# Patient Record
Sex: FEMALE | Race: BLACK OR AFRICAN AMERICAN | NOT HISPANIC OR LATINO | Employment: OTHER | URBAN - METROPOLITAN AREA
[De-identification: names, ages, dates, MRNs, and addresses within clinical notes are randomized per-mention and may not be internally consistent; named-entity substitution may affect disease eponyms.]

---

## 2017-01-11 ENCOUNTER — LAB VISIT (OUTPATIENT)
Dept: LAB | Facility: HOSPITAL | Age: 72
End: 2017-01-11
Attending: NUCLEAR MEDICINE
Payer: MEDICARE

## 2017-01-11 DIAGNOSIS — D35.00 PHEOCHROMOCYTOMA: Primary | ICD-10-CM

## 2017-01-11 LAB
ALBUMIN SERPL BCP-MCNC: 3.5 G/DL
ALP SERPL-CCNC: 96 U/L
ALT SERPL W/O P-5'-P-CCNC: 8 U/L
ANION GAP SERPL CALC-SCNC: 9 MMOL/L
AST SERPL-CCNC: 12 U/L
BASOPHILS # BLD AUTO: 0.01 K/UL
BASOPHILS NFR BLD: 0.3 %
BILIRUB SERPL-MCNC: 0.4 MG/DL
BUN SERPL-MCNC: 11 MG/DL
CALCIUM SERPL-MCNC: 9.3 MG/DL
CHLORIDE SERPL-SCNC: 108 MMOL/L
CO2 SERPL-SCNC: 25 MMOL/L
CREAT SERPL-MCNC: 0.9 MG/DL
DIFFERENTIAL METHOD: ABNORMAL
EOSINOPHIL # BLD AUTO: 0.2 K/UL
EOSINOPHIL NFR BLD: 4.6 %
ERYTHROCYTE [DISTWIDTH] IN BLOOD BY AUTOMATED COUNT: 14.7 %
EST. GFR  (AFRICAN AMERICAN): >60 ML/MIN/1.73 M^2
EST. GFR  (NON AFRICAN AMERICAN): >60 ML/MIN/1.73 M^2
GLUCOSE SERPL-MCNC: 89 MG/DL
HCT VFR BLD AUTO: 35.7 %
HGB BLD-MCNC: 11.5 G/DL
LYMPHOCYTES # BLD AUTO: 1 K/UL
LYMPHOCYTES NFR BLD: 27.4 %
MCH RBC QN AUTO: 29 PG
MCHC RBC AUTO-ENTMCNC: 32.2 %
MCV RBC AUTO: 90 FL
MONOCYTES # BLD AUTO: 0.5 K/UL
MONOCYTES NFR BLD: 12.4 %
NEUTROPHILS # BLD AUTO: 2.1 K/UL
NEUTROPHILS NFR BLD: 55.3 %
PLATELET # BLD AUTO: 238 K/UL
PMV BLD AUTO: 8.6 FL
POTASSIUM SERPL-SCNC: 4.2 MMOL/L
PROT SERPL-MCNC: 6.8 G/DL
RBC # BLD AUTO: 3.96 M/UL
SODIUM SERPL-SCNC: 142 MMOL/L
WBC # BLD AUTO: 3.72 K/UL

## 2017-01-11 PROCEDURE — 85025 COMPLETE CBC W/AUTO DIFF WBC: CPT

## 2017-01-11 PROCEDURE — 80053 COMPREHEN METABOLIC PANEL: CPT

## 2017-01-11 PROCEDURE — 36415 COLL VENOUS BLD VENIPUNCTURE: CPT

## 2017-01-17 DIAGNOSIS — C74.90 MALIGNANT PHEOCHROMOCYTOMA, UNSPECIFIED LATERALITY: Primary | ICD-10-CM

## 2017-02-01 ENCOUNTER — LAB VISIT (OUTPATIENT)
Dept: LAB | Facility: HOSPITAL | Age: 72
End: 2017-02-01
Attending: NUCLEAR MEDICINE
Payer: MEDICARE

## 2017-02-01 DIAGNOSIS — D35.00 PHEOCHROMOCYTOMA: Primary | ICD-10-CM

## 2017-02-01 LAB
ALBUMIN SERPL BCP-MCNC: 3.7 G/DL
ALP SERPL-CCNC: 104 U/L
ALT SERPL W/O P-5'-P-CCNC: 6 U/L
ANION GAP SERPL CALC-SCNC: 9 MMOL/L
AST SERPL-CCNC: 12 U/L
BASOPHILS # BLD AUTO: 0.01 K/UL
BASOPHILS NFR BLD: 0.2 %
BILIRUB SERPL-MCNC: 0.5 MG/DL
BUN SERPL-MCNC: 14 MG/DL
CALCIUM SERPL-MCNC: 9.7 MG/DL
CHLORIDE SERPL-SCNC: 106 MMOL/L
CO2 SERPL-SCNC: 25 MMOL/L
CREAT SERPL-MCNC: 0.9 MG/DL
DIFFERENTIAL METHOD: ABNORMAL
EOSINOPHIL # BLD AUTO: 0.2 K/UL
EOSINOPHIL NFR BLD: 4 %
ERYTHROCYTE [DISTWIDTH] IN BLOOD BY AUTOMATED COUNT: 14.6 %
EST. GFR  (AFRICAN AMERICAN): >60 ML/MIN/1.73 M^2
EST. GFR  (NON AFRICAN AMERICAN): >60 ML/MIN/1.73 M^2
GLUCOSE SERPL-MCNC: 91 MG/DL
HCT VFR BLD AUTO: 38.4 %
HGB BLD-MCNC: 12.4 G/DL
LYMPHOCYTES # BLD AUTO: 1.5 K/UL
LYMPHOCYTES NFR BLD: 27.2 %
MCH RBC QN AUTO: 29.3 PG
MCHC RBC AUTO-ENTMCNC: 32.3 %
MCV RBC AUTO: 91 FL
MONOCYTES # BLD AUTO: 0.3 K/UL
MONOCYTES NFR BLD: 5.7 %
NEUTROPHILS # BLD AUTO: 3.4 K/UL
NEUTROPHILS NFR BLD: 62.7 %
PLATELET # BLD AUTO: 238 K/UL
PMV BLD AUTO: 8.7 FL
POTASSIUM SERPL-SCNC: 4.3 MMOL/L
PROT SERPL-MCNC: 7 G/DL
RBC # BLD AUTO: 4.23 M/UL
SODIUM SERPL-SCNC: 140 MMOL/L
WBC # BLD AUTO: 5.48 K/UL

## 2017-02-01 PROCEDURE — 36415 COLL VENOUS BLD VENIPUNCTURE: CPT

## 2017-02-01 PROCEDURE — 86316 IMMUNOASSAY TUMOR OTHER: CPT

## 2017-02-01 PROCEDURE — 85025 COMPLETE CBC W/AUTO DIFF WBC: CPT

## 2017-02-01 PROCEDURE — 82542 COL CHROMOTOGRAPHY QUAL/QUAN: CPT

## 2017-02-01 PROCEDURE — 80053 COMPREHEN METABOLIC PANEL: CPT

## 2017-02-01 PROCEDURE — 83519 RIA NONANTIBODY: CPT

## 2017-02-01 PROCEDURE — 83519 RIA NONANTIBODY: CPT | Mod: 59

## 2017-02-06 ENCOUNTER — TELEPHONE (OUTPATIENT)
Dept: ENDOCRINOLOGY | Facility: CLINIC | Age: 72
End: 2017-02-06

## 2017-02-06 DIAGNOSIS — C74.91: Primary | ICD-10-CM

## 2017-02-06 LAB — CGA SERPL-MCNC: 21 NG/ML

## 2017-02-06 NOTE — TELEPHONE ENCOUNTER
----- Message from Neetu Diez sent at 2/6/2017  8:31 AM CST -----  Contact: Patient  Patient is requesting to complete labs at [facility] [place] location piror to 6/27/2017 follow up appointment.    Please call patient at [phone].

## 2017-02-10 LAB — PANCREASTATIN SERPL-MCNC: 98 PG/ML (ref 0–88)

## 2017-02-15 ENCOUNTER — LAB VISIT (OUTPATIENT)
Dept: LAB | Facility: HOSPITAL | Age: 72
End: 2017-02-15
Attending: NUCLEAR MEDICINE
Payer: MEDICARE

## 2017-02-15 DIAGNOSIS — C74.90 MALIGNANT PHEOCHROMOCYTOMA, UNSPECIFIED LATERALITY: ICD-10-CM

## 2017-02-15 LAB
ALBUMIN SERPL BCP-MCNC: 3.8 G/DL
ALP SERPL-CCNC: 107 U/L
ALT SERPL W/O P-5'-P-CCNC: 7 U/L
ANION GAP SERPL CALC-SCNC: 12 MMOL/L
AST SERPL-CCNC: 13 U/L
BASOPHILS # BLD AUTO: 0.02 K/UL
BASOPHILS NFR BLD: 0.4 %
BILIRUB SERPL-MCNC: 0.5 MG/DL
BUN SERPL-MCNC: 14 MG/DL
CALCIUM SERPL-MCNC: 9.8 MG/DL
CHLORIDE SERPL-SCNC: 105 MMOL/L
CO2 SERPL-SCNC: 23 MMOL/L
CREAT SERPL-MCNC: 0.8 MG/DL
DIFFERENTIAL METHOD: ABNORMAL
EOSINOPHIL # BLD AUTO: 0.2 K/UL
EOSINOPHIL NFR BLD: 3 %
ERYTHROCYTE [DISTWIDTH] IN BLOOD BY AUTOMATED COUNT: 14.6 %
EST. GFR  (AFRICAN AMERICAN): >60 ML/MIN/1.73 M^2
EST. GFR  (NON AFRICAN AMERICAN): >60 ML/MIN/1.73 M^2
GLUCOSE SERPL-MCNC: 82 MG/DL
HCT VFR BLD AUTO: 38.7 %
HGB BLD-MCNC: 12.6 G/DL
LYMPHOCYTES # BLD AUTO: 1.9 K/UL
LYMPHOCYTES NFR BLD: 33.4 %
MCH RBC QN AUTO: 29.4 PG
MCHC RBC AUTO-ENTMCNC: 32.6 %
MCV RBC AUTO: 90 FL
MONOCYTES # BLD AUTO: 0.6 K/UL
MONOCYTES NFR BLD: 10.4 %
NEUTROPHILS # BLD AUTO: 3 K/UL
NEUTROPHILS NFR BLD: 52.8 %
PLATELET # BLD AUTO: 250 K/UL
PMV BLD AUTO: 8.9 FL
POTASSIUM SERPL-SCNC: 3.8 MMOL/L
PROT SERPL-MCNC: 7.3 G/DL
RBC # BLD AUTO: 4.29 M/UL
SODIUM SERPL-SCNC: 140 MMOL/L
WBC # BLD AUTO: 5.66 K/UL

## 2017-02-15 PROCEDURE — 36415 COLL VENOUS BLD VENIPUNCTURE: CPT

## 2017-02-15 PROCEDURE — 85025 COMPLETE CBC W/AUTO DIFF WBC: CPT

## 2017-02-15 PROCEDURE — 80053 COMPREHEN METABOLIC PANEL: CPT

## 2017-02-16 LAB — NEUROKININ A SERPL-MCNC: 18 PG/ML

## 2017-02-22 ENCOUNTER — HOSPITAL ENCOUNTER (OUTPATIENT)
Dept: RADIOLOGY | Facility: HOSPITAL | Age: 72
Discharge: HOME | End: 2017-02-22
Attending: NUCLEAR MEDICINE
Payer: MEDICARE

## 2017-02-22 DIAGNOSIS — C74.90 PHEOCHROMOCYTOMA, MALIGNANT, UNSPECIFIED LATERALITY: ICD-10-CM

## 2017-02-22 PROCEDURE — 74178 CT ABD&PLV WO CNTR FLWD CNTR: CPT | Mod: 26,,, | Performed by: RADIOLOGY

## 2017-02-22 PROCEDURE — 74178 CT ABD&PLV WO CNTR FLWD CNTR: CPT | Mod: TC

## 2017-02-22 PROCEDURE — 25500020 PHARM REV CODE 255: Performed by: NUCLEAR MEDICINE

## 2017-02-22 RX ADMIN — IOHEXOL 75 ML: 350 INJECTION, SOLUTION INTRAVENOUS at 10:02

## 2017-02-22 RX ADMIN — IOHEXOL 30 ML: 350 INJECTION, SOLUTION INTRAVENOUS at 08:02

## 2017-02-23 ENCOUNTER — HOSPITAL ENCOUNTER (OUTPATIENT)
Dept: RADIOLOGY | Facility: HOSPITAL | Age: 72
Discharge: HOME | End: 2017-02-23
Attending: NUCLEAR MEDICINE
Payer: MEDICARE

## 2017-02-23 DIAGNOSIS — C74.90 PHEOCHROMOCYTOMA, MALIGNANT, UNSPECIFIED LATERALITY: ICD-10-CM

## 2017-02-23 DIAGNOSIS — D35.00 PHEOCHROMOCYTOMA, UNSPECIFIED LATERALITY: Primary | ICD-10-CM

## 2017-02-23 PROCEDURE — 78803 RP LOCLZJ TUM SPECT 1 AREA: CPT | Mod: TC

## 2017-02-23 PROCEDURE — 78804 RP LOCLZJ TUM WHBDY 2+D IMG: CPT | Mod: 26,,, | Performed by: RADIOLOGY

## 2017-02-23 PROCEDURE — 78803 RP LOCLZJ TUM SPECT 1 AREA: CPT | Mod: 26,,, | Performed by: RADIOLOGY

## 2017-02-24 ENCOUNTER — HOSPITAL ENCOUNTER (OUTPATIENT)
Dept: RADIOLOGY | Facility: HOSPITAL | Age: 72
Discharge: HOME | End: 2017-02-24
Attending: NUCLEAR MEDICINE
Payer: MEDICARE

## 2017-02-24 DIAGNOSIS — D35.00 PHEOCHROMOCYTOMA, UNSPECIFIED LATERALITY: ICD-10-CM

## 2017-02-24 PROCEDURE — 78804 RP LOCLZJ TUM WHBDY 2+D IMG: CPT | Mod: TC

## 2017-02-24 PROCEDURE — 78803 RP LOCLZJ TUM SPECT 1 AREA: CPT | Mod: 26,,, | Performed by: RADIOLOGY

## 2017-02-24 PROCEDURE — 78803 RP LOCLZJ TUM SPECT 1 AREA: CPT | Mod: TC

## 2017-02-24 PROCEDURE — A4641 RADIOPHARM DX AGENT NOC: HCPCS

## 2017-03-01 LAB — 5-HIAA, PLASMA (NEUROEND): 11 NG/ML

## 2017-03-06 RX ORDER — BLOOD-GLUCOSE METER
EACH MISCELLANEOUS
Qty: 1 EACH | Refills: 0 | Status: SHIPPED | OUTPATIENT
Start: 2017-03-06

## 2017-05-09 RX ORDER — HYDROCORTISONE 20 MG/1
TABLET ORAL
Qty: 135 TABLET | Refills: 2 | Status: SHIPPED | OUTPATIENT
Start: 2017-05-09 | End: 2017-12-08 | Stop reason: SDUPTHER

## 2017-06-20 ENCOUNTER — OFFICE VISIT (OUTPATIENT)
Dept: OPTOMETRY | Facility: CLINIC | Age: 72
End: 2017-06-20
Payer: MEDICARE

## 2017-06-20 ENCOUNTER — LAB VISIT (OUTPATIENT)
Dept: LAB | Facility: HOSPITAL | Age: 72
End: 2017-06-20
Attending: INTERNAL MEDICINE
Payer: MEDICARE

## 2017-06-20 DIAGNOSIS — H52.4 HYPEROPIA WITH PRESBYOPIA, BILATERAL: ICD-10-CM

## 2017-06-20 DIAGNOSIS — H52.03 HYPEROPIA WITH PRESBYOPIA, BILATERAL: ICD-10-CM

## 2017-06-20 DIAGNOSIS — H25.13 NUCLEAR SCLEROSIS, BILATERAL: ICD-10-CM

## 2017-06-20 DIAGNOSIS — E11.9 TYPE 2 DIABETES MELLITUS WITHOUT OPHTHALMIC MANIFESTATIONS: Primary | ICD-10-CM

## 2017-06-20 DIAGNOSIS — C74.91: ICD-10-CM

## 2017-06-20 PROCEDURE — 83835 ASSAY OF METANEPHRINES: CPT

## 2017-06-20 PROCEDURE — 36415 COLL VENOUS BLD VENIPUNCTURE: CPT

## 2017-06-20 PROCEDURE — 92015 DETERMINE REFRACTIVE STATE: CPT | Mod: S$GLB,,, | Performed by: OPTOMETRIST

## 2017-06-20 PROCEDURE — 99999 PR PBB SHADOW E&M-EST. PATIENT-LVL II: CPT | Mod: PBBFAC,,, | Performed by: OPTOMETRIST

## 2017-06-20 PROCEDURE — 92004 COMPRE OPH EXAM NEW PT 1/>: CPT | Mod: S$GLB,,, | Performed by: OPTOMETRIST

## 2017-06-20 NOTE — PROGRESS NOTES
"HPI     Today is patient's first eye exam. Using +2.50 OTC readers "all the time"    When reading and watching tv. Doesn't wear them when driving.   Occasionally sees floater OD.  Patient denies diplopia, headaches, flashes, and pain.    Hemoglobin A1C       Date                     Value               Ref Range             Status                12/20/2016               5.4                 4.5 - 6.2 %           Final                  Last edited by Johanna Jensen on 6/20/2017  8:29 AM. (History)        ROS     Negative for: Constitutional, Gastrointestinal, Neurological, Skin,   Genitourinary, Musculoskeletal, HENT, Endocrine, Cardiovascular, Eyes,   Respiratory, Psychiatric, Allergic/Imm, Heme/Lymph    Last edited by Mei Hunter, OD on 6/20/2017  9:27 AM. (History)        Assessment /Plan     For exam results, see Encounter Report.    Type 2 diabetes mellitus without ophthalmic manifestations    Nuclear sclerosis, bilateral    Hyperopia with presbyopia, bilateral            1. No retinopathy--monitor yearly.  Educated pt eye health correlates with blood sugar control.    2. Educated on cataracts and affects on vision.  Monitor.  3. Bifocal rx given        RTC 1 year for dm exam.                   "

## 2017-06-24 LAB
METANEPH FREE SERPL-MCNC: <25 PG/ML
METANEPHS SERPL-MCNC: 1350 PG/ML
NORMETANEPH FREE SERPL-MCNC: 1350 PG/ML

## 2017-06-27 ENCOUNTER — OFFICE VISIT (OUTPATIENT)
Dept: ENDOCRINOLOGY | Facility: CLINIC | Age: 72
End: 2017-06-27
Payer: MEDICARE

## 2017-06-27 VITALS
BODY MASS INDEX: 33.38 KG/M2 | WEIGHT: 200.38 LBS | DIASTOLIC BLOOD PRESSURE: 80 MMHG | HEIGHT: 65 IN | HEART RATE: 62 BPM | SYSTOLIC BLOOD PRESSURE: 132 MMHG

## 2017-06-27 DIAGNOSIS — E11.9 CONTROLLED TYPE 2 DIABETES MELLITUS WITHOUT COMPLICATION, WITHOUT LONG-TERM CURRENT USE OF INSULIN: ICD-10-CM

## 2017-06-27 DIAGNOSIS — C74.91: Primary | ICD-10-CM

## 2017-06-27 DIAGNOSIS — E27.40 HYPOADRENALISM: ICD-10-CM

## 2017-06-27 PROCEDURE — 1125F AMNT PAIN NOTED PAIN PRSNT: CPT | Mod: S$GLB,,, | Performed by: INTERNAL MEDICINE

## 2017-06-27 PROCEDURE — 1159F MED LIST DOCD IN RCRD: CPT | Mod: S$GLB,,, | Performed by: INTERNAL MEDICINE

## 2017-06-27 PROCEDURE — 99214 OFFICE O/P EST MOD 30 MIN: CPT | Mod: S$GLB,,, | Performed by: INTERNAL MEDICINE

## 2017-06-27 PROCEDURE — 4010F ACE/ARB THERAPY RXD/TAKEN: CPT | Mod: S$GLB,,, | Performed by: INTERNAL MEDICINE

## 2017-06-27 PROCEDURE — 3044F HG A1C LEVEL LT 7.0%: CPT | Mod: S$GLB,,, | Performed by: INTERNAL MEDICINE

## 2017-06-27 PROCEDURE — 99999 PR PBB SHADOW E&M-EST. PATIENT-LVL III: CPT | Mod: PBBFAC,,, | Performed by: INTERNAL MEDICINE

## 2017-06-27 NOTE — PROGRESS NOTES
CC: f/u for pheochromocytoma    HPI: 66 year old female s/p bilat adrenalectomy  and found to have pheochromocytoma.  Current meds: hydrocortisone 20mg qam, 10qpm, fludrocortisone 0.15mg daily.   Patient has lower back pain for 2 months and still has dyspnea on exertion.  Blood pressure is well controlled.  No palpitations, anxiety, pallor. Has low back pain and left knee pain.  Low back pain x 2 weeks without known injury.  She ran out of hydrocortisone 2 weks ago.  Pt was treated with I-131 labeled MIBG at [place] on 3/16/2016 and on 2016, .. She received 200 mCi of I 131 x 3.. Thyroid was blocked with SSKI.  BP good at home. She is taking losartin 50 mg/day and Nifedical 60 mg/day.    PAST MEDICAL HISTORY: Surgery, appendectomy, bilateral adrenalectomy.   Medical illnesses, longstanding hypertension.     FAMILY HISTORY: Father  at 79, heart disease. Mother  at 88,   heart disease.     SOCIAL HISTORY: Does not drink or smoke.    ROS:   Constitutional: No recent significant weight change  Eyes: No recent visual changes  ENT: No dysphagia  Cardiovascular: intermittent chest pain+  Respiratory: IGNACIO+  Gastrointestinal: Denies recent bowel disturbances  GenitoUrinary - No dysuria  Skin: No new skin rash  Neurologic: No focal neurologic complaints. Has low back pain X several months radiating both legs..  All other systems reviewed and are negative.    PE: GENERAL: Well developed, well nourished.  BMI 33.35 Weight 200.4 pounds /80  PSYCH: AAOx3, appropriate mood and affect, pleasant expression  EYES: Conjunctiva, corneas clear  NECK: Supple, trachea midline, no thyromegaly or nodules  CHEST: Resp even and unlabored, CTA bilateral.  CARDIAC: RRR, S1, S2 heard, no murmurs   ABDOMEN: Soft, non-tender, non-distended; +BS  VASCULAR: DP pulses +2/4 bilaterally, no edema  NEURO: Gait steady, CN II-VII grossly intact, 5/5 strength major flexors/extensors.  SKIN: no acanthosis  mirs.    Results for orders placed or performed in visit on 06/20/17   METANEPHRINES, PLASMA FREE   Result Value Ref Range    Metanephrine, Free <25 < OR = 57 pg/mL    Metanephrine, Total, Plasma 1350 (H) < OR = 205 pg/mL    Normetanephrine, Free 1350 (H) < OR = 148 pg/mL     IMPRESSION: Bilateral adrenalectomy  Metastatic pheochromocytoma  Hypertension  Low back pain.  Type 2 diabetes. Controlled  Normetanephrines 1350 very high.    RECOMMENDATIONS:Will let Dr Cruz know.

## 2017-06-28 ENCOUNTER — TELEPHONE (OUTPATIENT)
Dept: NEUROLOGY | Facility: HOSPITAL | Age: 72
End: 2017-06-28

## 2017-06-28 NOTE — TELEPHONE ENCOUNTER
----- Message from Lani Fields sent at 6/28/2017 10:48 AM CDT -----  Contact: Patient  CAMP- Patients alternate number  [phone]

## 2017-06-28 NOTE — TELEPHONE ENCOUNTER
----- Message from Lani Fields sent at 6/28/2017 10:45 AM CDT -----  Contact: Patient  CAMP- Patient regarding high level blood cmp and metanephrine. Dr Burton recommended that patient call Dr. Martines. Patients contact number [phone]

## 2017-06-28 NOTE — TELEPHONE ENCOUNTER
Phoned Dr Martines with the recent lab results.  He is trying to get in touch with Dr Burton to discuss the next steps in patient's plan of care.  Returned call to patient and informed her of this and stated that either myself or someone from Dr Burton office will be back in touch with her to discuss what is next in her treatment.  She verbalized understansding.

## 2017-07-06 ENCOUNTER — TELEPHONE (OUTPATIENT)
Dept: ENDOCRINOLOGY | Facility: CLINIC | Age: 72
End: 2017-07-06

## 2017-07-06 DIAGNOSIS — C74.91: Primary | ICD-10-CM

## 2017-07-06 NOTE — TELEPHONE ENCOUNTER
----- Message from Deborah Cox sent at 7/6/2017 11:57 AM CDT -----  Contact: Claudia /  [phone]  Claudia has some questions to ask Dr. Burton. Claudia can be reached at [phone].

## 2017-07-17 ENCOUNTER — TELEPHONE (OUTPATIENT)
Dept: ENDOCRINOLOGY | Facility: CLINIC | Age: 72
End: 2017-07-17

## 2017-07-17 NOTE — TELEPHONE ENCOUNTER
----- Message from Ursula Kahn sent at 7/17/2017 11:10 AM CDT -----  Contact: Pt's Sister Claudia Washburn [phone]  Pt's Sister Claudia Washburn called to speak to the nurse regarding the pt's care and appt for testing and would like a call back.    Ms Washburn can be reached at [phone].    Thanks

## 2017-07-24 ENCOUNTER — TELEPHONE (OUTPATIENT)
Dept: RADIOLOGY | Facility: HOSPITAL | Age: 72
End: 2017-07-24

## 2017-07-25 ENCOUNTER — HOSPITAL ENCOUNTER (OUTPATIENT)
Dept: RADIOLOGY | Facility: HOSPITAL | Age: 72
Discharge: HOME | End: 2017-07-25
Attending: INTERNAL MEDICINE
Payer: MEDICARE

## 2017-07-25 DIAGNOSIS — C74.91: ICD-10-CM

## 2017-07-25 PROCEDURE — 78804 RP LOCLZJ TUM WHBDY 2+D IMG: CPT | Mod: 26,,, | Performed by: NUCLEAR MEDICINE

## 2017-07-25 RX ORDER — METFORMIN HYDROCHLORIDE 500 MG/1
TABLET, EXTENDED RELEASE ORAL
Qty: 180 TABLET | Refills: 2 | Status: SHIPPED | OUTPATIENT
Start: 2017-07-25 | End: 2018-02-23 | Stop reason: SDUPTHER

## 2017-07-26 ENCOUNTER — HOSPITAL ENCOUNTER (OUTPATIENT)
Dept: RADIOLOGY | Facility: HOSPITAL | Age: 72
Discharge: HOME | End: 2017-07-26
Attending: INTERNAL MEDICINE
Payer: MEDICARE

## 2017-07-26 PROCEDURE — 78804 RP LOCLZJ TUM WHBDY 2+D IMG: CPT | Mod: TC

## 2017-07-26 PROCEDURE — A4641 RADIOPHARM DX AGENT NOC: HCPCS

## 2017-07-31 ENCOUNTER — TELEPHONE (OUTPATIENT)
Dept: ENDOCRINOLOGY | Facility: CLINIC | Age: 72
End: 2017-07-31

## 2017-07-31 DIAGNOSIS — C74.91: Primary | ICD-10-CM

## 2017-07-31 NOTE — TELEPHONE ENCOUNTER
Please call patient about results. And let me know if we have to see her again. We saw her in June. You didn't tell me to see her again so soon.

## 2017-07-31 NOTE — TELEPHONE ENCOUNTER
----- Message from Laxmi Emerson sent at 7/31/2017  8:39 AM CDT -----  Contact: pt's sister  Claudia Washburn   Sister    Ph [phone] --    Pt is still sick   And can hardly work    Dr Daniel Martines spoke to Dr Burton about pt   -     Tumor    1)   Needs test results    2) pt was suppose be seen as soon as possible     Please call sister     Thanks

## 2017-08-17 ENCOUNTER — OFFICE VISIT (OUTPATIENT)
Dept: SURGERY | Facility: CLINIC | Age: 72
End: 2017-08-17
Payer: MEDICARE

## 2017-08-17 VITALS
WEIGHT: 201.94 LBS | HEIGHT: 65 IN | DIASTOLIC BLOOD PRESSURE: 77 MMHG | TEMPERATURE: 98 F | SYSTOLIC BLOOD PRESSURE: 125 MMHG | BODY MASS INDEX: 33.65 KG/M2 | HEART RATE: 66 BPM

## 2017-08-17 DIAGNOSIS — C74.91: ICD-10-CM

## 2017-08-17 DIAGNOSIS — D35.00 PHEOCHROMOCYTOMA, UNSPECIFIED LATERALITY: Primary | ICD-10-CM

## 2017-08-17 PROCEDURE — 1126F AMNT PAIN NOTED NONE PRSNT: CPT | Mod: S$GLB,,, | Performed by: SURGERY

## 2017-08-17 PROCEDURE — 1159F MED LIST DOCD IN RCRD: CPT | Mod: S$GLB,,, | Performed by: SURGERY

## 2017-08-17 PROCEDURE — 3074F SYST BP LT 130 MM HG: CPT | Mod: S$GLB,,, | Performed by: SURGERY

## 2017-08-17 PROCEDURE — 99999 PR PBB SHADOW E&M-EST. PATIENT-LVL III: CPT | Mod: PBBFAC,,, | Performed by: SURGERY

## 2017-08-17 PROCEDURE — 3078F DIAST BP <80 MM HG: CPT | Mod: S$GLB,,, | Performed by: SURGERY

## 2017-08-17 PROCEDURE — 99213 OFFICE O/P EST LOW 20 MIN: CPT | Mod: S$GLB,,, | Performed by: SURGERY

## 2017-08-17 PROCEDURE — 3008F BODY MASS INDEX DOCD: CPT | Mod: S$GLB,,, | Performed by: SURGERY

## 2017-08-17 NOTE — Clinical Note
F/u with me 1 week after CT scans to discuss scheduling surgery. Cayla, please present at the next endocrine conference with diagnosis of malignant pheochromocytoma.

## 2017-08-17 NOTE — LETTER
[facility] - General Surgery  [address]  [facility] [address]  Phone: [phone] August 19, 2017      Daniel Martines Jr., MD  [address]  [address]  [facility] [address]    Patient: Hailey Sawant   MR Number: [id number]   YOB: 1945   Date of Visit: 8/17/2017     Dear Dr. Martines:    Thank you for referring Hailey Sawant to me for evaluation.     The patient is well known to me from [facility] [place] from between [facility].  I had performed bilateral adrenalectomies on her in the past and she developed malignant metastatic pheochromocytoma to the humerus treated with radioactive labeled MIBG tracer for ablation.     Recently her metanephrines and catecholamines have increased.  She had a nuclear medicine MIBG study which revealed disease in the right adrenal bed near the superior pole of the right kidney and right para-aortic region. There was no uptake in the humerus.  With her still requiring 3 medications for hypertension control and control of the distant disease she is being referred to consider palliative resection of the right adrenal bed and right para-aortic lymph node since the metastatic disease appears to be well controlled with previous radioactive MIBG ablation.    The patient would consider repeat surgery which would need to be an open approach.  I would like to repeat a CT scan of the chest abdomen and pelvis with oral and IV contrast to assess the extent of the disease before entertaining the surgery.    I would like to get the CT scan of the chest abdomen and pelvis and then re-present her at our multidisciplinary endocrine conference. I would like to get a baseline 24-hour urine for catecholamines and metanephrines since the recent increase have been serum values and we do not have a more confirmatory of urinary valuation in the record.  She denies any weight loss at the present time.     She has had about a one-month history of low back pain and  left abdominal cramping but denies any other symptoms. She will follow-up with me 1 week after the CT scans to discuss surgical scheduling for palliative resection of the residual/persistent disease at the right adrenal bed at the superior aspect of the right kidney as well as the para-aortic right-sided lymphadenopathy seen on MIBG scanning to hopefully help better control her hypertension which is currently being controlled with 3 medications.  The patient is also on chronic steroid replacement due to the history of bilateral adrenalectomies.    Time spent with the patient today and her sister was 30 minutes with greater than 50% of time in counseling.  She would need a full bowel prep for the surgery as well as type and cross match for 2 units of blood.    If you have questions, please do not hesitate to call me. I look forward to following Hailey Sawant along with you.    Sincerely,      Sandoval Castillo MD  Medical Director, [facility]  Section of General and Oncologic Surgery  [facility]    RLC/hcr

## 2017-08-19 NOTE — PROGRESS NOTES
Progress Notes        CC: f/u for pheochromocytoma    HPI: 66 year old female s/p bilat adrenalectomy  and found to have pheochromocytoma.  Current meds: hydrocortisone 20mg qam, 10qpm, fludrocortisone 0.15mg daily.   Patient has lower back pain for 2 months and still has dyspnea on exertion.  Blood pressure is well controlled.  No palpitations, anxiety, pallor. Has low back pain and left knee pain.  Low back pain x 2 weeks without known injury.  She ran out of hydrocortisone 2 weks ago.  Pt was treated with I-131 labeled MIBG at [place] on 3/16/2016 and on 2016, 113.. She received 200 mCi of I 131 x 3.. Thyroid was blocked with SSKI.  BP good at home. She is taking losartin 50 mg/day and Nifedical 60 mg/day.    PAST MEDICAL HISTORY: Surgery, appendectomy, bilateral adrenalectomy.   Medical illnesses, longstanding hypertension.     FAMILY HISTORY: Father  at 79, heart disease. Mother  at 88,   heart disease.     SOCIAL HISTORY: Does not drink or smoke.    ROS:   Constitutional: No recent significant weight change  Eyes: No recent visual changes  ENT: No dysphagia  Cardiovascular: intermittent chest pain+  Respiratory: IGNACIO+  Gastrointestinal: Denies recent bowel disturbances  GenitoUrinary - No dysuria  Skin: No new skin rash  Neurologic: No focal neurologic complaints. Has low back pain X several months radiating both legs..  All other systems reviewed and are negative.    PE: GENERAL: Well developed, well nourished.  BMI 33.35 Weight 200.4 pounds /80  PSYCH: AAOx3, appropriate mood and affect, pleasant expression  EYES: Conjunctiva, corneas clear  NECK: Supple, trachea midline, no thyromegaly or nodules  CHEST: Resp even and unlabored, CTA bilateral.  CARDIAC: RRR, S1, S2 heard, no murmurs   ABDOMEN: Soft, non-tender, non-distended; +BS  VASCULAR: DP pulses +2/4 bilaterally, no edema  NEURO: Gait steady, CN II-VII grossly intact, 5/5 strength major flexors/extensors.  SKIN: no  acanthosis nigracans.         Results for orders placed or performed in visit on 06/20/17   METANEPHRINES, PLASMA FREE   Result Value Ref Range     Metanephrine, Free <25 < OR = 57 pg/mL     Metanephrine, Total, Plasma 1350 (H) < OR = 205 pg/mL     Normetanephrine, Free 1350 (H) < OR = 148 pg/mL      IMPRESSION: Bilateral adrenalectomy  Metastatic pheochromocytoma  Hypertension  Low back pain.  Type 2 diabetes. Controlled  Normetanephrines 1350 very high.        The patient is well known to me from [facility], [place] from between [facility].  I had performed bilateral adrenalectomies on her in the past and she developed malignant metastatic pheochromocytoma to the humerus treated with radioactive labeled MIBG tracer for ablation.    Recently her metanephrines and catecholamines have increased.  She had a nuclear medicine MIBG study which revealed disease in the right adrenal bed near the superior pole of the right kidney and right para-aortic region.  There was no uptake in the humerus.  With her still requiring 3 medications for hypertension control and control of the distant disease she is being referred to consider palliative resection of the right adrenal bed and right para-aortic lymph node since the metastatic disease appears to be well controlled with previous radioactive MIBG ablation.  The patient would consider repeat surgery which would need to be an open approach.  I would like to repeat a CT scan of the chest abdomen and pelvis with oral and IV contrast to assess the extent of the disease before entertaining the surgery.  I would like to get the CT scan of the chest abdomen and pelvis and then re-present her at our multidisciplinary endocrine conference.  I would like to get a baseline 24-hour urine for catecholamines and metanephrines since the recent increase have been serum values and we do not have a more conference of urinary valuation the record  She denies any weight loss at the present  time.  She's had about a one-month history of low back pain and left abdominal cramping but denies any other symptoms.  She will follow-up with me progress in 1 week after the CT scans to discuss surgical scheduling for palliative resection of the residual/persistent disease at the right adrenal bed at the superior aspect of the right kidney as well as the para-aortic right-sided lymphadenopathy seen on MIBG scanning to hopefully help better control her hypertension which is currently being controlled with 3 medications.  The patient is also on chronic steroid replacement due to the history of bilateral adrenalectomies.  Time spent with the patient today and her sister was 30 minutes with greater than 50% of time in counseling.  She would need a full bowel prep for the surgery as well as type and cross match for 2 units of blood.

## 2017-08-21 ENCOUNTER — LAB VISIT (OUTPATIENT)
Dept: LAB | Facility: HOSPITAL | Age: 72
End: 2017-08-21
Attending: SURGERY
Payer: MEDICARE

## 2017-08-21 DIAGNOSIS — D35.00 PHEOCHROMOCYTOMA, UNSPECIFIED LATERALITY: ICD-10-CM

## 2017-08-21 PROCEDURE — 82384 ASSAY THREE CATECHOLAMINES: CPT

## 2017-08-21 PROCEDURE — 83835 ASSAY OF METANEPHRINES: CPT

## 2017-08-24 ENCOUNTER — TELEPHONE (OUTPATIENT)
Dept: RADIOLOGY | Facility: HOSPITAL | Age: 72
End: 2017-08-24

## 2017-08-26 ENCOUNTER — HOSPITAL ENCOUNTER (OUTPATIENT)
Dept: RADIOLOGY | Facility: HOSPITAL | Age: 72
Discharge: HOME | End: 2017-08-26
Attending: SURGERY
Payer: MEDICARE

## 2017-08-26 DIAGNOSIS — D35.00 PHEOCHROMOCYTOMA, UNSPECIFIED LATERALITY: ICD-10-CM

## 2017-08-26 LAB
CATECHOLS UR-IMP: ABNORMAL
COLLECT DURATION TIME SPEC: 24 HR
COLLECT DURATION TIME SPEC: 24 HR
CREAT 24H UR-MRATE: 1314 MG/D (ref 500–1400)
CREAT 24H UR-MRATE: 1314 MG/D (ref 500–1400)
CREAT UR-MCNC: 37 MG/DL
CREAT UR-MCNC: 37 MG/DL
DOPAMINE 24H UR-MRATE: 288 UG/D (ref 56–272)
DOPAMINE UR-MCNC: 81 UG/L
DOPAMINE/CREAT UR: 219 UG/G CRT (ref 0–250)
EPINEPH 24H UR-MRATE: <4 UG/D (ref 1–5)
EPINEPH UR-MCNC: <1 UG/L
EPINEPH/CREAT UR: <3 UG/G CRT (ref 0–20)
METANEPH 24H UR-MCNC: 12 UG/L
METANEPH 24H UR-MRATE: 43 UG/D (ref 39–143)
METANEPH+NORMETANEPH UR-IMP: ABNORMAL
METANEPH/CREAT 24H UR: 32 UG/G CRT (ref 0–300)
NOREPINEPH 24H UR-MRATE: 99 UG/D (ref 11–60)
NOREPINEPH UR-MCNC: 28 UG/L
NOREPINEPH/CREAT UR: 76 UG/G CRT (ref 0–45)
NORMETANEPHRINE 24H UR-MCNC: 881 UG/L
NORMETANEPHRINE 24H UR-MRATE: 3128 UG/D (ref 109–393)
NORMETANEPHRINE/CREAT 24H UR: 2381 UG/G CRT (ref 0–400)
SPECIMEN VOL ?TM UR: 3550 ML
SPECIMEN VOL ?TM UR: 3550 ML

## 2017-08-26 PROCEDURE — 71260 CT THORAX DX C+: CPT | Mod: TC

## 2017-08-26 PROCEDURE — 25500020 PHARM REV CODE 255: Performed by: SURGERY

## 2017-08-26 PROCEDURE — 74177 CT ABD & PELVIS W/CONTRAST: CPT | Mod: 26,,, | Performed by: RADIOLOGY

## 2017-08-26 PROCEDURE — 71260 CT THORAX DX C+: CPT | Mod: 26,,, | Performed by: RADIOLOGY

## 2017-08-26 PROCEDURE — 74177 CT ABD & PELVIS W/CONTRAST: CPT | Mod: TC

## 2017-08-26 RX ADMIN — IOHEXOL 15 ML: 350 INJECTION, SOLUTION INTRAVENOUS at 08:08

## 2017-08-26 RX ADMIN — IOHEXOL 15 ML: 350 INJECTION, SOLUTION INTRAVENOUS at 09:08

## 2017-08-26 RX ADMIN — IOHEXOL 100 ML: 350 INJECTION, SOLUTION INTRAVENOUS at 09:08

## 2017-08-28 LAB
CREAT SERPL-MCNC: 0.8 MG/DL (ref 0.5–1.4)
SAMPLE: NORMAL

## 2017-08-31 ENCOUNTER — OFFICE VISIT (OUTPATIENT)
Dept: SURGERY | Facility: CLINIC | Age: 72
End: 2017-08-31
Payer: MEDICARE

## 2017-08-31 VITALS
TEMPERATURE: 98 F | HEART RATE: 61 BPM | DIASTOLIC BLOOD PRESSURE: 66 MMHG | SYSTOLIC BLOOD PRESSURE: 126 MMHG | WEIGHT: 204.25 LBS | HEIGHT: 65 IN | BODY MASS INDEX: 34.03 KG/M2

## 2017-08-31 DIAGNOSIS — C74.91: ICD-10-CM

## 2017-08-31 DIAGNOSIS — Z01.818 PREOP TESTING: Primary | ICD-10-CM

## 2017-08-31 PROCEDURE — 3074F SYST BP LT 130 MM HG: CPT | Mod: S$GLB,,, | Performed by: SURGERY

## 2017-08-31 PROCEDURE — 3008F BODY MASS INDEX DOCD: CPT | Mod: S$GLB,,, | Performed by: SURGERY

## 2017-08-31 PROCEDURE — 99999 PR PBB SHADOW E&M-EST. PATIENT-LVL III: CPT | Mod: PBBFAC,,, | Performed by: SURGERY

## 2017-08-31 PROCEDURE — 3078F DIAST BP <80 MM HG: CPT | Mod: S$GLB,,, | Performed by: SURGERY

## 2017-08-31 PROCEDURE — 99214 OFFICE O/P EST MOD 30 MIN: CPT | Mod: S$GLB,,, | Performed by: SURGERY

## 2017-08-31 PROCEDURE — 1159F MED LIST DOCD IN RCRD: CPT | Mod: S$GLB,,, | Performed by: SURGERY

## 2017-08-31 PROCEDURE — 1125F AMNT PAIN NOTED PAIN PRSNT: CPT | Mod: S$GLB,,, | Performed by: SURGERY

## 2017-08-31 RX ORDER — DOXAZOSIN 2 MG/1
2 TABLET ORAL DAILY
Qty: 30 TABLET | Refills: 0 | Status: SHIPPED | OUTPATIENT
Start: 2017-08-31 | End: 2017-11-09

## 2017-08-31 NOTE — PROGRESS NOTES
Progress Notes        CC: f/u for pheochromocytoma    HPI: 66 year old female s/p bilat adrenalectomy  and found to have pheochromocytoma.  Current meds: hydrocortisone 20mg qam, 10qpm, fludrocortisone 0.15mg daily.   Patient has lower back pain for 2 months and still has dyspnea on exertion.  Blood pressure is well controlled.  No palpitations, anxiety, pallor. Has low back pain and left knee pain.  Low back pain x 2 weeks without known injury.  She ran out of hydrocortisone 2 weks ago.  Pt was treated with I-131 labeled MIBG at [place] on 3/16/2016 and on 2016, 113.. She received 200 mCi of I 131 x 3.. Thyroid was blocked with SSKI.  BP good at home. She is taking losartin 50 mg/day and Nifedical 60 mg/day.    PAST MEDICAL HISTORY: Surgery, appendectomy, bilateral adrenalectomy.   Medical illnesses, longstanding hypertension.     FAMILY HISTORY: Father  at 79, heart disease. Mother  at 88,   heart disease.     SOCIAL HISTORY: Does not drink or smoke.    ROS:   Constitutional: No recent significant weight change  Eyes: No recent visual changes  ENT: No dysphagia  Cardiovascular: intermittent chest pain+  Respiratory: IGNACIO+  Gastrointestinal: Denies recent bowel disturbances  GenitoUrinary - No dysuria  Skin: No new skin rash  Neurologic: No focal neurologic complaints. Has low back pain X several months radiating both legs..  All other systems reviewed and are negative.    PE: GENERAL: Well developed, well nourished.  BMI 33.35 Weight 200.4 pounds /80  PSYCH: AAOx3, appropriate mood and affect, pleasant expression  EYES: Conjunctiva, corneas clear  NECK: Supple, trachea midline, no thyromegaly or nodules  CHEST: Resp even and unlabored, CTA bilateral.  CARDIAC: RRR, S1, S2 heard, no murmurs   ABDOMEN: Soft, non-tender, non-distended; +BS  VASCULAR: DP pulses +2/4 bilaterally, no edema  NEURO: Gait steady, CN II-VII grossly intact, 5/5 strength major flexors/extensors.  SKIN: no  acanthosis nigracans.         Results for orders placed or performed in visit on 06/20/17   METANEPHRINES, PLASMA FREE   Result Value Ref Range     Metanephrine, Free <25 < OR = 57 pg/mL     Metanephrine, Total, Plasma 1350 (H) < OR = 205 pg/mL     Normetanephrine, Free 1350 (H) < OR = 148 pg/mL      IMPRESSION: Bilateral adrenalectomy  Metastatic pheochromocytoma  Hypertension  Low back pain.  Type 2 diabetes. Controlled  Normetanephrines 1350 very high.        The patient is well known to me from [facility] from [facility].  I had performed bilateral adrenalectomies on her in the past and she developed malignant metastatic pheochromocytoma to the humerus treated with radioactive labeled MIBG tracer for ablation.    Recently her metanephrines and catecholamines have increased.  She had a nuclear medicine MIBG study which revealed disease in the right adrenal bed near the superior pole of the right kidney and right para-aortic region.  There was no uptake in the humerus.  With her still requiring 3 medications for hypertension control and control of the distant disease she is being referred to consider palliative resection of the right adrenal bed and right para-aortic lymph node since the metastatic disease appears to be well controlled with previous radioactive MIBG ablation.  The patient would consider repeat surgery which would need to be an open approach.  I would like to repeat a CT scan of the chest abdomen and pelvis with oral and IV contrast to assess the extent of the disease before entertaining the surgery.  I would like to get the CT scan of the chest abdomen and pelvis and then re-present her at our multidisciplinary endocrine conference.  I would like to get a baseline 24-hour urine for catecholamines and metanephrines since the recent increase have been serum values and we do not have a more conference of urinary valuation the record  She denies any weight loss at the present  time.  She's had about a one-month history of low back pain and left abdominal cramping but denies any other symptoms.      Open palliative resection of the residual/persistent disease at the right adrenal bed at the superior aspect of the right kidney as well as the para-aortic right-sided lymphadenopathy seen on MIBG scanning to hopefully help better control her hypertension which is currently being controlled with 3 medications.  The patient is also on chronic steroid replacement due to the history of bilateral adrenalectomies and should receive preop steroids, hydrocortisone 100mg.    Start alpha blockade with cardura 2mg daily.    Time spent with the patient today and her sister was 30 minutes with greater than 50% of time in counseling.  She would need a full bowel prep for the surgery as well as type and cross match for 2 units of blood.    I have personally taken the history and examined this patient and agree with the resident's note as stated above.  Consented and scheduled for an open right adrenalectomy and adjacent LN excision on Monday 10-2-17  Will give Doxazosin 2 mg daily for alpha blockade pre-op.  Bowel prep with clear liquids and Mg citrate the day prior to surgery.  Pt seen again with her 2 family members.  Will need stress dose IV steroids on call to OR.  Will notify Dr. Joshua Burton of expected admission and surgery on 10-2-17.

## 2017-08-31 NOTE — LETTER
[facility] - General Surgery  [address]  [facility] [address]  Phone: [phone] September 2, 2017      Daniel Martines Jr., MD  [address]  [address]  [place] [place] [address]    Patient: Hailey Sawant   MR Number: [id number]   YOB: 1945   Date of Visit: 8/31/2017     Dear Dr. Martines:    Thank you for referring Hailey Sawant to me for evaluation.    Consented and scheduled for an open right adrenalectomy and adjacent LN excision on Monday 10-2-17.  Will give Doxazosin 2 mg daily for alpha blockade pre-op.  Bowel prep with clear liquids and Mg citrate the day prior to surgery.    Patient seen again with her 2 family members.  Will need stress dose IV steroids on call to OR.  Will notify Dr. Joshua Burton of expected admission and surgery on 10-2-17.    If you have questions, please do not hesitate to call me. I look forward to following Hailey Sawant along with you.    Sincerely,      Sandoval Castillo MD  Medical Director, [facility] [place] [facility]  Section of General and Oncologic Surgery  [facility]    RLC/hcr

## 2017-09-01 DIAGNOSIS — C74.91: Primary | ICD-10-CM

## 2017-09-12 RX ORDER — CALCIUM CITRATE/VITAMIN D3 200MG-6.25
TABLET ORAL
Qty: 100 STRIP | Refills: 3 | Status: SHIPPED | OUTPATIENT
Start: 2017-09-12 | End: 2018-09-12 | Stop reason: SDUPTHER

## 2017-09-12 RX ORDER — GLUCOSAM/CHON-MSM1/C/MANG/BOSW 500-416.6
TABLET ORAL
Qty: 200 EACH | Refills: 3 | Status: SHIPPED | OUTPATIENT
Start: 2017-09-12 | End: 2018-09-12 | Stop reason: SDUPTHER

## 2017-09-18 ENCOUNTER — HOSPITAL ENCOUNTER (OUTPATIENT)
Dept: PREADMISSION TESTING | Facility: HOSPITAL | Age: 72
Discharge: HOME | End: 2017-09-18
Attending: ANESTHESIOLOGY
Payer: MEDICARE

## 2017-09-18 ENCOUNTER — ANESTHESIA EVENT (OUTPATIENT)
Dept: SURGERY | Facility: HOSPITAL | Age: 72
DRG: 614 | End: 2017-09-18
Payer: MEDICARE

## 2017-09-18 ENCOUNTER — HOSPITAL ENCOUNTER (OUTPATIENT)
Dept: CARDIOLOGY | Facility: CLINIC | Age: 72
Discharge: HOME | End: 2017-09-18
Payer: MEDICARE

## 2017-09-18 VITALS
BODY MASS INDEX: 34.32 KG/M2 | HEART RATE: 82 BPM | HEIGHT: 65 IN | OXYGEN SATURATION: 97 % | TEMPERATURE: 98 F | DIASTOLIC BLOOD PRESSURE: 60 MMHG | WEIGHT: 206 LBS | SYSTOLIC BLOOD PRESSURE: 116 MMHG | RESPIRATION RATE: 16 BRPM

## 2017-09-18 DIAGNOSIS — Z01.818 PREOP TESTING: ICD-10-CM

## 2017-09-18 PROCEDURE — 93000 ELECTROCARDIOGRAM COMPLETE: CPT | Mod: S$GLB,,, | Performed by: INTERNAL MEDICINE

## 2017-09-18 NOTE — DISCHARGE INSTRUCTIONS
Your surgery has been scheduled for:__________________________________________    You should report to:  ____[facility], located on the [place] side of the first floor of the           [facility] ([phone])  ____[facility], located on the [facility] side of the            Second floor of the [facility] ([phone])  ____3rd Floor [facility] located on the [facility] side of the [facility] [phone]  Please Note   - Tell your doctor if you take Aspirin, products containing Aspirin, herbal medications  or blood thinners, such as Coumadin, Ticlid, or Plavix.  (Consult your provider regarding holding or stopping before surgery).  - Arrange for someone to drive you home following surgery.  You will not be allowed to leave the surgical facility alone or drive yourself home following sedation and anesthesia.  Before Surgery  - Stop taking all herbal medications 14days prior to surgery  - No Motrin/Advil (Ibuprofen) 7 days before surgery  - No Aleve (Naproxen) 7 days before surgery  - Stop Taking Asprin, products containing Asprin _____days before surgery  - Stop taking blood thinners_______days before surgery  - Refrain from drinking alcoholic beverages for 24hours before and after surgery  - Stop or limit smoking _________days before surgery  Night before Surgery  - DO NOT EAT OR DRINK ANYTHING AFTER MIDNIGHT, INCLUDING GUM, HARD CANDY, MINTS, OR CHEWING TOBACCO.  - Take a shower or bath (shower is recommended).  Bathe with Hibiclens soap or an antibacterial soap from the neck down.  If not supplied by your surgeon, hibiclens soap will need to be purchased over the counter in pharmacy.  Rinse soap off thoroughly.  - Shampoo your hair with your regular shampoo  The Day of Surgery  - Take another bath or shower with hibiclens or any antibacterial soap, to reduce the chance of infection.  - Take heart and blood  pressure medications with a small sip of water, as advised by the perioperative team.  - Do not take fluid pills  - You may brush your teeth and rinse your mouth, but do not swall any additional water.   - Do not apply perfumes, powder, body lotions or deodorant on the day of surgery.  - Nail polish should be removed.  - Do not wear makeup or moisturizer  - Wear comfortable clothes, such as a button front shirt and loose fitting pants.  - Leave all jewelry, including body piercings, and valuables at home.    - Bring any devices you will neeed after surgery such as crutches or canes.  - If you have sleep apnea, please bring your CPAP machine  In the event that your physical condition changes including the onset of a cold or respiratory illness, or if you have to delay or cancel your surgery, please notify your surgeon.  Anesthesia: General Anesthesia  Youre due to have surgery. During surgery, youll be given medication called anesthesia. (It is also called anesthetic.) This will keep you comfortable and pain-free. Your anesthesia provider will use general anesthesia. This sheet tells you more about it.  What is general anesthesia?     You are watched continuously during your procedure by the anesthesia provider   General anesthesia puts you into a state like deep sleep. It goes into the bloodstream (IV anesthetics), into the lungs (gas anesthetics), or both. You feel nothing during the procedure. You will not remember it. During the procedure, the anesthesia provider monitors you continuously. He or she checks your heart rate and rhythm, blood pressure, breathing, and blood oxygen.  · IV Anesthetics. IV anesthetics are given through an IV line in your arm. Theyre often given first. This is so you are asleep before a gas anesthetic is started. Some kinds of IV anesthetics relieve pain. Others relax you. Your doctor will decide which kind is best in your case.  · Gas Anesthetics. Gas anesthetics are breathed into  the lungs. They are often used to keep you asleep. They can be given through a facemask or a tube placed in your larynx or trachea (breathing tube).  ? If you have a facemask, your anesthesia provider will most likely place it over your nose and mouth while youre still awake. Youll breathe oxygen through the mask as your IV anesthetic is started. Gas anesthetic may be added through the mask.  ? If you have a tube in the larynx or trachea, it will be inserted into your throat after youre asleep.  Anesthesia tools and medications  You will likely have:  · IV anesthetics. These are put into an IV line into your bloodstream.  · Gas anesthetics. You breathe these anesthetics into your lungs, where they pass into your bloodstream.  · Pulse oximeter. This is a small clip that is attached to the end of your finger. This measures your blood oxygen level.  · Electrocardiography leads (electrodes). These are small sticky pads that are placed on your chest. They record your heart rate and rhythm.  · Blood pressure cuff. This reads your blood pressure.  Risks and possible complications  General anesthesia has some risks. These include:  · Breathing problems  · Nausea and vomiting  · Sore throat or hoarseness (usually temporary)  · Allergic reaction to the anesthetic  · Irregular heartbeat (rare)  · Cardiac arrest (rare)   Anesthesia safety  · Follow all instructions you are given for how long not to eat or drink before your procedure.  · Be sure your doctor knows what medications and drugs you take. This includes over-the-counter medications, herbs, supplements, alcohol or other drugs. You will be asked when those were last taken.  · Have an adult family member or friend drive you home after the procedure.  · For the first 24 hours after your surgery:  ? Do not drive or use heavy equipment.  ? Have a trusted family member or spouse make important decisions or sign documents.  ? Avoid alcohol.  ? Have a responsible adult stay  with you. He or she can watch for problems and help keep you safe.  Date Last Reviewed: 10/16/2014  © 9767-1671 [employer]. [address] [place] [address], [place] [address]. All rights reserved. This information is not intended as a substitute for professional medical care. Always follow your healthcare professional's instructions.

## 2017-09-18 NOTE — ANESTHESIA PREPROCEDURE EVALUATION
09/18/2017  Hailey Sawant is a 72 y.o., female.    Anesthesia Evaluation      I have reviewed the Medications.   Steroids Taken In Past Year: Hydrocortisone    Review of Systems  Anesthesia Hx:  No problems with previous Anesthesia History of prior surgery of interest to airway management or planning: Previous anesthesia: General 8 years : adrenalectomy with general anesthesia.  Procedure performed at an [facility]. Denies Family Hx of Anesthesia complications.   Denies Personal Hx of Anesthesia complications.   Social:  Denies Tobacco Use. Denies Alcohol Use.   Hematology/Oncology:         -- Denies Anemia: Hematology Comments: hgb 11.0   EENT/Dental:   Denies Throat Disease. Denies Jaw Problems   Cardiovascular:   Hypertension, well controlled IGNACIO  Functional Capacity good / => 4 METS, walking/stationary bike: + OCCASIONAL SOB/denies CP  Denies Coronary Artery Disease.  Denies Congestive Heart Failure (CHF)  Denies Deep Venous Thrombosis (DVT)  Hypertension , Recent typical clinic B/P of 116/60 @ POC visit    Pulmonary:  Denies Asthma.  Denies Chronic Obstructive Pulmonary Disease (COPD).  Possible Obstructive Sleep Apnea , (STOP/BANG) Symptoms S - Snoring (loud), P - Pressure being treated for high BP and A - Age > 50    Renal/:   crt 1.0 Denies Kidney Function/Disease    Hepatic/GI:  Hepatic/GI Normal  Denies GERD.  Denies Esophageal / Stomach Disorders  Denies Liver Disease    Musculoskeletal:  Joint Disease:  Arthritis  Bone Disorders: Osteomalacia   Neurological:  Neurology Normal  Neuro Symptoms of numbness Bilateral handDenies Seizure Disorder  Denies CVA - Cerebrovasular Accident  Denies TIA - Transient Ischemic Attack    Endocrine:  Diabetes, Type 2 Diabetes for 7 years , controlled by oral hypoglycemics. Typical AM glucose range:  , most recent HgA1c value was 5.5 on 6/27/17.   Thyroid Disease s/p MULLINS Rx  Pheochromocytoma (S/P total adrenalectomy), malignant       Physical Exam  General:  Well nourished    Airway/Jaw/Neck:  Airway Findings: Mouth Opening: Normal General Airway Assessment: Adult  Mallampati: II  Improves to II with phonation.  Jaw/Neck Findings:  Neck ROM: Normal ROM      Dental:  Dental Findings: In tact   Chest/Lungs:  Chest/Lungs Findings: Clear to auscultation, Normal Respiratory Rate     Heart/Vascular:  Heart Findings: Rate: Normal  Vascular Findings:  Edema  Edema Locations: BLE  Edema: +1 or +2        Mental Status:  Mental Status Findings:  Cooperative, Alert and Oriented         Anesthesia Plan  Type of Anesthesia, risks & benefits discussed:  Anesthesia Type:  general  Patient's Preference:   Intra-op Monitoring Plan: standard ASA monitors  Intra-op Monitoring Plan Comments:   Post Op Pain Control Plan: multimodal analgesia  Post Op Pain Control Plan Comments:   Induction:   IV  Beta Blocker:  Patient is on a Beta-Blocker and has received one dose within the past 24 hours (No further documentation required).       Informed Consent: Patient understands risks and agrees with Anesthesia plan.  Questions answered. Anesthesia consent signed with patient.  ASA Score: 2     Day of Surgery Review of History & Physical:    H&P update referred to the surgeon.         Ready For Surgery From Anesthesia Perspective.     No clearance needed per Dr. Leopold    Per Dr. Castillo 's note 9/2/17: The patient is also on chronic steroid replacement due to the history of bilateral adrenalectomies and should receive preop steroids, hydrocortisone 100mg.     Start alpha blockade with cardura 2mg daily.      She would need a full bowel prep for the surgery as well as type and cross match for 2 units of blood.    EKG OK per Dr. Leopold Sharlana M Wilson, RN

## 2017-09-29 ENCOUNTER — TELEPHONE (OUTPATIENT)
Dept: SURGERY | Facility: CLINIC | Age: 72
End: 2017-09-29

## 2017-10-02 ENCOUNTER — ANESTHESIA (OUTPATIENT)
Dept: SURGERY | Facility: HOSPITAL | Age: 72
DRG: 614 | End: 2017-10-02
Payer: MEDICARE

## 2017-10-02 ENCOUNTER — SURGERY (OUTPATIENT)
Age: 72
End: 2017-10-02

## 2017-10-02 ENCOUNTER — HOSPITAL ENCOUNTER (INPATIENT)
Facility: HOSPITAL | Age: 72
LOS: 4 days | Discharge: HOME HEALTH | DRG: 614 | End: 2017-10-06
Attending: SURGERY | Admitting: SURGERY
Payer: MEDICARE

## 2017-10-02 DIAGNOSIS — I48.91 A-FIB: ICD-10-CM

## 2017-10-02 DIAGNOSIS — E27.40 HYPOADRENALISM: Primary | ICD-10-CM

## 2017-10-02 DIAGNOSIS — D35.00 PHEOCHROMOCYTOMA: ICD-10-CM

## 2017-10-02 DIAGNOSIS — C74.90: ICD-10-CM

## 2017-10-02 LAB
ABO + RH BLD: NORMAL
ALBUMIN SERPL BCP-MCNC: 2.8 G/DL
ALP SERPL-CCNC: 89 U/L
ALT SERPL W/O P-5'-P-CCNC: 49 U/L
ANION GAP SERPL CALC-SCNC: 9 MMOL/L
ANION GAP SERPL CALC-SCNC: 9 MMOL/L
APTT BLDCRRT: 22.2 SEC
AST SERPL-CCNC: 114 U/L
BASOPHILS # BLD AUTO: 0.01 K/UL
BASOPHILS NFR BLD: 0.1 %
BILIRUB SERPL-MCNC: 0.4 MG/DL
BLD GP AB SCN CELLS X3 SERPL QL: NORMAL
BUN SERPL-MCNC: 11 MG/DL
BUN SERPL-MCNC: 11 MG/DL
CALCIUM SERPL-MCNC: 7.9 MG/DL
CALCIUM SERPL-MCNC: 7.9 MG/DL
CHLORIDE SERPL-SCNC: 109 MMOL/L
CHLORIDE SERPL-SCNC: 109 MMOL/L
CO2 SERPL-SCNC: 22 MMOL/L
CO2 SERPL-SCNC: 22 MMOL/L
CREAT SERPL-MCNC: 0.8 MG/DL
CREAT SERPL-MCNC: 0.8 MG/DL
DIFFERENTIAL METHOD: ABNORMAL
EOSINOPHIL # BLD AUTO: 0 K/UL
EOSINOPHIL NFR BLD: 0.1 %
ERYTHROCYTE [DISTWIDTH] IN BLOOD BY AUTOMATED COUNT: 14.5 %
EST. GFR  (AFRICAN AMERICAN): >60 ML/MIN/1.73 M^2
EST. GFR  (AFRICAN AMERICAN): >60 ML/MIN/1.73 M^2
EST. GFR  (NON AFRICAN AMERICAN): >60 ML/MIN/1.73 M^2
EST. GFR  (NON AFRICAN AMERICAN): >60 ML/MIN/1.73 M^2
GLUCOSE SERPL-MCNC: 145 MG/DL
GLUCOSE SERPL-MCNC: 145 MG/DL
HCT VFR BLD AUTO: 29 %
HGB BLD-MCNC: 9.8 G/DL
INR PPP: 1.3
LACTATE SERPL-SCNC: 1 MMOL/L
LYMPHOCYTES # BLD AUTO: 0.8 K/UL
LYMPHOCYTES NFR BLD: 6.3 %
MAGNESIUM SERPL-MCNC: 2 MG/DL
MCH RBC QN AUTO: 29.9 PG
MCHC RBC AUTO-ENTMCNC: 33.8 G/DL
MCV RBC AUTO: 88 FL
MONOCYTES # BLD AUTO: 0.8 K/UL
MONOCYTES NFR BLD: 6 %
NEUTROPHILS # BLD AUTO: 11.3 K/UL
NEUTROPHILS NFR BLD: 86.9 %
PHOSPHATE SERPL-MCNC: 4.3 MG/DL
PLATELET # BLD AUTO: 191 K/UL
PMV BLD AUTO: 8.6 FL
POCT GLUCOSE: 105 MG/DL (ref 70–110)
POCT GLUCOSE: 110 MG/DL (ref 70–110)
POTASSIUM SERPL-SCNC: 4.1 MMOL/L
POTASSIUM SERPL-SCNC: 4.1 MMOL/L
PROT SERPL-MCNC: 5.5 G/DL
PROTHROMBIN TIME: 13.2 SEC
RBC # BLD AUTO: 3.28 M/UL
SODIUM SERPL-SCNC: 140 MMOL/L
SODIUM SERPL-SCNC: 140 MMOL/L
WBC # BLD AUTO: 12.96 K/UL

## 2017-10-02 PROCEDURE — 86920 COMPATIBILITY TEST SPIN: CPT

## 2017-10-02 PROCEDURE — 27201423 OPTIME MED/SURG SUP & DEVICES STERILE SUPPLY: Performed by: SURGERY

## 2017-10-02 PROCEDURE — 86900 BLOOD TYPING SEROLOGIC ABO: CPT

## 2017-10-02 PROCEDURE — 38747 REMOVE ABDOMINAL LYMPH NODES: CPT | Mod: 59,,, | Performed by: SURGERY

## 2017-10-02 PROCEDURE — 25000003 PHARM REV CODE 250: Performed by: STUDENT IN AN ORGANIZED HEALTH CARE EDUCATION/TRAINING PROGRAM

## 2017-10-02 PROCEDURE — 27000221 HC OXYGEN, UP TO 24 HOURS

## 2017-10-02 PROCEDURE — 07BD0ZZ EXCISION OF AORTIC LYMPHATIC, OPEN APPROACH: ICD-10-PCS | Performed by: SURGERY

## 2017-10-02 PROCEDURE — 36620 INSERTION CATHETER ARTERY: CPT | Mod: 59,,, | Performed by: ANESTHESIOLOGY

## 2017-10-02 PROCEDURE — 83036 HEMOGLOBIN GLYCOSYLATED A1C: CPT

## 2017-10-02 PROCEDURE — 63600175 PHARM REV CODE 636 W HCPCS: Performed by: NURSE ANESTHETIST, CERTIFIED REGISTERED

## 2017-10-02 PROCEDURE — 84100 ASSAY OF PHOSPHORUS: CPT

## 2017-10-02 PROCEDURE — 27200677 HC TRANSDUCER MONITOR KIT SINGLE: Performed by: NURSE ANESTHETIST, CERTIFIED REGISTERED

## 2017-10-02 PROCEDURE — 25000003 PHARM REV CODE 250: Performed by: NURSE ANESTHETIST, CERTIFIED REGISTERED

## 2017-10-02 PROCEDURE — 27201037 HC PRESSURE MONITORING SET UP

## 2017-10-02 PROCEDURE — 60540 EXPLORE ADRENAL GLAND: CPT | Mod: 51,RT,, | Performed by: SURGERY

## 2017-10-02 PROCEDURE — C1751 CATH, INF, PER/CENT/MIDLINE: HCPCS | Performed by: NURSE ANESTHETIST, CERTIFIED REGISTERED

## 2017-10-02 PROCEDURE — 63600175 PHARM REV CODE 636 W HCPCS: Performed by: SURGERY

## 2017-10-02 PROCEDURE — 0GT30ZZ RESECTION OF RIGHT ADRENAL GLAND, OPEN APPROACH: ICD-10-PCS | Performed by: SURGERY

## 2017-10-02 PROCEDURE — 25000242 PHARM REV CODE 250 ALT 637 W/ HCPCS: Performed by: NURSE ANESTHETIST, CERTIFIED REGISTERED

## 2017-10-02 PROCEDURE — 88305 TISSUE EXAM BY PATHOLOGIST: CPT | Performed by: PATHOLOGY

## 2017-10-02 PROCEDURE — C9290 INJ, BUPIVACAINE LIPOSOME: HCPCS | Performed by: SURGERY

## 2017-10-02 PROCEDURE — 85610 PROTHROMBIN TIME: CPT

## 2017-10-02 PROCEDURE — 20000000 HC ICU ROOM

## 2017-10-02 PROCEDURE — 94640 AIRWAY INHALATION TREATMENT: CPT

## 2017-10-02 PROCEDURE — 80053 COMPREHEN METABOLIC PANEL: CPT

## 2017-10-02 PROCEDURE — 88305 TISSUE EXAM BY PATHOLOGIST: CPT | Mod: 26,,, | Performed by: PATHOLOGY

## 2017-10-02 PROCEDURE — 47120 PARTIAL REMOVAL OF LIVER: CPT | Mod: ,,, | Performed by: SURGERY

## 2017-10-02 PROCEDURE — 0TB00ZZ EXCISION OF RIGHT KIDNEY, OPEN APPROACH: ICD-10-PCS | Performed by: UROLOGY

## 2017-10-02 PROCEDURE — 25000242 PHARM REV CODE 250 ALT 637 W/ HCPCS: Performed by: ANESTHESIOLOGY

## 2017-10-02 PROCEDURE — 88307 TISSUE EXAM BY PATHOLOGIST: CPT | Performed by: PATHOLOGY

## 2017-10-02 PROCEDURE — 82962 GLUCOSE BLOOD TEST: CPT | Performed by: SURGERY

## 2017-10-02 PROCEDURE — 36556 INSERT NON-TUNNEL CV CATH: CPT | Performed by: ANESTHESIOLOGY

## 2017-10-02 PROCEDURE — 36000708 HC OR TIME LEV III 1ST 15 MIN: Performed by: SURGERY

## 2017-10-02 PROCEDURE — 36000709 HC OR TIME LEV III EA ADD 15 MIN: Performed by: SURGERY

## 2017-10-02 PROCEDURE — 63600175 PHARM REV CODE 636 W HCPCS

## 2017-10-02 PROCEDURE — 25000003 PHARM REV CODE 250: Performed by: SURGERY

## 2017-10-02 PROCEDURE — 36556 INSERT NON-TUNNEL CV CATH: CPT | Mod: 59,,, | Performed by: ANESTHESIOLOGY

## 2017-10-02 PROCEDURE — 88342 IMHCHEM/IMCYTCHM 1ST ANTB: CPT | Mod: 26,,, | Performed by: PATHOLOGY

## 2017-10-02 PROCEDURE — D9220A PRA ANESTHESIA: Mod: ANES,,, | Performed by: ANESTHESIOLOGY

## 2017-10-02 PROCEDURE — 83605 ASSAY OF LACTIC ACID: CPT

## 2017-10-02 PROCEDURE — 86901 BLOOD TYPING SEROLOGIC RH(D): CPT

## 2017-10-02 PROCEDURE — 85025 COMPLETE CBC W/AUTO DIFF WBC: CPT

## 2017-10-02 PROCEDURE — 27100025 HC TUBING, SET FLUID WARMER: Performed by: NURSE ANESTHETIST, CERTIFIED REGISTERED

## 2017-10-02 PROCEDURE — 83735 ASSAY OF MAGNESIUM: CPT

## 2017-10-02 PROCEDURE — 0FB00ZZ EXCISION OF LIVER, OPEN APPROACH: ICD-10-PCS | Performed by: SURGERY

## 2017-10-02 PROCEDURE — 63600175 PHARM REV CODE 636 W HCPCS: Performed by: STUDENT IN AN ORGANIZED HEALTH CARE EDUCATION/TRAINING PROGRAM

## 2017-10-02 PROCEDURE — 37000008 HC ANESTHESIA 1ST 15 MINUTES: Performed by: SURGERY

## 2017-10-02 PROCEDURE — 88307 TISSUE EXAM BY PATHOLOGIST: CPT | Mod: 26,,, | Performed by: PATHOLOGY

## 2017-10-02 PROCEDURE — 36620 INSERTION CATHETER ARTERY: CPT | Performed by: ANESTHESIOLOGY

## 2017-10-02 PROCEDURE — 85730 THROMBOPLASTIN TIME PARTIAL: CPT

## 2017-10-02 PROCEDURE — 37000009 HC ANESTHESIA EA ADD 15 MINS: Performed by: SURGERY

## 2017-10-02 PROCEDURE — 50240 NEPHRECTOMY PARTIAL: CPT | Mod: 52,RT,, | Performed by: UROLOGY

## 2017-10-02 PROCEDURE — D9220A PRA ANESTHESIA: Mod: CRNA,,, | Performed by: NURSE ANESTHETIST, CERTIFIED REGISTERED

## 2017-10-02 RX ORDER — FAMOTIDINE 20 MG/50ML
20 INJECTION, SOLUTION INTRAVENOUS 2 TIMES DAILY
Status: DISCONTINUED | OUTPATIENT
Start: 2017-10-03 | End: 2017-10-03

## 2017-10-02 RX ORDER — FENTANYL CITRATE 50 UG/ML
INJECTION, SOLUTION INTRAMUSCULAR; INTRAVENOUS
Status: COMPLETED
Start: 2017-10-02 | End: 2017-10-02

## 2017-10-02 RX ORDER — ROCURONIUM BROMIDE 10 MG/ML
INJECTION, SOLUTION INTRAVENOUS
Status: DISCONTINUED | OUTPATIENT
Start: 2017-10-02 | End: 2017-10-02

## 2017-10-02 RX ORDER — FENTANYL CITRATE 50 UG/ML
25 INJECTION, SOLUTION INTRAMUSCULAR; INTRAVENOUS EVERY 5 MIN PRN
Status: CANCELLED | OUTPATIENT
Start: 2017-10-02

## 2017-10-02 RX ORDER — IBUPROFEN 200 MG
16 TABLET ORAL
Status: DISCONTINUED | OUTPATIENT
Start: 2017-10-02 | End: 2017-10-06 | Stop reason: HOSPADM

## 2017-10-02 RX ORDER — HYDROMORPHONE HYDROCHLORIDE 1 MG/ML
1 INJECTION, SOLUTION INTRAMUSCULAR; INTRAVENOUS; SUBCUTANEOUS
Status: DISCONTINUED | OUTPATIENT
Start: 2017-10-02 | End: 2017-10-06 | Stop reason: HOSPADM

## 2017-10-02 RX ORDER — GLYCOPYRROLATE 0.2 MG/ML
INJECTION INTRAMUSCULAR; INTRAVENOUS
Status: DISCONTINUED | OUTPATIENT
Start: 2017-10-02 | End: 2017-10-02

## 2017-10-02 RX ORDER — KETAMINE HYDROCHLORIDE 100 MG/ML
INJECTION, SOLUTION INTRAMUSCULAR; INTRAVENOUS
Status: DISCONTINUED | OUTPATIENT
Start: 2017-10-02 | End: 2017-10-02

## 2017-10-02 RX ORDER — PANTOPRAZOLE SODIUM 40 MG/10ML
40 INJECTION, POWDER, LYOPHILIZED, FOR SOLUTION INTRAVENOUS
Status: DISCONTINUED | OUTPATIENT
Start: 2017-10-03 | End: 2017-10-02 | Stop reason: RX

## 2017-10-02 RX ORDER — HYDRALAZINE HYDROCHLORIDE 20 MG/ML
INJECTION INTRAMUSCULAR; INTRAVENOUS
Status: DISCONTINUED | OUTPATIENT
Start: 2017-10-02 | End: 2017-10-02

## 2017-10-02 RX ORDER — ONDANSETRON 2 MG/ML
4 INJECTION INTRAMUSCULAR; INTRAVENOUS EVERY 8 HOURS PRN
Status: DISCONTINUED | OUTPATIENT
Start: 2017-10-02 | End: 2017-10-06 | Stop reason: HOSPADM

## 2017-10-02 RX ORDER — NICARDIPINE HYDROCHLORIDE 0.2 MG/ML
5 INJECTION INTRAVENOUS CONTINUOUS
Status: DISCONTINUED | OUTPATIENT
Start: 2017-10-02 | End: 2017-10-03

## 2017-10-02 RX ORDER — ACETAMINOPHEN 10 MG/ML
INJECTION, SOLUTION INTRAVENOUS
Status: DISCONTINUED | OUTPATIENT
Start: 2017-10-02 | End: 2017-10-02

## 2017-10-02 RX ORDER — OXYCODONE HYDROCHLORIDE 5 MG/1
5 TABLET ORAL EVERY 4 HOURS PRN
Status: DISCONTINUED | OUTPATIENT
Start: 2017-10-02 | End: 2017-10-06 | Stop reason: HOSPADM

## 2017-10-02 RX ORDER — ONDANSETRON 2 MG/ML
4 INJECTION INTRAMUSCULAR; INTRAVENOUS EVERY 12 HOURS PRN
Status: DISCONTINUED | OUTPATIENT
Start: 2017-10-02 | End: 2017-10-02 | Stop reason: HOSPADM

## 2017-10-02 RX ORDER — ALBUTEROL SULFATE 90 UG/1
AEROSOL, METERED RESPIRATORY (INHALATION)
Status: DISCONTINUED | OUTPATIENT
Start: 2017-10-02 | End: 2017-10-02

## 2017-10-02 RX ORDER — PROPOFOL 10 MG/ML
VIAL (ML) INTRAVENOUS
Status: DISCONTINUED | OUTPATIENT
Start: 2017-10-02 | End: 2017-10-02

## 2017-10-02 RX ORDER — SODIUM CHLORIDE 9 MG/ML
INJECTION, SOLUTION INTRAVENOUS CONTINUOUS
Status: DISCONTINUED | OUTPATIENT
Start: 2017-10-02 | End: 2017-10-02

## 2017-10-02 RX ORDER — AMOXICILLIN 250 MG
1 CAPSULE ORAL 2 TIMES DAILY
Status: DISCONTINUED | OUTPATIENT
Start: 2017-10-03 | End: 2017-10-06 | Stop reason: HOSPADM

## 2017-10-02 RX ORDER — MIDAZOLAM HYDROCHLORIDE 1 MG/ML
INJECTION, SOLUTION INTRAMUSCULAR; INTRAVENOUS
Status: DISCONTINUED | OUTPATIENT
Start: 2017-10-02 | End: 2017-10-02

## 2017-10-02 RX ORDER — ALBUTEROL SULFATE 0.83 MG/ML
2.5 SOLUTION RESPIRATORY (INHALATION) ONCE
Status: COMPLETED | OUTPATIENT
Start: 2017-10-02 | End: 2017-10-02

## 2017-10-02 RX ORDER — POLYETHYLENE GLYCOL 3350 17 G/17G
17 POWDER, FOR SOLUTION ORAL DAILY
Status: DISCONTINUED | OUTPATIENT
Start: 2017-10-03 | End: 2017-10-06 | Stop reason: HOSPADM

## 2017-10-02 RX ORDER — OXYCODONE HYDROCHLORIDE 5 MG/1
10 TABLET ORAL EVERY 4 HOURS PRN
Status: DISCONTINUED | OUTPATIENT
Start: 2017-10-02 | End: 2017-10-06 | Stop reason: HOSPADM

## 2017-10-02 RX ORDER — IBUPROFEN 200 MG
24 TABLET ORAL
Status: DISCONTINUED | OUTPATIENT
Start: 2017-10-02 | End: 2017-10-06 | Stop reason: HOSPADM

## 2017-10-02 RX ORDER — SODIUM CHLORIDE 0.9 % (FLUSH) 0.9 %
3 SYRINGE (ML) INJECTION
Status: DISCONTINUED | OUTPATIENT
Start: 2017-10-02 | End: 2017-10-02

## 2017-10-02 RX ORDER — FENTANYL CITRATE 50 UG/ML
INJECTION, SOLUTION INTRAMUSCULAR; INTRAVENOUS
Status: DISCONTINUED | OUTPATIENT
Start: 2017-10-02 | End: 2017-10-02

## 2017-10-02 RX ORDER — SODIUM CHLORIDE 9 MG/ML
INJECTION, SOLUTION INTRAVENOUS CONTINUOUS
Status: DISCONTINUED | OUTPATIENT
Start: 2017-10-02 | End: 2017-10-03

## 2017-10-02 RX ORDER — ONDANSETRON 2 MG/ML
INJECTION INTRAMUSCULAR; INTRAVENOUS
Status: DISCONTINUED | OUTPATIENT
Start: 2017-10-02 | End: 2017-10-02

## 2017-10-02 RX ORDER — IPRATROPIUM BROMIDE AND ALBUTEROL SULFATE 2.5; .5 MG/3ML; MG/3ML
3 SOLUTION RESPIRATORY (INHALATION) EVERY 4 HOURS PRN
Status: DISCONTINUED | OUTPATIENT
Start: 2017-10-02 | End: 2017-10-05

## 2017-10-02 RX ORDER — PROMETHAZINE HYDROCHLORIDE 25 MG/1
25 SUPPOSITORY RECTAL EVERY 6 HOURS PRN
Status: DISCONTINUED | OUTPATIENT
Start: 2017-10-02 | End: 2017-10-06 | Stop reason: HOSPADM

## 2017-10-02 RX ORDER — LIDOCAINE HCL/PF 100 MG/5ML
SYRINGE (ML) INTRAVENOUS
Status: DISCONTINUED | OUTPATIENT
Start: 2017-10-02 | End: 2017-10-02

## 2017-10-02 RX ORDER — DIPHENHYDRAMINE HYDROCHLORIDE 50 MG/ML
12.5 INJECTION INTRAMUSCULAR; INTRAVENOUS EVERY 4 HOURS PRN
Status: DISCONTINUED | OUTPATIENT
Start: 2017-10-02 | End: 2017-10-03

## 2017-10-02 RX ORDER — NEOSTIGMINE METHYLSULFATE 1 MG/ML
INJECTION, SOLUTION INTRAVENOUS
Status: DISCONTINUED | OUTPATIENT
Start: 2017-10-02 | End: 2017-10-02

## 2017-10-02 RX ORDER — NICARDIPINE HYDROCHLORIDE 2.5 MG/ML
INJECTION INTRAVENOUS
Status: DISCONTINUED | OUTPATIENT
Start: 2017-10-02 | End: 2017-10-02

## 2017-10-02 RX ORDER — INSULIN ASPART 100 [IU]/ML
1-10 INJECTION, SOLUTION INTRAVENOUS; SUBCUTANEOUS
Status: DISCONTINUED | OUTPATIENT
Start: 2017-10-02 | End: 2017-10-06 | Stop reason: HOSPADM

## 2017-10-02 RX ORDER — NICARDIPINE HYDROCHLORIDE 0.2 MG/ML
INJECTION INTRAVENOUS CONTINUOUS PRN
Status: DISCONTINUED | OUTPATIENT
Start: 2017-10-02 | End: 2017-10-02

## 2017-10-02 RX ORDER — GLUCAGON 1 MG
1 KIT INJECTION
Status: DISCONTINUED | OUTPATIENT
Start: 2017-10-02 | End: 2017-10-06 | Stop reason: HOSPADM

## 2017-10-02 RX ADMIN — FENTANYL CITRATE 25 MCG: 50 INJECTION, SOLUTION INTRAMUSCULAR; INTRAVENOUS at 06:10

## 2017-10-02 RX ADMIN — ALBUTEROL SULFATE 2 PUFF: 90 AEROSOL, METERED RESPIRATORY (INHALATION) at 05:10

## 2017-10-02 RX ADMIN — GLYCOPYRROLATE 0.6 MG: 0.2 INJECTION, SOLUTION INTRAMUSCULAR; INTRAVENOUS at 05:10

## 2017-10-02 RX ADMIN — BUPIVACAINE 20 ML: 13.3 INJECTION, SUSPENSION, LIPOSOMAL INFILTRATION at 05:10

## 2017-10-02 RX ADMIN — HYDROCORTISONE SODIUM SUCCINATE 100 MG: 100 INJECTION, POWDER, FOR SOLUTION INTRAMUSCULAR; INTRAVENOUS at 09:10

## 2017-10-02 RX ADMIN — SODIUM CHLORIDE: 0.9 INJECTION, SOLUTION INTRAVENOUS at 11:10

## 2017-10-02 RX ADMIN — KETAMINE HYDROCHLORIDE 25 MG: 100 INJECTION, SOLUTION, CONCENTRATE INTRAMUSCULAR; INTRAVENOUS at 04:10

## 2017-10-02 RX ADMIN — HYDROCORTISONE SODIUM SUCCINATE 100 MG: 100 INJECTION, POWDER, FOR SOLUTION INTRAMUSCULAR; INTRAVENOUS at 02:10

## 2017-10-02 RX ADMIN — ACETAMINOPHEN 1000 MG: 10 INJECTION, SOLUTION INTRAVENOUS at 06:10

## 2017-10-02 RX ADMIN — MIDAZOLAM HYDROCHLORIDE 1 MG: 1 INJECTION, SOLUTION INTRAMUSCULAR; INTRAVENOUS at 02:10

## 2017-10-02 RX ADMIN — FENTANYL CITRATE 50 MCG: 50 INJECTION, SOLUTION INTRAMUSCULAR; INTRAVENOUS at 05:10

## 2017-10-02 RX ADMIN — ALBUTEROL SULFATE 2.5 MG: 2.5 SOLUTION RESPIRATORY (INHALATION) at 12:10

## 2017-10-02 RX ADMIN — PROPOFOL 150 MG: 10 INJECTION, EMULSION INTRAVENOUS at 02:10

## 2017-10-02 RX ADMIN — HYDRALAZINE HYDROCHLORIDE 5 MG: 20 INJECTION INTRAMUSCULAR; INTRAVENOUS at 05:10

## 2017-10-02 RX ADMIN — PROPOFOL 30 MG: 10 INJECTION, EMULSION INTRAVENOUS at 04:10

## 2017-10-02 RX ADMIN — ONDANSETRON 4 MG: 2 INJECTION INTRAMUSCULAR; INTRAVENOUS at 04:10

## 2017-10-02 RX ADMIN — NEOSTIGMINE METHYLSULFATE 5 MG: 1 INJECTION INTRAVENOUS at 05:10

## 2017-10-02 RX ADMIN — LIDOCAINE HYDROCHLORIDE 80 MG: 20 INJECTION, SOLUTION INTRAVENOUS at 02:10

## 2017-10-02 RX ADMIN — SODIUM CHLORIDE, SODIUM GLUCONATE, SODIUM ACETATE, POTASSIUM CHLORIDE, MAGNESIUM CHLORIDE, SODIUM PHOSPHATE, DIBASIC, AND POTASSIUM PHOSPHATE: .53; .5; .37; .037; .03; .012; .00082 INJECTION, SOLUTION INTRAVENOUS at 05:10

## 2017-10-02 RX ADMIN — ROCURONIUM BROMIDE 10 MG: 10 INJECTION, SOLUTION INTRAVENOUS at 04:10

## 2017-10-02 RX ADMIN — SODIUM CHLORIDE, SODIUM GLUCONATE, SODIUM ACETATE, POTASSIUM CHLORIDE, MAGNESIUM CHLORIDE, SODIUM PHOSPHATE, DIBASIC, AND POTASSIUM PHOSPHATE: .53; .5; .37; .037; .03; .012; .00082 INJECTION, SOLUTION INTRAVENOUS at 02:10

## 2017-10-02 RX ADMIN — HYDROCORTISONE SODIUM SUCCINATE 100 MG: 100 INJECTION, POWDER, FOR SOLUTION INTRAMUSCULAR; INTRAVENOUS at 12:10

## 2017-10-02 RX ADMIN — HYDROMORPHONE HYDROCHLORIDE 1 MG: 1 INJECTION, SOLUTION INTRAMUSCULAR; INTRAVENOUS; SUBCUTANEOUS at 08:10

## 2017-10-02 RX ADMIN — ROCURONIUM BROMIDE 40 MG: 10 INJECTION, SOLUTION INTRAVENOUS at 02:10

## 2017-10-02 RX ADMIN — FENTANYL CITRATE 200 MCG: 50 INJECTION, SOLUTION INTRAMUSCULAR; INTRAVENOUS at 02:10

## 2017-10-02 RX ADMIN — NICARDIPINE HYDROCHLORIDE 100 MCG: 2.5 INJECTION INTRAVENOUS at 04:10

## 2017-10-02 RX ADMIN — Medication 2 G: at 03:10

## 2017-10-02 RX ADMIN — NICARDIPINE HYDROCHLORIDE 2.5 MG/HR: 0.2 INJECTION, SOLUTION INTRAVENOUS at 04:10

## 2017-10-02 RX ADMIN — OXYCODONE HYDROCHLORIDE 5 MG: 5 TABLET ORAL at 11:10

## 2017-10-02 RX ADMIN — FENTANYL CITRATE: 50 INJECTION, SOLUTION INTRAMUSCULAR; INTRAVENOUS at 06:10

## 2017-10-02 RX ADMIN — FENTANYL CITRATE 100 MCG: 50 INJECTION, SOLUTION INTRAMUSCULAR; INTRAVENOUS at 03:10

## 2017-10-02 RX ADMIN — ROCURONIUM BROMIDE 10 MG: 10 INJECTION, SOLUTION INTRAVENOUS at 02:10

## 2017-10-02 RX ADMIN — PROPOFOL 50 MG: 10 INJECTION, EMULSION INTRAVENOUS at 02:10

## 2017-10-02 RX ADMIN — ROCURONIUM BROMIDE 20 MG: 10 INJECTION, SOLUTION INTRAVENOUS at 03:10

## 2017-10-02 RX ADMIN — SODIUM CHLORIDE: 0.9 INJECTION, SOLUTION INTRAVENOUS at 07:10

## 2017-10-02 NOTE — ANESTHESIA PROCEDURE NOTES
Arterial    Diagnosis: Pheochromocytoma    Patient location during procedure: pre-op  Procedure start time: 10/2/2017 2:05 PM  Timeout: 10/2/2017 2:01 PM  Procedure end time: 10/2/2017 2:15 PM  Staffing  Anesthesiologist: TRAN FELDER  Performed: anesthesiologist   Anesthesiologist was present at the time of the procedure.  Preanesthetic Checklist  Completed: patient identified, site marked, surgical consent, pre-op evaluation, timeout performed, IV checked, risks and benefits discussed, monitors and equipment checked and anesthesia consent givenArterial  Skin Prep: chlorhexidine gluconate  Location: radial  Catheter Size: 20 G  Catheter placement by Ultrasound guidance. Heme positive aspiration all ports.  Vessel Caliber: medium, patent, compressibility normal  Vascular Doppler:  not done  Needle advanced into vessel with real time Ultrasound guidance.  Guidewire confirmed in vessel.  Sterile sheath used.Insertion Attempts: 1  Assessment  Dressing: secured with tape and tegaderm  Patient: Tolerated well

## 2017-10-02 NOTE — H&P
HPI: 66 year old female s/p bilat adrenalectomy  and found to have pheochromocytoma.  Current meds: hydrocortisone 20mg qam, 10qpm, fludrocortisone 0.15mg daily.   Patient has lower back pain for 2 months and still has dyspnea on exertion.  Blood pressure is well controlled.  No palpitations, anxiety, pallor. Has low back pain and left knee pain.  Low back pain x 2 weeks without known injury.  She ran out of hydrocortisone 2 weks ago.  Pt was treated with I-131 labeled MIBG at [place] on 3/16/2016 and on 2016, .. She received 200 mCi of I 131 x 3.. Thyroid was blocked with SSKI.  BP good at home. She is taking losartin 50 mg/day and Nifedical 60 mg/day.    PAST MEDICAL HISTORY: Surgery, appendectomy, bilateral adrenalectomy.   Medical illnesses, longstanding hypertension.     FAMILY HISTORY: Father  at 79, heart disease. Mother  at 88,   heart disease.     SOCIAL HISTORY: Does not drink or smoke.    ROS:   Constitutional: No recent significant weight change  Eyes: No recent visual changes  ENT: No dysphagia  Cardiovascular: intermittent chest pain+  Respiratory: IGNACIO+  Gastrointestinal: Denies recent bowel disturbances  GenitoUrinary - No dysuria  Skin: No new skin rash  Neurologic: No focal neurologic complaints. Has low back pain X several months radiating both legs..  All other systems reviewed and are negative.    PE: GENERAL: Well developed, well nourished.  BMI 33.35 Weight 200.4 pounds /80  PSYCH: AAOx3, appropriate mood and affect, pleasant expression  EYES: Conjunctiva, corneas clear  NECK: Supple, trachea midline, no thyromegaly or nodules  CHEST: Resp even and unlabored, CTA bilateral.  CARDIAC: RRR, S1, S2 heard, no murmurs   ABDOMEN: Soft, non-tender, non-distended; +BS  VASCULAR: DP pulses +2/4 bilaterally, no edema  NEURO: Gait steady, CN II-VII grossly intact, 5/5 strength major flexors/extensors.  SKIN: no acanthosis nigracans.             Results for orders placed or  performed in visit on 06/20/17   METANEPHRINES, PLASMA FREE   Result Value Ref Range     Metanephrine, Free <25 < OR = 57 pg/mL     Metanephrine, Total, Plasma 1350 (H) < OR = 205 pg/mL     Normetanephrine, Free 1350 (H) < OR = 148 pg/mL      IMPRESSION: Bilateral adrenalectomy  Metastatic pheochromocytoma  Hypertension  Low back pain.  Type 2 diabetes. Controlled  Normetanephrines 1350 very high.         The patient is well known to me from [facility].  I had performed bilateral adrenalectomies on her in the past and she developed malignant metastatic pheochromocytoma to the humerus treated with radioactive labeled MIBG tracer for ablation.     Recently her metanephrines and catecholamines have increased.  She had a nuclear medicine MIBG study which revealed disease in the right adrenal bed near the superior pole of the right kidney and right para-aortic region.  There was no uptake in the humerus.  With her still requiring 3 medications for hypertension control and control of the distant disease she is being referred to consider palliative resection of the right adrenal bed and right para-aortic lymph node since the metastatic disease appears to be well controlled with previous radioactive MIBG ablation.  The patient would consider repeat surgery which would need to be an open approach.  I would like to repeat a CT scan of the chest abdomen and pelvis with oral and IV contrast to assess the extent of the disease before entertaining the surgery.  I would like to get the CT scan of the chest abdomen and pelvis and then re-present her at our multidisciplinary endocrine conference.  I would like to get a baseline 24-hour urine for catecholamines and metanephrines since the recent increase have been serum values and we do not have a more conference of urinary valuation the record  She denies any weight loss at the present time.  She's had about a one-month history of low back  pain and left abdominal cramping but denies any other symptoms.        Plan:    To OR for open adrenalectomy    Shaan Novak MD PGY III  [phone]

## 2017-10-02 NOTE — ANESTHESIA PROCEDURE NOTES
Central Line    Diagnosis: Pheo  Procedure start time: 10/2/2017 2:52 PM  Timeout: 10/2/2017 2:52 PM  Procedure end time: 10/2/2017 3:05 PM  Staffing  Anesthesiologist: TRAN FELDER  Performed: anesthesiologist   Anesthesiologist was present at the time of the procedure.  Preanesthetic Checklist  Completed: patient identified, site marked, surgical consent, pre-op evaluation, timeout performed, IV checked, risks and benefits discussed, monitors and equipment checked and anesthesia consent given  Indication  Indication: hemodynamic monitoring, vascular access, med administration     Anesthesia   general anesthesia    Central Line  Skin Prep: skin prepped with Betadine, skin prep agent completely dried prior to procedure  maximum sterile barriers used during central venous catheter insertion  hand hygiene performed prior to central venous catheter insertion  Location: right internal jugular,   Catheter type: triple lumen  Catheter Size: 7 Fr  Ultrasound: vascular probe with ultrasound  Vessel Caliber: medium, patent, compressibility normal  Needle advanced into vessel with real time Ultrasound guidance.  Guidewire confirmed in vessel.  Sterile sheath used.   Manometry: Venous cannualation confirmed by visual estimation of blood vessel pressure using manometry.  Insertion Attempts: 1   Securement:line sutured, chlorhexidine patch, sterile dressing applied and blood return through all ports     Post-Procedure  X-Ray: successful placement  Adverse Events:none

## 2017-10-02 NOTE — NURSING
Pt presents to SICU 6086 via bed from Anesthesia Service with portable telemetry and pulse oxymetry.  Upon arrival, SICU monitoring applied.  Pt presents with restlessness, moaning loudly and complaining of severe stomach pain. Anesthesia service administers Fentanyl IV, which provides moderate pain relief. Abdomen soft, contour symmetrical.  Small amount of sanguinous drainage noted to RUTH ANN.  SICU service notified of pt's arrival, and orders received.  Ace Guardado RN (night shift) presents to bedside, report given and care assumed.

## 2017-10-02 NOTE — H&P
History & Physical  Surgical Intensive Care    SUBJECTIVE:     Chief Complaint/Reason for Admission: Recurrent pheochromocytoma s/p adrenalectomy    History of Present Illness:  Patient is a 72 y.o. 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 Since her surgery, the patient has had increasing blood pressure requirements, now on three anti-hypertensive medications at home. Repeat labs in August 2017 showed increased metanephrines and catecholamines. Repeat nuclear medicine MIBG study showed no uptake in the humerus but did show disease in the right adrenal bed close to the superior pole of the right kidney as well as the right para-aortic region. The patient was taken to the OR today for repeat adrenalectomy with para-aortic lymph node dissection. She arrives to the SICU extubated maintaining O2 sats>95% on non-rebreather mask, HDS off pressor support.     PTA Medications   Medication Sig    doxazosin (CARDURA) 2 MG tablet Take 1 tablet (2 mg total) by mouth once daily.    hydrocortisone (CORTEF) 20 MG Tab TAKE 1 TABLET EVERY MORNING  AND TAKE 1/2 TABLET EVERY EVENING  AT  5PM    losartan (COZAAR) 50 MG tablet Take 50 mg by mouth once daily.    metformin (GLUCOPHAGE-XR) 500 MG 24 hr tablet TAKE 1 TABLET TWICE DAILY WITH MEALS    nifedipine (ADALAT CC) 90 MG TbSR Take 30 mg by mouth once daily.    aspirin 81 MG Chew Take 81 mg by mouth once daily.    cholecalciferol, vitamin D3, (VITAMIN D3) 5,000 unit Tab Take 5,000 Units by mouth once daily.    furosemide (LASIX) 40 MG tablet Take 20 mg by mouth. Monday and Friday    TRUE METRIX AIR GLUCOSE METER kit CHECK BLOOD SUGAR ONE TIME DAILY    TRUE METRIX GLUCOSE TEST STRIP Strp CHECK BLOOD SUGAR ONE TIME  DAILY    TRUEPLUS LANCETS 28 gauge Misc TEST ONE TIME DAILY       Review of patient's allergies indicates:  No Known Allergies    Past Medical History:   Diagnosis Date    Diabetes mellitus     Hypertension     Malignant pheochromocytoma     Pheochromocytoma     Pheochromocytoma, malignant 01/10/2016    Pheochromocytoma, malignant     Thyroid disease      Past Surgical History:   Procedure Laterality Date    ADRENALECTOMY      APPENDECTOMY      MIBG Therapy  3/2016, 7/2016, 12/2016    ELIJAH Martines     Family History   Problem Relation Age of Onset    Heart disease Mother     Diabetes Mother     Heart disease Father     Melanoma Neg Hx      Social History   Substance Use Topics    Smoking status: Never Smoker    Smokeless tobacco: Never Used    Alcohol use No        Review of Systems:  Review of Systems   Unable to perform ROS: Patient unresponsive     OBJECTIVE:     Vital Signs (Most Recent)  Temp: 98.5 °F (36.9 °C) (10/02/17 1216)  Pulse: 63 (10/02/17 1216)  Resp: 18 (10/02/17 1216)  BP: (!) 136/55 (10/02/17 1219)  SpO2: 100 % (10/02/17 1216)  Ventilator Data (Last 24H):          Hemodynamic Parameters (Last 24H):       Physical Exam   Constitutional: She appears well-developed and well-nourished.   HENT:   Head: Normocephalic and atraumatic.   Cardiovascular: Normal rate, regular rhythm and normal heart sounds.  Exam reveals no friction rub.    No murmur heard.  Pulmonary/Chest: Effort normal and breath sounds normal. No respiratory distress. She has no wheezes. She has no rales.   Abdominal: Soft. She exhibits no distension. There is no tenderness.   Right incision covered with gauze, not saturated. RUTH ANN drain in place with serosanguinous output.     Lines/Drains:       Peripheral IV - Single Lumen 10/02/17 1145 Left Hand (Active)   Site Assessment Clean;Dry;Intact 10/2/2017 12:19 PM   Line Status Blood return noted;Infusing 10/2/2017 12:19 PM   Dressing Status Clean;Dry;Intact 10/2/2017  12:19 PM   Dressing Intervention New dressing 10/2/2017 12:19 PM   Number of days: 0     Laboratory  CBC:   Recent Labs  Lab 10/02/17  1825   WBC 12.96*   RBC 3.28*   HGB 9.8*   HCT 29.0*      MCV 88   MCH 29.9   MCHC 33.8     CMP:   Recent Labs  Lab 10/02/17  1825   *  145*   CALCIUM 7.9*  7.9*   ALBUMIN 2.8*   PROT 5.5*     140   K 4.1  4.1   CO2 22*  22*     109   BUN 11  11   CREATININE 0.8  0.8   ALKPHOS 89   ALT 49*   *   BILITOT 0.4     Coagulation:   Recent Labs  Lab 10/02/17  1825   LABPROT 13.2*   INR 1.3*   APTT 22.2       Chest X-Ray: No results found in the last 24 hours.      ASSESSMENT/PLAN:   72 y.o. 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 Now s/p right redo-adrenalectomy with para-aortic lymph node dissection on 10/2/2017.    Plan:    Neuro:   - oxy Ir, dilaudid IV for breakthrough pain, IV tylenol  - off sedation    Pulmonary:   -extubated currently on non rebreather, wean as tolerated    Cardiac:  - SBP goal<170, cardene gtt as needed  -HDS not requiring pressors  -Home meds include losartan, nifedipine, doxazosin    Renal:   -Carcamo in place  - monitor UOP   -Bun/Cr stable, continue to trend    Fluids/Electrolytes/Nutrition/GI:   -Nutritional status: clear liquid diet, advance as tolerated  -replace lytes PRN  -maintenance fluids Ns@125ml/hr  - stress dose steroids 100mg q8  - polyethylene glycol packet daily  - GI prophylaxis: famotidine 20mg IV BID    Hematology/Oncology:  -H/H 9.8/29, trend   -INR/Plts 1.3/191  - SCDs in place, anticoagulation held for now per primary team    Infectious Disease:   -Afebrile  -WBC elevated post-op, continue to monitor  - lactate 1.0, trend seral lactates    Endocrine:  -Glucose goal of 120-150  -SSI    Dispo:  -Continue care in the ICU  setting    Prema Colon, PGY-1  General Surgery  [phone]  10/02/2017

## 2017-10-02 NOTE — PROGRESS NOTES
5735-Dr. Nava asked if hydrocortisone should be given and she stated to give the medication now in preop.

## 2017-10-02 NOTE — TRANSFER OF CARE
"Anesthesia Transfer of Care Note    Patient: Hailey Sawant    Procedure(s) Performed: Procedure(s) (LRB):  ADRENALECTOMY open exploratory (Right)    Patient location: PACU    Anesthesia Type: general    Transport from OR: Transported from OR on 6-10 L/min O2 by face mask with adequate spontaneous ventilation    Post pain: adequate analgesia    Post assessment: no apparent anesthetic complications and tolerated procedure well    Post vital signs: stable    Level of consciousness: awake    Nausea/Vomiting: no nausea/vomiting    Complications: none    Transfer of care protocol was followed      Last vitals:   Visit Vitals  BP (!) 136/55   Pulse 63   Temp 36.9 °C (98.5 °F) (Oral)   Resp 18   Ht 5' 5" (1.651 m)   Wt 92.5 kg (204 lb)   SpO2 100%   BMI 33.95 kg/m²     "

## 2017-10-03 LAB
ALBUMIN SERPL BCP-MCNC: 2.9 G/DL
ALP SERPL-CCNC: 93 U/L
ALT SERPL W/O P-5'-P-CCNC: 44 U/L
ANION GAP SERPL CALC-SCNC: 9 MMOL/L
AST SERPL-CCNC: 143 U/L
BASOPHILS # BLD AUTO: 0.01 K/UL
BASOPHILS NFR BLD: 0.1 %
BILIRUB SERPL-MCNC: 0.7 MG/DL
BUN SERPL-MCNC: 11 MG/DL
CALCIUM SERPL-MCNC: 7.9 MG/DL
CHLORIDE SERPL-SCNC: 110 MMOL/L
CO2 SERPL-SCNC: 20 MMOL/L
CREAT SERPL-MCNC: 0.8 MG/DL
DIFFERENTIAL METHOD: ABNORMAL
EOSINOPHIL # BLD AUTO: 0 K/UL
EOSINOPHIL NFR BLD: 0 %
ERYTHROCYTE [DISTWIDTH] IN BLOOD BY AUTOMATED COUNT: 14.9 %
EST. GFR  (AFRICAN AMERICAN): >60 ML/MIN/1.73 M^2
EST. GFR  (NON AFRICAN AMERICAN): >60 ML/MIN/1.73 M^2
ESTIMATED AVG GLUCOSE: 108 MG/DL
GLUCOSE SERPL-MCNC: 105 MG/DL (ref 70–110)
GLUCOSE SERPL-MCNC: 122 MG/DL
HBA1C MFR BLD HPLC: 5.4 %
HCO3 UR-SCNC: 21.6 MMOL/L (ref 24–28)
HCT VFR BLD AUTO: 31.2 %
HCT VFR BLD CALC: 29 %PCV (ref 36–54)
HGB BLD-MCNC: 10.2 G/DL
LACTATE SERPL-SCNC: 0.8 MMOL/L
LACTATE SERPL-SCNC: 0.9 MMOL/L
LACTATE SERPL-SCNC: 0.9 MMOL/L
LYMPHOCYTES # BLD AUTO: 0.8 K/UL
LYMPHOCYTES NFR BLD: 7.1 %
MAGNESIUM SERPL-MCNC: 2.2 MG/DL
MCH RBC QN AUTO: 29.1 PG
MCHC RBC AUTO-ENTMCNC: 32.7 G/DL
MCV RBC AUTO: 89 FL
MONOCYTES # BLD AUTO: 0.7 K/UL
MONOCYTES NFR BLD: 5.8 %
NEUTROPHILS # BLD AUTO: 10.1 K/UL
NEUTROPHILS NFR BLD: 86.7 %
PCO2 BLDA: 34 MMHG (ref 35–45)
PH SMN: 7.41 [PH] (ref 7.35–7.45)
PLATELET # BLD AUTO: 198 K/UL
PMV BLD AUTO: 9.1 FL
PO2 BLDA: 98 MMHG (ref 80–100)
POC BE: -3 MMOL/L
POC IONIZED CALCIUM: 1.12 MMOL/L (ref 1.06–1.42)
POC SATURATED O2: 98 % (ref 95–100)
POC TCO2: 23 MMOL/L (ref 23–27)
POCT GLUCOSE: 132 MG/DL (ref 70–110)
POCT GLUCOSE: 139 MG/DL (ref 70–110)
POCT GLUCOSE: 148 MG/DL (ref 70–110)
POCT GLUCOSE: 186 MG/DL (ref 70–110)
POTASSIUM BLD-SCNC: 4 MMOL/L (ref 3.5–5.1)
POTASSIUM SERPL-SCNC: 4.5 MMOL/L
PROT SERPL-MCNC: 5.8 G/DL
RBC # BLD AUTO: 3.5 M/UL
SAMPLE: ABNORMAL
SODIUM BLD-SCNC: 142 MMOL/L (ref 136–145)
SODIUM SERPL-SCNC: 139 MMOL/L
WBC # BLD AUTO: 11.61 K/UL

## 2017-10-03 PROCEDURE — 99900035 HC TECH TIME PER 15 MIN (STAT)

## 2017-10-03 PROCEDURE — 94664 DEMO&/EVAL PT USE INHALER: CPT

## 2017-10-03 PROCEDURE — 83605 ASSAY OF LACTIC ACID: CPT

## 2017-10-03 PROCEDURE — 63600175 PHARM REV CODE 636 W HCPCS: Performed by: INTERNAL MEDICINE

## 2017-10-03 PROCEDURE — 25000003 PHARM REV CODE 250: Performed by: SURGERY

## 2017-10-03 PROCEDURE — S0028 INJECTION, FAMOTIDINE, 20 MG: HCPCS | Performed by: SURGERY

## 2017-10-03 PROCEDURE — 99222 1ST HOSP IP/OBS MODERATE 55: CPT | Mod: ,,, | Performed by: INTERNAL MEDICINE

## 2017-10-03 PROCEDURE — 25000003 PHARM REV CODE 250: Performed by: STUDENT IN AN ORGANIZED HEALTH CARE EDUCATION/TRAINING PROGRAM

## 2017-10-03 PROCEDURE — 27000221 HC OXYGEN, UP TO 24 HOURS

## 2017-10-03 PROCEDURE — 94799 UNLISTED PULMONARY SVC/PX: CPT

## 2017-10-03 PROCEDURE — 80053 COMPREHEN METABOLIC PANEL: CPT

## 2017-10-03 PROCEDURE — 20000000 HC ICU ROOM

## 2017-10-03 PROCEDURE — 63600175 PHARM REV CODE 636 W HCPCS: Performed by: SURGERY

## 2017-10-03 PROCEDURE — 83735 ASSAY OF MAGNESIUM: CPT

## 2017-10-03 PROCEDURE — 27000173 HC ACAPELLA DEVICE DH OR DM

## 2017-10-03 PROCEDURE — 85025 COMPLETE CBC W/AUTO DIFF WBC: CPT

## 2017-10-03 PROCEDURE — 94667 MNPJ CHEST WALL 1ST: CPT

## 2017-10-03 RX ORDER — FAMOTIDINE 20 MG/1
20 TABLET, FILM COATED ORAL 2 TIMES DAILY
Status: DISCONTINUED | OUTPATIENT
Start: 2017-10-03 | End: 2017-10-06 | Stop reason: HOSPADM

## 2017-10-03 RX ORDER — HYDROCORTISONE 10 MG/1
10 TABLET ORAL
Status: DISCONTINUED | OUTPATIENT
Start: 2017-10-04 | End: 2017-10-06 | Stop reason: HOSPADM

## 2017-10-03 RX ORDER — FLUDROCORTISONE ACETATE 0.1 MG/1
100 TABLET ORAL DAILY
Status: DISCONTINUED | OUTPATIENT
Start: 2017-10-04 | End: 2017-10-06 | Stop reason: HOSPADM

## 2017-10-03 RX ORDER — ENOXAPARIN SODIUM 100 MG/ML
40 INJECTION SUBCUTANEOUS EVERY 24 HOURS
Status: DISCONTINUED | OUTPATIENT
Start: 2017-10-03 | End: 2017-10-06 | Stop reason: HOSPADM

## 2017-10-03 RX ORDER — HYDROCORTISONE 10 MG/1
10 TABLET ORAL
Status: DISCONTINUED | OUTPATIENT
Start: 2017-10-03 | End: 2017-10-03

## 2017-10-03 RX ORDER — HYDROCORTISONE 20 MG/1
20 TABLET ORAL DAILY
Status: DISCONTINUED | OUTPATIENT
Start: 2017-10-04 | End: 2017-10-06 | Stop reason: HOSPADM

## 2017-10-03 RX ADMIN — OXYCODONE HYDROCHLORIDE 5 MG: 5 TABLET ORAL at 08:10

## 2017-10-03 RX ADMIN — HYDROMORPHONE HYDROCHLORIDE 1 MG: 1 INJECTION, SOLUTION INTRAMUSCULAR; INTRAVENOUS; SUBCUTANEOUS at 11:10

## 2017-10-03 RX ADMIN — FAMOTIDINE 20 MG: 20 TABLET, FILM COATED ORAL at 08:10

## 2017-10-03 RX ADMIN — STANDARDIZED SENNA CONCENTRATE AND DOCUSATE SODIUM 1 TABLET: 8.6; 5 TABLET, FILM COATED ORAL at 08:10

## 2017-10-03 RX ADMIN — HYDROMORPHONE HYDROCHLORIDE 1 MG: 1 INJECTION, SOLUTION INTRAMUSCULAR; INTRAVENOUS; SUBCUTANEOUS at 07:10

## 2017-10-03 RX ADMIN — HYDROCORTISONE SODIUM SUCCINATE 100 MG: 100 INJECTION, POWDER, FOR SOLUTION INTRAMUSCULAR; INTRAVENOUS at 06:10

## 2017-10-03 RX ADMIN — FAMOTIDINE 20 MG: 10 INJECTION, SOLUTION INTRAVENOUS at 06:10

## 2017-10-03 RX ADMIN — STANDARDIZED SENNA CONCENTRATE AND DOCUSATE SODIUM 1 TABLET: 8.6; 5 TABLET, FILM COATED ORAL at 09:10

## 2017-10-03 RX ADMIN — HYDROMORPHONE HYDROCHLORIDE 1 MG: 1 INJECTION, SOLUTION INTRAMUSCULAR; INTRAVENOUS; SUBCUTANEOUS at 10:10

## 2017-10-03 RX ADMIN — OXYCODONE HYDROCHLORIDE 5 MG: 5 TABLET ORAL at 04:10

## 2017-10-03 RX ADMIN — POLYETHYLENE GLYCOL 3350 17 G: 17 POWDER, FOR SOLUTION ORAL at 09:10

## 2017-10-03 RX ADMIN — ENOXAPARIN SODIUM 40 MG: 100 INJECTION SUBCUTANEOUS at 06:10

## 2017-10-03 RX ADMIN — OXYCODONE HYDROCHLORIDE 5 MG: 5 TABLET ORAL at 10:10

## 2017-10-03 RX ADMIN — HYDROCORTISONE SODIUM SUCCINATE 50 MG: 100 INJECTION, POWDER, FOR SOLUTION INTRAMUSCULAR; INTRAVENOUS at 04:10

## 2017-10-03 RX ADMIN — HYDROCORTISONE SODIUM SUCCINATE 50 MG: 100 INJECTION, POWDER, FOR SOLUTION INTRAMUSCULAR; INTRAVENOUS at 10:10

## 2017-10-03 NOTE — SUBJECTIVE & OBJECTIVE
Interval History:   NAEON  Pain controlled  Good uop    Review of Systems   All other systems reviewed and are negative.    Objective:     Temp:  [97.7 °F (36.5 °C)-98.5 °F (36.9 °C)] 98.2 °F (36.8 °C)  Pulse:  [62-76] 68  Resp:  [12-35] 18  SpO2:  [93 %-100 %] 94 %  BP: (125-168)/(55-77) 151/68  Arterial Line BP: (114-163)/(33-71) 132/49     Body mass index is 33.95 kg/m².            Drains     Drain                 Closed/Suction Drain 10/02/17 1654 Right;Lateral Abdomen Bulb 19 Fr. less than 1 day         Urethral Catheter 10/02/17 1400 Double-lumen;Straight-tip;Non-latex 16 Fr. less than 1 day                Physical Exam   Constitutional: No distress.   HENT:   Head: Normocephalic and atraumatic.   Eyes: Conjunctivae are normal. No scleral icterus.   Neck: Normal range of motion.   Cardiovascular: Normal rate.    Pulmonary/Chest: Effort normal. No respiratory distress.   Abdominal: Soft. She exhibits no distension. There is tenderness (appropriate). There is no rebound and no guarding.   Incisions c/d/i  Fr howard draining clear yellow  RUTH ANN draining bloody output   Musculoskeletal: Normal range of motion.   SCDs in place   Neurological: She is alert.   Skin: Skin is warm and dry. She is not diaphoretic.     Psychiatric: She has a normal mood and affect.       Significant Labs:    BMP:    Recent Labs  Lab 10/02/17  1825 10/03/17  0406     140 139   K 4.1  4.1 4.5     109 110   CO2 22*  22* 20*   BUN 11  11 11   CREATININE 0.8  0.8 0.8   CALCIUM 7.9*  7.9* 7.9*       CBC:     Recent Labs  Lab 10/02/17  1825 10/03/17  0406   WBC 12.96* 11.61   HGB 9.8* 10.2*   HCT 29.0* 31.2*    198       All pertinent labs results from the past 24 hours have been reviewed.    Significant Imaging:  All pertinent imaging results/findings from the past 24 hours have been reviewed.

## 2017-10-03 NOTE — ASSESSMENT & PLAN NOTE
- Consult for medication management of steroids and BP medication post surgery  - Pt currenetly on Hydrocortisone 100mg Q8- change to 50mg Q8, with goal to get her back to home dosing 20mg every AM and 10mg every PM tomorrow  - Hold fludrocortisone as pt is still being stress dosed- will add back home 0.15mg tomorrow when back to home dosing  - BP currently 137/65, will monitor and add medications as needed

## 2017-10-03 NOTE — PLAN OF CARE
Problem: Patient Care Overview  Goal: Plan of Care Review  Outcome: Ongoing (interventions implemented as appropriate)  Dx: s/p Right Adrenalectomy    Shift Events: Dc'd howard and arterial line.    Neuro: Arouses to voice, moves all extremities, and follows commands.     Vital Signs: Systolic bp goal of <170 maintained, sats 95% on room air.    Intake: NS at 125cc/hr    Output: Kasandra output 0-30cc/hr.  UOP 30-40cc/hr.    Pain Management: Prn oxycodone 5mg and Dilaudid 1mg administered for pain.     Labs: Daily labs and Lactic q4h drawn.  Accuchecks ac/hs, pt not requiring ss insulin.    Skin:  Right lower lateral abdominal incision and kasandra drain site cdi and covered w/ telfa.  Heels, elbows, and sacrum w/o redness or breakdown.

## 2017-10-03 NOTE — PLAN OF CARE
Problem: Spiritual Distress, Risk/Actual (Adult,Obstetrics,Pediatric)  Intervention: Facilitate Personal Exploration/Expression of Spirituality  Provided initial visit. Introduced and offered pastoral support with reflective listening and prayer upon request by pt's sister. No further spiritual needs expressed at this time. Pt made aware of 's presence as needed.

## 2017-10-03 NOTE — PROGRESS NOTES
Progress Note  Surgical Intensive Care    Admit Date: 10/2/2017  Post-operative Day: 1 Day Post-Op  Hospital Day: 2    SUBJECTIVE:     Follow-up For:  Procedure(s) (LRB):  ADRENALECTOMY open exploratory (Right)    HPI:    Patient is a 72 y.o. 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 Since her surgery, the patient has had increasing blood pressure requirements, now on three anti-hypertensive medications at home. Repeat labs in August 2017 showed increased metanephrines and catecholamines. Repeat nuclear medicine MIBG study showed no uptake in the humerus but did show disease in the right adrenal bed close to the superior pole of the right kidney as well as the right para-aortic region. The patient was taken to the OR today for repeat adrenalectomy with para-aortic lymph node dissection. She arrives to the SICU extubated maintaining O2 sats>95% on non-rebreather mask, HDS off pressor support.      Interval history:    No acute events overnight. Weaned to 2L [place]. Pain well controlled on PO pain meds. Carcamo in place, UOP adequate.    Continuous Infusions:   sodium chloride 0.9% 125 mL/hr at 10/02/17 1956    nicardipine Stopped (10/02/17 1930)     Scheduled Meds:   amiodarone        enoxaparin  40 mg Subcutaneous Daily    famotidine  20 mg Intravenous BID    hydrocortisone sodium succinate  100 mg Intravenous Q8H    polyethylene glycol  17 g Oral Daily    senna-docusate 8.6-50 mg  1 tablet Oral BID     PRN Meds:albuterol-ipratropium 2.5mg-0.5mg/3mL, dextrose 50%, dextrose 50%, diphenhydrAMINE, glucagon (human recombinant), glucose, glucose, HYDROmorphone, insulin aspart, ondansetron, oxycodone, oxycodone, promethazine    Review of patient's allergies  indicates:  No Known Allergies    OBJECTIVE:     Vital Signs (Most Recent)  Temp: 98.2 °F (36.8 °C) (10/03/17 0300)  Pulse: 68 (10/03/17 0630)  Resp: 18 (10/03/17 0630)  BP: (!) 151/68 (10/03/17 0600)  SpO2: (!) 94 % (10/03/17 0630)    Vital Signs Range (Last 24H):  Temp:  [97.7 °F (36.5 °C)-98.5 °F (36.9 °C)]   Pulse:  [62-76]   Resp:  [12-35]   BP: (125-168)/(55-77)   SpO2:  [93 %-100 %]   Arterial Line BP: (114-163)/(33-71)     I & O (Last 24H):  Intake/Output Summary (Last 24 hours) at 10/03/17 0650  Last data filed at 10/03/17 0600   Gross per 24 hour   Intake             3180 ml   Output             2445 ml   Net              735 ml     Physical Exam:  Physical Exam   Constitutional: She is oriented to person, place, and time and well-developed, well-nourished, and in no distress.   HENT:   Head: Normocephalic and atraumatic.   Cardiovascular: Normal rate, regular rhythm and normal heart sounds.    No murmur heard.  Pulmonary/Chest: Effort normal and breath sounds normal. No respiratory distress. She has no wheezes.   Abdominal: Soft. She exhibits no distension. There is tenderness.   Right flank incision covered in gauze, not saturated. RUTH ANN drain in place with serosanguinous output.   Neurological: She is alert and oriented to person, place, and time.   Skin: Skin is warm and dry.     Wound/Incision:  no drainage    Laboratory (Last 24H):  CBC:    Recent Labs  Lab 10/03/17  0406   WBC 11.61   HGB 10.2*   HCT 31.2*        CMP:    Recent Labs  Lab 10/03/17  0406   CALCIUM 7.9*   ALBUMIN 2.9*   PROT 5.8*      K 4.5   CO2 20*      BUN 11   CREATININE 0.8   ALKPHOS 93   ALT 44   *   BILITOT 0.7       Chest X-Ray: X-ray Chest 1 View    Result Date: 10/2/2017  Cardiomegaly with mild congestive changes. Right IJ catheter tip overlies the upper right atrium. Electronically signed by: TAMELA HERNANDEZ MD Date:     10/02/17 Time:    19:31       Diagnostic Results:  No Further    ASSESSMENT/PLAN:    72 y.o. 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 Now s/p right redo-adrenalectomy with para-aortic lymph node dissection on 10/2/2017.     Plan:     Neuro:   - oxy Ir, dilaudid IV for breakthrough pain  - off sedation     Pulmonary:   - maintaining O2 sats>95% on 2L NC     Cardiac:  - SBP goal<170, cardene gtt not needed overnight  -HDS not requiring pressors  -Home meds include losartan, nifedipine, doxazosin  - d/c arterial line     Renal:   -Howard in place, d/c howard this morning  - UOP adequate  -Bun/Cr stable, continue to trend     Fluids/Electrolytes/Nutrition/GI:   -Nutritional status: clear liquid diet, advance as tolerated  -replace lytes PRN  - stress dose steroids 100mg q8  - polyethylene glycol packet daily  - GI prophylaxis: famotidine 20mg IV BID     Hematology/Oncology:  -H/H stable, continue to trend  - SCDs in place, anticoagulation held for now per primary team     Infectious Disease:   -Afebrile  -WBC WNL, continue to monitor  - lactate trending down     Endocrine:  -Glucose goal of 120-150     Dispo:  - D/C howard, arterial line, maintenance fluids. Stepdown today per primary team.    Prema Colon, PGY-1  General Surgery  [phone]  10/03/2017

## 2017-10-03 NOTE — PROGRESS NOTES
[facility]  Urology  Progress Note    Patient Name: Hailey Sawant  MRN: [id number]  Admission Date: 10/2/2017  Hospital Length of Stay: 1 days  Code Status: Prior   Attending Provider: Sandoval Castillo MD   Primary Care Physician: Rodolfo Burton MD    Subjective:     HPI:  72 y.o. 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 Since her surgery, the patient has had increasing blood pressure requirements, now on three anti-hypertensive medications at home. Repeat labs in August 2017 showed increased metanephrines and catecholamines. Repeat nuclear medicine MIBG study showed no uptake in the humerus but did show disease in the right adrenal bed close to the superior pole of the right kidney as well as the right para-aortic region. The patient was taken to the OR today for repeat adrenalectomy with para-aortic lymph node dissection. She arrives to the SICU extubated maintaining O2 sats>95% on non-rebreather mask, HDS off pressor support.       Interval History:   NAEON  Pain controlled  Good uop    Review of Systems   All other systems reviewed and are negative.    Objective:     Temp:  [97.7 °F (36.5 °C)-98.5 °F (36.9 °C)] 98.2 °F (36.8 °C)  Pulse:  [62-76] 68  Resp:  [12-35] 18  SpO2:  [93 %-100 %] 94 %  BP: (125-168)/(55-77) 151/68  Arterial Line BP: (114-163)/(33-71) 132/49     Body mass index is 33.95 kg/m².            Drains     Drain                 Closed/Suction Drain 10/02/17 1654 Right;Lateral Abdomen Bulb 19 Fr. less than 1 day         Urethral Catheter 10/02/17 1400 Double-lumen;Straight-tip;Non-latex 16 Fr. less than 1 day                Physical Exam   Constitutional: No distress.   HENT:   Head: Normocephalic and atraumatic.    Eyes: Conjunctivae are normal. No scleral icterus.   Neck: Normal range of motion.   Cardiovascular: Normal rate.    Pulmonary/Chest: Effort normal. No respiratory distress.   Abdominal: Soft. She exhibits no distension. There is tenderness (appropriate). There is no rebound and no guarding.   Incisions c/d/i  Fr howard draining clear yellow  RUTH ANN draining bloody output   Musculoskeletal: Normal range of motion.   SCDs in place   Neurological: She is alert.   Skin: Skin is warm and dry. She is not diaphoretic.     Psychiatric: She has a normal mood and affect.       Significant Labs:    BMP:    Recent Labs  Lab 10/02/17  1825 10/03/17  0406     140 139   K 4.1  4.1 4.5     109 110   CO2 22*  22* 20*   BUN 11  11 11   CREATININE 0.8  0.8 0.8   CALCIUM 7.9*  7.9* 7.9*       CBC:     Recent Labs  Lab 10/02/17  1825 10/03/17  0406   WBC 12.96* 11.61   HGB 9.8* 10.2*   HCT 29.0* 31.2*    198       All pertinent labs results from the past 24 hours have been reviewed.    Significant Imaging:  All pertinent imaging results/findings from the past 24 hours have been reviewed.                  Assessment/Plan:     * Pheochromocytoma, malignant    - Management per primary team  - no need for further urologic intervention  - howard and drain removal per primary            VTE Risk Mitigation         Ordered     enoxaparin injection 40 mg  Daily     Route:  Subcutaneous        10/03/17 0615     High Risk of VTE  Once      10/03/17 0615     Place sequential compression device  Until discontinued      10/03/17 0615          Camila De Guzman MD  Urology  [facility]

## 2017-10-03 NOTE — PLAN OF CARE
Will see patient for discharge assessment tomorrow.       10/03/17 1718   Discharge Assessment   Assessment Type Discharge Planning Assessment

## 2017-10-03 NOTE — HPI
72 y.o. 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 Since her surgery, the patient has had increasing blood pressure requirements, now on three anti-hypertensive medications at home. Repeat labs in August 2017 showed increased metanephrines and catecholamines. Repeat nuclear medicine MIBG study showed no uptake in the humerus but did show disease in the right adrenal bed close to the superior pole of the right kidney as well as the right para-aortic region. The patient was taken to the OR today for repeat adrenalectomy with para-aortic lymph node dissection. She arrives to the SICU extubated maintaining O2 sats>95% on non-rebreather mask, HDS off pressor support.

## 2017-10-03 NOTE — SUBJECTIVE & OBJECTIVE
Interval HPI:   Overnight events:  Eatin%  Nausea: No  Hypoglycemia and intervention: No  Fever: No  TPN and/or TF: No  If yes, type of TF/TPN and rate:     PMH, PSH, FH, SH updated and reviewed     Review of Systems   Constitutional: Positive for weight changes (gain since steroids).  Eyes: Positive for visual disturbance (seen by optho for cataracts).  Respiratory: Positive for cough (longstanding, not a smoker- pt states she takes asthma inhaler which helps)   Cardiovascular: Positive for chest pain intermittently, not associated with activity.  Gastrointestinal: Negative for nausea.  Endocrine: Positive for polyuria, polydipsia.  Musculoskeletal: Positive for back pain.  Skin: Positive for rash on legs.  Neurological: Positive for syncope.  Psychiatric/Behavioral: Negative for depression.      Labs Reviewed and Include:  BASELINE Creatinine:   [unfilled]  [unfilled]  [unfilled]    Recent Labs  Lab 10/03/17  0406   *   CALCIUM 7.9*   ALBUMIN 2.9*   PROT 5.8*      K 4.5   CO2 20*      BUN 11   CREATININE 0.8   ALKPHOS 93   ALT 44   *   BILITOT 0.7     Lab Results   Component Value Date    HGBA1C 5.4 10/02/2017       Nutritional status:   Body mass index is 33.95 kg/m².  Lab Results   Component Value Date    ALBUMIN 2.9 (L) 10/03/2017    ALBUMIN 2.8 (L) 10/02/2017    ALBUMIN 3.2 (L) 2017     No results found for: PREALBUMIN    Estimated Creatinine Clearance: 70.4 mL/min (based on SCr of 0.8 mg/dL).    POCT Glucose results: 105-132    Current Insulin Regimen: Sliding Scale 1-01 PRN with meals           PHYSICAL EXAMINATION:Vitals:    10/03/17 1100   BP: 137/65   Pulse: 62   Resp: 12   Temp:      Body mass index is 33.95 kg/m².    Physical Exam   PHYSICAL EXAMINATION  Constitutional:  Well developed, well nourished, NAD.  ENT: External ears no masses with nose patent; normal hearing.   Neck:  Supple; trachea midline; no thyromegaly.   Cardiovascular: Normal heart sounds, no LE  edema.     Lungs:  Normal effort; lungs anterior bilaterally clear to auscultation.  Abdomen:  Soft, no masses,  no hernias.  MS: No clubbing or cyanosis of nails noted; normal gait or unable to assess gait.  Skin: Round raised nodular lesions BLLE.  Psychiatric: Good judgement and insight; normal mood and affect.  Neurological: Cranial nerves are grossly intact. Normal vibration sense in the bilateral lower extremities.

## 2017-10-03 NOTE — ANESTHESIA RELEASE NOTE
"Anesthesia Release from PACU Note    Patient: Hailey Sawant    Procedure(s) Performed: Procedure(s) (LRB):  ADRENALECTOMY open exploratory (Right)    Anesthesia type: general    Post pain: Adequate analgesia    Post assessment: no apparent anesthetic complications    Last Vitals:   Visit Vitals  BP (!) 151/68 (BP Location: Right arm, Patient Position: Lying)   Pulse 68   Temp 36.8 °C (98.2 °F) (Oral)   Resp 18   Ht 5' 5" (1.651 m)   Wt 92.5 kg (204 lb)   SpO2 (!) 94%   BMI 33.95 kg/m²       Post vital signs: stable    Level of consciousness: awake, alert  and oriented    Nausea/Vomiting: no nausea/no vomiting    Complications: none    Airway Patency: patent    Respiratory: unassisted    Cardiovascular: stable and blood pressure at baseline    Hydration: euvolemic       "

## 2017-10-03 NOTE — SUBJECTIVE & OBJECTIVE
Interval History: NAEON. VSS. No cardene gtt needed. Pt having some pain but improved with prn pain medications. Stable for stepdown to the floor.     Medications:  Continuous Infusions:   Scheduled Meds:   amiodarone        enoxaparin  40 mg Subcutaneous Daily    famotidine  20 mg Oral BID    hydrocortisone sodium succinate  100 mg Intravenous Q8H    polyethylene glycol  17 g Oral Daily    senna-docusate 8.6-50 mg  1 tablet Oral BID     PRN Meds:albuterol-ipratropium 2.5mg-0.5mg/3mL, dextrose 50%, dextrose 50%, glucagon (human recombinant), glucose, glucose, HYDROmorphone, insulin aspart, ondansetron, oxycodone, oxycodone, promethazine     Review of patient's allergies indicates:  No Known Allergies  Objective:     Vital Signs (Most Recent):  Temp: 98.9 °F (37.2 °C) (10/03/17 0700)  Pulse: 72 (10/03/17 0802)  Resp: (!) 21 (10/03/17 0802)  BP: 137/63 (10/03/17 0802)  SpO2: (!) 92 % (10/03/17 0802) Vital Signs (24h Range):  Temp:  [97.7 °F (36.5 °C)-98.9 °F (37.2 °C)] 98.9 °F (37.2 °C)  Pulse:  [62-76] 72  Resp:  [12-35] 21  SpO2:  [92 %-100 %] 92 %  BP: (125-183)/(55-77) 137/63  Arterial Line BP: (114-163)/(33-71) 132/49     Weight: 92.5 kg (204 lb)  Body mass index is 33.95 kg/m².    Intake/Output - Last 3 Shifts       10/01 0700 - 10/02 0659 10/02 0700 - 10/03 0659 10/03 0700 - 10/04 0659    P.O.  480     I.V. (mL/kg)  2700 (29.2)     Total Intake(mL/kg)  3180 (34.4)     Urine (mL/kg/hr)  1980     Drains  165 30 (0.1)    Blood  300     Total Output   2445 30    Net   +735 -30                 Physical Exam   Constitutional: She is oriented to person, place, and time. She appears well-developed and well-nourished. No distress.   HENT:   Head: Normocephalic and atraumatic.   Eyes: Pupils are equal, round, and reactive to light. No scleral icterus.   Cardiovascular: Normal rate.    Pulmonary/Chest: Effort normal.   Abdominal: There is tenderness (appropriately so).   Neurological: She is alert and oriented to  person, place, and time. No cranial nerve deficit.   Psychiatric: She has a normal mood and affect. Her behavior is normal.       Significant Labs:  CBC:   Recent Labs  Lab 10/03/17  0406   WBC 11.61   RBC 3.50*   HGB 10.2*   HCT 31.2*      MCV 89   MCH 29.1   MCHC 32.7     CMP:   Recent Labs  Lab 10/03/17  0406   *   CALCIUM 7.9*   ALBUMIN 2.9*   PROT 5.8*      K 4.5   CO2 20*      BUN 11   CREATININE 0.8   ALKPHOS 93   ALT 44   *   BILITOT 0.7

## 2017-10-03 NOTE — CONSULTS
[facility]  Endocrinology  Consult Note    Consult Requested by: Sandoval Castillo MD   Reason for admit: Pheochromocytoma, malignant    HISTORY OF PRESENT ILLNESS:  Reason for Consult: Medication recommendation for post-surgical malignant pheochromocytoma.    Surgical Procedure and Date: 10/2/17    Diabetes diagnosis year:     Home Diabetes Medications:  Metformin 500mg BID    How often checking glucose at home? One   BG readings on regimen:   Hypoglycemia on the regimen?  No  Missed doses on regimen?  No    Diabetes Complications include:     Diabetic cataract     Complicating diabetes co morbidities:   None.      HPI:   Patient is a 72 y.o. female with a diagnosis of malignant pheochromocytoma. Pt has stage IV metastatic disease with mets to the liver and bone and had a BL adrenalectomy in November of 2009. Pt also had labeled MIBG tracer for ablation, which initially controlled her metanephrines and catecholamines but recent testing in August showed elevated levels. A repeat nuclear medicine MIBG showed uptake in the adrenal bed close to the superior pole of the R kidney as well as para-aortic uptake. Pt had removal surgery of the effected areas yesterday and today remains in some pain at the surgical site. No other acute complaints.             Medications and/or Treatments Impacting Glycemic Control:  Immunotherapy:    Immunosuppressants     None        Steroids:   Hormones     Start     Stop Route Frequency Ordered    10/03/17 1400  hydrocortisone sodium succinate injection 50 mg      -- IV Every 8 hours 10/03/17 1218        Pressors:    Autonomic Drugs     None          Prescriptions Prior to Admission   Medication Sig Dispense Refill Last Dose    doxazosin (CARDURA) 2 MG tablet Take 1 tablet (2 mg total) by mouth once daily. 30 tablet 0 10/1/2017 at 0900    hydrocortisone (CORTEF) 20 MG Tab TAKE 1 TABLET EVERY MORNING  AND TAKE 1/2 TABLET EVERY EVENING  AT  5PM 135 tablet 2  10/2/2017 at 0730    losartan (COZAAR) 50 MG tablet Take 50 mg by mouth once daily.   10/1/2017 at 0900    metformin (GLUCOPHAGE-XR) 500 MG 24 hr tablet TAKE 1 TABLET TWICE DAILY WITH MEALS 180 tablet 2 10/1/2017 at 1700    nifedipine (ADALAT CC) 90 MG TbSR Take 30 mg by mouth once daily.   10/1/2017 at 0900    aspirin 81 MG Chew Take 81 mg by mouth once daily.   9/25/2017 at 0900    cholecalciferol, vitamin D3, (VITAMIN D3) 5,000 unit Tab Take 5,000 Units by mouth once daily.   9/25/2017 at 0900    furosemide (LASIX) 40 MG tablet Take 20 mg by mouth. Monday and Friday  6 9/29/2017 at 0900    TRUE METRIX AIR GLUCOSE METER kit CHECK BLOOD SUGAR ONE TIME DAILY 1 each 0 Unknown at Unknown time    TRUE METRIX GLUCOSE TEST STRIP Strp CHECK BLOOD SUGAR ONE TIME DAILY 100 strip 3 Unknown at Unknown time    TRUEPLUS LANCETS 28 gauge Misc TEST ONE TIME DAILY 200 each 3 Unknown at Unknown time       Current Facility-Administered Medications   Medication Dose Route Frequency Provider Last Rate Last Dose    albuterol-ipratropium 2.5mg-0.5mg/3mL nebulizer solution 3 mL  3 mL Nebulization Q4H PRN Fiordaliza Hernandez MD        amiodarone (CORDARONE) 360 mg/200 mL (1.8 mg/mL) infusion             dextrose 50% injection 12.5 g  12.5 g Intravenous PRN Fiordaliza Hernandez MD        dextrose 50% injection 25 g  25 g Intravenous PRN Fiordaliza Hernandez MD        enoxaparin injection 40 mg  40 mg Subcutaneous Daily Fiordaliza Hernandez MD   40 mg at 10/03/17 0648    famotidine tablet 20 mg  20 mg Oral BID Shaan Novak MD        glucagon (human recombinant) injection 1 mg  1 mg Intramuscular PRN Fiordaliza Hernandez MD        glucose chewable tablet 16 g  16 g Oral PRN Fiordaliza Hernandez MD        glucose chewable tablet 24 g  24 g Oral PRN Fiordaliza Hernandez MD        hydrocortisone sodium succinate injection 50 mg  50 mg Intravenous Q8H Jared Vallejo MD        HYDROmorphone injection 1 mg  1 mg Intravenous Q3H PRN Fiordaliza Hernandez MD   1 mg at  10/03/17 1133    insulin aspart pen 1-10 Units  1-10 Units Subcutaneous QID (AC + HS) PRN Fiordaliza Hernandez MD        ondansetron injection 4 mg  4 mg Intravenous Q8H PRN Fiordaliza Hernandez MD        oxycodone immediate release tablet 10 mg  10 mg Oral Q4H PRN Fiordaliza Hernandez MD        oxycodone immediate release tablet 5 mg  5 mg Oral Q4H PRN Fiordaliza Hernandez MD   5 mg at 10/03/17 1030    polyethylene glycol packet 17 g  17 g Oral Daily Fiordaliza Hernandez MD   17 g at 10/03/17 0936    promethazine suppository 25 mg  25 mg Rectal Q6H PRN Fiordaliza Hernandez MD        senna-docusate 8.6-50 mg per tablet 1 tablet  1 tablet Oral BID Fiordaliza Hernandez MD   1 tablet at 10/03/17 0936       Interval HPI:   Overnight events:  Eatin%  Nausea: No  Hypoglycemia and intervention: No  Fever: No  TPN and/or TF: No  If yes, type of TF/TPN and rate:     PMH, PSH, FH, SH updated and reviewed     Review of Systems   Constitutional: Positive for weight changes (gain since steroids).  Eyes: Positive for visual disturbance (seen by optho for cataracts).  Respiratory: Positive for cough (longstanding, not a smoker- pt states she takes asthma inhaler which helps)   Cardiovascular: Positive for chest pain intermittently, not associated with activity.  Gastrointestinal: Negative for nausea.  Endocrine: Positive for polyuria, polydipsia.  Musculoskeletal: Positive for back pain.  Skin: Positive for rash on legs.  Neurological: Positive for syncope.  Psychiatric/Behavioral: Negative for depression.      Labs Reviewed and Include:  BASELINE Creatinine:   [unfilled]  [unfilled]  [unfilled]    Recent Labs  Lab 10/03/17  0406   *   CALCIUM 7.9*   ALBUMIN 2.9*   PROT 5.8*      K 4.5   CO2 20*      BUN 11   CREATININE 0.8   ALKPHOS 93   ALT 44   *   BILITOT 0.7     Lab Results   Component Value Date    HGBA1C 5.4 10/02/2017       Nutritional status:   Body mass index is 33.95 kg/m².  Lab Results   Component Value Date    ALBUMIN 2.9  (L) 10/03/2017    ALBUMIN 2.8 (L) 10/02/2017    ALBUMIN 3.2 (L) 09/18/2017     No results found for: PREALBUMIN    Estimated Creatinine Clearance: 70.4 mL/min (based on SCr of 0.8 mg/dL).    POCT Glucose results: 105-132    Current Insulin Regimen: Sliding Scale 1-01 PRN with meals           PHYSICAL EXAMINATION:Vitals:    10/03/17 1100   BP: 137/65   Pulse: 62   Resp: 12   Temp:      Body mass index is 33.95 kg/m².    Physical Exam   PHYSICAL EXAMINATION  Constitutional:  Well developed, well nourished, NAD.  ENT: External ears no masses with nose patent; normal hearing.   Neck:  Supple; trachea midline; no thyromegaly.   Cardiovascular: Normal heart sounds, no LE edema.     Lungs:  Normal effort; lungs anterior bilaterally clear to auscultation.  Abdomen:  Soft, no masses,  no hernias.  MS: No clubbing or cyanosis of nails noted; normal gait or unable to assess gait.  Skin: Round raised nodular lesions BLLE.  Psychiatric: Good judgement and insight; normal mood and affect.  Neurological: Cranial nerves are grossly intact. Normal vibration sense in the bilateral lower extremities.  .     ASSESSMENT and PLAN:    * Pheochromocytoma, malignant    - Consult for medication management of steroids and BP medication post surgery  - Pt currenetly on Hydrocortisone 100mg Q8- change to 50mg Q8, with goal to get her back to home dosing 20mg every AM and 10mg every PM tomorrow  - Hold fludrocortisone as pt is still being stress dosed- will add back home 0.15mg tomorrow when back to home dosing  - BP currently 137/65, will monitor and add medications as needed           Controlled type 2 diabetes mellitus without complication    - Controlled well, nothing to do.              DISCHARGE NEEDS: will assess daily    Toby Caballero MD  Endocrinology  [facility],  have personally taken the history and examined the patient and agree with the resident's note as stated above.

## 2017-10-03 NOTE — ASSESSMENT & PLAN NOTE
S/p re-excision of right adrenal bed with yosi-aortic duane basin lymphadenectomy    Continue home BP meds  Regular diet  DVT/GI ppx  Ambulate  IS/Acapella  Stepdown the floor after discontinuing howard and a line

## 2017-10-03 NOTE — ANESTHESIA POSTPROCEDURE EVALUATION
"Anesthesia Post Evaluation    Patient: Hailey Sawant    Procedure(s) Performed: Procedure(s) (LRB):  ADRENALECTOMY open exploratory (Right)    Final Anesthesia Type: general  Patient location during evaluation: PACU  Patient participation: Yes- Able to Participate  Level of consciousness: awake and alert  Post-procedure vital signs: reviewed and stable  Pain management: adequate  Airway patency: patent  PONV status at discharge: No PONV  Anesthetic complications: no      Cardiovascular status: blood pressure returned to baseline  Respiratory status: unassisted  Hydration status: euvolemic  Follow-up not needed.        Visit Vitals  BP (!) 151/68 (BP Location: Right arm, Patient Position: Lying)   Pulse 68   Temp 36.8 °C (98.2 °F) (Oral)   Resp 18   Ht 5' 5" (1.651 m)   Wt 92.5 kg (204 lb)   SpO2 (!) 94%   BMI 33.95 kg/m²       Pain/Aaliyah Score: Pain Assessment Performed: Yes (10/3/2017  3:00 AM)  Presence of Pain: complains of pain/discomfort (10/3/2017  3:00 AM)  Pain Rating Prior to Med Admin: 7 (10/3/2017  7:12 AM)  Pain Rating Post Med Admin: 0 (10/3/2017  5:04 AM)      "

## 2017-10-04 LAB
ALBUMIN SERPL BCP-MCNC: 2.7 G/DL
ALP SERPL-CCNC: 95 U/L
ALT SERPL W/O P-5'-P-CCNC: 33 U/L
ANION GAP SERPL CALC-SCNC: 8 MMOL/L
AST SERPL-CCNC: 58 U/L
BASOPHILS # BLD AUTO: 0.01 K/UL
BASOPHILS NFR BLD: 0.1 %
BILIRUB SERPL-MCNC: 0.5 MG/DL
BUN SERPL-MCNC: 12 MG/DL
CALCIUM SERPL-MCNC: 8.8 MG/DL
CHLORIDE SERPL-SCNC: 108 MMOL/L
CO2 SERPL-SCNC: 22 MMOL/L
CREAT SERPL-MCNC: 0.9 MG/DL
DIFFERENTIAL METHOD: ABNORMAL
EOSINOPHIL # BLD AUTO: 0 K/UL
EOSINOPHIL NFR BLD: 0.1 %
ERYTHROCYTE [DISTWIDTH] IN BLOOD BY AUTOMATED COUNT: 14.8 %
EST. GFR  (AFRICAN AMERICAN): >60 ML/MIN/1.73 M^2
EST. GFR  (NON AFRICAN AMERICAN): >60 ML/MIN/1.73 M^2
GLUCOSE SERPL-MCNC: 126 MG/DL
HCT VFR BLD AUTO: 29.9 %
HGB BLD-MCNC: 9.8 G/DL
LYMPHOCYTES # BLD AUTO: 1 K/UL
LYMPHOCYTES NFR BLD: 10.2 %
MAGNESIUM SERPL-MCNC: 2.3 MG/DL
MCH RBC QN AUTO: 29.6 PG
MCHC RBC AUTO-ENTMCNC: 32.8 G/DL
MCV RBC AUTO: 90 FL
MONOCYTES # BLD AUTO: 0.7 K/UL
MONOCYTES NFR BLD: 7.5 %
NEUTROPHILS # BLD AUTO: 8.1 K/UL
NEUTROPHILS NFR BLD: 82.1 %
PLATELET # BLD AUTO: 178 K/UL
PLATELET BLD QL SMEAR: ABNORMAL
PMV BLD AUTO: 8.9 FL
POCT GLUCOSE: 124 MG/DL (ref 70–110)
POCT GLUCOSE: 131 MG/DL (ref 70–110)
POTASSIUM SERPL-SCNC: 4.9 MMOL/L
PROT SERPL-MCNC: 5.9 G/DL
RBC # BLD AUTO: 3.31 M/UL
SODIUM SERPL-SCNC: 138 MMOL/L
WBC # BLD AUTO: 9.87 K/UL

## 2017-10-04 PROCEDURE — 27000173 HC ACAPELLA DEVICE DH OR DM

## 2017-10-04 PROCEDURE — G8979 MOBILITY GOAL STATUS: HCPCS | Mod: CJ

## 2017-10-04 PROCEDURE — 97162 PT EVAL MOD COMPLEX 30 MIN: CPT

## 2017-10-04 PROCEDURE — 94799 UNLISTED PULMONARY SVC/PX: CPT

## 2017-10-04 PROCEDURE — 25000003 PHARM REV CODE 250: Performed by: SURGERY

## 2017-10-04 PROCEDURE — 80053 COMPREHEN METABOLIC PANEL: CPT

## 2017-10-04 PROCEDURE — 25000003 PHARM REV CODE 250: Performed by: INTERNAL MEDICINE

## 2017-10-04 PROCEDURE — 94761 N-INVAS EAR/PLS OXIMETRY MLT: CPT

## 2017-10-04 PROCEDURE — 27000221 HC OXYGEN, UP TO 24 HOURS

## 2017-10-04 PROCEDURE — 99900035 HC TECH TIME PER 15 MIN (STAT)

## 2017-10-04 PROCEDURE — 99232 SBSQ HOSP IP/OBS MODERATE 35: CPT | Mod: ,,, | Performed by: INTERNAL MEDICINE

## 2017-10-04 PROCEDURE — 97165 OT EVAL LOW COMPLEX 30 MIN: CPT

## 2017-10-04 PROCEDURE — 85025 COMPLETE CBC W/AUTO DIFF WBC: CPT

## 2017-10-04 PROCEDURE — 94664 DEMO&/EVAL PT USE INHALER: CPT

## 2017-10-04 PROCEDURE — G8978 MOBILITY CURRENT STATUS: HCPCS | Mod: CN

## 2017-10-04 PROCEDURE — 25000003 PHARM REV CODE 250: Performed by: ANESTHESIOLOGY

## 2017-10-04 PROCEDURE — 25000003 PHARM REV CODE 250: Performed by: STUDENT IN AN ORGANIZED HEALTH CARE EDUCATION/TRAINING PROGRAM

## 2017-10-04 PROCEDURE — 20000000 HC ICU ROOM

## 2017-10-04 PROCEDURE — 63600175 PHARM REV CODE 636 W HCPCS: Performed by: INTERNAL MEDICINE

## 2017-10-04 PROCEDURE — 63600175 PHARM REV CODE 636 W HCPCS: Performed by: SURGERY

## 2017-10-04 PROCEDURE — 83735 ASSAY OF MAGNESIUM: CPT

## 2017-10-04 RX ORDER — DOXAZOSIN 1 MG/1
2 TABLET ORAL DAILY
Status: DISCONTINUED | OUTPATIENT
Start: 2017-10-04 | End: 2017-10-06 | Stop reason: HOSPADM

## 2017-10-04 RX ORDER — NIFEDIPINE 30 MG/1
30 TABLET, EXTENDED RELEASE ORAL DAILY
Status: DISCONTINUED | OUTPATIENT
Start: 2017-10-04 | End: 2017-10-06 | Stop reason: HOSPADM

## 2017-10-04 RX ORDER — LOSARTAN POTASSIUM 50 MG/1
50 TABLET ORAL DAILY
Status: DISCONTINUED | OUTPATIENT
Start: 2017-10-04 | End: 2017-10-06 | Stop reason: HOSPADM

## 2017-10-04 RX ORDER — LABETALOL HYDROCHLORIDE 5 MG/ML
10 INJECTION, SOLUTION INTRAVENOUS EVERY 6 HOURS PRN
Status: DISCONTINUED | OUTPATIENT
Start: 2017-10-04 | End: 2017-10-06 | Stop reason: HOSPADM

## 2017-10-04 RX ORDER — DIPHENHYDRAMINE HCL 25 MG
25 CAPSULE ORAL EVERY 6 HOURS PRN
Status: DISCONTINUED | OUTPATIENT
Start: 2017-10-04 | End: 2017-10-06 | Stop reason: HOSPADM

## 2017-10-04 RX ORDER — ASPIRIN 81 MG/1
81 TABLET ORAL DAILY
Status: DISCONTINUED | OUTPATIENT
Start: 2017-10-04 | End: 2017-10-06 | Stop reason: HOSPADM

## 2017-10-04 RX ADMIN — OXYCODONE HYDROCHLORIDE 10 MG: 5 TABLET ORAL at 07:10

## 2017-10-04 RX ADMIN — HYDROCORTISONE SODIUM SUCCINATE 50 MG: 100 INJECTION, POWDER, FOR SOLUTION INTRAMUSCULAR; INTRAVENOUS at 05:10

## 2017-10-04 RX ADMIN — STANDARDIZED SENNA CONCENTRATE AND DOCUSATE SODIUM 1 TABLET: 8.6; 5 TABLET, FILM COATED ORAL at 09:10

## 2017-10-04 RX ADMIN — DOXAZOSIN 2 MG: 1 TABLET ORAL at 12:10

## 2017-10-04 RX ADMIN — LOSARTAN POTASSIUM 50 MG: 50 TABLET, FILM COATED ORAL at 12:10

## 2017-10-04 RX ADMIN — FLUDROCORTISONE ACETATE 100 MCG: 0.1 TABLET ORAL at 09:10

## 2017-10-04 RX ADMIN — FAMOTIDINE 20 MG: 20 TABLET, FILM COATED ORAL at 08:10

## 2017-10-04 RX ADMIN — NIFEDIPINE 30 MG: 30 TABLET, FILM COATED, EXTENDED RELEASE ORAL at 12:10

## 2017-10-04 RX ADMIN — OXYCODONE HYDROCHLORIDE 10 MG: 5 TABLET ORAL at 05:10

## 2017-10-04 RX ADMIN — ENOXAPARIN SODIUM 40 MG: 100 INJECTION SUBCUTANEOUS at 05:10

## 2017-10-04 RX ADMIN — STANDARDIZED SENNA CONCENTRATE AND DOCUSATE SODIUM 1 TABLET: 8.6; 5 TABLET, FILM COATED ORAL at 08:10

## 2017-10-04 RX ADMIN — HYDROCORTISONE 10 MG: 10 TABLET ORAL at 05:10

## 2017-10-04 RX ADMIN — DIPHENHYDRAMINE HYDROCHLORIDE 25 MG: 25 CAPSULE ORAL at 09:10

## 2017-10-04 RX ADMIN — HYDROCORTISONE 20 MG: 20 TABLET ORAL at 09:10

## 2017-10-04 RX ADMIN — FAMOTIDINE 20 MG: 20 TABLET, FILM COATED ORAL at 09:10

## 2017-10-04 RX ADMIN — ASPIRIN 81 MG: 81 TABLET, COATED ORAL at 12:10

## 2017-10-04 NOTE — PROGRESS NOTES
[facility]  General Surgery  Progress Note    Subjective:     History of Present Illness:  No notes on file    Post-Op Info:  Procedure(s) (LRB):  ADRENALECTOMY open exploratory (Right)   2 Days Post-Op     Interval History: NAEON. Some bouts of hypertension brought on by pain, improved with pain meds. Otherwise stable. Resting comfortably in the chair this morning. Did not ambulate yesterday. No other complaints or concerns.     Medications:  Continuous Infusions:   Scheduled Meds:   enoxaparin  40 mg Subcutaneous Daily    famotidine  20 mg Oral BID    fludrocortisone  100 mcg Oral Daily    hydrocortisone  10 mg Oral Before dinner    hydrocortisone  20 mg Oral Daily    polyethylene glycol  17 g Oral Daily    senna-docusate 8.6-50 mg  1 tablet Oral BID     PRN Meds:albuterol-ipratropium 2.5mg-0.5mg/3mL, dextrose 50%, dextrose 50%, glucagon (human recombinant), glucose, glucose, HYDROmorphone, insulin aspart, ondansetron, oxycodone, oxycodone, promethazine     Review of patient's allergies indicates:  No Known Allergies  Objective:     Vital Signs (Most Recent):  Temp: 98.6 °F (37 °C) (10/04/17 0315)  Pulse: 76 (10/04/17 0842)  Resp: (!) 35 (10/04/17 0842)  BP: (!) 153/65 (10/04/17 0600)  SpO2: 96 % (10/04/17 0842) Vital Signs (24h Range):  Temp:  [98.6 °F (37 °C)-99.3 °F (37.4 °C)] 98.6 °F (37 °C)  Pulse:  [61-77] 76  Resp:  [8-49] 35  SpO2:  [94 %-100 %] 96 %  BP: (137-203)/(64-82) 153/65     Weight: 92.5 kg (204 lb)  Body mass index is 33.95 kg/m².    Intake/Output - Last 3 Shifts       10/02 0700 - 10/03 0659 10/03 0700 - 10/04 0659 10/04 0700 - 10/05 0659    P.O. 480      I.V. (mL/kg) 2700 (29.2)      Total Intake(mL/kg) 3180 (34.4)      Urine (mL/kg/hr) 1980 735 (0.3)     Drains 165 240 (0.1)     Blood 300      Total Output 2445 975      Net +735 -975                   Physical Exam   Constitutional: She is oriented to person, place, and time. She appears well-developed and  well-nourished. No distress.   HENT:   Head: Normocephalic and atraumatic.   Eyes: Pupils are equal, round, and reactive to light. No scleral icterus.   Cardiovascular: Normal rate.    Pulmonary/Chest: Effort normal.   Abdominal: There is tenderness (appropriately so).   Incision CDI  Drain with dark old serosanguinous fluid   Neurological: She is alert and oriented to person, place, and time. No cranial nerve deficit.   Psychiatric: She has a normal mood and affect. Her behavior is normal.       Significant Labs:  CBC:     Recent Labs  Lab 10/04/17  0340   WBC 9.87   RBC 3.31*   HGB 9.8*   HCT 29.9*      MCV 90   MCH 29.6   MCHC 32.8     CMP:     Recent Labs  Lab 10/04/17  0340   *   CALCIUM 8.8   ALBUMIN 2.7*   PROT 5.9*      K 4.9   CO2 22*      BUN 12   CREATININE 0.9   ALKPHOS 95   ALT 33   AST 58*   BILITOT 0.5     Assessment/Plan:     * Pheochromocytoma, malignant    S/p re-excision of right adrenal bed with yosi-aortic duane basin lymphadenectomy    Continue home BP meds  Decrease stress dose steroids to 50 q8 per endocrine, appreciate assistance  Regular diet  DVT/GI ppx  Ambulate  IS/Acapella  Stepdown orders in place        Controlled type 2 diabetes mellitus without complication    SSI  Appreciate endocrine's assistance with DMII management        Pheochromocytoma    S/p re-excision of right adrenal bed with yosi-aortic duane basin lymphadenectomy    Continue home BP meds  Regular diet  DVT/GI ppx  Ambulate  IS/Acapella  Stepdown the floor after discontinuing howard and a line        H/O total adrenalectomy    Continue stress dose steroids  Endocrine on board        Hypertension    Continue home meds  PRN as needed  Adjust meds if pt has hypotension        Hypoadrenalism    Continue steroid replacement therapy  Decreasing stress dose to 50 q8  Endocrine on board            Shaan Bassett MD  General Surgery  [facility]

## 2017-10-04 NOTE — PT/OT/SLP EVAL
Physical Therapy  Evaluation    Hailey Sawant   MRN: [id number]   Admitting Diagnosis: Pheochromocytoma, malignant    PT Received On: 10/04/17  PT Start Time: 0859     PT Stop Time: 0912    PT Total Time (min): 13 min       Billable Minutes:  Evaluation 13 min    Diagnosis: Pheochromocytoma, malignant  S/p exploratory open R  adrenalectomy 10/2/17    Past Medical History:   Diagnosis Date    Diabetes mellitus     Hypertension     Malignant pheochromocytoma     Pheochromocytoma     Pheochromocytoma, malignant 01/10/2016    Pheochromocytoma, malignant     Thyroid disease       Past Surgical History:   Procedure Laterality Date    ADRENALECTOMY      APPENDECTOMY      MIBG Therapy  3/2016, 7/2016, 12/2016    [facility]       Referring physician: Saul Mustafa  Date referred to PT: 10/3/17    General Precautions: Standard, fall  Orthopedic Precautions:     Braces:         Do you have any cultural, spiritual, Zoroastrianism conflicts, given your current situation?: none    Patient History:  Lives With: alone (pt is retired and lives alone. Family will assist after discharge if needed.)  Living Arrangements: house (5 steps with B handrails and ramp entrance.)  Equipment Currently Used at Home: none  DME owned (not currently used): none    Previous Level of Function:  Ambulation Skills: independent  Transfer Skills: independent    Subjective:  Communicated with nurse prior to session.    Chief Complaint: pt c/o pain during treatment.   Patient goals:  To get better and go home.     Pain/Comfort  Pain Rating 1: 4/10  Location 1:  (abdomen)  Pain Rating Post-Intervention 1: 4/10      Objective:   Patient found with: telemetry, blood pressure cuff, pulse ox (continuous), oxygen, RUTH ANN drain (hep lock IV)     Cognitive Exam:  Oriented to: Person, Place, Time and Situation    Follows Commands/attention: Follows multistep  commands  Communication: clear/fluent  Safety awareness/insight to disability: intact    Physical  Exam:    Lower Extremity Range of Motion:  Right Lower Extremity: WFL  Left Lower Extremity: WFL    Lower Extremity Strength:  Right Lower Extremity: WFL  Left Lower Extremity: WFL       Functional Mobility:  Bed Mobility:  Rolling/Turning to Left:  (not tested. pt was sitting in chair for treatment. )    Transfers:  Sit <> Stand Assistance: Contact Guard Assistance    Gait:   Gait Distance: 30 ft with O2 intact.  Assistance 1: Contact Guard Assistance  Gait Assistive Device: Hand held assist  Gait Pattern: 2-point gait      AM-PAC 6 CLICK MOBILITY  How much help from another person does this patient currently need?   1 = Unable, Total/Dependent Assistance  2 = A lot, Maximum/Moderate Assistance  3 = A little, Minimum/Contact Guard/Supervision  4 = None, Modified [place]/Independent    Turning over in bed (including adjusting bedclothes, sheets and blankets)?: 3  Sitting down on and standing up from a chair with arms (e.g., wheelchair, bedside commode, etc.): 3  Moving from lying on back to sitting on the side of the bed?: 3  Moving to and from a bed to a chair (including a wheelchair)?: 3  Need to walk in hospital room?: 3  Climbing 3-5 steps with a railing?: 3  Total Score: 18     AM-PAC Raw Score CMS G-Code Modifier Level of Impairment Assistance   6 % Total / Unable   7 - 9 CM 80 - 100% Maximal Assist   10 - 14 CL 60 - 80% Moderate Assist   15 - 19 CK 40 - 60% Moderate Assist   20 - 22 CJ 20 - 40% Minimal Assist   23 CI 1-20% SBA / CGA   24 CH 0% Independent/ Mod I     Patient left up in chair with all lines intact, call button in reach and sister present.    Assessment:   Hailey Sawant is a 72 y.o. female with a medical diagnosis of Pheochromocytoma, malignant and presents with decreased mobility, transfers and decreased distance ambulated. Pt would benefit from cont PT to address deficits and will be able to discharge home with no therapy or DME needs. Pt will benefit from skilled PT 4x/wk to  progress physically and return pt to Independent status. Pt is s/p R open exploratory adrenalectomy 10/2/17.    Rehab identified problem list/impairments: Rehab identified problem list/impairments: impaired endurance, gait instability, impaired functional mobilty    Rehab potential is good.    Activity tolerance: Good    Discharge recommendations: Discharge Facility/Level Of Care Needs:  (no further PT will be needed.)     Barriers to discharge: Barriers to Discharge: None    Equipment recommendations: Equipment Needed After Discharge: none     GOALS:    Physical Therapy Goals        Problem: Physical Therapy Goal    Goal Priority Disciplines Outcome Goal Variances Interventions   Physical Therapy Goal     PT/OT, PT      Description:  Goals to be met by: 10/18/17    Patient will increase functional independence with mobility by performin. Supine to sit with Modified [place] - not met  2. Sit to stand transfer with Modified [place] -not met  3. Gait  x 220 feet with Supervision . - not met  4. Ascend/descend 5 stair with bilateral Handrails Contact Guard Assistance . -  Not met                      PLAN:    Patient to be seen 4 x/week to address the above listed problems via gait training, therapeutic activities  Plan of Care expires: 17  Plan of Care reviewed with: patient (sister)    Functional Assessment Tool Used: FIM  Score: 1  Functional Limitation: Mobility: Walking and moving around  Mobility: Walking and Moving Around Current Status (): CN  Mobility: Walking and Moving Around Goal Status (): MITRA Salguero, PT  10/04/2017

## 2017-10-04 NOTE — PLAN OF CARE
Patient is a 72 year old female admitted from home and underwent Open Adrenalectomy, Para-aortic LND 10/2/2017.  Patient still in ICU, expected to stepdown to [facility] and discharge home with needs to be determined +/- 10/6/2017.    Patient lives alone in a one story home.  Patient asleep during visit today, sister (Claudia) is at bedside and supplied information.  Claudia stated she lives across the street from patient and another sister and brother live nearby.  Stated she is returning to work tomorrow and sister or brother will drive patient home when she is discharged.  Stated family members will all help care for patient at discharge.  Discussed need for patient to have a medic alert bracelet for adrenalectomy with understanding verbalized.  Will continue to follow for needs    Rodolfo Burton MD  [facility] [id number] [phone]      [id number] - [place], [place] - [address] AT [address] &  [address]  [place] [address]  Phone: [phone] Fax: [phone]    [id number] - [place], [place] - [address] AT [address] [place] [address] [place]  [address]  [place] [address]  Phone: [phone] Fax: [phone]    [facility] - [facility] [address] [facility]  [address] [facility] [address]  Phone: [phone] Fax: [phone]    Extended Emergency Contact Information  Primary Emergency Contact: Claudia Washburn  Address: [address]           [place], [place] [id number] [place] [address] of [facility]  Home Phone: [phone]  Relation: Sister       10/04/17 0469   Discharge Assessment   Assessment Type Discharge Planning Assessment   Confirmed/corrected address and phone number on facesheet? Yes   Assessment information obtained from? Other  (sister)   Expected Length of Stay (days) 5   Communicated expected length of stay with patient/caregiver yes   Prior to hospitilization cognitive status: Alert/Oriented    Prior to hospitalization functional status: Independent   Current cognitive status: Alert/Oriented   Current Functional Status: Independent;Needs Assistance   Lives With alone   Able to Return to Prior Arrangements yes   Is patient able to care for self after discharge? Yes   Who are your caregiver(s) and their phone number(s)? sisters & brother   Patient's perception of discharge disposition home or selfcare   Equipment Currently Used at Home none   Do you have any problems affording any of your prescribed medications? No   Is the patient taking medications as prescribed? yes   Does the patient have transportation home? Yes   Transportation Available family or friend will provide   Discharge Plan A Home with family   Discharge Plan B Home with family;Home Health   Patient/Family In Agreement With Plan yes   Does the patient have transportation to healthcare appointments? Yes   Readmission Questionnaire   Living Arrangements house

## 2017-10-04 NOTE — PLAN OF CARE
Problem: Occupational Therapy Goal  Goal: Occupational Therapy Goal  Goals to be met by: 7 days 10/11/17     Patient will increase functional independence with ADLs by performing:    UE Dressing with Supervision.  LE Dressing with Minimal Assistance.  Grooming while standing with Supervision.  Toileting from toilet with Supervision for hygiene and clothing management.   Stand pivot transfers with Supervision.  Toilet transfer to toilet with Supervision.    Goals remain appropriate.

## 2017-10-04 NOTE — SUBJECTIVE & OBJECTIVE
Interval History: NAEON. Some bouts of hypertension brought on by pain, improved with pain meds. Otherwise stable. Resting comfortably in the chair this morning. Did not ambulate yesterday. No other complaints or concerns.     Medications:  Continuous Infusions:   Scheduled Meds:   enoxaparin  40 mg Subcutaneous Daily    famotidine  20 mg Oral BID    fludrocortisone  100 mcg Oral Daily    hydrocortisone  10 mg Oral Before dinner    hydrocortisone  20 mg Oral Daily    polyethylene glycol  17 g Oral Daily    senna-docusate 8.6-50 mg  1 tablet Oral BID     PRN Meds:albuterol-ipratropium 2.5mg-0.5mg/3mL, dextrose 50%, dextrose 50%, glucagon (human recombinant), glucose, glucose, HYDROmorphone, insulin aspart, ondansetron, oxycodone, oxycodone, promethazine     Review of patient's allergies indicates:  No Known Allergies  Objective:     Vital Signs (Most Recent):  Temp: 98.6 °F (37 °C) (10/04/17 0315)  Pulse: 76 (10/04/17 0842)  Resp: (!) 35 (10/04/17 0842)  BP: (!) 153/65 (10/04/17 0600)  SpO2: 96 % (10/04/17 0842) Vital Signs (24h Range):  Temp:  [98.6 °F (37 °C)-99.3 °F (37.4 °C)] 98.6 °F (37 °C)  Pulse:  [61-77] 76  Resp:  [8-49] 35  SpO2:  [94 %-100 %] 96 %  BP: (137-203)/(64-82) 153/65     Weight: 92.5 kg (204 lb)  Body mass index is 33.95 kg/m².    Intake/Output - Last 3 Shifts       10/02 0700 - 10/03 0659 10/03 0700 - 10/04 0659 10/04 0700 - 10/05 0659    P.O. 480      I.V. (mL/kg) 2700 (29.2)      Total Intake(mL/kg) 3180 (34.4)      Urine (mL/kg/hr) 1980 735 (0.3)     Drains 165 240 (0.1)     Blood 300      Total Output 2445 975      Net +735 -975                   Physical Exam   Constitutional: She is oriented to person, place, and time. She appears well-developed and well-nourished. No distress.   HENT:   Head: Normocephalic and atraumatic.   Eyes: Pupils are equal, round, and reactive to light. No scleral icterus.   Cardiovascular: Normal rate.    Pulmonary/Chest: Effort normal.   Abdominal: There  is tenderness (appropriately so).   Incision CDI  Drain with dark old serosanguinous fluid   Neurological: She is alert and oriented to person, place, and time. No cranial nerve deficit.   Psychiatric: She has a normal mood and affect. Her behavior is normal.       Significant Labs:  CBC:     Recent Labs  Lab 10/04/17  0340   WBC 9.87   RBC 3.31*   HGB 9.8*   HCT 29.9*      MCV 90   MCH 29.6   MCHC 32.8     CMP:     Recent Labs  Lab 10/04/17  0340   *   CALCIUM 8.8   ALBUMIN 2.7*   PROT 5.9*      K 4.9   CO2 22*      BUN 12   CREATININE 0.9   ALKPHOS 95   ALT 33   AST 58*   BILITOT 0.5

## 2017-10-04 NOTE — ASSESSMENT & PLAN NOTE
S/p re-excision of right adrenal bed with yosi-aortic [place] lymphadenectomy    Continue home BP meds  Decrease stress dose steroids to 50 q8 per endocrine, appreciate assistance  Regular diet  DVT/GI ppx  Ambulate  IS/Acapella  Stepdown orders in place

## 2017-10-04 NOTE — PLAN OF CARE
Problem: Physical Therapy Goal  Goal: Physical Therapy Goal  Goals to be met by: 10/18/17    Patient will increase functional independence with mobility by performin. Supine to sit with Modified [place] - not met  2. Sit to stand transfer with Modified [place] -not met  3. Gait  x 220 feet with Supervision . - not met  4. Ascend/descend 5 stair with bilateral Handrails Contact Guard Assistance . -  Not met    eval completed and goals appropriate. Kelly Salguero, PT 10/4/2017

## 2017-10-04 NOTE — PROGRESS NOTES
Progress Note  Surgical Intensive Care    Admit Date: 10/2/2017  Post-operative Day: 2 Days Post-Op  Hospital Day: 3    SUBJECTIVE:     Follow-up For:  Procedure(s) (LRB):  ADRENALECTOMY open exploratory (Right)    HPI:    Patient is a 72 y.o. 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 Since her surgery, the patient has had increasing blood pressure requirements, now on three anti-hypertensive medications at home. Repeat labs in August 2017 showed increased metanephrines and catecholamines. Repeat nuclear medicine MIBG study showed no uptake in the humerus but did show disease in the right adrenal bed close to the superior pole of the right kidney as well as the right para-aortic region. The patient was taken to the OR today for repeat adrenalectomy with para-aortic lymph node dissection. She arrives to the SICU extubated maintaining O2 sats>95% on non-rebreather mask, HDS off pressor support.      Interval history:    No acute events overnight. Pain well controlled on PO pain meds. Carcamo removed yesterday, UOP adequate.    Continuous Infusions:     Scheduled Meds:   enoxaparin  40 mg Subcutaneous Daily    famotidine  20 mg Oral BID    fludrocortisone  100 mcg Oral Daily    hydrocortisone  10 mg Oral Before dinner    hydrocortisone  20 mg Oral Daily    polyethylene glycol  17 g Oral Daily    senna-docusate 8.6-50 mg  1 tablet Oral BID     PRN Meds:albuterol-ipratropium 2.5mg-0.5mg/3mL, dextrose 50%, dextrose 50%, glucagon (human recombinant), glucose, glucose, HYDROmorphone, insulin aspart, ondansetron, oxycodone, oxycodone, promethazine    Review of patient's allergies indicates:  No Known Allergies    OBJECTIVE:     Vital Signs (Most Recent)  Temp:  98.6 °F (37 °C) (10/04/17 0315)  Pulse: 76 (10/04/17 0842)  Resp: (!) 35 (10/04/17 0842)  BP: (!) 153/65 (10/04/17 0600)  SpO2: 96 % (10/04/17 0842)    Vital Signs Range (Last 24H):  Temp:  [98.6 °F (37 °C)-99.3 °F (37.4 °C)]   Pulse:  [61-77]   Resp:  [8-49]   BP: (137-203)/(64-82)   SpO2:  [94 %-100 %]     I & O (Last 24H):    Intake/Output Summary (Last 24 hours) at 10/04/17 0904  Last data filed at 10/04/17 0600   Gross per 24 hour   Intake                0 ml   Output              945 ml   Net             -945 ml     Physical Exam:  Physical Exam   Constitutional: She is oriented to person, place, and time and well-developed, well-nourished, and in no distress.   HENT:   Head: Normocephalic and atraumatic.   Cardiovascular: Normal rate, regular rhythm and normal heart sounds.    No murmur heard.  Pulmonary/Chest: Effort normal and breath sounds normal. No respiratory distress. She has no wheezes.   Abdominal: Soft. She exhibits no distension. There is no tenderness.   Right flank incision with staples, clean, dry and intact. RUTH ANN drain in place with serosanguinous output.   Neurological: She is alert and oriented to person, place, and time.   Skin: Skin is warm and dry.     Wound/Incision:  no drainage    Laboratory (Last 24H):  CBC:    Recent Labs  Lab 10/04/17  0340   WBC 9.87   HGB 9.8*   HCT 29.9*        CMP:    Recent Labs  Lab 10/04/17  0340   CALCIUM 8.8   ALBUMIN 2.7*   PROT 5.9*      K 4.9   CO2 22*      BUN 12   CREATININE 0.9   ALKPHOS 95   ALT 33   AST 58*   BILITOT 0.5       Chest X-Ray: X-ray Chest 1 View    Result Date: 10/2/2017  Cardiomegaly with mild congestive changes. Right IJ catheter tip overlies the upper right atrium. Electronically signed by: TAMELA HERNANDEZ MD Date:     10/02/17 Time:    19:31       Diagnostic Results:  No Further    ASSESSMENT/PLAN:   72 y.o. female with past medical history of stage IV malignant pheochromocytoma with metastases to the liver and right  humerus s/p bilateral adrenalectomy in November 2009 and radioactive labeled MIBG tracer for ablation. Now s/p right redo-adrenalectomy with para-aortic lymph node dissection on 10/2/2017.     Plan:     Neuro:   - oxy Ir, dilaudid IV for breakthrough pain  - off sedation     Pulmonary:   - maintaining O2 sats>95% on room air     Cardiac:  - SBP goal<170, cardene gtt not needed thus far  -HDS not requiring pressors  -Home meds include losartan, nifedipine, doxazosin     Renal:   - voiding, UOP adequate  -Bun/Cr stable, continue to trend     Fluids/Electrolytes/Nutrition/GI:   -Nutritional status: tolerating regular diet  -replace lytes PRN  - stress dose steroids 100mg q8, wean per endocrinology  - polyethylene glycol packet daily  - GI prophylaxis: famotidine 20mg IV BID     Hematology/Oncology:  -H/H drifting down, continue to trend  - SCDs in place, lovenox subcu      Infectious Disease:   -Afebrile  -WBC WNL, continue to monitor  - lactate trending down     Endocrine:  -Glucose goal of 120-150  - endocrinology following, plan for weaning of stress dose steroids     Dispo:  - Stepdown today per primary team.    Prema Colon, PGY-1  General Surgery  [phone]  10/04/2017

## 2017-10-04 NOTE — PROGRESS NOTES
"[facility]  Endocrinology  Progress Note    Admit Date: 10/2/2017     Reason for Consult: Medication recommendation for post-surgical malignant pheochromocytoma.    Surgical Procedure and Date: 10/2/17    Diabetes diagnosis year:     Home Diabetes Medications:  Metformin 500mg BID    How often checking glucose at home? One   BG readings on regimen:   Hypoglycemia on the regimen?  No  Missed doses on regimen?  No    Diabetes Complications include:     Diabetic cataract     Complicating diabetes co morbidities:   None.      HPI:   Patient is a 72 y.o. female with a diagnosis of malignant pheochromocytoma. Pt has stage IV metastatic disease with mets to the liver and bone and had a BL adrenalectomy in 2009. Pt also had labeled MIBG tracer for ablation, which initially controlled her metanephrines and catecholamines but recent testing in August showed elevated levels. A repeat nuclear medicine MIBG showed uptake in the adrenal bed close to the superior pole of the R kidney as well as para-aortic uptake. Pt had removal surgery of the effected areas yesterday and today remains in some pain at the surgical site. No other acute complaints.             Interval HPI:   Overnight events: None  Eatin%  Nausea: No  Hypoglycemia and intervention: No  Fever: No  TPN and/or TF: No  If yes, type of TF/TPN and rate:     BP (!) 154/70   Pulse 67   Temp 98.5 °F (36.9 °C) (Oral)   Resp (!) 22   Ht 5' 5" (1.651 m)   Wt 92.5 kg (204 lb)   SpO2 97%   BMI 33.95 kg/m²       Labs Reviewed and Include      Recent Labs  Lab 10/04/17  0340   *   CALCIUM 8.8   ALBUMIN 2.7*   PROT 5.9*      K 4.9   CO2 22*      BUN 12   CREATININE 0.9   ALKPHOS 95   ALT 33   AST 58*   BILITOT 0.5     Lab Results   Component Value Date    WBC 9.87 10/04/2017    HGB 9.8 (L) 10/04/2017    HCT 29.9 (L) 10/04/2017    MCV 90 10/04/2017     10/04/2017     No results for input(s): TSH, FREET4 " in the last 168 hours.  Lab Results   Component Value Date    HGBA1C 5.4 10/02/2017       Nutritional status:   Body mass index is 33.95 kg/m².  Lab Results   Component Value Date    ALBUMIN 2.7 (L) 10/04/2017    ALBUMIN 2.9 (L) 10/03/2017    ALBUMIN 2.8 (L) 10/02/2017     No results found for: PREALBUMIN    Estimated Creatinine Clearance: 62.6 mL/min (based on SCr of 0.9 mg/dL).    Accu-Checks  Recent Labs      10/02/17   1141  10/02/17   1830  10/02/17   2236  10/03/17   0729  10/03/17   1603  10/03/17   2236  10/04/17   0700   POCTGLUCOSE  105  139*  110  132*  186*  148*  131*       Current Medications and/or Treatments Impacting Glycemic Control  Immunotherapy:  Immunosuppressants     None        Steroids:   Hormones     Start     Stop Route Frequency Ordered    10/04/17 1615  hydrocortisone tablet 10 mg      -- Oral Before dinner 10/03/17 1542    10/04/17 0900  hydrocortisone tablet 20 mg      -- Oral Daily 10/03/17 1541    10/04/17 0900  fludrocortisone tablet 100 mcg      -- Oral Daily 10/03/17 1542        Pressors:    Autonomic Drugs     None        Hyperglycemia/Diabetes Medications: Antihyperglycemics     Start     Stop Route Frequency Ordered    10/02/17 1837  insulin aspart pen 1-10 Units      -- SubQ Before meals & nightly PRN 10/02/17 1819          ASSESSMENT and PLAN    * Pheochromocytoma, malignant    - Consult for medication management of steroids and BP medication post surgery  - Pt restarted on home dosing 20mg every AM and 10mg every PM   - Pt restarted on fludrocortisone 0.1 mg as home dosing was likely high with her symptoms of weight gain, but can change as an outpatient as needed  - BP currently -203/64-82 in the last 24hr, consider restarting alpha 1 blocker 1st (doxazosin)  - If pt's BP remains persistently elevated >140/90, consider restarting nifedipine next  - FU with endocrine appointment with Dr. Burton within 3 months to monitor medication             Toby Caballero,  MD  Endocrinology  [facility], Paueltte Fernandez MD,  have personally taken the history and examined the patient and agree with the resident's note as stated above.    rtc in 4 weeks to assess disease activity

## 2017-10-04 NOTE — PT/OT/SLP EVAL
"Occupational Therapy  Evaluation    Hailey Sawant   MRN: [id number]   Admitting Diagnosis: Pheochromocytoma, malignant    OT Date of Treatment: 10/04/17   OT Start Time: 0830  OT Stop Time: 0845  OT Total Time (min): 15 min    Billable Minutes:  Evaluation 15    Diagnosis: Pheochromocytoma, malignant   Pt is s/p adrenalectomy (R) on 10/2/17    Past Medical History:   Diagnosis Date    Diabetes mellitus     Hypertension     Malignant pheochromocytoma     Pheochromocytoma     Pheochromocytoma, malignant 01/10/2016    Pheochromocytoma, malignant     Thyroid disease       Past Surgical History:   Procedure Laterality Date    ADRENALECTOMY      APPENDECTOMY      MIBG Therapy  3/2016, 7/2016, 12/2016    [facility] - Dr. Martines         General Precautions: Standard, fall  Orthopedic Precautions: N/A      Patient History:  Living Environment  Lives With: alone  Living Arrangements: house  Home Accessibility: stairs to enter home  Number of Stairs to Enter Home: 5  Stair Railings at Home: outside, present at both sides  Living Environment Comment: Pt lives alone in one story home with 5 RADHA and also has ramp access. 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   Equipment Currently Used at Home: none        Subjective:  Communicated with nsg prior to session.  "I think I want to sit back in the chair and take a nap" pt reports.     Pain/Comfort  Pain Rating 1: 5/10  Location 1: abdomen  Pain Addressed 1: Reposition, Distraction    Objective:    Pt found seated in b/s chair with fly present in room.     Cognitive Exam:  Pt awake, oriented and following commands. Pt with appropriate affect and behavior.     Physical Exam:  Pt is right hand dominant and demo WFL UE strength/ROM, intact coordination and sensation.     Functional Mobility:    Transfers:  Sit <> Stand Assistance: Contact Guard Assistance  Sit <> Stand Assistive Device: No Assistive " "Device      Activities of Daily Living:       UE Dressing Level of Assistance: Maximum assistance  LE Dressing Level of Assistance: Total assistance  Grooming Position: Seated  Grooming Level of Assistance: Supervision   G/H simiated seated. Pt declined standing g/h skills due to wanting to return to b/s chair.     Limited functional mobility completed in room with CGA for safety/balance.   Education provided to pt re: safety with functional mobility/ADL skills and importance of continued participation with ADL skills and mobility. Pt receptive and verb understanding.     AM-PAC 6 CLICK ADL  How much help from another person does this patient currently need?  1 = Unable, Total/Dependent Assistance  2 = A lot, Maximum/Moderate Assistance  3 = A little, Minimum/Contact Guard/Supervision  4 = None, Modified [place]/Independent    Putting on and taking off regular lower body clothing? : 1  Bathing (including washing, rinsing, drying)?: 2  Toileting, which includes using toilet, bedpan, or urinal? : 2  Putting on and taking off regular upper body clothing?: 2  Taking care of personal grooming such as brushing teeth?: 3  Eating meals?: 1  Total Score: 11    AM-PAC Raw Score CMS "G-Code Modifier Level of Impairment Assistance   6 % Total / Unable   7 - 9 CM 80 - 100% Maximal Assist   10-14 CL 60 - 80% Moderate Assist   15 - 19 CK 40 - 60% Moderate Assist   20 - 22 CJ 20 - 40% Minimal Assist   23 CI 1-20% SBA / CGA   24 CH 0% Independent/ Mod I       Patient left up in chair with all lines intact, call button in reach, nsg notified and fly present    Assessment:  Hailey Sawant is a 72 y.o. female with a medical diagnosis of Pheochromocytoma, malignant and tolerated session well with good effort and performance Pt's independence with mobility and ADL skills is impaired at this time; however, OT expects good functional progress. OT anticipates pt will be safe for d/c home with fly to assist when medically " stable. .    Rehab identified problem list/impairments: Rehab identified problem list/impairments: weakness, impaired self care skills, impaired balance, impaired functional mobilty, impaired endurance, gait instability    Rehab potential is good.    Activity tolerance: Good    Discharge recommendations: Home with fly to assist as needed.        GOALS:    Occupational Therapy Goals        Problem: Occupational Therapy Goal    Goal Priority Disciplines Outcome Interventions   Occupational Therapy Goal     OT, PT/OT     Description:  Goals to be met by: 7 days 10/11/17     Patient will increase functional independence with ADLs by performing:    UE Dressing with Supervision.  LE Dressing with Minimal Assistance.  Grooming while standing with Supervision.  Toileting from toilet with Supervision for hygiene and clothing management.   Stand pivot transfers with Supervision.  Toilet transfer to toilet with Supervision.                      PLAN:  Patient to be seen 3 x/week to address the above listed problems via self-care/home management, therapeutic activities, therapeutic exercises  Plan of Care expires:    Plan of Care reviewed with: patient         ASPEN Lord  10/04/2017

## 2017-10-04 NOTE — PLAN OF CARE
Problem: Patient Care Overview  Goal: Plan of Care Review  Outcome: Ongoing (interventions implemented as appropriate)  No acute events overnight. VSS. Pt remains on 2 L NC with SpO2 100%. Denies SOB. Pain managed with PRN medication. RUTH ANN output dark red ~100 mL this shift. Pt with marginal UO overnight. Bladder scan with ~500 mL, straight cath with 625 mL, yellow. Recheck bladder scan if due to void in 6 hours (1100). Pt up to chair this AM. Awaiting bed placement at this time. POC reviewed with pt and daughter. Will continue to monitor.

## 2017-10-04 NOTE — ASSESSMENT & PLAN NOTE
- Consult for medication management of steroids and BP medication post surgery  - Pt restarted on home dosing 20mg every AM and 10mg every PM   - Pt restarted on fludrocortisone 0.1 mg as home dosing was likely high with her symptoms of weight gain, but can change as an outpatient as needed  - BP currently -203/64-82 in the last 24hr, consider restarting alpha 1 blocker 1st (doxazosin)  - If pt's BP remains persistently elevated >140/90, consider restarting nifedipine next  - FU with endocrine appointment with Dr. Burton within 3 months to monitor medication

## 2017-10-04 NOTE — PROGRESS NOTES
Notified SICU resident of pt with only 30 mL urine throughout night. Pt denies urge to urinate despite encouragment/reminders throughout shift. Bladder scan reads 500-530 mL. Orders to straight cath the patient. Will carry out and continue to monitor.

## 2017-10-05 LAB
ALBUMIN SERPL BCP-MCNC: 2.3 G/DL
ALP SERPL-CCNC: 74 U/L
ALT SERPL W/O P-5'-P-CCNC: 20 U/L
ANION GAP SERPL CALC-SCNC: 6 MMOL/L
AST SERPL-CCNC: 31 U/L
BASOPHILS # BLD AUTO: 0.02 K/UL
BASOPHILS # BLD AUTO: 0.02 K/UL
BASOPHILS NFR BLD: 0.3 %
BASOPHILS NFR BLD: 0.3 %
BILIRUB SERPL-MCNC: 0.5 MG/DL
BUN SERPL-MCNC: 10 MG/DL
CALCIUM SERPL-MCNC: 8.2 MG/DL
CHLORIDE SERPL-SCNC: 108 MMOL/L
CO2 SERPL-SCNC: 27 MMOL/L
CREAT SERPL-MCNC: 0.8 MG/DL
DIFFERENTIAL METHOD: ABNORMAL
DIFFERENTIAL METHOD: ABNORMAL
EOSINOPHIL # BLD AUTO: 0.2 K/UL
EOSINOPHIL # BLD AUTO: 0.3 K/UL
EOSINOPHIL NFR BLD: 2.9 %
EOSINOPHIL NFR BLD: 4.1 %
ERYTHROCYTE [DISTWIDTH] IN BLOOD BY AUTOMATED COUNT: 14.7 %
ERYTHROCYTE [DISTWIDTH] IN BLOOD BY AUTOMATED COUNT: 14.9 %
EST. GFR  (AFRICAN AMERICAN): >60 ML/MIN/1.73 M^2
EST. GFR  (NON AFRICAN AMERICAN): >60 ML/MIN/1.73 M^2
GLUCOSE SERPL-MCNC: 101 MG/DL
HCT VFR BLD AUTO: 26.5 %
HCT VFR BLD AUTO: 29.6 %
HGB BLD-MCNC: 8.6 G/DL
HGB BLD-MCNC: 9.4 G/DL
LYMPHOCYTES # BLD AUTO: 0.8 K/UL
LYMPHOCYTES # BLD AUTO: 1.8 K/UL
LYMPHOCYTES NFR BLD: 11.6 %
LYMPHOCYTES NFR BLD: 23.6 %
MAGNESIUM SERPL-MCNC: 1.7 MG/DL
MCH RBC QN AUTO: 29.1 PG
MCH RBC QN AUTO: 29.4 PG
MCHC RBC AUTO-ENTMCNC: 31.8 G/DL
MCHC RBC AUTO-ENTMCNC: 32.5 G/DL
MCV RBC AUTO: 90 FL
MCV RBC AUTO: 93 FL
MONOCYTES # BLD AUTO: 0.6 K/UL
MONOCYTES # BLD AUTO: 0.9 K/UL
MONOCYTES NFR BLD: 11.2 %
MONOCYTES NFR BLD: 8.7 %
NEUTROPHILS # BLD AUTO: 4.7 K/UL
NEUTROPHILS # BLD AUTO: 5.5 K/UL
NEUTROPHILS NFR BLD: 60.7 %
NEUTROPHILS NFR BLD: 76.2 %
PHOSPHATE SERPL-MCNC: 2.3 MG/DL
PLATELET # BLD AUTO: 168 K/UL
PLATELET # BLD AUTO: 198 K/UL
PMV BLD AUTO: 8.7 FL
PMV BLD AUTO: 8.8 FL
POCT GLUCOSE: 118 MG/DL (ref 70–110)
POCT GLUCOSE: 129 MG/DL (ref 70–110)
POCT GLUCOSE: 136 MG/DL (ref 70–110)
POCT GLUCOSE: 140 MG/DL (ref 70–110)
POCT GLUCOSE: 97 MG/DL (ref 70–110)
POTASSIUM SERPL-SCNC: 3.7 MMOL/L
PROT SERPL-MCNC: 5 G/DL
RBC # BLD AUTO: 2.96 M/UL
RBC # BLD AUTO: 3.2 M/UL
SODIUM SERPL-SCNC: 141 MMOL/L
WBC # BLD AUTO: 7.26 K/UL
WBC # BLD AUTO: 7.74 K/UL

## 2017-10-05 PROCEDURE — 85025 COMPLETE CBC W/AUTO DIFF WBC: CPT | Mod: 91

## 2017-10-05 PROCEDURE — 80053 COMPREHEN METABOLIC PANEL: CPT

## 2017-10-05 PROCEDURE — 93010 ELECTROCARDIOGRAM REPORT: CPT | Mod: ,,, | Performed by: INTERNAL MEDICINE

## 2017-10-05 PROCEDURE — 63600175 PHARM REV CODE 636 W HCPCS: Performed by: SURGERY

## 2017-10-05 PROCEDURE — 25000003 PHARM REV CODE 250: Performed by: STUDENT IN AN ORGANIZED HEALTH CARE EDUCATION/TRAINING PROGRAM

## 2017-10-05 PROCEDURE — 25000003 PHARM REV CODE 250: Performed by: SURGERY

## 2017-10-05 PROCEDURE — 93005 ELECTROCARDIOGRAM TRACING: CPT

## 2017-10-05 PROCEDURE — 97116 GAIT TRAINING THERAPY: CPT

## 2017-10-05 PROCEDURE — 25000003 PHARM REV CODE 250: Performed by: ANESTHESIOLOGY

## 2017-10-05 PROCEDURE — 99232 SBSQ HOSP IP/OBS MODERATE 35: CPT | Mod: GC,,, | Performed by: ANESTHESIOLOGY

## 2017-10-05 PROCEDURE — 84100 ASSAY OF PHOSPHORUS: CPT

## 2017-10-05 PROCEDURE — 25000003 PHARM REV CODE 250: Performed by: INTERNAL MEDICINE

## 2017-10-05 PROCEDURE — 20600001 HC STEP DOWN PRIVATE ROOM

## 2017-10-05 PROCEDURE — 83735 ASSAY OF MAGNESIUM: CPT

## 2017-10-05 RX ORDER — LANOLIN ALCOHOL/MO/W.PET/CERES
400 CREAM (GRAM) TOPICAL ONCE
Status: COMPLETED | OUTPATIENT
Start: 2017-10-05 | End: 2017-10-05

## 2017-10-05 RX ORDER — POTASSIUM CHLORIDE 750 MG/1
30 CAPSULE, EXTENDED RELEASE ORAL ONCE
Status: COMPLETED | OUTPATIENT
Start: 2017-10-05 | End: 2017-10-05

## 2017-10-05 RX ADMIN — HYDROCORTISONE 20 MG: 20 TABLET ORAL at 09:10

## 2017-10-05 RX ADMIN — NIFEDIPINE 30 MG: 30 TABLET, FILM COATED, EXTENDED RELEASE ORAL at 01:10

## 2017-10-05 RX ADMIN — POTASSIUM CHLORIDE 30 MEQ: 750 CAPSULE, EXTENDED RELEASE ORAL at 07:10

## 2017-10-05 RX ADMIN — POLYETHYLENE GLYCOL 3350 17 G: 17 POWDER, FOR SOLUTION ORAL at 09:10

## 2017-10-05 RX ADMIN — OXYCODONE HYDROCHLORIDE 5 MG: 5 TABLET ORAL at 07:10

## 2017-10-05 RX ADMIN — FLUDROCORTISONE ACETATE 100 MCG: 0.1 TABLET ORAL at 09:10

## 2017-10-05 RX ADMIN — STANDARDIZED SENNA CONCENTRATE AND DOCUSATE SODIUM 1 TABLET: 8.6; 5 TABLET, FILM COATED ORAL at 09:10

## 2017-10-05 RX ADMIN — LOSARTAN POTASSIUM 50 MG: 50 TABLET, FILM COATED ORAL at 12:10

## 2017-10-05 RX ADMIN — STANDARDIZED SENNA CONCENTRATE AND DOCUSATE SODIUM 1 TABLET: 8.6; 5 TABLET, FILM COATED ORAL at 08:10

## 2017-10-05 RX ADMIN — DOXAZOSIN 2 MG: 1 TABLET ORAL at 01:10

## 2017-10-05 RX ADMIN — ASPIRIN 81 MG: 81 TABLET, COATED ORAL at 09:10

## 2017-10-05 RX ADMIN — HYDROCORTISONE 10 MG: 10 TABLET ORAL at 04:10

## 2017-10-05 RX ADMIN — FAMOTIDINE 20 MG: 20 TABLET, FILM COATED ORAL at 08:10

## 2017-10-05 RX ADMIN — MAGNESIUM OXIDE TAB 400 MG (241.3 MG ELEMENTAL MG) 400 MG: 400 (241.3 MG) TAB at 07:10

## 2017-10-05 RX ADMIN — ENOXAPARIN SODIUM 40 MG: 100 INJECTION SUBCUTANEOUS at 05:10

## 2017-10-05 RX ADMIN — FAMOTIDINE 20 MG: 20 TABLET, FILM COATED ORAL at 09:10

## 2017-10-05 NOTE — OP NOTE
DATE OF PROCEDURE:  10/02/2017.    PRIMARY SURGEON:  Sandoval Castillo M.D.    ASSISTANT SURGEONS:  Fiordaliza Hernandez M.D. (RES) and Tio Fuentes M.D.    PREOPERATIVE DIAGNOSIS:  Stage IV malignant pheochromocytoma with   persistent/recurrent right adrenal gland pheochromocytoma with right retrocaval   lymphadenopathy.    POSTOPERATIVE DIAGNOSIS:  Stage IV malignant pheochromocytoma with   persistent/recurrent right adrenal gland pheochromocytoma with right retrocaval   lymphadenopathy.    PROCEDURE:  Open right adrenalectomy with en bloc wedge resection of the   inferior aspect of the liver with right retrocaval lymph node   sampling/excisional biopsy with en bloc resection of the persistent/recurrent   right malignant pheochromocytoma of the right adrenal gland with wedge resection   of the liver and the capsule of the right kidney (the partial nephrectomy   capsular resection of the right kidney portion will be dictated by Dr. Fuentes of   Urology Service).    PROCEDURE IN DETAIL:  The patient underwent informed consent.  The history and   physical examination was reviewed and updated.  The patient had a history of   bone metastases to the humerus, which were treated with radioactive MIBG   scanning with the only area of persistent disease in the right adrenal bed/fossa   consistent with recurrent/persistent right adrenal pheochromocytoma with an   enlarged right retrocaval lymph node at the level of the renal hilum.  The   patient was brought to the operating room.  She underwent a central venous   access placement as well as arterial line placement.  She underwent general   endotracheal anesthesia.  She was placed in the left lateral decubitus position   on a beanbag.  The kidney rest was elevated and the bed was flexed and angulated   at the space between the costal margin and anterior superior iliac spine.  The   right lateral abdomen and flank were prepped and draped in a sterile fashion.  A   right subcostal  incision was made.  The muscular layers were divided and the   peritoneal cavity was entered.  Adhesions of the right colon and hepatic flexure   were taken down to allow a Jeremiah retractor to be placed.  We dissected in the   right retroperitoneum identifying the kidney and liver.  The multilobulated   recurrence/persistent pheochromocytoma was identified.  Without violating the   pheochromocytoma tissue, we performed a separation by taking the capsule of the   liver and some small amount of liver parenchyma as a wedge resection to not   violate the pheochromocytoma.  The raw surface of the liver was made hemostatic   with electrocautery.  We circumferentially dissected around tissue.  The   pheochromocytoma was lobulated and attached to the superolateral aspect of the   kidney.  We identified the vena cava after performing a Kocher maneuver,   rotating the hepatic flexure inferior medially and performing the Kocher   maneuver to identify the duodenum at its second portion along with the vena   cava.  We kept our dissection along the vena cava, so as not to injure the right   renal vein, which was identified.  We identified the right renal artery and the   right ureter.  Between the ureter and the right renal artery, we identified the   2 cm lymph node and this retrocaval lymph node dissection was performed to   excise this lymph node.  This was submitted separately and grossly appeared to   be consistent with a metastasis containing malignant pheochromocytoma.  Once   this lymph node was removed, there was no further palpable lymphadenopathy.  We   then obtained an intraoperative Urology consult with Dr. Tio Fuentes because the   pheochromocytoma mass was adherent to the kidney and since she had stage   disease, we felt that we did not want to perform a full nephrectomy and that   partial nephrectomy would be considered.  He came in and we essentially took the   capsule off of the kidney parenchyma leaving the  capsule attached en bloc to   the pheochromocytoma essentially as we did with the liver having dissected   through the liver parenchyma to maintain a complete gross resection of all the   tumor.  The pheochromocytoma/right adrenal gland, which was either recurrent or   persistent disease was submitted to Pathology for permanent sectioning.  At the   time of completion, we had identified the clips.  They were likely at the site   of the prior adrenal dissection at the right adrenal vein and all the tissue had   been taken off of this once the surgery was completed, there was no palpable   gross residual disease left in the adrenal bed or retrocaval region making this   a successful resection/debulking of any grossly evident disease to try to   optimize debulking for her in the state of stage IV pheochromocytoma to try to   decrease the monitoring for hypertensive medications that she was on.  The wound   was irrigated.  There was no evidence of bleeding.  Nu-Knit sheet was placed   between the raw surface of the liver and the raw surface of the kidney, which   both had their capsules removed to resect this pheochromocytoma of the right   adrenal region and right adrenal remnant gland if any persistent disease was   there.  The raw surface was covered with a Nu-Knit.  We placed a #19 round Rufino   drains adjacent to the liver bed.  Estimated blood loss had been approximately   300 mL with hemostasis achieved.  The sponge count was performed and all needle,   instrument and sponge counts were correct.  We then closed the muscular layers   in 2 layers closing the posterior muscle and fascial layer with a running #1   looped PDS suture followed by a running looped #1 PDS on the anterior muscular   fascial layer of the right flank.  Exparel solution using approximately 40 mL of   50% Exparel and 50% saline was injected in between the fascial layers.  The   subcutaneous tissue was irrigated.  It was made hemostatic and the  skin was   closed with clips.  The drain was anchored to the skin at the exit site inferior   to the wound with a 2-0 nylon suture.  Again, estimated blood loss had been 300   mL.  The patient was turned over to Anesthesia for extubation and transported   to the recovery area in a satisfactory condition.  Her blood pressure had been   relatively stable throughout the procedure.  Again, the procedure was en bloc   right adrenalectomy of recurrent/persistent right adrenal pheochromocytoma with   en bloc wedge resection of the posterior inferior aspect of the liver with   resection of the liver capsule with some of the parenchyma adjacent to it with   partial nephrectomy component to be dictated by Dr. Tio Fuentes and   retroperitoneal retrocaval lymph node sampling/excision as detailed in the op   note.      RLC/HN  dd: 10/04/2017 21:57:19 (CDT)  td: 10/04/2017 22:46:57 (CDT)  Doc ID   #[id number]  Job ID #[id number]    CC:

## 2017-10-05 NOTE — SUBJECTIVE & OBJECTIVE
Interval History: Patient feeling well today. Ambulated in room today. Tolerating diet. No bowel movement but having flatus. No nausea or vomiting. Working with IS.     Medications:  Continuous Infusions:   Scheduled Meds:   aspirin  81 mg Oral Daily    doxazosin  2 mg Oral Daily    enoxaparin  40 mg Subcutaneous Daily    famotidine  20 mg Oral BID    fludrocortisone  100 mcg Oral Daily    hydrocortisone  10 mg Oral Before dinner    hydrocortisone  20 mg Oral Daily    losartan  50 mg Oral Daily    nifedipine  30 mg Oral Daily    polyethylene glycol  17 g Oral Daily    senna-docusate 8.6-50 mg  1 tablet Oral BID     PRN Meds:albuterol-ipratropium 2.5mg-0.5mg/3mL, dextrose 50%, dextrose 50%, diphenhydrAMINE, glucagon (human recombinant), glucose, glucose, HYDROmorphone, insulin aspart, labetalol, ondansetron, oxycodone, oxycodone, promethazine     Review of patient's allergies indicates:  No Known Allergies  Objective:     Vital Signs (Most Recent):  Temp: 98.8 °F (37.1 °C) (10/05/17 0715)  Pulse: 72 (10/05/17 0800)  Resp: (!) 25 (10/05/17 0800)  BP: (!) 124/56 (10/05/17 0800)  SpO2: 96 % (10/05/17 0800) Vital Signs (24h Range):  Temp:  [98.4 °F (36.9 °C)-98.8 °F (37.1 °C)] 98.8 °F (37.1 °C)  Pulse:  [62-80] 72  Resp:  [14-35] 25  SpO2:  [90 %-99 %] 96 %  BP: (122-176)/(56-96) 124/56     Weight: 92.5 kg (204 lb)  Body mass index is 33.95 kg/m².    Intake/Output - Last 3 Shifts       10/03 0700 - 10/04 0659 10/04 0700 - 10/05 0659 10/05 0700 - 10/06 0659    P.O.   240    I.V. (mL/kg)       Total Intake(mL/kg)   240 (2.6)    Urine (mL/kg/hr) 735 (0.3) 450 (0.2) 0 (0)    Drains 240 (0.1) 85 (0) 70 (0.4)    Blood       Total Output 975 535 70    Net -975 -535 +170           Urine Occurrence  1 x 1 x          Physical Exam   Constitutional: She is oriented to person, place, and time. She appears well-developed and well-nourished. No distress.   HENT:   Head: Normocephalic and atraumatic.   Eyes: Pupils are  equal, round, and reactive to light. No scleral icterus.   Cardiovascular: Normal rate.    Pulmonary/Chest: Effort normal.   Abdominal: There is tenderness (appropriately so).   Incision CDI  Drain with dark old serosanguinous fluid   Neurological: She is alert and oriented to person, place, and time. No cranial nerve deficit.   Psychiatric: She has a normal mood and affect. Her behavior is normal.       Significant Labs:  CBC:     Recent Labs  Lab 10/05/17  0400   WBC 7.74   RBC 2.96*   HGB 8.6*   HCT 26.5*      MCV 90   MCH 29.1   MCHC 32.5     CMP:     Recent Labs  Lab 10/05/17  0400      CALCIUM 8.2*   ALBUMIN 2.3*   PROT 5.0*      K 3.7   CO2 27      BUN 10   CREATININE 0.8   ALKPHOS 74   ALT 20   AST 31   BILITOT 0.5

## 2017-10-05 NOTE — PROGRESS NOTES
Pt placed back on telemetry after having taken a sponge bath in restroom with sister. Pt noted to have irregular rate controlled rhythm. Blood pressure remains stable with SBP in 140's. Pt denies chest pain or SOB at this time. Dr Novak at bedside and EKG ordered.

## 2017-10-05 NOTE — OP NOTE
[facility]  Operative Note    Date: 10/02/2017    Pre-Op Diagnosis:  1. History of metastatic pheochromocytoma with recurrent right upper quadrant disease     2. Right renal mass x 3, likely metastatic pheochromocytoma    Post-Op Diagnosis: same    Procedure(s) Performed:   1.  Right open partial nephrectomy x 3    Specimen(s): Right kidney tumor x 3    Staff Surgeon: Dominick Kauffman MD    Assistant Surgeon: Sandoval Castillo MD    Anesthesia: General endotracheal anesthesia    Indications:  Hailey Sawant is a 72 y.o. female with Right renal mass, likely recurrent metastatic pheochromocytoma who was undergoing resection by Dr. Castillo. There were masses invading the kidney for which Dr. Castillo asked for urology assistance to perform a partial nephrectomy.    Findings:   1. 1 renal artery and 1 renal vein  2. Conglomeration of 3 renal tumors on anterior surface of kidney  3.  Warm or cold ischemia was not required    Estimated Blood Loss: 50 mL    Fluids: per anesthesia    Drains: per Dr. Castillo    Procedure in detail:  The patient was undergoing resection by Dr. Castillo via a right subcostal incision. I was called into the room to assess and assist in resection of tumor on the anterior surface of the right kidney. There were 3 nodular tumors that were protuberant and did not appear to invade deeply into the right kidney.      The ureter was identified. The kidney was mobilized completely in all directions allowing it to be completely free other than the hilar attachments.  We identified the renal vein and artery which we then encircled with a vessel loop. We were able to isolate the vessels posteriorly and anteriorly creating a space to pass our Satinsky vascular clamp, if needed.     We then dissected free the fat from the edges of the tumors to identify their extent and boundaries. There were three tumors that were in close proximity. We were able to identify the lateral edges and then  excise them completely en bloc using a combination of blunt and sharp dissection. Electrocautery was used as needed to maintain hemostasis. Gross margins were negative. The defect was repaired.    The area was then irrigated with sterile water. The kidney was placed back into the surgical bed and there was no obvious bleeding. At this point, the patient was returned to Dr. Castillo and his team to complete the rest of the operation and close the incision. A drain will be placed for post-operative monitoring.    The patient tolerated my portion of the procedure well. She will be under Dr. Castillo's care while admitted to the hospital.    Dominick Kauffman MD

## 2017-10-05 NOTE — PROGRESS NOTES
Reviewed EKG and telemetry, patient does not have atrial fibrillation, but sinus with frequent PACs, no sustained runs. Spoke to Dr. Novak, consult discontinued.      Faustino Feldman MD  Cardiology Fellow

## 2017-10-05 NOTE — ASSESSMENT & PLAN NOTE
S/p re-excision of right adrenal bed with yosi-aortic [place] basin lymphadenectomy    Continue home BP meds  Decrease stress dose steroids to 50 q8 per endocrine, appreciate assistance, continue wean per endocrine recs  Regular diet  DVT/GI ppx  Ambulate in guerra today  IS/Acapella  Stepdown orders in place

## 2017-10-05 NOTE — PROGRESS NOTES
EKG done indicating AFib. Dr Waggoner with SICU notified. Cardiology consulted. Will continue to monitor pt at this time.

## 2017-10-05 NOTE — PROGRESS NOTES
[facility]  General Surgery  Progress Note    Subjective:     History of Present Illness:  No notes on file    Post-Op Info:  Procedure(s) (LRB):  ADRENALECTOMY open exploratory (Right)   3 Days Post-Op     Interval History: Patient feeling well today. Ambulated in room today. Tolerating diet. No bowel movement but having flatus. No nausea or vomiting. Working with IS.     Medications:  Continuous Infusions:   Scheduled Meds:   aspirin  81 mg Oral Daily    doxazosin  2 mg Oral Daily    enoxaparin  40 mg Subcutaneous Daily    famotidine  20 mg Oral BID    fludrocortisone  100 mcg Oral Daily    hydrocortisone  10 mg Oral Before dinner    hydrocortisone  20 mg Oral Daily    losartan  50 mg Oral Daily    nifedipine  30 mg Oral Daily    polyethylene glycol  17 g Oral Daily    senna-docusate 8.6-50 mg  1 tablet Oral BID     PRN Meds:albuterol-ipratropium 2.5mg-0.5mg/3mL, dextrose 50%, dextrose 50%, diphenhydrAMINE, glucagon (human recombinant), glucose, glucose, HYDROmorphone, insulin aspart, labetalol, ondansetron, oxycodone, oxycodone, promethazine     Review of patient's allergies indicates:  No Known Allergies  Objective:     Vital Signs (Most Recent):  Temp: 98.8 °F (37.1 °C) (10/05/17 0715)  Pulse: 72 (10/05/17 0800)  Resp: (!) 25 (10/05/17 0800)  BP: (!) 124/56 (10/05/17 0800)  SpO2: 96 % (10/05/17 0800) Vital Signs (24h Range):  Temp:  [98.4 °F (36.9 °C)-98.8 °F (37.1 °C)] 98.8 °F (37.1 °C)  Pulse:  [62-80] 72  Resp:  [14-35] 25  SpO2:  [90 %-99 %] 96 %  BP: (122-176)/(56-96) 124/56     Weight: 92.5 kg (204 lb)  Body mass index is 33.95 kg/m².    Intake/Output - Last 3 Shifts       10/03 0700 - 10/04 0659 10/04 0700 - 10/05 0659 10/05 0700 - 10/06 0659    P.O.   240    I.V. (mL/kg)       Total Intake(mL/kg)   240 (2.6)    Urine (mL/kg/hr) 735 (0.3) 450 (0.2) 0 (0)    Drains 240 (0.1) 85 (0) 70 (0.4)    Blood       Total Output 975 535 70    Net -975 535 +170           Urine  Occurrence  1 x 1 x          Physical Exam   Constitutional: She is oriented to person, place, and time. She appears well-developed and well-nourished. No distress.   HENT:   Head: Normocephalic and atraumatic.   Eyes: Pupils are equal, round, and reactive to light. No scleral icterus.   Cardiovascular: Normal rate.    Pulmonary/Chest: Effort normal.   Abdominal: There is tenderness (appropriately so).   Incision CDI  Drain with dark old serosanguinous fluid   Neurological: She is alert and oriented to person, place, and time. No cranial nerve deficit.   Psychiatric: She has a normal mood and affect. Her behavior is normal.       Significant Labs:  CBC:     Recent Labs  Lab 10/05/17  0400   WBC 7.74   RBC 2.96*   HGB 8.6*   HCT 26.5*      MCV 90   MCH 29.1   MCHC 32.5     CMP:     Recent Labs  Lab 10/05/17  0400      CALCIUM 8.2*   ALBUMIN 2.3*   PROT 5.0*      K 3.7   CO2 27      BUN 10   CREATININE 0.8   ALKPHOS 74   ALT 20   AST 31   BILITOT 0.5     Assessment/Plan:     * Pheochromocytoma, malignant    S/p re-excision of right adrenal bed with yosi-aortic duane basin lymphadenectomy    Continue home BP meds  Decrease stress dose steroids to 50 q8 per endocrine, appreciate assistance, continue wean per endocrine recs  Regular diet  DVT/GI ppx  Ambulate in guerra today  IS/Acapella  Stepdown orders in place        Controlled type 2 diabetes mellitus without complication    SSI  Appreciate endocrine's assistance with DMII management        Pheochromocytoma    S/p re-excision of right adrenal bed with yosi-aortic du[place] basin lymphadenectomy    Continue home BP meds  Regular diet  DVT/GI ppx  Ambulate  IS/Acapella  Stepdown the floor after discontinuing howard and a line        H/O total adrenalectomy    Continue stress dose steroids  Endocrine on board        Hypertension    Continue home meds  PRN as needed  Adjust meds if pt has hypotension        Hypoadrenalism    Continue steroid  replacement therapy  Decreasing stress dose to 50 q8  Endocrine on board            Shaan Bassett MD  General Surgery  [facility]

## 2017-10-05 NOTE — PROGRESS NOTES
Progress Note  Surgical Intensive Care    Admit Date: 10/2/2017  Post-operative Day: 3 Days Post-Op  Hospital Day: 4    SUBJECTIVE:     Follow-up For:  Procedure(s) (LRB):  ADRENALECTOMY open exploratory (Right)    HPI:    Patient is a 72 y.o. 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 Since her surgery, the patient has had increasing blood pressure requirements, now on three anti-hypertensive medications at home. Repeat labs in August 2017 showed increased metanephrines and catecholamines. Repeat nuclear medicine MIBG study showed no uptake in the humerus but did show disease in the right adrenal bed close to the superior pole of the right kidney as well as the right para-aortic region. The patient was taken to the OR today for repeat adrenalectomy with para-aortic lymph node dissection. She arrives to the SICU extubated maintaining O2 sats>95% on non-rebreather mask, HDS off pressor support.      Interval history:    No acute events overnight. Pain well controlled on PO pain meds. Voiding, UOP adequate. Up and out of bed yesterday and today.    Continuous Infusions:     Scheduled Meds:   aspirin  81 mg Oral Daily    doxazosin  2 mg Oral Daily    enoxaparin  40 mg Subcutaneous Daily    famotidine  20 mg Oral BID    fludrocortisone  100 mcg Oral Daily    hydrocortisone  10 mg Oral Before dinner    hydrocortisone  20 mg Oral Daily    losartan  50 mg Oral Daily    magnesium oxide  400 mg Oral Once    nifedipine  30 mg Oral Daily    polyethylene glycol  17 g Oral Daily    potassium chloride  30 mEq Oral Once    senna-docusate 8.6-50 mg  1 tablet Oral BID     PRN Meds:albuterol-ipratropium 2.5mg-0.5mg/3mL, dextrose 50%, dextrose 50%, diphenhydrAMINE,  glucagon (human recombinant), glucose, glucose, HYDROmorphone, insulin aspart, labetalol, ondansetron, oxycodone, oxycodone, promethazine    Review of patient's allergies indicates:  No Known Allergies    OBJECTIVE:     Vital Signs (Most Recent)  Temp: 98.5 °F (36.9 °C) (10/05/17 0300)  Pulse: 64 (10/05/17 0600)  Resp: 18 (10/05/17 0600)  BP: (!) 160/79 (10/05/17 0500)  SpO2: 97 % (10/05/17 0600)    Vital Signs Range (Last 24H):  Temp:  [98.4 °F (36.9 °C)-98.6 °F (37 °C)]   Pulse:  [64-80]   Resp:  [14-35]   BP: (122-176)/(57-96)   SpO2:  [90 %-99 %]     I & O (Last 24H):    Intake/Output Summary (Last 24 hours) at 10/05/17 0714  Last data filed at 10/04/17 2300   Gross per 24 hour   Intake                0 ml   Output              525 ml   Net             -525 ml     Physical Exam:  Physical Exam   Constitutional: She is oriented to person, place, and time and well-developed, well-nourished, and in no distress.   HENT:   Head: Normocephalic and atraumatic.   Cardiovascular: Normal rate, regular rhythm and normal heart sounds.    No murmur heard.  Pulmonary/Chest: Effort normal and breath sounds normal. No respiratory distress. She has no wheezes.   Abdominal: Soft. She exhibits no distension. There is no tenderness.   Right flank incision with staples, clean, dry and intact.    Neurological: She is alert and oriented to person, place, and time.   Skin: Skin is warm and dry.     Wound/Incision:  no drainage    Laboratory (Last 24H):  CBC:    Recent Labs  Lab 10/05/17  0400   WBC 7.74   HGB 8.6*   HCT 26.5*        CMP:    Recent Labs  Lab 10/05/17  0400   CALCIUM 8.2*   ALBUMIN 2.3*   PROT 5.0*      K 3.7   CO2 27      BUN 10   CREATININE 0.8   ALKPHOS 74   ALT 20   AST 31   BILITOT 0.5       Chest X-Ray: X-ray Chest 1 View  Results for orders placed or performed during the hospital encounter of 10/02/17   X-Ray Chest 1 View    Narrative    Chest AP portable    Indication:cvl.    Comparison:August  25, 2015.    Findings:     Right IJ catheter tip overlies the upper right atrium.    The cardiomediastinal silhouette is enlarged with central vascular congestion.  There is no significant pleural effusion.  The trachea is midline.  The lungs are symmetrically expanded bilaterally with mild perihilar edema.  There is no pneumothorax.  The osseous structures appear unchanged.    Impression    Cardiomegaly with mild congestive changes.    Right IJ catheter tip overlies the upper right atrium.        Electronically signed by: TAMELA HERNANDEZ MD  Date:     10/02/17  Time:    19:31            Diagnostic Results:  No Further    ASSESSMENT/PLAN:   72 y.o. 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 Now s/p right redo-adrenalectomy with para-aortic lymph node dissection on 10/2/2017.     Plan:     Neuro:   - pain well controlled on oxy Ir, dilaudid IV for breakthrough pain  - off sedation     Pulmonary:   - maintaining O2 sats>95% on room air     Cardiac:  - SBP goal<170, cardene gtt not needed thus far  -HDS not requiring pressors  -Home meds include losartan, nifedipine, doxazosin     Renal:   - voiding, UOP adequate  -Bun/Cr stable, continue to trend     Fluids/Electrolytes/Nutrition/GI:   -Nutritional status: tolerating regular diet  -replace lytes PRN  - stress dose steroids 100mg q8, wean per endocrinology  - polyethylene glycol packet daily  - GI prophylaxis: famotidine 20mg PO BID     Hematology/Oncology:  -H/H continues drifting down, continue to trend, transfuse as needed  - SCDs in place, lovenox subcu, up and out of bed      Infectious Disease:   -Afebrile  -WBC WNL, continue to monitor  - lactate trending down     Endocrine:  -Glucose goal of 120-150  - endocrinology following, plan for weaning of stress dose steroids     Dispo:  - Stepdown  today per primary team.    Prema Colon, PGY-1  General Surgery  [phone]  10/05/2017

## 2017-10-05 NOTE — PLAN OF CARE
Problem: Patient Care Overview  Goal: Plan of Care Review  Outcome: Ongoing (interventions implemented as appropriate)  Pt on 2L NC; sats %. VSS. A&Ox4 and following commands. Turned q2h to prevent skin breakdown. Accuchecks monitored; no insulin coverage required. RUTH ANN drain with minimal output overnight. Labs monitored. Plan is to step pt down this AM. Sister remains at bedside. Plan of care reviewed with pt and sister. All questions and concerns addressed. See flowsheet for full assessment details.

## 2017-10-05 NOTE — PLAN OF CARE
Problem: Physical Therapy Goal  Goal: Physical Therapy Goal  Goals to be met by: 10/18/17    Patient will increase functional independence with mobility by performin. Supine to sit with Modified [place] - not met  2. Sit to stand transfer with Modified [place] -not met  3. Gait  x 220 feet with Supervision . - not met  4. Ascend/descend 5 stair with bilateral Handrails Contact Guard Assistance . -  Not met     Goals remain appropriate. Kelly Salguero, PT 10/5/2017

## 2017-10-05 NOTE — PT/OT/SLP PROGRESS
Physical Therapy  Treatment    Hailey Sawant   MRN: [id number]   Admitting Diagnosis: Pheochromocytoma, malignant    PT Received On: 10/05/17  PT Start Time: 0859     PT Stop Time: 0911    PT Total Time (min): 12 min       Billable Minutes:  Gait [id number] min    Treatment Type: Treatment  PT/PTA: PT     PTA Visit Number: 0       General Precautions: Standard, fall  Orthopedic Precautions:     Braces:      Do you have any cultural, spiritual, Lutheran conflicts, given your current situation?: none    Subjective:  Communicated with nurse prior to session.     pt c/o dizziness with gait training.     Pain/Comfort  Pain Rating 1: 5/10  Location 1:  (abdomen)  Pain Rating Post-Intervention 1: 5/10    Objective:   Patient found with: pulse ox (continuous), telemetry, blood pressure cuff, oxygen, RUTH ANN drain    Functional Mobility:  Bed Mobility:   Rolling/Turning to Left:  (not tested. pt was sitting in chair for treatment. )    Transfers:  Sit <> Stand Assistance: Contact Guard Assistance  Sit <> Stand Assistive Device: No Assistive Device    Gait:   Gait Distance: 10 ft, 38 ft with O2 intact and nursing with portable monitor. pt had sitting rest period between distance ambulated.   Assistance 1: Total assistance (pt was min assist x 2.  pt c/o dizziness with gait training. )  Gait Assistive Device: Hand held assist  Gait Pattern: 2-point gait    AM-PAC 6 CLICK MOBILITY  How much help from another person does this patient currently need?   1 = Unable, Total/Dependent Assistance  2 = A lot, Maximum/Moderate Assistance  3 = A little, Minimum/Contact Guard/Supervision  4 = None, Modified [place]/Independent    Turning over in bed (including adjusting bedclothes, sheets and blankets)?: 3  Sitting down on and standing up from a chair with arms (e.g., wheelchair, bedside commode, etc.): 3  Moving from lying on back to sitting on the side of the bed?: 3  Moving to and from a bed to a chair (including a wheelchair)?: 3  Need  to walk in hospital room?: 2  Climbing 3-5 steps with a railing?: 1  Total Score: 15    AM-PAC Raw Score CMS G-Code Modifier Level of Impairment Assistance   6 % Total / Unable   7 - 9 CM 80 - 100% Maximal Assist   10 - 14 CL 60 - 80% Moderate Assist   15 - 19 CK 40 - 60% Moderate Assist   20 - 22 CJ 20 - 40% Minimal Assist   23 CI 1-20% SBA / CGA   24 CH 0% Independent/ Mod I     Patient left up in chair with all lines intact, call button in reach and sister present.    Assessment:  Hailey Sawant is a 72 y.o. female with a medical diagnosis of Pheochromocytoma, malignant and presents with decreased mobility, transfers and decreased distance ambulated. Pt would benefit from cont PT to address deficits and may need HHPT. Pt will benefit from skilled PT 4x/wk to progress to Independent status. Pt is s/p exp open adrenalectomy 10/2/17..    Rehab identified problem list/impairments: Rehab identified problem list/impairments: weakness, gait instability, impaired functional mobilty, decreased lower extremity function    Rehab potential is all lines intact, call button in reach and sister present.    Activity tolerance: Good    Discharge recommendations: Discharge Facility/Level Of Care Needs: home health PT     Barriers to discharge: Barriers to Discharge: None    Equipment recommendations: Equipment Needed After Discharge: none     GOALS:    Physical Therapy Goals        Problem: Physical Therapy Goal    Goal Priority Disciplines Outcome Goal Variances Interventions   Physical Therapy Goal     PT/OT, PT      Description:  Goals to be met by: 10/18/17    Patient will increase functional independence with mobility by performin. Supine to sit with Modified [place] - not met  2. Sit to stand transfer with Modified [place] -not met  3. Gait  x 220 feet with Supervision . - not met  4. Ascend/descend 5 stair with bilateral Handrails Contact Guard Assistance . -  Not met                      PLAN:     Patient to be seen 4 x/week  to address the above listed problems via gait training, therapeutic activities  Plan of Care expires: 11/02/17  Plan of Care reviewed with: patient (sister)         Kellyhardy Salguero, PT  10/05/2017

## 2017-10-06 VITALS
WEIGHT: 204 LBS | SYSTOLIC BLOOD PRESSURE: 132 MMHG | OXYGEN SATURATION: 95 % | TEMPERATURE: 99 F | BODY MASS INDEX: 33.99 KG/M2 | RESPIRATION RATE: 18 BRPM | HEIGHT: 65 IN | HEART RATE: 71 BPM | DIASTOLIC BLOOD PRESSURE: 61 MMHG

## 2017-10-06 LAB
ALBUMIN SERPL BCP-MCNC: 2.2 G/DL
ALP SERPL-CCNC: 80 U/L
ALT SERPL W/O P-5'-P-CCNC: 15 U/L
ANION GAP SERPL CALC-SCNC: 7 MMOL/L
AST SERPL-CCNC: 23 U/L
BASOPHILS # BLD AUTO: 0.01 K/UL
BASOPHILS NFR BLD: 0.2 %
BILIRUB SERPL-MCNC: 0.5 MG/DL
BLD PROD TYP BPU: NORMAL
BLD PROD TYP BPU: NORMAL
BLOOD UNIT EXPIRATION DATE: NORMAL
BLOOD UNIT EXPIRATION DATE: NORMAL
BLOOD UNIT TYPE CODE: 5100
BLOOD UNIT TYPE CODE: 5100
BLOOD UNIT TYPE: NORMAL
BLOOD UNIT TYPE: NORMAL
BUN SERPL-MCNC: 9 MG/DL
CALCIUM SERPL-MCNC: 8.4 MG/DL
CHLORIDE SERPL-SCNC: 108 MMOL/L
CO2 SERPL-SCNC: 26 MMOL/L
CODING SYSTEM: NORMAL
CODING SYSTEM: NORMAL
CREAT SERPL-MCNC: 0.7 MG/DL
DIFFERENTIAL METHOD: ABNORMAL
DISPENSE STATUS: NORMAL
DISPENSE STATUS: NORMAL
EOSINOPHIL # BLD AUTO: 0.3 K/UL
EOSINOPHIL NFR BLD: 4.5 %
ERYTHROCYTE [DISTWIDTH] IN BLOOD BY AUTOMATED COUNT: 14.8 %
EST. GFR  (AFRICAN AMERICAN): >60 ML/MIN/1.73 M^2
EST. GFR  (NON AFRICAN AMERICAN): >60 ML/MIN/1.73 M^2
GLUCOSE SERPL-MCNC: 94 MG/DL
HCT VFR BLD AUTO: 25.9 %
HGB BLD-MCNC: 8.5 G/DL
LYMPHOCYTES # BLD AUTO: 1.7 K/UL
LYMPHOCYTES NFR BLD: 26.6 %
MAGNESIUM SERPL-MCNC: 1.6 MG/DL
MCH RBC QN AUTO: 29.2 PG
MCHC RBC AUTO-ENTMCNC: 32.8 G/DL
MCV RBC AUTO: 89 FL
MONOCYTES # BLD AUTO: 0.7 K/UL
MONOCYTES NFR BLD: 10.4 %
NEUTROPHILS # BLD AUTO: 3.8 K/UL
NEUTROPHILS NFR BLD: 57.8 %
PLATELET # BLD AUTO: 177 K/UL
PMV BLD AUTO: 8.6 FL
POCT GLUCOSE: 119 MG/DL (ref 70–110)
POCT GLUCOSE: 145 MG/DL (ref 70–110)
POTASSIUM SERPL-SCNC: 3.7 MMOL/L
PROT SERPL-MCNC: 5 G/DL
RBC # BLD AUTO: 2.91 M/UL
SODIUM SERPL-SCNC: 141 MMOL/L
TRANS ERYTHROCYTES VOL PATIENT: NORMAL ML
TRANS ERYTHROCYTES VOL PATIENT: NORMAL ML
WBC # BLD AUTO: 6.51 K/UL

## 2017-10-06 PROCEDURE — 25000003 PHARM REV CODE 250: Performed by: ANESTHESIOLOGY

## 2017-10-06 PROCEDURE — 36415 COLL VENOUS BLD VENIPUNCTURE: CPT

## 2017-10-06 PROCEDURE — 25000003 PHARM REV CODE 250: Performed by: SURGERY

## 2017-10-06 PROCEDURE — 94664 DEMO&/EVAL PT USE INHALER: CPT

## 2017-10-06 PROCEDURE — 25000003 PHARM REV CODE 250: Performed by: INTERNAL MEDICINE

## 2017-10-06 PROCEDURE — 85025 COMPLETE CBC W/AUTO DIFF WBC: CPT

## 2017-10-06 PROCEDURE — 25000003 PHARM REV CODE 250: Performed by: STUDENT IN AN ORGANIZED HEALTH CARE EDUCATION/TRAINING PROGRAM

## 2017-10-06 PROCEDURE — 80053 COMPREHEN METABOLIC PANEL: CPT

## 2017-10-06 PROCEDURE — 83735 ASSAY OF MAGNESIUM: CPT

## 2017-10-06 PROCEDURE — 27000173 HC ACAPELLA DEVICE DH OR DM

## 2017-10-06 RX ORDER — BISACODYL 10 MG
10 SUPPOSITORY, RECTAL RECTAL DAILY
Refills: 0 | Status: ON HOLD | COMMUNITY
Start: 2017-10-07 | End: 2018-07-18 | Stop reason: CLARIF

## 2017-10-06 RX ORDER — OXYCODONE HYDROCHLORIDE 5 MG/1
5 TABLET ORAL EVERY 4 HOURS PRN
Qty: 61 TABLET | Refills: 0 | Status: ON HOLD | OUTPATIENT
Start: 2017-10-06 | End: 2018-07-18 | Stop reason: CLARIF

## 2017-10-06 RX ORDER — SYRING-NEEDL,DISP,INSUL,0.3 ML 29 G X1/2"
296 SYRINGE, EMPTY DISPOSABLE MISCELLANEOUS ONCE
Status: DISCONTINUED | OUTPATIENT
Start: 2017-10-06 | End: 2017-10-06 | Stop reason: HOSPADM

## 2017-10-06 RX ORDER — BISACODYL 10 MG
10 SUPPOSITORY, RECTAL RECTAL DAILY
Status: DISCONTINUED | OUTPATIENT
Start: 2017-10-06 | End: 2017-10-06 | Stop reason: HOSPADM

## 2017-10-06 RX ADMIN — HYDROCORTISONE 20 MG: 20 TABLET ORAL at 11:10

## 2017-10-06 RX ADMIN — OXYCODONE HYDROCHLORIDE 5 MG: 5 TABLET ORAL at 11:10

## 2017-10-06 RX ADMIN — NIFEDIPINE 30 MG: 30 TABLET, FILM COATED, EXTENDED RELEASE ORAL at 08:10

## 2017-10-06 RX ADMIN — STANDARDIZED SENNA CONCENTRATE AND DOCUSATE SODIUM 1 TABLET: 8.6; 5 TABLET, FILM COATED ORAL at 08:10

## 2017-10-06 RX ADMIN — ASPIRIN 81 MG: 81 TABLET, COATED ORAL at 08:10

## 2017-10-06 RX ADMIN — OXYCODONE HYDROCHLORIDE 5 MG: 5 TABLET ORAL at 12:10

## 2017-10-06 RX ADMIN — LOSARTAN POTASSIUM 50 MG: 50 TABLET, FILM COATED ORAL at 08:10

## 2017-10-06 RX ADMIN — FLUDROCORTISONE ACETATE 100 MCG: 0.1 TABLET ORAL at 08:10

## 2017-10-06 RX ADMIN — BISACODYL 10 MG: 10 SUPPOSITORY RECTAL at 08:10

## 2017-10-06 RX ADMIN — DOXAZOSIN 2 MG: 1 TABLET ORAL at 08:10

## 2017-10-06 RX ADMIN — FAMOTIDINE 20 MG: 20 TABLET, FILM COATED ORAL at 08:10

## 2017-10-06 NOTE — NURSING
Discharge instructions discussed with pt. Verbalizes understanding. Paperwork and prescription given to pt. Medications reconciled. IV D/C'd, catheter intact. Tolerated well. Pt informed that showering is okay, but to not submerge incision. Instructed against strenuous activity. Vital signs stable. No acute distress noted. Awaiting transport.

## 2017-10-06 NOTE — PLAN OF CARE
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       Future Appointments  Date Time Provider Department Center   10/19/2017 2:45 PM [facility]   11/9/2017 11:00 AM [facility]           I have seen and examined this patient face to face today. My clinical findings that support the need for the home health skilled services and home bound status are the following:  Weakness/numbness causing balance and gait disturbance due to Surgery making it taxing to leave home.    Allergies:Review of patient's allergies indicates:  No Known Allergies    Diet: regular diet    Activities: activity as tolerated    Nursing:   SN to complete comprehensive assessment including routine vital signs. Instruct on disease process and s/s of complications to report to MD. Review/verify medication list sent home with the patient at time of discharge  and instruct patient/caregiver as needed. Frequency may be adjusted depending on start of care date.    Notify MD if SBP > 160 or < 90; DBP > 90 or < 50; HR > 120 or < 50; Temp > 101.4      CONSULTS:    Physical Therapy to evaluate and treat. Evaluate for home safety and equipment needs; Establish/upgrade home exercise program. Perform /  instruct on therapeutic exercises, gait training, transfer training, and Range of Motion.    MISCELLANEOUS CARE:  N/A    WOUND CARE ORDERS  n/a      Medications: Review discharge medications with patient and family and provide education.      Current Discharge Medication List      CONTINUE these medications which have NOT CHANGED    Details   doxazosin (CARDURA) 2 MG tablet Take 1 tablet (2 mg total) by mouth once daily.  Qty: 30 tablet, Refills: 0      hydrocortisone (CORTEF) 20 MG Tab TAKE 1 TABLET EVERY MORNING  AND TAKE 1/2 TABLET EVERY EVENING  AT  5PM  Qty: 135 tablet, Refills: 2      losartan (COZAAR) 50 MG tablet Take 50 mg by mouth once daily.      metformin (GLUCOPHAGE-XR) 500 MG 24 hr tablet TAKE 1 TABLET TWICE DAILY WITH MEALS  Qty: 180 tablet, Refills: 2      nifedipine (ADALAT CC) 90 MG TbSR Take 30 mg by mouth once daily.      aspirin 81 MG Chew Take 81 mg by mouth once daily.      cholecalciferol, vitamin D3, (VITAMIN D3) 5,000 unit Tab Take 5,000 Units by mouth once daily.      furosemide (LASIX) 40 MG tablet Take 20 mg by mouth. Monday and Friday  Refills: 6      TRUE METRIX AIR GLUCOSE METER kit CHECK BLOOD SUGAR ONE TIME DAILY  Qty: 1 each, Refills: 0      TRUE METRIX GLUCOSE TEST STRIP Strp CHECK BLOOD SUGAR ONE TIME DAILY  Qty: 100 strip, Refills: 3      TRUEPLUS LANCETS 28 gauge Misc TEST ONE TIME DAILY  Qty: 200 each, Refills: 3             I certify that this patient is confined to her home and needs physical therapy.

## 2017-10-06 NOTE — NURSING TRANSFER
Nursing Transfer Note      10/5/2017     Transfer From: [facility] 6077 to [facility] 655     Transfer via wheelchair    Transfer with cardiac monitoring and 2 L NC O2     Transported by ICU RN    Medicines sent:none    Chart send with patient: Yes    Notified: sister    Patient reassessed at: 10/5/17 @ 1500    Upon arrival to floor: cardiac monitor applied, patient oriented to room, call bell in reach and bed in lowest position

## 2017-10-06 NOTE — PLAN OF CARE
Patient discharged home with [facility].  Follow up appts on AVS.       10/06/17 1507   Final Note   Assessment Type Final Discharge Note   Discharge Disposition Home-Health   Hospital Follow Up  Appt(s) scheduled? Yes   Right Care Referral Info   Post Acute Recommendation Home-care

## 2017-10-06 NOTE — SUBJECTIVE & OBJECTIVE
Interval History: Pt resting comfortably in bed this morning. Pain controlled with minimal PO med. Ambulating. No new complaints or concerns. Ready for discharge.     Medications:  Continuous Infusions:   Scheduled Meds:   aspirin  81 mg Oral Daily    doxazosin  2 mg Oral Daily    enoxaparin  40 mg Subcutaneous Daily    famotidine  20 mg Oral BID    fludrocortisone  100 mcg Oral Daily    hydrocortisone  10 mg Oral Before dinner    hydrocortisone  20 mg Oral Daily    losartan  50 mg Oral Daily    nifedipine  30 mg Oral Daily    polyethylene glycol  17 g Oral Daily    senna-docusate 8.6-50 mg  1 tablet Oral BID     PRN Meds:dextrose 50%, dextrose 50%, diphenhydrAMINE, glucagon (human recombinant), glucose, glucose, HYDROmorphone, insulin aspart, labetalol, ondansetron, oxycodone, oxycodone, promethazine     Review of patient's allergies indicates:  No Known Allergies  Objective:     Vital Signs (Most Recent):  Temp: 99.1 °F (37.3 °C) (10/06/17 0440)  Pulse: 63 (10/06/17 0440)  Resp: 18 (10/06/17 0440)  BP: 128/62 (10/06/17 0440)  SpO2: (!) 92 % (10/06/17 0440) Vital Signs (24h Range):  Temp:  [98.1 °F (36.7 °C)-99.1 °F (37.3 °C)] 99.1 °F (37.3 °C)  Pulse:  [62-82] 63  Resp:  [15-31] 18  SpO2:  [91 %-98 %] 92 %  BP: (121-159)/(56-87) 128/62     Weight: 92.5 kg (204 lb)  Body mass index is 33.95 kg/m².    Intake/Output - Last 3 Shifts       10/04 0700 - 10/05 0659 10/05 0700 - 10/06 0659 10/06 0700 - 10/07 0659    P.O.  1540     I.V. (mL/kg)  20 (0.2)     Total Intake(mL/kg)  1560 (16.9)     Urine (mL/kg/hr) 450 (0.2) 0 (0)     Emesis/NG output  0 (0)     Drains 85 (0) 204 (0.1)     Other  0 (0)     Stool  0 (0)     Blood  0 (0)     Total Output 535 204      Net -535 +1356             Urine Occurrence 1 x 4 x     Stool Occurrence  0 x     Emesis Occurrence  0 x           Physical Exam   Constitutional: She is oriented to person, place, and time. She appears well-developed and well-nourished. No distress.    HENT:   Head: Normocephalic and atraumatic.   Eyes: Pupils are equal, round, and reactive to light. No scleral icterus.   Cardiovascular: Normal rate.    Pulmonary/Chest: Effort normal. No respiratory distress.   Abdominal: Soft. There is no tenderness.   Incision CDI  Drain with dark old serosanguinous fluid   Neurological: She is alert and oriented to person, place, and time. No cranial nerve deficit.   Skin: Skin is warm. No erythema.   Psychiatric: She has a normal mood and affect. Her behavior is normal.       Significant Labs:  CBC:     Recent Labs  Lab 10/06/17  0408   WBC 6.51   RBC 2.91*   HGB 8.5*   HCT 25.9*      MCV 89   MCH 29.2   MCHC 32.8     CMP:     Recent Labs  Lab 10/06/17  0408   GLU 94   CALCIUM 8.4*   ALBUMIN 2.2*   PROT 5.0*      K 3.7   CO2 26      BUN 9   CREATININE 0.7   ALKPHOS 80   ALT 15   AST 23   BILITOT 0.5

## 2017-10-06 NOTE — PLAN OF CARE
SW learned in morning huddle that Pt stepped down from the unit and is medically stable for discharge today with home health. SW met with Pt and provided her with a list of home health agencies from the [facility] website. Pt signed Patient Choice form indicating that she was ok with the first available in-network provider. BRIE sent all necessary referral information to [facility] via [facility]. BRIE asked them to confirm they could accept Pt.    Pt to discharge home today with home health and family support.     Thi Salas LCSW

## 2017-10-06 NOTE — PROGRESS NOTES
[facility]  General Surgery  Progress Note    Subjective:     History of Present Illness:  No notes on file    Post-Op Info:  Procedure(s) (LRB):  ADRENALECTOMY open exploratory (Right)   4 Days Post-Op     Interval History: Pt resting comfortably in bed this morning. Pain controlled with minimal PO med. Ambulating. No new complaints or concerns. Ready for discharge.     Medications:  Continuous Infusions:   Scheduled Meds:   aspirin  81 mg Oral Daily    doxazosin  2 mg Oral Daily    enoxaparin  40 mg Subcutaneous Daily    famotidine  20 mg Oral BID    fludrocortisone  100 mcg Oral Daily    hydrocortisone  10 mg Oral Before dinner    hydrocortisone  20 mg Oral Daily    losartan  50 mg Oral Daily    nifedipine  30 mg Oral Daily    polyethylene glycol  17 g Oral Daily    senna-docusate 8.6-50 mg  1 tablet Oral BID     PRN Meds:dextrose 50%, dextrose 50%, diphenhydrAMINE, glucagon (human recombinant), glucose, glucose, HYDROmorphone, insulin aspart, labetalol, ondansetron, oxycodone, oxycodone, promethazine     Review of patient's allergies indicates:  No Known Allergies  Objective:     Vital Signs (Most Recent):  Temp: 99.1 °F (37.3 °C) (10/06/17 0440)  Pulse: 63 (10/06/17 0440)  Resp: 18 (10/06/17 0440)  BP: 128/62 (10/06/17 0440)  SpO2: (!) 92 % (10/06/17 0440) Vital Signs (24h Range):  Temp:  [98.1 °F (36.7 °C)-99.1 °F (37.3 °C)] 99.1 °F (37.3 °C)  Pulse:  [62-82] 63  Resp:  [15-31] 18  SpO2:  [91 %-98 %] 92 %  BP: (121-159)/(56-87) 128/62     Weight: 92.5 kg (204 lb)  Body mass index is 33.95 kg/m².    Intake/Output - Last 3 Shifts       10/04 0700 - 10/05 0659 10/05 0700 - 10/06 0659 10/06 0700 - 10/07 0659    P.O.  1540     I.V. (mL/kg)  20 (0.2)     Total Intake(mL/kg)  1560 (16.9)     Urine (mL/kg/hr) 450 (0.2) 0 (0)     Emesis/NG output  0 (0)     Drains 85 (0) 204 (0.1)     Other  0 (0)     Stool  0 (0)     Blood  0 (0)     Total Output 535 204      Net -535 +1356              Urine Occurrence 1 x 4 x     Stool Occurrence  0 x     Emesis Occurrence  0 x           Physical Exam   Constitutional: She is oriented to person, place, and time. She appears well-developed and well-nourished. No distress.   HENT:   Head: Normocephalic and atraumatic.   Eyes: Pupils are equal, round, and reactive to light. No scleral icterus.   Cardiovascular: Normal rate.    Pulmonary/Chest: Effort normal. No respiratory distress.   Abdominal: Soft. There is no tenderness.   Incision CDI  Drain with dark old serosanguinous fluid   Neurological: She is alert and oriented to person, place, and time. No cranial nerve deficit.   Skin: Skin is warm. No erythema.   Psychiatric: She has a normal mood and affect. Her behavior is normal.       Significant Labs:  CBC:     Recent Labs  Lab 10/06/17  0408   WBC 6.51   RBC 2.91*   HGB 8.5*   HCT 25.9*      MCV 89   MCH 29.2   MCHC 32.8     CMP:     Recent Labs  Lab 10/06/17  0408   GLU 94   CALCIUM 8.4*   ALBUMIN 2.2*   PROT 5.0*      K 3.7   CO2 26      BUN 9   CREATININE 0.7   ALKPHOS 80   ALT 15   AST 23   BILITOT 0.5     Assessment/Plan:     * Pheochromocytoma, malignant    S/p re-excision of right adrenal bed with yosi-aortic [place] lymphadenectomy    Continue home BP meds  Decrease stress dose steroids to 20 daily  Regular diet  DVT/GI ppx  Ambulate in guerra today  IS/Acapella  Will DC drain prior to discharge    Likely discharge today        Controlled type 2 diabetes mellitus without complication    SSI  Appreciate endocrine's assistance with DMII management        Pheochromocytoma    S/p re-excision of right adrenal bed with yosi-aortic [place] lymphadenectomy    Continue home BP meds  Regular diet  DVT/GI ppx  Ambulate  IS/Acapella  Stepdown the floor after discontinuing howard and a line        H/O total adrenalectomy    Continue stress dose steroids  Endocrine on board        Hypertension    Continue home meds  PRN as needed  Adjust meds  if pt has hypotension        Hypoadrenalism    Continue steroid replacement therapy  Decreasing stress dose to 50 q8  Endocrine on board            Shaan Bassett MD  General Surgery  [facility]

## 2017-10-06 NOTE — PLAN OF CARE
Problem: Patient Care Overview  Goal: Plan of Care Review  Outcome: Ongoing (interventions implemented as appropriate)  POC reviewed and understood by patient. Patient's AAOx4. Pain managed by PRN pain meds per MD order. RUTH ANN drain secured and intact draining dark red fluid. IV patent and intact. IV dressing clean, dry, and intact. Patient tolerated 2L NC O2 without any complaints of SOB sat at 91. Family at bedside. Call light WNR. Bed in lowest position. Will continue to monitor.

## 2017-10-06 NOTE — ASSESSMENT & PLAN NOTE
S/p re-excision of right adrenal bed with yosi-aortic [place] basin lymphadenectomy    Continue home BP meds  Decrease stress dose steroids to 20 daily  Regular diet  DVT/GI ppx  Ambulate in guerra today  IS/Acapella  Will DC drain prior to discharge    Likely discharge today

## 2017-10-09 NOTE — PT/OT/SLP DISCHARGE
Physical Therapy Discharge Summary    Hailey Sawant  MRN: [id number]   Pheochromocytoma, malignant   Patient Discharged from acute Physical Therapy on 10/6/17.  Please refer to prior PT noted date on 10/5/17 for functional status.     Assessment:   Patient has not met goals.  GOALS:    Physical Therapy Goals        Problem: Physical Therapy Goal    Goal Priority Disciplines Outcome Goal Variances Interventions   Physical Therapy Goal     PT/OT, PT      Description:  Goals to be met by: 10/18/17    Patient will increase functional independence with mobility by performin. Supine to sit with Modified [place] - not met  2. Sit to stand transfer with Modified [place] -not met  3. Gait  x 220 feet with Supervision . - not met  4. Ascend/descend 5 stair with bilateral Handrails Contact Guard Assistance . -  Not met                    Reasons for Discontinuation of Therapy Services  Transfer to alternate level of care.      Plan:  Patient Discharged to: home.

## 2017-10-10 ENCOUNTER — TELEPHONE (OUTPATIENT)
Dept: NEUROLOGY | Facility: HOSPITAL | Age: 72
End: 2017-10-10

## 2017-10-11 NOTE — PT/OT/SLP DISCHARGE
Occupational Therapy Discharge Summary    Hailey Sawant  MRN: [id number]   Pheochromocytoma, malignant   Patient Discharged from acute Occupational Therapy on 10/11/2017  .  Please refer to prior OT note dated on 10/4/17 for functional status.     Assessment:   Patient appropriate for care in another setting.  GOALS:    Occupational Therapy Goals        Problem: Occupational Therapy Goal    Goal Priority Disciplines Outcome Interventions   Occupational Therapy Goal     OT, PT/OT     Description:  Goals to be met by: 7 days 10/11/17     Patient will increase functional independence with ADLs by performing:    UE Dressing with Supervision.  LE Dressing with Minimal Assistance.  Grooming while standing with Supervision.  Toileting from toilet with Supervision for hygiene and clothing management.   Stand pivot transfers with Supervision.  Toilet transfer to toilet with Supervision.                  Pt seen for OT eval only; thus, goals not achieved upon hospital d/c.     Reasons for Discontinuation of Therapy Services  Transfer to alternate level of care.      Plan:  Patient Discharged to: Home with Home Health Service   ASPEN Lord  10/11/2017  .

## 2017-10-19 ENCOUNTER — OFFICE VISIT (OUTPATIENT)
Dept: SURGERY | Facility: CLINIC | Age: 72
End: 2017-10-19
Payer: MEDICARE

## 2017-10-19 VITALS
TEMPERATURE: 98 F | SYSTOLIC BLOOD PRESSURE: 147 MMHG | DIASTOLIC BLOOD PRESSURE: 64 MMHG | WEIGHT: 202.94 LBS | HEIGHT: 65 IN | BODY MASS INDEX: 33.81 KG/M2 | HEART RATE: 66 BPM

## 2017-10-19 DIAGNOSIS — D35.00 PHEOCHROMOCYTOMA, UNSPECIFIED LATERALITY: Primary | ICD-10-CM

## 2017-10-19 PROCEDURE — 99024 POSTOP FOLLOW-UP VISIT: CPT | Mod: S$GLB,,, | Performed by: SURGERY

## 2017-10-19 PROCEDURE — 99999 PR PBB SHADOW E&M-EST. PATIENT-LVL III: CPT | Mod: PBBFAC,,, | Performed by: SURGERY

## 2017-10-19 NOTE — Clinical Note
"Joshua, she did great and I got all the grossly evident disease out. Do you think it might be worth repeating the radioactive MIBG ablative dose again while she currently has "low volume" disease? Thanks, Sandoval"

## 2017-10-21 NOTE — PROGRESS NOTES
"Doing well s/p surgery on 10-2-17.  Pathology revealed:  SPECIMEN  1) Right retrocaval lymph node, rule out metastatic pheochromocytoma.  2) Right recurrent pheochromocytoma.  Supplemental Diagnosis  1, 2. THE RESULTS OF ADDITIONAL IMMUNOSTAINS ARE AS FOLLOWS: CHROMOGRANIN IS STRONGLY  POSITIVE. S100 IS FOCALLY POSITIVE. PANCYTOKERATIN (AE1/AE3) IS NEGATIVE. THE POSITIVE AND  NEGATIVE CONTROLS STAIN APPROPRIATELY.THESE RESULTS SUPPORT THE DIAGNOSIS OF  METASTATIC/RECURRENT PHEOCHROMOCYTOMA.    Pathology results as expected.  No gross tumor left behind intra-op.  Completely debulked of gross disease intra-op.  Pt has done very well post-op.  No signs of wound infection.  Staples removed.  Abdominal exam benign.  She hs no subjective complaints.    Pt remains on steroids.  Hopefully antihypertensive medical regimen can be tapered.  Will re-present at endocrine conference.  Perhaps would be reasonable to consider giving another radioactive MIBG ablative dose while she has clinically no residual gross disease and theoretically has "low volume" disease.  Will discuss with Dr. Burton.  "

## 2017-11-09 ENCOUNTER — OFFICE VISIT (OUTPATIENT)
Dept: ENDOCRINOLOGY | Facility: CLINIC | Age: 72
End: 2017-11-09
Payer: MEDICARE

## 2017-11-09 VITALS
WEIGHT: 199.94 LBS | DIASTOLIC BLOOD PRESSURE: 64 MMHG | HEIGHT: 63 IN | HEART RATE: 62 BPM | BODY MASS INDEX: 35.43 KG/M2 | SYSTOLIC BLOOD PRESSURE: 134 MMHG

## 2017-11-09 DIAGNOSIS — C74.91 MALIGNANT PHEOCHROMOCYTOMA OF RIGHT ADRENAL GLAND: Primary | ICD-10-CM

## 2017-11-09 DIAGNOSIS — E11.9 CONTROLLED TYPE 2 DIABETES MELLITUS WITHOUT COMPLICATION, WITH LONG-TERM CURRENT USE OF INSULIN: ICD-10-CM

## 2017-11-09 DIAGNOSIS — E89.6 H/O TOTAL ADRENALECTOMY: ICD-10-CM

## 2017-11-09 DIAGNOSIS — E55.9 VITAMIN D DEFICIENCY: ICD-10-CM

## 2017-11-09 DIAGNOSIS — Z79.4 CONTROLLED TYPE 2 DIABETES MELLITUS WITHOUT COMPLICATION, WITH LONG-TERM CURRENT USE OF INSULIN: ICD-10-CM

## 2017-11-09 DIAGNOSIS — E27.40 HYPOADRENALISM: ICD-10-CM

## 2017-11-09 PROCEDURE — 99214 OFFICE O/P EST MOD 30 MIN: CPT | Mod: GC,S$GLB,, | Performed by: INTERNAL MEDICINE

## 2017-11-09 PROCEDURE — 99999 PR PBB SHADOW E&M-EST. PATIENT-LVL IV: CPT | Mod: PBBFAC,GC,, | Performed by: INTERNAL MEDICINE

## 2017-11-09 RX ORDER — SYRINGE, DISPOSABLE, 3 ML
1 SYRINGE, EMPTY DISPOSABLE MISCELLANEOUS ONCE
Refills: 0 | COMMUNITY
Start: 2017-11-09 | End: 2017-11-09

## 2017-11-09 RX ORDER — FLUDROCORTISONE ACETATE 0.1 MG/1
100 TABLET ORAL DAILY
Qty: 90 TABLET | Refills: 3 | Status: SHIPPED | OUTPATIENT
Start: 2017-11-09 | End: 2017-11-15

## 2017-11-09 RX ORDER — DEXAMETHASONE SODIUM PHOSPHATE 4 MG/ML
4 INJECTION, SOLUTION INTRA-ARTICULAR; INTRALESIONAL; INTRAMUSCULAR; INTRAVENOUS; SOFT TISSUE ONCE
Qty: 1 ML | Refills: 0 | Status: SHIPPED | OUTPATIENT
Start: 2017-11-09 | End: 2017-11-09

## 2017-11-09 NOTE — PROGRESS NOTES
"Subjective:      Patient ID: Hailey Sawant is a 72 y.o. female.    Chief Complaint: malignant pheochromocytoma     is presenting for follow up of malignant pheochromocytoma    Had recent surgery in Oct 2017 with Dr. Castillo to remove residual right pheochromocytoma.  Pt was treated with I-131 labeled MIBG at [place] on 3/16/2016 and on 2016, . She received 200 mCi of I 131 x 3. Thyroid was blocked with SSKI.     s/p bilat adrenalectomy  and found to have pheochromocytoma.  Current meds: hydrocortisone 20mg qam, 10qpm, not taking fludrocortisone  For bp she is currently on losartan 50mg and nifedical 60mg daily  Previously on alpha blocker (doxazosin) in  but now off.    Denies palpitations, flushing, severe bp lability    Checks BP daily at home with readings averagin-130's systolic /60-80's diastolic    DM diagnosed in . On metformin 500mg BID. Denies complications. a1c 5.4 from 10/2017    History of Vit D deficiency    Review of Systems   Constitutional: Negative for unexpected weight change.   Eyes: Negative for visual disturbance.   Respiratory: Negative for shortness of breath.    Cardiovascular: Negative for chest pain.   Gastrointestinal: Negative for abdominal pain.   Musculoskeletal: Negative for arthralgias.   Skin: Negative for wound.   Neurological: Negative for headaches.   Hematological: Does not bruise/bleed easily.   Psychiatric/Behavioral: Negative for sleep disturbance.       Objective:     /64   Pulse 62   Ht 5' 3" (1.6 m)   Wt 90.7 kg (199 lb 15.3 oz)   BMI 35.42 kg/m²      Physical Exam   Constitutional: She appears well-developed and well-nourished.   In wheelchair but is ambulatory   HENT:   Head: Normocephalic and atraumatic.   Neck: Normal range of motion. No thyromegaly present.   Cardiovascular: Normal rate and regular rhythm.    Pulmonary/Chest: Effort normal and breath sounds normal.   Abdominal: Soft. Bowel sounds are normal. "   Musculoskeletal: Normal range of motion. She exhibits no edema.   Skin: Skin is warm and dry.   Psychiatric: She has a normal mood and affect. Her behavior is normal.   Vitals reviewed.      Assessment:     1. Malignant pheochromocytoma of right adrenal gland    2. Hypoadrenalism    3. H/O total adrenalectomy    4. Controlled type 2 diabetes mellitus without complication, with long-term current use of insulin    5. Vitamin D deficiency        Plan:   1.Malignant pheo with recent repeat adrenal surgery in 10/2017 with Dr. Castillo. Recheck plasma metanephrines. Continue losartan 50mg and nifedical 60mg daily as bp is controlled on these agents. If metanephrines increase will need to consider repeat MIBG imaging and possible re-treatment with I-131.  2.On HC 20mg qam, 10mg evening. Likely obtaining some mineralocorticoids from HC, however previously on florinef. Will recheck bmp and renin level today and have re-ordered florinef 0.1mg daily. Have ordered emergency dexamethasone. Discussed sick day precautions in doubling/tripling HC dose.  3. Management as above  4. Continue metformin 500mg BID. A1C at goal.  5. Recheck vit d level      Return in about 6 months (around 5/9/2018).    Discussed with Dr. Harley    [phone] home  [phone] tenzin Taylor (sister)      Jared Vallejo MD

## 2017-11-09 NOTE — PROGRESS NOTES
I have reviewed and concur with the fellow's history, physical, assessment, and plan.  I have personally interviewed the patient and all questions were answered.

## 2017-11-14 ENCOUNTER — TELEPHONE (OUTPATIENT)
Dept: ENDOCRINOLOGY | Facility: CLINIC | Age: 72
End: 2017-11-14

## 2017-11-14 NOTE — TELEPHONE ENCOUNTER
----- Message from Deborah Cox sent at 11/14/2017 10:45 AM CST -----  Contact: Self [phone]  ..Medication question / problems.  PT states fludrocortisone makes her dizzy. She is requesting an alternative.

## 2017-11-15 ENCOUNTER — TELEPHONE (OUTPATIENT)
Dept: ENDOCRINOLOGY | Facility: CLINIC | Age: 72
End: 2017-11-15

## 2017-11-15 NOTE — TELEPHONE ENCOUNTER
Spoke with patient regarding flornief. Reports severe malaise and dizziness with medication. Recommended that she hold medication. Reports that [facility] pharmacy is having difficulty in filling dexamethasone. Will look into that issue.

## 2017-11-15 NOTE — TELEPHONE ENCOUNTER
----- Message from Laxmi Emerson sent at 11/15/2017  1:59 PM CST -----  Contact: pt   Pt in clinic on 11/9     Going to send new med to mail order pharmacy    -  Pharmacy received  Needed  clarification   Please call  [facility]    Pt has not received the new med     [facility]         ___________________________________________________________________________      Also    Pt called yesterday   -  Stopped taken med    No one has called her back-  See below     ..Medication question / problems.  PT states fludrocortisone makes her dizzy. She is requesting an alternative.     Please call  Pt    [phone]

## 2017-11-16 ENCOUNTER — TELEPHONE (OUTPATIENT)
Dept: ENDOCRINOLOGY | Facility: CLINIC | Age: 72
End: 2017-11-16

## 2017-11-16 RX ORDER — DEXAMETHASONE SODIUM PHOSPHATE 4 MG/ML
4 INJECTION, SOLUTION INTRA-ARTICULAR; INTRALESIONAL; INTRAMUSCULAR; INTRAVENOUS; SOFT TISSUE ONCE
Qty: 1 ML | Refills: 0 | Status: SHIPPED | OUTPATIENT
Start: 2017-11-16 | End: 2017-11-16

## 2017-11-16 RX ORDER — SYRINGE, DISPOSABLE, 3 ML
1 SYRINGE, EMPTY DISPOSABLE MISCELLANEOUS ONCE
Qty: 1 EACH | Refills: 0 | Status: SHIPPED | OUTPATIENT
Start: 2017-11-16 | End: 2017-11-16

## 2017-11-16 NOTE — TELEPHONE ENCOUNTER
Called [facility] pharmacy and remedied the problem.   It was regarding the needles and syringes that go with dexamethasone

## 2017-11-16 NOTE — TELEPHONE ENCOUNTER
Resent Rx for dexamethasone emergency injection with syringe and needle to [facility] pharmacy.

## 2017-11-16 NOTE — TELEPHONE ENCOUNTER
I will call patients pharmacy to see what the issue is but I don't see it on her med card. Can you add it and then I will call them

## 2017-12-11 RX ORDER — HYDROCORTISONE 20 MG/1
TABLET ORAL
Qty: 135 TABLET | Refills: 6 | Status: SHIPPED | OUTPATIENT
Start: 2017-12-11 | End: 2019-01-02 | Stop reason: SDUPTHER

## 2017-12-26 ENCOUNTER — TELEPHONE (OUTPATIENT)
Dept: ENDOCRINOLOGY | Facility: CLINIC | Age: 72
End: 2017-12-26

## 2017-12-26 ENCOUNTER — TELEPHONE (OUTPATIENT)
Dept: SURGERY | Facility: CLINIC | Age: 72
End: 2017-12-26

## 2017-12-26 NOTE — TELEPHONE ENCOUNTER
----- Message from Natalie Trejo sent at 12/26/2017  1:40 PM CST -----  Contact: Pt  Pt is requesting a callback from nurse about numbness where she had surgery    Says her right side has no feeling    Pt can be reached at [phone]    Thanks

## 2017-12-28 ENCOUNTER — TELEPHONE (OUTPATIENT)
Dept: ENDOCRINOLOGY | Facility: CLINIC | Age: 72
End: 2017-12-28

## 2017-12-28 ENCOUNTER — TELEPHONE (OUTPATIENT)
Dept: SURGERY | Facility: CLINIC | Age: 72
End: 2017-12-28

## 2017-12-28 NOTE — TELEPHONE ENCOUNTER
----- Message from Kole Barreto sent at 12/28/2017 10:06 AM CST -----  Pt said her incision is numb. Pt said its not red or anything but she has no feeling at the incision site. Please call pt regarding this at [phone]

## 2017-12-28 NOTE — TELEPHONE ENCOUNTER
Explained pt pt that the numbness was normal and if she felt she wanted to been seen she could schedule an appointment, she stated she wanted to wait to see if thing improved and will call when she feel that see need to be seen

## 2017-12-28 NOTE — TELEPHONE ENCOUNTER
----- Message from Deborah Cox sent at 12/28/2017 10:03 AM CST -----  Contact: Self [phone]  PT called for results from labs that were done in November.

## 2018-01-22 DIAGNOSIS — C74.91 MALIGNANT PHEOCHROMOCYTOMA OF RIGHT ADRENAL GLAND: Primary | ICD-10-CM

## 2018-01-22 RX ORDER — NEEDLES, DISPOSABLE 25GX5/8"
NEEDLE, DISPOSABLE MISCELLANEOUS
Qty: 100 EACH | Refills: 0 | Status: ON HOLD | OUTPATIENT
Start: 2018-01-22 | End: 2018-07-18 | Stop reason: HOSPADM

## 2018-01-23 NOTE — TELEPHONE ENCOUNTER
Attempted to call patient. Patient discussed in tumor board last week and plan is to repeat MIBG imaging to see if there is any radiographic evidence of pheochromocytoma.

## 2018-01-24 ENCOUNTER — TELEPHONE (OUTPATIENT)
Dept: SURGERY | Facility: CLINIC | Age: 73
End: 2018-01-24

## 2018-01-24 NOTE — TELEPHONE ENCOUNTER
----- Message from Osiel Shepard sent at 1/24/2018  3:14 PM CST -----  Patient states that her stomach is swollen and it looks like she is six months pregnant;pt also states that when she lays on her side it feels as if something is sliding across her stomach and there is a little pain;pt would like to be seen ASAP//please call back at [phone]//thank you

## 2018-01-24 NOTE — TELEPHONE ENCOUNTER
Returned call and schedule pt to see Dr Birmingham  Tomorrow, she stayed she does not feel that she needs to the ER

## 2018-01-25 ENCOUNTER — OFFICE VISIT (OUTPATIENT)
Dept: SURGERY | Facility: CLINIC | Age: 73
End: 2018-01-25
Payer: MEDICARE

## 2018-01-25 ENCOUNTER — HOSPITAL ENCOUNTER (OUTPATIENT)
Dept: RADIOLOGY | Facility: HOSPITAL | Age: 73
Discharge: HOME | End: 2018-01-25
Attending: SURGERY
Payer: MEDICARE

## 2018-01-25 VITALS
DIASTOLIC BLOOD PRESSURE: 65 MMHG | HEIGHT: 63 IN | SYSTOLIC BLOOD PRESSURE: 144 MMHG | WEIGHT: 202.38 LBS | HEART RATE: 60 BPM | TEMPERATURE: 98 F | BODY MASS INDEX: 35.86 KG/M2

## 2018-01-25 DIAGNOSIS — C74.10 PHEOCHROMOBLASTOMA, UNSPECIFIED LATERALITY: ICD-10-CM

## 2018-01-25 DIAGNOSIS — C74.10 PHEOCHROMOBLASTOMA, UNSPECIFIED LATERALITY: Primary | ICD-10-CM

## 2018-01-25 PROCEDURE — 99213 OFFICE O/P EST LOW 20 MIN: CPT | Mod: S$GLB,,, | Performed by: SURGERY

## 2018-01-25 PROCEDURE — 99999 PR PBB SHADOW E&M-EST. PATIENT-LVL III: CPT | Mod: PBBFAC,,, | Performed by: SURGERY

## 2018-01-25 PROCEDURE — 25500020 PHARM REV CODE 255: Performed by: SURGERY

## 2018-01-25 PROCEDURE — 74177 CT ABD & PELVIS W/CONTRAST: CPT | Mod: 26,,, | Performed by: RADIOLOGY

## 2018-01-25 PROCEDURE — 74177 CT ABD & PELVIS W/CONTRAST: CPT | Mod: TC

## 2018-01-25 RX ORDER — SYRINGE WITH NEEDLE, 1 ML 25GX5/8"
SYRINGE, EMPTY DISPOSABLE MISCELLANEOUS
Status: ON HOLD | COMMUNITY
Start: 2017-11-16 | End: 2018-07-18 | Stop reason: HOSPADM

## 2018-01-25 RX ORDER — NIFEDIPINE 90 MG/1
90 TABLET, EXTENDED RELEASE ORAL DAILY
COMMUNITY
Start: 2017-11-24

## 2018-01-25 RX ADMIN — IOHEXOL 15 ML: 350 INJECTION, SOLUTION INTRAVENOUS at 04:01

## 2018-01-25 RX ADMIN — IOHEXOL 100 ML: 350 INJECTION, SOLUTION INTRAVENOUS at 06:01

## 2018-01-25 RX ADMIN — IOHEXOL 15 ML: 350 INJECTION, SOLUTION INTRAVENOUS at 05:01

## 2018-01-25 NOTE — Clinical Note
Please get nuc med MIBG scan on patient and have her follow up with Dr. Joshua Burton in Endocrinology.

## 2018-01-28 NOTE — PROGRESS NOTES
"Pt with 3 days of abdominal pain which she describes as constant and like a "pin-sticking".  Describes pain as diffuse with subjective abdominal swelling.  Points toward RUQ if she is askked to try to best localize site of pain.    Denies any N/V.  Had BM today but none in the 10-14 days prior.  No fevers, chills, night sweats.  States that BM today did not change pain.  Denies any alleviating or aggravating factors.  No change with position or food intake.    Abdominal exam is benign.  No clinical evidence of hernia in the RUQ.  Some mild abdominal tenderness but no peritoneal or localizing signs.    CBC, CMP, amylase and lipase levels ordered toady along with CT scan of abdomen and pelvis.    Labs were essentially WNL with normal WBC and pancreatic enzymes.  Otherwise chronic stable lab values.  CT scan also with no focal findings.    Pt recently presented at multi-disciplinary endocrine conference.  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  Pt encouraged to f/u with Dr. Joshua Burton again.    Pt called with essentially normal labs and CT scan results which revealed nothing to potentially reveal an etiology or source to her 3 days of abdominal pain.  When I called her 1 day after clinic encounter she said her abdominal pain was somewhat improved.  May be related to her 10-14 day hx of constipation prior to clinic visit.  "

## 2018-01-29 DIAGNOSIS — C74.90 MALIGNANT NEOPLASM OF ADRENAL GLAND, UNSPECIFIED LATERALITY: Primary | ICD-10-CM

## 2018-01-29 LAB
CREAT SERPL-MCNC: 0.8 MG/DL (ref 0.5–1.4)
SAMPLE: NORMAL

## 2018-02-01 ENCOUNTER — HOSPITAL ENCOUNTER (OUTPATIENT)
Dept: RADIOLOGY | Facility: HOSPITAL | Age: 73
Discharge: HOME | End: 2018-02-01
Attending: SURGERY
Payer: MEDICARE

## 2018-02-01 DIAGNOSIS — C74.90 MALIGNANT NEOPLASM OF ADRENAL GLAND, UNSPECIFIED LATERALITY: ICD-10-CM

## 2018-02-01 PROCEDURE — 78804 RP LOCLZJ TUM WHBDY 2+D IMG: CPT | Mod: 26,,, | Performed by: RADIOLOGY

## 2018-02-02 ENCOUNTER — HOSPITAL ENCOUNTER (OUTPATIENT)
Dept: RADIOLOGY | Facility: HOSPITAL | Age: 73
Discharge: HOME | End: 2018-02-02
Attending: SURGERY
Payer: MEDICARE

## 2018-02-02 PROCEDURE — A4641 RADIOPHARM DX AGENT NOC: HCPCS

## 2018-02-26 RX ORDER — METFORMIN HYDROCHLORIDE 500 MG/1
TABLET, EXTENDED RELEASE ORAL
Qty: 180 TABLET | Refills: 3 | Status: SHIPPED | OUTPATIENT
Start: 2018-02-26 | End: 2022-11-11 | Stop reason: SDUPTHER

## 2018-03-05 ENCOUNTER — OFFICE VISIT (OUTPATIENT)
Dept: ENDOCRINOLOGY | Facility: CLINIC | Age: 73
End: 2018-03-05
Payer: MEDICARE

## 2018-03-05 VITALS
DIASTOLIC BLOOD PRESSURE: 70 MMHG | HEART RATE: 58 BPM | HEIGHT: 63 IN | SYSTOLIC BLOOD PRESSURE: 126 MMHG | WEIGHT: 199.31 LBS | RESPIRATION RATE: 16 BRPM | BODY MASS INDEX: 35.32 KG/M2

## 2018-03-05 DIAGNOSIS — I15.2 HYPERTENSION DUE TO ENDOCRINE DISORDER: ICD-10-CM

## 2018-03-05 DIAGNOSIS — M54.40 BILATERAL LOW BACK PAIN WITH SCIATICA, SCIATICA LATERALITY UNSPECIFIED, UNSPECIFIED CHRONICITY: ICD-10-CM

## 2018-03-05 DIAGNOSIS — E27.40 HYPOADRENALISM: Primary | ICD-10-CM

## 2018-03-05 DIAGNOSIS — C74.91 MALIGNANT PHEOCHROMOCYTOMA OF RIGHT ADRENAL GLAND: ICD-10-CM

## 2018-03-05 DIAGNOSIS — D35.01 PHEOCHROMOCYTOMA OF RIGHT ADRENAL GLAND: ICD-10-CM

## 2018-03-05 PROBLEM — M54.50 LOW BACK PAIN: Status: ACTIVE | Noted: 2018-03-05

## 2018-03-05 PROCEDURE — 99214 OFFICE O/P EST MOD 30 MIN: CPT | Mod: S$GLB,,, | Performed by: INTERNAL MEDICINE

## 2018-03-05 PROCEDURE — 99999 PR PBB SHADOW E&M-EST. PATIENT-LVL V: CPT | Mod: PBBFAC,,, | Performed by: INTERNAL MEDICINE

## 2018-03-05 PROCEDURE — 3078F DIAST BP <80 MM HG: CPT | Mod: S$GLB,,, | Performed by: INTERNAL MEDICINE

## 2018-03-05 PROCEDURE — 3074F SYST BP LT 130 MM HG: CPT | Mod: S$GLB,,, | Performed by: INTERNAL MEDICINE

## 2018-03-05 NOTE — PROGRESS NOTES
"Subjective:      Patient ID: Hailey Sawant is a 72 y.o. female.    Chief Complaint: malignant pheochromocytoma     is presenting for follow up of malignant pheochromocytoma    Had recent surgery in Oct 2017 with Dr. Castillo to remove residual right pheochromocytoma.  Pt was treated with I-131 labeled MIBG at [place] on 3/16/2016 and on 2016, . She received 200 mCi of I 131 x 3. Thyroid was blocked with SSKI.     s/p bilat adrenalectomy  and found to have pheochromocytoma.  Current meds: hydrocortisone 20mg qam, 10qpm, not taking fludrocortisone  For bp she is currently on losartan 50mg and nifedical 60mg daily  Previously on alpha blocker (doxazosin) in  but now off.    Denies palpitations, flushing, severe bp lability    Checks BP daily at home with readings averagin-130's systolic /60-80's diastolic    DM diagnosed in . On metformin 500mg BID. Denies complications. a1c 5.4 from 10/2017    History of Vit D deficiency  Pt has lost 7 pounds since 2017.  Review of Systems   Constitutional: Negative for unexpected weight change.   Eyes: Negative for visual disturbance.   Respiratory: Negative for shortness of breath.    Cardiovascular: Negative for chest pain.   Gastrointestinal: Negative for abdominal pain.        Anorexia   Musculoskeletal: Positive for back pain and gait problem. Negative for arthralgias.   Skin: Negative for wound.   Neurological: Positive for light-headedness and headaches.   Hematological: Does not bruise/bleed easily.   Psychiatric/Behavioral: Negative for sleep disturbance.       Objective:     /70 (BP Location: Left arm, Patient Position: Sitting, BP Method: Medium (Manual))   Pulse (!) 58   Resp 16   Ht 5' 3" (1.6 m)   Wt 90.4 kg (199 lb 4.7 oz)   BMI 35.30 kg/m²      Physical Exam   Constitutional: She appears well-developed and well-nourished.   In wheelchair but is ambulatory   HENT:   Head: Normocephalic and atraumatic. "   Neck: Normal range of motion. No thyromegaly present.   Cardiovascular: Normal rate and regular rhythm.    Pulmonary/Chest: Effort normal and breath sounds normal.   Abdominal: Soft. Bowel sounds are normal.   Musculoskeletal: Normal range of motion. She exhibits no edema.   Skin: Skin is warm and dry.   Psychiatric: She has a normal mood and affect. Her behavior is normal.   Vitals reviewed.      Assessment:     No diagnosis found.    Plan:   1.Malignant pheo with recent repeat adrenal surgery in 10/2017 with Dr. Castillo. Recheck plasma metanephrines. Continue losartan 50mg and nifedical 60mg daily as bp is controlled on these agents. If metanephrines increase will need to consider repeat MIBG imaging and possible re-treatment with I-131.  2.On HC 20mg qam, 10mg evening. Likely obtaining some mineralocorticoids from HC, however previously on florinef. Will recheck bmp and renin level today and have re-ordered florinef 0.1mg daily. Have ordered emergency dexamethasone. Discussed sick day precautions in doubling/tripling HC dose.  3. Management as above  4. Continue metformin 500mg BID. A1C at goal.  5. Recheck vit d level  6 Repeat MIBG in 3 months  7. See pain management for low back pain which is chronic and limiting activity  8. Plasma metanephrine.

## 2018-03-21 ENCOUNTER — TELEPHONE (OUTPATIENT)
Dept: ENDOCRINOLOGY | Facility: CLINIC | Age: 73
End: 2018-03-21

## 2018-03-21 NOTE — TELEPHONE ENCOUNTER
Spoke with Lauren in the send out lab the METANEPHRINES, PLASMA FREE was discarded because there was not enough sample drawn.  Please advise

## 2018-03-21 NOTE — TELEPHONE ENCOUNTER
----- Message from Rafaela Carranza sent at 3/21/2018  5:47 AM CDT -----  There was an issue with a test ordered on this patient from 03/05/2018.  Please call the Sendout lab as soon as possible at [phone] ext. [phone] for complete details.  Anyone in the Sendout lab will be able to assist you with further information.

## 2018-05-16 ENCOUNTER — HOSPITAL ENCOUNTER (OUTPATIENT)
Dept: RADIOLOGY | Facility: HOSPITAL | Age: 73
Discharge: HOME | End: 2018-05-16
Attending: INTERNAL MEDICINE
Payer: MEDICARE

## 2018-05-16 DIAGNOSIS — C74.91 MALIGNANT PHEOCHROMOCYTOMA OF RIGHT ADRENAL GLAND: ICD-10-CM

## 2018-05-16 PROCEDURE — 78804 RP LOCLZJ TUM WHBDY 2+D IMG: CPT | Mod: 26,,, | Performed by: RADIOLOGY

## 2018-05-17 ENCOUNTER — HOSPITAL ENCOUNTER (OUTPATIENT)
Dept: RADIOLOGY | Facility: HOSPITAL | Age: 73
Discharge: HOME | End: 2018-05-17
Attending: INTERNAL MEDICINE
Payer: MEDICARE

## 2018-05-17 PROCEDURE — A4641 RADIOPHARM DX AGENT NOC: HCPCS

## 2018-05-22 ENCOUNTER — OFFICE VISIT (OUTPATIENT)
Dept: ENDOCRINOLOGY | Facility: CLINIC | Age: 73
End: 2018-05-22
Payer: MEDICARE

## 2018-05-22 VITALS
HEART RATE: 69 BPM | SYSTOLIC BLOOD PRESSURE: 110 MMHG | WEIGHT: 200.38 LBS | BODY MASS INDEX: 34.21 KG/M2 | HEIGHT: 64 IN | DIASTOLIC BLOOD PRESSURE: 60 MMHG

## 2018-05-22 DIAGNOSIS — M54.40 BILATERAL LOW BACK PAIN WITH SCIATICA, SCIATICA LATERALITY UNSPECIFIED, UNSPECIFIED CHRONICITY: ICD-10-CM

## 2018-05-22 DIAGNOSIS — E89.6 H/O TOTAL ADRENALECTOMY: ICD-10-CM

## 2018-05-22 DIAGNOSIS — D35.01 PHEOCHROMOCYTOMA OF RIGHT ADRENAL GLAND: Primary | ICD-10-CM

## 2018-05-22 PROCEDURE — 3074F SYST BP LT 130 MM HG: CPT | Mod: CPTII,S$GLB,, | Performed by: INTERNAL MEDICINE

## 2018-05-22 PROCEDURE — 99214 OFFICE O/P EST MOD 30 MIN: CPT | Mod: S$GLB,,, | Performed by: INTERNAL MEDICINE

## 2018-05-22 PROCEDURE — 99999 PR PBB SHADOW E&M-EST. PATIENT-LVL III: CPT | Mod: PBBFAC,,, | Performed by: INTERNAL MEDICINE

## 2018-05-22 PROCEDURE — 3078F DIAST BP <80 MM HG: CPT | Mod: CPTII,S$GLB,, | Performed by: INTERNAL MEDICINE

## 2018-05-22 NOTE — Clinical Note
Please review and let me know if there is anything else that needs to be done should f/u with Joshua in 3 months.

## 2018-05-22 NOTE — PROGRESS NOTES
Subjective:     Patient ID: Hailey Sawant is a 72 y.o. female.    Chief Complaint: No chief complaint on file.    HPI:   Ms. Sawant is a 72 y.o. female who is here for a follow-up visit for evaluation malignant pheochromocytoma.     Reports back pain that is chronic, reported at last visit as well. Reports pain has worsened in the past few months. The pain is so significant she is unable to get out of bed or walk around in her house.     Denies palpitations, flushing and severe bp lability. Occasional nausea and mild headaches, headaches are a little more frequent.  Checks blood pressure daily with readings averagin-130's systolic /60-80's diastolic    Review of her chart:   Had recent surgery in Oct 2017 with Dr. Castillo to remove residual right pheochromocytoma.  Pt was treated with I-131 labeled MIBG at [place] on 3/16/2016 and on 2016, 2016. She received 200 mCi of I 131 x 3. Thyroid was blocked with SSKI.    s/p bilat adrenalectomy  and found to have pheochromocytoma.    Labs done 2017 - mildly abnl plasma metanephrines.   CT scan 2018 - no tumor   MIBG  and 2018 - faint tracer uptake above right kidney.     Current meds:  Hydrocortisone 20mg qam, 10qpm, not taking fludrocortisone  Losartan 50mg and nifedipine 60mg daily    Metformin for diabetes diagnosed . Denies diarrhea.   Lasix for lower extremity swelling - twice a week.      Review of Systems   Constitutional: Negative for chills and fever.   HENT: Negative for congestion and sinus pressure.    Eyes: Negative for visual disturbance.   Respiratory: Negative for chest tightness and shortness of breath.    Cardiovascular: Negative for chest pain, palpitations and leg swelling.   Gastrointestinal: Negative for abdominal pain and vomiting.   Genitourinary: Negative for dysuria.   Musculoskeletal: Negative for arthralgias.   Skin: Negative for rash.   Neurological: Negative for weakness.   Hematological: Does not  bruise/bleed easily.   Psychiatric/Behavioral: Negative for sleep disturbance.        Objective:     Physical Exam    Results for MIRTHA ENGLE (MRN [id number]) as of 5/22/2018 13:52   Ref. Range 11/9/2017 12:48   Metanephrine, Free Latest Ref Range: < OR = 57 pg/mL SEE NOTE   Normetanephrine, Free Latest Ref Range: < OR = 148 pg/mL 333 (H)   Metanephrine, Total, Plasma Latest Ref Range: < OR = 205 pg/mL 333 (H)     NM tumor localization:  Unchanged focus of mild increased radiotracer uptake in the region of the upper pole of the right kidney best seen on 4 hr images and less conspicuous on the 24 hour images (in part related to relatively low counts on 24 hr images).  Overall appearance is similar to prior examination on 02/01/2018.    No other areas of abnormal foci of tracer uptake are seen.   Impression       Mildly MIBG avid neoplasm in the region of the upper pole of the right kidney.       Assessment/Plan:     1. Pheochromocytoma of right adrenal gland    - MRI Lumbar Spine W WO Contrast; Future  - Ambulatory consult to Neurosurgery  - METANEPHRINES, PLASMA FREE; Future  - Creatinine, serum; Future    2. H/O total adrenalectomy    - MRI Lumbar Spine W WO Contrast; Future  - Ambulatory consult to Neurosurgery  - METANEPHRINES, PLASMA FREE; Future  - Creatinine, serum; Future    3. Bilateral low back pain with sciatica, sciatica laterality unspecified, unspecified chronicity    - MRI Lumbar Spine W WO Contrast; Future  - Ambulatory consult to Neurosurgery  - METANEPHRINES, PLASMA FREE; Future  - Creatinine, serum; Future

## 2018-05-25 ENCOUNTER — TELEPHONE (OUTPATIENT)
Dept: ENDOCRINOLOGY | Facility: CLINIC | Age: 73
End: 2018-05-25

## 2018-05-25 NOTE — TELEPHONE ENCOUNTER
----- Message from Swathi Ferro sent at 5/25/2018 12:09 PM CDT -----  Contact: Self  Pt is calling to speak with Staff because she will be having an MRI on 5/29/18, ordered by Dr Lopez and would like Dr. Lopez to contact her for the results afterwards.    She can be reached at [phone].    Thank you.

## 2018-05-29 ENCOUNTER — HOSPITAL ENCOUNTER (OUTPATIENT)
Dept: RADIOLOGY | Facility: HOSPITAL | Age: 73
Discharge: HOME | End: 2018-05-29
Attending: INTERNAL MEDICINE
Payer: MEDICARE

## 2018-05-29 ENCOUNTER — TELEPHONE (OUTPATIENT)
Dept: ENDOCRINOLOGY | Facility: CLINIC | Age: 73
End: 2018-05-29

## 2018-05-29 DIAGNOSIS — E89.6 H/O TOTAL ADRENALECTOMY: ICD-10-CM

## 2018-05-29 DIAGNOSIS — M54.40 BILATERAL LOW BACK PAIN WITH SCIATICA, SCIATICA LATERALITY UNSPECIFIED, UNSPECIFIED CHRONICITY: ICD-10-CM

## 2018-05-29 DIAGNOSIS — D35.01 PHEOCHROMOCYTOMA OF RIGHT ADRENAL GLAND: ICD-10-CM

## 2018-05-29 PROCEDURE — 72158 MRI LUMBAR SPINE W/O & W/DYE: CPT | Mod: 26,,, | Performed by: RADIOLOGY

## 2018-05-29 PROCEDURE — 72158 MRI LUMBAR SPINE W/O & W/DYE: CPT | Mod: TC

## 2018-05-29 PROCEDURE — A9585 GADOBUTROL INJECTION: HCPCS | Performed by: INTERNAL MEDICINE

## 2018-05-29 PROCEDURE — 25500020 PHARM REV CODE 255: Performed by: INTERNAL MEDICINE

## 2018-05-29 RX ORDER — GADOBUTROL 604.72 MG/ML
10 INJECTION INTRAVENOUS
Status: COMPLETED | OUTPATIENT
Start: 2018-05-29 | End: 2018-05-29

## 2018-05-29 RX ADMIN — GADOBUTROL 10 ML: 604.72 INJECTION INTRAVENOUS at 09:05

## 2018-05-30 NOTE — TELEPHONE ENCOUNTER
Please call patient   1) Neurosurgery appointment was not scheduled -- I ordered this at her last appointment with me --> she needs this she has significant back pain  2) MRI no fracture

## 2018-06-07 ENCOUNTER — HOSPITAL ENCOUNTER (OUTPATIENT)
Dept: RADIOLOGY | Facility: HOSPITAL | Age: 73
Discharge: HOME | End: 2018-06-07
Attending: NEUROLOGICAL SURGERY
Payer: MEDICARE

## 2018-06-07 ENCOUNTER — OFFICE VISIT (OUTPATIENT)
Dept: NEUROSURGERY | Facility: CLINIC | Age: 73
End: 2018-06-07
Payer: MEDICARE

## 2018-06-07 VITALS
DIASTOLIC BLOOD PRESSURE: 69 MMHG | SYSTOLIC BLOOD PRESSURE: 129 MMHG | WEIGHT: 198.88 LBS | TEMPERATURE: 98 F | BODY MASS INDEX: 34.14 KG/M2 | HEART RATE: 71 BPM

## 2018-06-07 DIAGNOSIS — G89.29 CHRONIC SI JOINT PAIN: ICD-10-CM

## 2018-06-07 DIAGNOSIS — M47.816 LUMBAR SPONDYLOSIS: Primary | ICD-10-CM

## 2018-06-07 DIAGNOSIS — I15.2 HYPERTENSION DUE TO ENDOCRINE DISORDER: ICD-10-CM

## 2018-06-07 DIAGNOSIS — M53.3 CHRONIC SI JOINT PAIN: ICD-10-CM

## 2018-06-07 DIAGNOSIS — M54.50 BILATERAL LOW BACK PAIN WITHOUT SCIATICA, UNSPECIFIED CHRONICITY: ICD-10-CM

## 2018-06-07 DIAGNOSIS — G89.29 CHRONIC SI JOINT PAIN: Primary | ICD-10-CM

## 2018-06-07 DIAGNOSIS — M51.9 INTERVERTEBRAL DISK DISEASE: ICD-10-CM

## 2018-06-07 DIAGNOSIS — M53.3 CHRONIC SI JOINT PAIN: Primary | ICD-10-CM

## 2018-06-07 PROCEDURE — 72114 X-RAY EXAM L-S SPINE BENDING: CPT | Mod: 26,,, | Performed by: RADIOLOGY

## 2018-06-07 PROCEDURE — 3078F DIAST BP <80 MM HG: CPT | Mod: CPTII,S$GLB,, | Performed by: NEUROLOGICAL SURGERY

## 2018-06-07 PROCEDURE — 99999 PR PBB SHADOW E&M-EST. PATIENT-LVL III: CPT | Mod: PBBFAC,,, | Performed by: NEUROLOGICAL SURGERY

## 2018-06-07 PROCEDURE — 99204 OFFICE O/P NEW MOD 45 MIN: CPT | Mod: S$GLB,,, | Performed by: NEUROLOGICAL SURGERY

## 2018-06-07 PROCEDURE — 3074F SYST BP LT 130 MM HG: CPT | Mod: CPTII,S$GLB,, | Performed by: NEUROLOGICAL SURGERY

## 2018-06-07 PROCEDURE — 72114 X-RAY EXAM L-S SPINE BENDING: CPT | Mod: TC

## 2018-06-07 RX ORDER — MOMETASONE FUROATE 1 MG/G
CREAM TOPICAL
Status: ON HOLD | COMMUNITY
Start: 2018-03-29 | End: 2018-07-18 | Stop reason: CLARIF

## 2018-06-07 RX ORDER — ACETAMINOPHEN 500MG/15ML
LIQUID (ML) ORAL
Status: ON HOLD | COMMUNITY
Start: 2018-04-13 | End: 2018-07-18 | Stop reason: CLARIF

## 2018-06-07 RX ORDER — CELECOXIB 200 MG/1
200 CAPSULE ORAL 2 TIMES DAILY
Qty: 28 CAPSULE | Refills: 1 | Status: SHIPPED | OUTPATIENT
Start: 2018-06-07 | End: 2018-06-07 | Stop reason: SDUPTHER

## 2018-06-07 RX ORDER — NAPROXEN SODIUM 220 MG/1
TABLET, FILM COATED ORAL
Status: ON HOLD | COMMUNITY
Start: 2018-04-13 | End: 2018-07-18

## 2018-06-07 RX ORDER — CELECOXIB 200 MG/1
200 CAPSULE ORAL 2 TIMES DAILY
Qty: 28 CAPSULE | Refills: 1 | Status: CANCELLED | OUTPATIENT
Start: 2018-06-07 | End: 2018-06-21

## 2018-06-07 NOTE — LETTER
June 8, 2018      Sayda Lopez MD  [address]  [facility] [address]           [facility] 7th Fl  [address]  [facility] [address]  Phone: [phone]          Patient: Haiely Sawant   MR Number: [id number]   YOB: 1945   Date of Visit: 6/7/2018       Dear Dr. Sayda Lopez:    Thank you for referring Hailey Sawant to me for evaluation. Attached you will find relevant portions of my assessment and plan of care.    If you have questions, please do not hesitate to call me. I look forward to following Hailey Sawant along with you.    Sincerely,    Michael Barreto MD    Enclosure  CC:  No Recipients    If you would like to receive this communication electronically, please contact [email or web address] or [phone] to request more information on [email or web address] Link access.    For providers and/or their staff who would like to refer a patient to [facility], please contact us through our one-stop-shop provider referral line, [facility], at [phone].    If you feel you have received this communication in error or would no longer like to receive these types of communications, please e-mail [email or web address]

## 2018-06-08 PROBLEM — M47.816 LUMBAR SPONDYLOSIS: Status: ACTIVE | Noted: 2018-06-08

## 2018-06-08 PROBLEM — M51.9 INTERVERTEBRAL DISK DISEASE: Status: ACTIVE | Noted: 2018-06-08

## 2018-06-08 RX ORDER — CELECOXIB 200 MG/1
CAPSULE ORAL
Qty: 180 CAPSULE | Refills: 1 | Status: ON HOLD | OUTPATIENT
Start: 2018-06-08 | End: 2018-07-17

## 2018-06-12 ENCOUNTER — TELEPHONE (OUTPATIENT)
Dept: NEUROSURGERY | Facility: CLINIC | Age: 73
End: 2018-06-12

## 2018-06-12 DIAGNOSIS — M54.50 BILATERAL LOW BACK PAIN WITHOUT SCIATICA, UNSPECIFIED CHRONICITY: Primary | ICD-10-CM

## 2018-06-21 ENCOUNTER — OFFICE VISIT (OUTPATIENT)
Dept: OPTOMETRY | Facility: CLINIC | Age: 73
End: 2018-06-21
Payer: MEDICARE

## 2018-06-21 DIAGNOSIS — E11.9 TYPE 2 DIABETES MELLITUS WITHOUT OPHTHALMIC MANIFESTATIONS: Primary | ICD-10-CM

## 2018-06-21 DIAGNOSIS — H52.03 HYPEROPIA WITH PRESBYOPIA, BILATERAL: ICD-10-CM

## 2018-06-21 DIAGNOSIS — H52.4 HYPEROPIA WITH PRESBYOPIA, BILATERAL: ICD-10-CM

## 2018-06-21 DIAGNOSIS — H25.13 NUCLEAR SCLEROSIS, BILATERAL: ICD-10-CM

## 2018-06-21 PROCEDURE — 92014 COMPRE OPH EXAM EST PT 1/>: CPT | Mod: S$GLB,,, | Performed by: OPTOMETRIST

## 2018-06-21 PROCEDURE — 99999 PR PBB SHADOW E&M-EST. PATIENT-LVL II: CPT | Mod: PBBFAC,,, | Performed by: OPTOMETRIST

## 2018-06-21 NOTE — PROGRESS NOTES
HPI     Patient's last dilated exam was: 6/20/2017 w/ Dr. Hunter    Pt here for diabetic eye exam.  Wearing otc readers, does not want bifocal   rx glasses. Patient denies flashes, floaters, pain and double vision. C/O   occasional itching OU, Not using any gtts. Treats with cold compresses   only. Did not test blood sugar today, was 100 yesterday. No previous eye   trauma or sx. No fm hx of glaucoma, ARMD, RD    Hemoglobin A1C       Date                     Value               Ref Range             Status                10/02/2017               5.4                 4.0 - 5.6 %           Final                  06/27/2017               5.5                 4.0 - 5.6 %           Final                 12/20/2016               5.4                 4.5 - 6.2 %           Final                  Last edited by Johana Casiano, PCT on 6/21/2018  9:03 AM.   (History)            Assessment /Plan     For exam results, see Encounter Report.    Type 2 diabetes mellitus without ophthalmic manifestations    Nuclear sclerosis, bilateral    Hyperopia with presbyopia, bilateral          1.  No retinopathy--monitor yearly.  BS control.  2.  Educated on cataracts and affects on vision.  Patient happy with vision.  Monitor.  3.   Continue w/ current rx            RTC 1 year for dm exam.

## 2018-06-22 ENCOUNTER — CLINICAL SUPPORT (OUTPATIENT)
Dept: REHABILITATION | Facility: HOSPITAL | Age: 73
End: 2018-06-22
Attending: NEUROLOGICAL SURGERY
Payer: MEDICARE

## 2018-06-22 DIAGNOSIS — M54.41 CHRONIC BILATERAL LOW BACK PAIN WITH RIGHT-SIDED SCIATICA: Primary | ICD-10-CM

## 2018-06-22 DIAGNOSIS — G89.29 CHRONIC SI JOINT PAIN: ICD-10-CM

## 2018-06-22 DIAGNOSIS — M53.3 CHRONIC SI JOINT PAIN: ICD-10-CM

## 2018-06-22 DIAGNOSIS — G89.29 CHRONIC BILATERAL LOW BACK PAIN WITH RIGHT-SIDED SCIATICA: Primary | ICD-10-CM

## 2018-06-22 DIAGNOSIS — R29.898 WEAKNESS OF BOTH HIPS: ICD-10-CM

## 2018-06-22 PROCEDURE — 97162 PT EVAL MOD COMPLEX 30 MIN: CPT

## 2018-06-22 PROCEDURE — G8981 BODY POS CURRENT STATUS: HCPCS | Mod: CL

## 2018-06-22 PROCEDURE — G8982 BODY POS GOAL STATUS: HCPCS | Mod: CK

## 2018-06-24 NOTE — PLAN OF CARE
[facility]  Physical Therapy Initial Evaluation    Name: Hailey Sawant  Clinic Number: [id number]    Therapy Diagnosis:   Encounter Diagnoses   Name Primary?    Chronic SI joint pain     Chronic bilateral low back pain with right-sided sciatica Yes    Weakness of both hips      Physician: Michael Barreto,*    Physician Orders: PT Eval and Treat Aquatic Therapy  Medical Diagnosis: M53.3, G89.29 Chronic SI joint pain   Evaluation Date: 6/22/2018  Authorization Period Expiration: 12/31/18  Plan of Care Certification Period: 8/22/18  Visit # / Visits authorized: 1/ 20    Time In: 9:57  Time Out: 10:30  Total Billable Time: 30 minutes    Precautions: Diabetes      Subjective   Date of onset: 6 months ago. Took a trip on a bus in September 2017  History of current condition - Hailey is a 73 yo AAF referred to OP PT for aquatic therapy secondary chronic low back pain with R LE pain.       Past Medical History:   Diagnosis Date    Diabetes mellitus     Hypertension     Lumbar spondylosis 6/8/2018    Malignant pheochromocytoma     Pheochromocytoma     Pheochromocytoma, malignant 01/10/2016    Pheochromocytoma, malignant     Thyroid disease      Hailey Sawant  has a past surgical history that includes Adrenalectomy; Appendectomy; and MIBG Therapy (3/2016, 7/2016, 12/2016).    Hailey has a current medication list which includes the following prescription(s): acetaminophen, alcohol antiseptic pads, aspirin, aspirin, bd luer-donal syringe, bd regular bevel needles, bisacodyl, celecoxib, cholecalciferol (vitamin d3), furosemide, hydrocortisone, losartan, metformin, mometasone 0.1%, nifedipine, oxycodone, true metrix air glucose meter, true metrix glucose test strip, and trueplus lancets.    Review of patient's allergies indicates:  No Known Allergies     Imaging, X-ray: 6/7/18    FINDINGS:  Alignment: Grade 1 anterolisthesis noted at L4-L5 with minimal dynamic  instability.    Vertebrae: Vertebral body heights are maintained.  No suspicious appearing lytic or blastic lesions.    Discs and facets: Severe disc height loss noted at L3-4.  Moderate disc height loss noted at L4-5. Lower lumbar facet arthropathy noted.    Miscellaneous: Surgical clips seen in the upper abdomen.  Calcified fibroid noted in the pelvis.    Prior Therapy: none  Social History: lives alone, has a ramp to enter  Occupation: retired. Worked in hospital environmental services  Prior Level of Function: I with amb and ADL  Current Level of Function: sweeping, mopping, standing to cook    Pain:  Current 7/10, worst 10/10, best 6/10   Location: bilateral back   Description: Throbbing  Aggravating Factors: cleaning her house, sweeping, riding stationary bike  Easing Factors: heating pad, sit in recliner, Advil    Radicular symptoms: sharp pain down R LE  Bowel and Bladder incontinence: none    Sleep is disturbed by pain. Sleeping position: side, back, stomach    Pts goals: gain strength in her back    Objective     Postural examination in standing:  - increased lumbar lordosis  - forward head  - forward shoulders    Postural examination in sitting:   - decreased lumbar lordosis  - forward head  - forward shoulders      Functional assessment:   - walking: I  - sit to stand: I  - sit to supine: I       - supine to sit: I  - supine to prone: I     Lumbar active range of motion in standing is:  Flexion 75 deg   Extension 30 deg with pain   Left side bending 25 deg   Right side bending 20 deg   Left rotation Decreased 10%   Right rotation Decreased 10%     Flexibility testing:  - hamstrings:      R: WNL L: tight. R: L: -35 deg           - piriformis:         R: tight, decreased 50%             - quadriceps:      R: tight, decreased 25%, L: WFL               - hip flexors:        B: tight, decreased 25%  - hip adductors:   B: tight, decreased 25%  - IT Bands:          B: WNL     MMT R L   Hip flexion 5/5 5/5    Hip abduction 3-/5 4-/5   Hip extension 3+/5 3+/5   Hip ER 5/5 5/5   Hip IR 5/5 5/5   Knee extension 5/5 5/5   Knee flexion 5/5 5/5   Ankle dorsiflexion 5/5 5/5   Ankle plantar flexion 5/5 5/5       Endurance is good.      Lumbar Special tests    SLR negative   Cross SLR negative   ATUL B hip tightness   Prone Instability Test negative     Palpation: tenderness in B QL and B lumbar paraspinals     Sensation:  - Light Touch: intact B LEs  - Saddle: intact    Reflexes:   - Knee Jerk: 2+    CMS Impairment/Limitation/Restriction for FOTO Back Survey    Therapist reviewed FOTO scores for Hailey Sawant on 6/22/2018.   FOTO documents entered into [employer] - see Media section.    Limitation Score: 62%  Category: Body Position    Current : CL = least 60% but < 80% impaired, limited or restricted  Goal: CK = at least 40% but < 60% impaired, limited or restricted         TREATMENT     Education provided re:   PT reviewed aquatic therapy information and precautions with patient. PT cleared pt for aquatic therapy.      Written Home Exercises Provided:   No HEP given today    Assessment   Hailey is a 72 y.o. 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 Pt presents with B low back pain with R LE pain. Patient presenting with decreased B LE strength, particularly hip weakness, and decreased B LE flexibility affecting functional activities in standing.    Pt prognosis is Good.   Pt will benefit from skilled outpatient Physical Therapy to address the deficits stated above and in the chart below, provide pt/family education, and to maximize pt's level of independence.     Plan of care discussed with patient: Yes  Pt's spiritual, cultural and educational needs considered and patient is agreeable to the plan of care and goals as stated below:     Anticipated Barriers for therapy: patient lives about an hour away from clinic    Medical Necessity is demonstrated by the  following  History  Co-morbidities and personal factors that may impact the plan of care Co-morbidities:   diabetes    Personal Factors:   livea about an hour away from clinic     moderate   Examination  Body Structures and Functions, activity limitations and participation restrictions that may impact the plan of care Body Regions:   back  lower extremities    Body Systems:    strength    Participation Restrictions:   Limits cooking and house hold duties    Activity limitations:   Learning and applying knowledge  no deficits    General Tasks and Commands  no deficits    Communication  no deficits    Mobility  walking    Self care  no deficits    Domestic Life  doing house work (cleaning house, washing dishes, laundry)    Interactions/Relationships  no deficits    Life Areas  no deficits    Community and Social Life  no deficits         moderate   Clinical Presentation evolving clinical presentation with changing clinical characteristics moderate   Decision Making/ Complexity Score: moderate     Goals:  Short Term Goals: 4 weeks   1. Independent with HEP.  2. Report decreased lumbar and R LE pain < or =  6/10 with adls such as sweeping, cleaning her house, standing to cook, and mopping.  3. Increased MMT for B LE by 1 muscle grade to promote proper pelvic stability to decrease lumbar and R LE pain < or =  6/10 with adls such as sweeping, cleaning her house, standing to cook, and mopping.    4. Increased flexibility in B hip adductors, B piriformis, B hip flexors, B IT bands, and B quads to promote proper pelvic stability to decrease lumbar and R LE pain < or =  6/10 with adls such as sweeping, cleaning her house, standing to cook, and mopping.    Long Term Goals: 8 weeks   5. Report decreased lumbar and R LE pain < or =  4/10 with adls such as such as sweeping, cleaning her house, standing to cook, and mopping.  6. Increased MMT for B LE to 5/5 muscle grade to promote proper pelvic stability to decrease lumbar and R  LE pain < or =  4/10 with adls such as such as sweeping, cleaning her house, standing to cook, and mopping.     7. Patient to achieve CK (at least 40% < 60% impaired, limited or restricted) level on the FOTO Outcomes Measurement System.    Plan   Certification Period/Plan of care expiration: 6/22/2018 to 8/22/18.    Outpatient Physical Therapy 2 times weekly for 8 weeks to include the following interventions: Aquatic Therapy.     Mohit Vasquez, PT

## 2018-06-25 ENCOUNTER — TELEPHONE (OUTPATIENT)
Dept: ENDOCRINOLOGY | Facility: CLINIC | Age: 73
End: 2018-06-25

## 2018-06-25 DIAGNOSIS — C74.91: Primary | ICD-10-CM

## 2018-06-26 ENCOUNTER — HOSPITAL ENCOUNTER (OUTPATIENT)
Facility: OTHER | Age: 73
Discharge: HOME | End: 2018-06-26
Attending: ANESTHESIOLOGY | Admitting: ANESTHESIOLOGY
Payer: MEDICARE

## 2018-06-26 ENCOUNTER — TELEPHONE (OUTPATIENT)
Dept: PAIN MEDICINE | Facility: CLINIC | Age: 73
End: 2018-06-26

## 2018-06-26 VITALS
WEIGHT: 200 LBS | DIASTOLIC BLOOD PRESSURE: 67 MMHG | OXYGEN SATURATION: 96 % | BODY MASS INDEX: 35.44 KG/M2 | SYSTOLIC BLOOD PRESSURE: 135 MMHG | RESPIRATION RATE: 18 BRPM | HEART RATE: 60 BPM | HEIGHT: 63 IN | TEMPERATURE: 98 F

## 2018-06-26 DIAGNOSIS — M53.3 SACROILIAC JOINT PAIN: Primary | ICD-10-CM

## 2018-06-26 LAB — POCT GLUCOSE: 102 MG/DL (ref 70–110)

## 2018-06-26 PROCEDURE — 27096 INJECT SACROILIAC JOINT: CPT | Mod: RT,,, | Performed by: ANESTHESIOLOGY

## 2018-06-26 PROCEDURE — 27096 INJECT SACROILIAC JOINT: CPT | Performed by: ANESTHESIOLOGY

## 2018-06-26 PROCEDURE — 82947 ASSAY GLUCOSE BLOOD QUANT: CPT | Performed by: ANESTHESIOLOGY

## 2018-06-26 PROCEDURE — 25500020 PHARM REV CODE 255: Performed by: ANESTHESIOLOGY

## 2018-06-26 PROCEDURE — 63600175 PHARM REV CODE 636 W HCPCS: Performed by: ANESTHESIOLOGY

## 2018-06-26 PROCEDURE — 25000003 PHARM REV CODE 250: Performed by: ANESTHESIOLOGY

## 2018-06-26 RX ORDER — BUPIVACAINE HYDROCHLORIDE 2.5 MG/ML
INJECTION, SOLUTION EPIDURAL; INFILTRATION; INTRACAUDAL
Status: DISCONTINUED | OUTPATIENT
Start: 2018-06-26 | End: 2018-06-26 | Stop reason: HOSPADM

## 2018-06-26 RX ORDER — METHYLPREDNISOLONE ACETATE 40 MG/ML
INJECTION, SUSPENSION INTRA-ARTICULAR; INTRALESIONAL; INTRAMUSCULAR; SOFT TISSUE
Status: DISCONTINUED | OUTPATIENT
Start: 2018-06-26 | End: 2018-06-26 | Stop reason: HOSPADM

## 2018-06-26 RX ORDER — LIDOCAINE HYDROCHLORIDE 10 MG/ML
INJECTION INFILTRATION; PERINEURAL
Status: DISCONTINUED | OUTPATIENT
Start: 2018-06-26 | End: 2018-06-26 | Stop reason: HOSPADM

## 2018-06-26 NOTE — OP NOTE
Sacroiliac Joint Injection under Fluoroscopy    Date of procedure 06/26/2018    Time-out taken to identify patient and procedure side prior to starting the procedure.                                                                 PROCEDURE:  rightsacroiliac joint injection under fluoroscopy.    REASON FOR PROCEDURE: right Bilateral low back pain without sciatica, unspecified chronicity [M54.5]    PHYSICIAN: Sydney Reynoso MD    ASSISTANTS: >    MEDICATIONS INJECTED:  Depo-Medrol 40mg and 4 mL Bupivacaine 0.25%    LOCAL ANESTHETIC USED: Xylocaine 1% 5mL    SEDATION MEDICATIONS: None    ESTIMATED BLOOD LOSS:  None.    COMPLICATIONS:  None.    TECHNIQUE:   Laying in the prone position, the patient was prepped and draped in the usual sterile fashion using ChloraPrep and fenestrated drape.  The area was determined under fluoroscopy.  Local Xylocaine was injected by raising a wheel and going down to the periosteum using a 27-gauge hypodermic needle.  The 3.5 inch 22-gauge spinal needle was introduce into the right sacroiliac joint.  Negative pressure applied to confirm no intravascular placement.  Omnipaque was injected to confirm placement and to confirm that there was no vascular runoff.  The medication was then injected slowly.  The patient tolerated the procedure well.      The patient was monitored for approximately 30 minutes after the procedure.  Patient was given post procedure and discharge instructions to follow at home.  We will see the patient back in two weeks or the patient may call to inform of status. The patient was discharged in a stable condition

## 2018-06-26 NOTE — DISCHARGE SUMMARY
"Discharge Note  Short Stay      SUMMARY     Admit Date: 6/26/2018    Attending Physician: Sydney Reynoso      Discharge Physician: Sydney Reynoso      Discharge Date: 6/26/2018 10:24 AM    Procedure(s) (LRB):  BLOCK, SACROILIAC JOINT (Right)    Final Diagnosis: Bilateral low back pain without sciatica, unspecified chronicity [M54.5]    Disposition: Home or self care    Patient Instructions:   Current Discharge Medication List      CONTINUE these medications which have NOT CHANGED    Details   acetaminophen 500 mg/15 mL Liqd       alcohol antiseptic pads (ALCOHOL PREP PADS TOP)       !! aspirin 81 MG Chew Take 81 mg by mouth once daily.      !! aspirin 81 MG Chew       BD LUER-GENTRY SYRINGE 3 mL 18 x 1 1/2" Syrg       BD REGULAR BEVEL NEEDLES 25 gauge x 1 1/2" Ndle USE AS DIRECTED  Qty: 100 each, Refills: 0      bisacodyl (DULCOLAX) 10 mg Supp Place 1 suppository (10 mg total) rectally once daily.  Refills: 0      celecoxib (CELEBREX) 200 MG capsule TAKE 1 CAPSULE(200 MG) BY MOUTH TWICE DAILY  Qty: 180 capsule, Refills: 1    Comments: **Patient requests 90 days supply**  Associated Diagnoses: Chronic SI joint pain      cholecalciferol, vitamin D3, (VITAMIN D3) 5,000 unit Tab Take 5,000 Units by mouth once daily.      furosemide (LASIX) 40 MG tablet Take 20 mg by mouth. Monday and Friday  Refills: 6      hydrocortisone (CORTEF) 20 MG Tab TAKE 1 TABLET EVERY MORNING  AND TAKE 1/2 TABLET EVERY EVENING  AT  5PM  Qty: 135 tablet, Refills: 6      losartan (COZAAR) 50 MG tablet Take 50 mg by mouth once daily.      metFORMIN (GLUCOPHAGE-XR) 500 MG 24 hr tablet TAKE 1 TABLET TWICE DAILY WITH MEALS  Qty: 180 tablet, Refills: 3      mometasone 0.1% (ELOCON) 0.1 % cream       NIFEdipine (PROCARDIA-XL) 90 MG (OSM) 24 hr tablet       oxycodone (ROXICODONE) 5 MG immediate release tablet Take 1 tablet (5 mg total) by mouth every 4 (four) hours as needed.  Qty: 61 tablet, Refills: 0      TRUE METRIX AIR GLUCOSE METER kit CHECK " BLOOD SUGAR ONE TIME DAILY  Qty: 1 each, Refills: 0      TRUE METRIX GLUCOSE TEST STRIP Strp CHECK BLOOD SUGAR ONE TIME DAILY  Qty: 100 strip, Refills: 3      TRUEPLUS LANCETS 28 gauge Misc TEST ONE TIME DAILY  Qty: 200 each, Refills: 3       !! - Potential duplicate medications found. Please discuss with provider.              Discharge Diagnosis: Bilateral low back pain without sciatica, unspecified chronicity [M54.5]  Condition on Discharge: Stable with no complications to procedure   Diet on Discharge: Same as before.  Activity: as per instruction sheet.  Discharge to: Home with a responsible adult.  Follow up: 2-4 weeks

## 2018-06-26 NOTE — TELEPHONE ENCOUNTER
----- Message from Rafal Garcia sent at 6/26/2018  3:50 PM CDT -----  Contact: MIRTHA ENGLE [[id number]]    Name of Who is Calling: MIRTHA ENGLE [[id number]]      What is the request in detail: Patient would like to schedule her two week injection post-op      Can the clinic reply by AJAYNER: no      What Number to Call Back if not in [facility]: [phone] (Claudia/sister) or [phone]

## 2018-06-26 NOTE — DISCHARGE INSTRUCTIONS
Home Care Instructions Pain Management:    1. DIET:   You may resume your normal diet today.   2. BATHING:   You may shower with luke warm water.  3. DRESSING:   You may remove your bandage today.   4. ACTIVITY LEVEL:   You may resume your normal activities 24 hrs after your procedure.  5. MEDICATIONS:   You may resume your normal medications today.   6. SPECIAL INSTRUCTIONS:   No heat to the injection site for 24 hrs including, bath or shower, heating pad, moist heat, or hot tubs.    Use ice pack to injection site for any pain or discomfort.  Apply ice packs for 20 minute intervals as needed.   If you have received any sedatives by mouth today you may not drive for 12 hours.    If you have received any sedation through your IV, you may not drive for 24 hrs.     PLEASE CALL YOUR DOCTOR IF:  1. Redness or swelling around the injection site.  2. Fever of 101 degrees  3. Drainage (pus) from the injection site.  4. For any continuous bleeding (some dried blood over the incision is normal.)    FOR EMERGENCIES:   If any unusual problems or difficulties occur during clinic hours, call [phone] or [phone].     Thank you for allowing us to care for you today. You may receive a survey about the care we provided. Your feedback is valuable and helps us provide excellent care throughout the community.

## 2018-07-09 ENCOUNTER — TELEPHONE (OUTPATIENT)
Dept: ENDOCRINOLOGY | Facility: CLINIC | Age: 73
End: 2018-07-09

## 2018-07-09 DIAGNOSIS — C74.91 MALIGNANT NEOPLASM OF RIGHT ADRENAL GLAND: ICD-10-CM

## 2018-07-09 DIAGNOSIS — C74.90 MALIGNANT PHEOCHROMOCYTOMA, UNSPECIFIED LATERALITY: Primary | ICD-10-CM

## 2018-07-11 ENCOUNTER — OFFICE VISIT (OUTPATIENT)
Dept: SPINE | Facility: CLINIC | Age: 73
End: 2018-07-11
Attending: ANESTHESIOLOGY
Payer: MEDICARE

## 2018-07-11 VITALS
BODY MASS INDEX: 35.44 KG/M2 | WEIGHT: 200 LBS | DIASTOLIC BLOOD PRESSURE: 67 MMHG | SYSTOLIC BLOOD PRESSURE: 136 MMHG | HEART RATE: 37 BPM | HEIGHT: 63 IN | TEMPERATURE: 98 F

## 2018-07-11 DIAGNOSIS — M54.41 CHRONIC BILATERAL LOW BACK PAIN WITH RIGHT-SIDED SCIATICA: Primary | Chronic | ICD-10-CM

## 2018-07-11 DIAGNOSIS — M47.816 LUMBAR SPONDYLOSIS: ICD-10-CM

## 2018-07-11 DIAGNOSIS — M53.3 SACROILIAC JOINT PAIN: ICD-10-CM

## 2018-07-11 DIAGNOSIS — M51.9 INTERVERTEBRAL DISK DISEASE: ICD-10-CM

## 2018-07-11 DIAGNOSIS — G89.29 CHRONIC BILATERAL LOW BACK PAIN WITH RIGHT-SIDED SCIATICA: Primary | Chronic | ICD-10-CM

## 2018-07-11 PROCEDURE — 3075F SYST BP GE 130 - 139MM HG: CPT | Mod: CPTII,S$GLB,, | Performed by: ANESTHESIOLOGY

## 2018-07-11 PROCEDURE — 99999 PR PBB SHADOW E&M-EST. PATIENT-LVL III: CPT | Mod: PBBFAC,,, | Performed by: ANESTHESIOLOGY

## 2018-07-11 PROCEDURE — 99214 OFFICE O/P EST MOD 30 MIN: CPT | Mod: S$GLB,,, | Performed by: ANESTHESIOLOGY

## 2018-07-11 PROCEDURE — 3078F DIAST BP <80 MM HG: CPT | Mod: CPTII,S$GLB,, | Performed by: ANESTHESIOLOGY

## 2018-07-11 RX ORDER — FLUDROCORTISONE ACETATE 0.1 MG/1
100 TABLET ORAL DAILY
COMMUNITY
End: 2019-03-20

## 2018-07-11 NOTE — LETTER
July 11, 2018      Michael Barreto MD  [address]  [facility] [address]           [facility]  [address], [address]  [facility] [address]  Phone: [phone]  Fax: [phone]          Patient: Hailey Sawant   MR Number: [id number]   YOB: 1945   Date of Visit: 7/11/2018       Dear Dr. Michael Barreto:    Thank you for referring Hailey Sawant to me for evaluation. Attached you will find relevant portions of my assessment and plan of care.    If you have questions, please do not hesitate to call me. I look forward to following Hailey Sawant along with you.    Sincerely,    Sydney Reynoso MD    Enclosure  CC:  No Recipients    If you would like to receive this communication electronically, please contact [email or web address] or [phone] to request more information on [employer] access.    For providers and/or their staff who would like to refer a patient to [facility], please contact us through our one-stop-shop provider referral line, [facility] [place], at [phone].    If you feel you have received this communication in error or would no longer like to receive these types of communications, please e-mail [email or web address]

## 2018-07-11 NOTE — PROGRESS NOTES
Chronic Pain - New Consult    Referring Physician: Michael Barreto,*    Chief Complaint:   Chief Complaint   Patient presents with    Low-back Pain        SUBJECTIVE: Disclaimer: This note has been generated using voice-recognition software. There may be typographical errors that have been missed during proof-reading    Initial encounter:    Hailey Sawant presents to the clinic for the evaluation of lower back pain. The pain started 2 months ago and symptoms have been worsening.    Brief history:    Pain Description:    The pain is located in the lower back area and radiates to the right leg.      At BEST  7/10     At WORST  9/10 on the WORST day.      On average pain is rated as 8/10.     Today the pain is rated as 9/10    The pain is described as aching, sharp, shooting, throbbing and tingling      Symptoms interfere with daily activity, sleeping and work.     Exacerbating factors: Sitting, Standing, Laying, Bending, Coughing/Sneezing, Walking, Night Time, Morning, Lifting and Getting out of bed/chair.      Mitigating factors ice, medications and physical therapy.     Patient denies night fever/night sweats, urinary incontinence, bowel incontinence, significant weight loss, significant motor weakness and loss of sensations.  Patient denies any suicidal or homicidal ideations    Pain Medications:  Current:  Aspirin        Tried in Past:  NSAIDs -Aspirin  TCA -Never  SNRI -Never  Anti-convulsants -Never  Muscle Relaxants -Never  Opioids-Oxycodone    Physical Therapy/Home Exercise: yes       report:  Reviewed and consistent with medication use as prescribed.    Pain Procedures: previous SI joint injection without relief.    Chiropractor -never  Acupuncture - never  TENS unit -never  Spinal decompression -never  Joint replacement -never    Imaging:     X-Ray Lumbar Complete With Flex And Ext  06/07/2018    Narrative     EXAMINATION:  XR LUMBAR SPINE 5 VIEW WITH FLEX AND EXT    CLINICAL HISTORY:  si  joint pain;  Sacrococcygeal disorders, not elsewhere classified    TECHNIQUE:  Five views of the lumbar spine plus flexion extension views were performed.    COMPARISON:  08/17/2016    FINDINGS:  Alignment: Grade 1 anterolisthesis noted at L4-L5 with minimal dynamic instability.    Vertebrae: Vertebral body heights are maintained.  No suspicious appearing lytic or blastic lesions.    Discs and facets: Severe disc height loss noted at L3-4.  Moderate disc height loss noted at L4-5. Lower lumbar facet arthropathy noted.    Miscellaneous: Surgical clips seen in the upper abdomen.  Calcified fibroid noted in the pelvis.   Impression       As above.      Electronically signed by: Jeff Membreno MD  Date: 06/07/2018  Time: 13:56    Encounter     View Encounter            MRI Lumbar Spine W WO Contrast  05/29/2018    Narrative     EXAMINATION:  MRI LUMBAR SPINE W WO CONTRAST    CLINICAL HISTORY:  Low back pain, >6wks conservative tx, persistent-progressive sx, surgical candidate;has history of malignant pheochromocytoma;  Benign neoplasm of right adrenal gland    TECHNIQUE:  Multiplanar multisequence MRI images of the lumbar spine were obtained before and after the administration of 10 cc Gadavist intravenous contrast.    COMPARISON:  CT abdomen pelvis 01/25/2018.    FINDINGS:  There is grade 1 anterolisthesis of L4 on L5.  Lumbar spinal alignment is otherwise within normal limits.  Vertebral body heights appear well maintained without evidence of acute fracture.  There is disc height loss at L3-L4.  There is DJD and suspected Modic 1 type changes along the inferior endplate of L3 and superior endplate of L4 with enhancement.  Early spondylo discitis less likely.  The disc at L3-L4 does not enhance.  Otherwise bone marrow signal appears homogeneous without evidence of a replacement process.  The conus terminates at the L1 vertebral body level.  No paravertebral soft tissue masses or fluid collections.  Incidentally  visualized structures in the abdomen and pelvis demonstrate no acute abnormalities.    There are multilevel degenerative changes in the lumbar spine as follows:    T12-L1: No significant spinal canal or neural foraminal narrowing.    L1-L2: Broad-based disc bulge and mild facet arthropathy without significant spinal canal or neural foraminal narrowing.    L2-L3: Are mild broad-based disc bulge and facet arthropathy without convincing spinal canal or neural foraminal narrowing.    L3-L4: Broad-based disc bulge, bilateral facet arthropathy and ligamentum flavum buckling contribute to mild effacement of the thecal sac as well as moderate left and mild right neural foraminal narrowing.    L4-L5: Grade 1 anterolisthesis L4 on L5.  There is broad-based disc bulge and facet arthropathy without significant spinal canal or neural foraminal narrowing.    L5-S1: Broad-based disc bulge and facet arthropathy without significant spinal canal stenosis or neural foraminal narrowing.   Impression       Multilevel lumbar spondylosis with suspected prominent Modic type 1 changes involving the inferior endplate of L3 and superior endplate of L4.    No convincing evidence of osteomyelitis or discitis although early spondylo discitis may appear similarly.  * As early vertebral spondylo discitis may not be initially apparent on MRI, correlation with ESR and CRP recommended is a minimum (with additional consideration for repeat MRI with contrast within 2-3 weeks, particularly if pain persists) to exclude spondylo discitis.    Additional details as above.    This report was flagged in Epic as abnormal.    Electronically signed by resident: Saul Mcclain  Date: 05/29/2018  Time: 10:23        X-Ray Lumbar Spine Complete 5 View  08/17/2016    Narrative     HISTORY: Back pain    COMPARISON: Lumbar spine 10/19/15    FINDINGS: 5 views.  The lumbar spine vertebral body heights and alignment are well-maintained.  There is multilevel degenerative  change of the lumbar facets.  Multiple surgical clips are seen in the upper abdomen.  The osseous structures are intact with no evidence of fracture or dislocation.  There is a probable calcified fibroid in the pelvis.  The joint spaces and soft tissues are otherwise unremarkable        X-Ray Lumbar Spine AP And Lateral  10/19/2015    Narrative     History: Low back pain.    Procedure: Lumbar spine 3 views    Findings:    The examination is compared with the study of 4/11/2012.    There is again a normal lumbar lordosis.  There is mild degenerative disk disease, without significant change from the study.  No spondylolisthesis or spondylolysis or acute fractures.    There are surgical clips in the upper abdomen from prior surgery.    SI joints are symmetric and normal bilaterally.   Impression         Mild degenerative changes in the lumbar spine.      Electronically signed by: LAKESHA BAIRES MD  Date: 10/19/15  Time: 08:38         X-Ray Lumbar Spine Ap And Lateral  04/11/2012    Narrative     DATE OF EXAM: Apr 11 2012      GEN   0208  -  L-SPINE AP/LAT:   \  [id number]     CLINICAL HISTORY:   \729.5 0 PAIN IN LIMB     PROCEDURE COMMENT:   \     ICD 9 CODE(S):   (\)     CPT 4 CODE(S)/MODIFIER(S):   (\)     Lumbar spine:     Surgical clips overlie thoracic lumbar junction on AP view.  5 true   lumbar vertebral segments.  Mild lordosis.  Early degenerative disk,   spondylosis change noted.               Past Medical History:   Diagnosis Date    Diabetes mellitus     Hypertension     Lumbar spondylosis 6/8/2018    Malignant pheochromocytoma     Pheochromocytoma     Pheochromocytoma, malignant 01/10/2016    Pheochromocytoma, malignant     Sacroiliac joint pain 7/11/2018    Thyroid disease      Past Surgical History:   Procedure Laterality Date    ADRENALECTOMY      APPENDECTOMY      INJECTION OF ANESTHETIC AGENT INTO SACROILIAC JOINT Right 6/26/2018    Procedure: BLOCK, SACROILIAC JOINT;  Surgeon: Sydney SEXTON  MD Edgardo;  Location: [facility];  Service: Pain Management;  Laterality: Right;  Right SI Joint Injection    07524    NEEDS CONSENT    MIBG Therapy  3/2016, 7/2016, 12/2016    ELIJAH Martines     Social History     Social History    Marital status: Single     Spouse name: N/A    Number of children: N/A    Years of education: N/A     Occupational History    Not on file.     Social History Main Topics    Smoking status: Never Smoker    Smokeless tobacco: Never Used    Alcohol use No    Drug use: No    Sexual activity: Not on file     Other Topics Concern    Are You Pregnant Or Think You May Be? No    Breast-Feeding No     Social History Narrative    No narrative on file     Family History   Problem Relation Age of Onset    Heart disease Mother     Diabetes Mother     Heart disease Father     Melanoma Neg Hx        Review of patient's allergies indicates:  No Known Allergies    Current Outpatient Prescriptions   Medication Sig    alcohol antiseptic pads (ALCOHOL PREP PADS TOP)     aspirin 81 MG Chew Take 81 mg by mouth once daily.    aspirin 81 MG Chew     bisacodyl (DULCOLAX) 10 mg Supp Place 1 suppository (10 mg total) rectally once daily.    celecoxib (CELEBREX) 200 MG capsule TAKE 1 CAPSULE(200 MG) BY MOUTH TWICE DAILY    cholecalciferol, vitamin D3, (VITAMIN D3) 5,000 unit Tab Take 5,000 Units by mouth once daily.    fludrocortisone (FLORINEF) 0.1 mg Tab Take 100 mcg by mouth once daily.    furosemide (LASIX) 40 MG tablet Take 20 mg by mouth. Monday and Friday    hydrocortisone (CORTEF) 20 MG Tab TAKE 1 TABLET EVERY MORNING  AND TAKE 1/2 TABLET EVERY EVENING  AT  5PM    losartan (COZAAR) 50 MG tablet Take 50 mg by mouth once daily.    metFORMIN (GLUCOPHAGE-XR) 500 MG 24 hr tablet TAKE 1 TABLET TWICE DAILY WITH MEALS    NIFEdipine (PROCARDIA-XL) 90 MG (OSM) 24 hr tablet     TRUE METRIX AIR GLUCOSE METER kit CHECK BLOOD SUGAR ONE TIME DAILY    TRUE METRIX GLUCOSE TEST STRIP  "Strp CHECK BLOOD SUGAR ONE TIME DAILY    TRUEPLUS LANCETS 28 gauge Misc TEST ONE TIME DAILY    acetaminophen 500 mg/15 mL Liqd     BD LUER-GENTRY SYRINGE 3 mL 18 x 1 1/2" Syrg     BD REGULAR BEVEL NEEDLES 25 gauge x 1 1/2" Ndle USE AS DIRECTED    mometasone 0.1% (ELOCON) 0.1 % cream     oxycodone (ROXICODONE) 5 MG immediate release tablet Take 1 tablet (5 mg total) by mouth every 4 (four) hours as needed.     No current facility-administered medications for this visit.        REVIEW OF SYSTEMS:    GENERAL:  No weight loss, malaise or fevers.  HEENT:   No recent changes in vision or hearing  NECK:  Negative for lumps, no difficulty with swallowing.  RESPIRATORY:  Negative for cough, wheezing or shortness of breath, patient denies any recent URI.  CARDIOVASCULAR:  Negative for chest pain, leg swelling or palpitations.  GI:  Negative for abdominal discomfort, blood in stools or black stools or change in bowel habits.  MUSCULOSKELETAL:  See HPI.  SKIN:  Negative for lesions, rash, and itching.  PSYCH:  No mood disorder or recent psychosocial stressors.  Patients sleep is not disturbed secondary to pain.  HEMATOLOGY/LYMPHOLOGY:  Negative for prolonged bleeding, bruising easily or swollen nodes.  Patient is not currently taking any anti-coagulants  ENDO:  DM2, history of pheochromocytoma  NEURO:   No history of headaches, syncope, paralysis, seizures or tremors.  All other reviewed and negative other than HPI.    OBJECTIVE:    /67   Pulse (!) 37   Temp 97.7 °F (36.5 °C)   Ht 5' 3" (1.6 m)   Wt 90.7 kg (200 lb)   BMI 35.43 kg/m²     PHYSICAL EXAMINATION:    GENERAL: Well appearing, in no acute distress, alert and oriented x3.  PSYCH:  Mood and affect appropriate.  SKIN: Skin color, texture, turgor normal, no rashes or lesions.  HEAD/FACE:  Normocephalic, atraumatic. Cranial nerves grossly intact.  CV: RRR with palpation of the radial artery.  PULM: No evidence of respiratory difficulty, symmetric chest " rise.  BACK: Straight leg raising in the sitting and supine positions is negative to radicular pain. There is pain with palpation over the facet joints of the lumbar spine bilaterally. There is decreased range of motion with extension to 15 degrees, and facet loading maneuvers cause reproducible pain.    EXTREMITIES: Peripheral joint ROM is full and pain free without obvious instability or laxity in all four extremities. No deformities, edema, or skin discoloration. Good capillary refill.  MUSCULOSKELETAL: Hip, and knee provocative maneuvers are negative.  There is  pain with palpation over the sacroiliac joints bilaterally.  There is no pain to palpation over the greater trochanteric bursa bilaterally.     Bilateral lower extremity strength is normal and symmetric.  No atrophy or tone abnormalities are noted.  NEURO: Bilateral lower extremity coordination and muscle stretch reflexes are physiologic and symmetric.  Plantar response are downgoing. No clonus.  Decreased sensation over the medial and lateral aspect of the right foot and leg  GAIT: Antalgic, ambulates without assistance     ASSESSMENT: 73 y.o. year old female with pain, consistent with     Encounter Diagnoses   Name Primary?    Chronic bilateral low back pain with right-sided sciatica Yes    Lumbar spondylosis     Intervertebral disk disease     Sacroiliac joint pain        PLAN:    I will schedule the patient for right-sided L5 and S1 transforaminal epidural steroid injection    Follow-up 2 weeks after injection in the future we may consider trialing  Gabapentin     in the future the patient benefit from medial branch nerve blocks followed by radiofrequency ablation if diagnostic.    Placed emphasis on the importance of continued exercise    Greater than 25 minutes spent in total in todays visit with the patient, with more than half that time direct face to face counseling and education with the patient today. We discussed the disease process,  prognosis, treatment plan, and risks and benefits.     Sydney Reynoso  07/11/2018

## 2018-07-12 ENCOUNTER — CLINICAL SUPPORT (OUTPATIENT)
Dept: REHABILITATION | Facility: HOSPITAL | Age: 73
End: 2018-07-12
Attending: NEUROLOGICAL SURGERY
Payer: MEDICARE

## 2018-07-12 DIAGNOSIS — M54.41 CHRONIC BILATERAL LOW BACK PAIN WITH RIGHT-SIDED SCIATICA: ICD-10-CM

## 2018-07-12 DIAGNOSIS — G89.29 CHRONIC BILATERAL LOW BACK PAIN WITH RIGHT-SIDED SCIATICA: ICD-10-CM

## 2018-07-12 DIAGNOSIS — R29.898 WEAKNESS OF BOTH HIPS: ICD-10-CM

## 2018-07-12 PROCEDURE — 97113 AQUATIC THERAPY/EXERCISES: CPT

## 2018-07-12 NOTE — PROGRESS NOTES
"                                                                                                                         Aquatic Treatment Note      Total treatment time: 60    Time In: 10:00  Time Out: 11:00      Subjective  Hailey states "that her LBP is 7/10 upon arrival, 3/10 post tx, today      Objective    Treatment: Hailey was instructed in and performed therapeutic exercises to develop strength, endurance, ROM, flexibility, balance, posture and core stabilization for 60 minutes. Patient performed therapeutic exercises consisting of warm up laps without resistance, PROM/Stretching, UE strengthening, LE strengthening, lumbar stablization, balance exercises, isometrics, Endurance, march, theraband and cool down.    Warm-up Laps 3 x each  FWD,BWD,Side    Stretches 3 x 20 sec each  HSS  Quad    LE exs 20x each    Mini Squat with QS  Heel Raise with GS  Hip ext with HS Curl  Hip flex with LAQ  Lunges fwd/side    UE exs 20x each  Shld flex/ext   Shld Horiz ABD/ADD    Lat Pull YTB  Horiz Row YTC    Endurance 3 min  Marching    Cool Down Laps 1 x each  FWD,BWD,Side       Patient was not issued HEP for pool.      Assessment  Patient's tolerance to treatment was good with benefit of reduced LBP sx post tx as above. Hailey is a 72 y.o. 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 Pt presents with B low back pain with R LE pain. Patient presenting with decreased B LE strength, particularly hip weakness, and decreased B LE flexibility affecting functional activities in standing  Patient will continue to benefit from skilled PT intervention.    Hailey is making good progress towards established goals.      Plan  Continue 2x per week.    "

## 2018-07-16 ENCOUNTER — CLINICAL SUPPORT (OUTPATIENT)
Dept: REHABILITATION | Facility: HOSPITAL | Age: 73
End: 2018-07-16
Attending: NEUROLOGICAL SURGERY
Payer: MEDICARE

## 2018-07-16 DIAGNOSIS — M54.41 CHRONIC BILATERAL LOW BACK PAIN WITH RIGHT-SIDED SCIATICA: ICD-10-CM

## 2018-07-16 DIAGNOSIS — G89.29 CHRONIC BILATERAL LOW BACK PAIN WITH RIGHT-SIDED SCIATICA: ICD-10-CM

## 2018-07-16 DIAGNOSIS — R29.898 WEAKNESS OF BOTH HIPS: ICD-10-CM

## 2018-07-16 PROCEDURE — 97113 AQUATIC THERAPY/EXERCISES: CPT

## 2018-07-16 NOTE — PROGRESS NOTES
"                                                                                                                         Aquatic Treatment Note      Total treatment time: 60    Time In: 10:00  Time Out: 11:00      Subjective  Hailey states "that her LBP is 7/10 upon arrival, 3/10 post tx, today      Objective    Treatment: Hailey was instructed in and performed therapeutic exercises to develop strength, endurance, ROM, flexibility, balance, posture and core stabilization for 60 minutes. Patient performed therapeutic exercises consisting of warm up laps without resistance, PROM/Stretching, UE strengthening, LE strengthening, lumbar stablization, balance exercises, isometrics, Endurance, march, theraband and cool down.    Warm-up Laps 3 x each  FWD,BWD,Side    Stretches 3 x 20 sec each  HSS  Quad    LE exs 25x each    Mini Squat with QS  Heel Raise with GS  Hip ext with HS Curl  Hip flex with LAQ  Lunges fwd/side    UE exs 25x each  Shld flex/ext   Shld Horiz ABD/ADD    Lat Pull YTB  Horiz Row YTC    Endurance 3 min  Marching    Cool Down Laps 1 x each  FWD,BWD,Side       Patient was not issued HEP for pool.      Assessment  Pt tolerated treatment progression well with no increase in symptoms. Hailey is a 72 y.o. 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 Pt presents with B low back pain with R LE pain. Patient presenting with decreased B LE strength, particularly hip weakness, and decreased B LE flexibility affecting functional activities in standing. Patient will continue to benefit from skilled PT intervention.    Hailey is making good progress towards established goals.      Plan  Continue 2x per week.  "

## 2018-07-17 ENCOUNTER — SURGERY (OUTPATIENT)
Age: 73
End: 2018-07-17

## 2018-07-17 ENCOUNTER — TELEPHONE (OUTPATIENT)
Dept: ADMINISTRATIVE | Facility: OTHER | Age: 73
End: 2018-07-17

## 2018-07-17 ENCOUNTER — HOSPITAL ENCOUNTER (OUTPATIENT)
Facility: OTHER | Age: 73
Discharge: HOME | DRG: 286 | End: 2018-07-19
Attending: EMERGENCY MEDICINE | Admitting: HOSPITALIST
Payer: MEDICARE

## 2018-07-17 DIAGNOSIS — E27.40 ADRENOCORTICAL INSUFFICIENCY: ICD-10-CM

## 2018-07-17 DIAGNOSIS — I25.110 UNSTABLE ANGINA PECTORIS DUE TO CORONARY ARTERIOSCLEROSIS: ICD-10-CM

## 2018-07-17 DIAGNOSIS — I49.9 IRREGULAR HEART BEAT: ICD-10-CM

## 2018-07-17 DIAGNOSIS — I25.10 ATHEROSCLEROTIC HEART DISEASE OF NATIVE CORONARY ARTERY WITHOUT ANGINA PECTORIS: ICD-10-CM

## 2018-07-17 DIAGNOSIS — R07.9 CHEST PAIN: ICD-10-CM

## 2018-07-17 DIAGNOSIS — I49.9 IRREGULAR HEART RATE: ICD-10-CM

## 2018-07-17 DIAGNOSIS — R06.00 DYSPNEA: ICD-10-CM

## 2018-07-17 PROBLEM — M54.41 CHRONIC LOW BACK PAIN WITH RIGHT-SIDED SCIATICA: Status: ACTIVE | Noted: 2018-07-17

## 2018-07-17 PROBLEM — I50.9 ACUTE HEART FAILURE: Status: ACTIVE | Noted: 2018-07-17

## 2018-07-17 PROBLEM — G89.29 CHRONIC LOW BACK PAIN WITH RIGHT-SIDED SCIATICA: Status: ACTIVE | Noted: 2018-07-17

## 2018-07-17 LAB
ALBUMIN SERPL BCP-MCNC: 3.6 G/DL
ALP SERPL-CCNC: 108 U/L
ALT SERPL W/O P-5'-P-CCNC: 6 U/L
ANION GAP SERPL CALC-SCNC: 10 MMOL/L
AST SERPL-CCNC: 10 U/L
BASOPHILS # BLD AUTO: 0.02 K/UL
BASOPHILS NFR BLD: 0.4 %
BILIRUB SERPL-MCNC: 0.7 MG/DL
BNP SERPL-MCNC: 113 PG/ML
BUN SERPL-MCNC: 15 MG/DL
CALCIUM SERPL-MCNC: 9.7 MG/DL
CHLORIDE SERPL-SCNC: 105 MMOL/L
CO2 SERPL-SCNC: 29 MMOL/L
CREAT SERPL-MCNC: 0.8 MG/DL
DIFFERENTIAL METHOD: ABNORMAL
EOSINOPHIL # BLD AUTO: 0.3 K/UL
EOSINOPHIL NFR BLD: 5.6 %
ERYTHROCYTE [DISTWIDTH] IN BLOOD BY AUTOMATED COUNT: 15 %
EST. GFR  (AFRICAN AMERICAN): >60 ML/MIN/1.73 M^2
EST. GFR  (NON AFRICAN AMERICAN): >60 ML/MIN/1.73 M^2
GLUCOSE SERPL-MCNC: 86 MG/DL
HCT VFR BLD AUTO: 38.6 %
HGB BLD-MCNC: 12.5 G/DL
LYMPHOCYTES # BLD AUTO: 1.5 K/UL
LYMPHOCYTES NFR BLD: 28.7 %
MCH RBC QN AUTO: 28.9 PG
MCHC RBC AUTO-ENTMCNC: 32.4 G/DL
MCV RBC AUTO: 89 FL
MONOCYTES # BLD AUTO: 0.6 K/UL
MONOCYTES NFR BLD: 10.7 %
NEUTROPHILS # BLD AUTO: 2.9 K/UL
NEUTROPHILS NFR BLD: 54.4 %
PLATELET # BLD AUTO: 255 K/UL
PMV BLD AUTO: 9.3 FL
POCT GLUCOSE: 121 MG/DL (ref 70–110)
POCT GLUCOSE: 151 MG/DL (ref 70–110)
POTASSIUM SERPL-SCNC: 2.9 MMOL/L
PROT SERPL-MCNC: 7.2 G/DL
RBC # BLD AUTO: 4.33 M/UL
SODIUM SERPL-SCNC: 144 MMOL/L
TROPONIN I SERPL DL<=0.01 NG/ML-MCNC: 0.09 NG/ML
TROPONIN I SERPL DL<=0.01 NG/ML-MCNC: 0.09 NG/ML
WBC # BLD AUTO: 5.34 K/UL

## 2018-07-17 PROCEDURE — 93306 TTE W/DOPPLER COMPLETE: CPT

## 2018-07-17 PROCEDURE — 63600175 PHARM REV CODE 636 W HCPCS: Performed by: HOSPITALIST

## 2018-07-17 PROCEDURE — 25000003 PHARM REV CODE 250: Performed by: EMERGENCY MEDICINE

## 2018-07-17 PROCEDURE — 25000003 PHARM REV CODE 250: Performed by: HOSPITALIST

## 2018-07-17 PROCEDURE — 11000001 HC ACUTE MED/SURG PRIVATE ROOM

## 2018-07-17 PROCEDURE — 36415 COLL VENOUS BLD VENIPUNCTURE: CPT

## 2018-07-17 PROCEDURE — 83036 HEMOGLOBIN GLYCOSYLATED A1C: CPT

## 2018-07-17 PROCEDURE — 85025 COMPLETE CBC W/AUTO DIFF WBC: CPT

## 2018-07-17 PROCEDURE — G0378 HOSPITAL OBSERVATION PER HR: HCPCS

## 2018-07-17 PROCEDURE — 99284 EMERGENCY DEPT VISIT MOD MDM: CPT | Mod: 25

## 2018-07-17 PROCEDURE — 96374 THER/PROPH/DIAG INJ IV PUSH: CPT

## 2018-07-17 PROCEDURE — 96360 HYDRATION IV INFUSION INIT: CPT | Mod: 59

## 2018-07-17 PROCEDURE — 99220 PR INITIAL OBSERVATION CARE,LEVL III: CPT | Mod: ,,, | Performed by: HOSPITALIST

## 2018-07-17 PROCEDURE — 84484 ASSAY OF TROPONIN QUANT: CPT | Mod: 91

## 2018-07-17 PROCEDURE — 93010 ELECTROCARDIOGRAM REPORT: CPT | Mod: ,,, | Performed by: INTERNAL MEDICINE

## 2018-07-17 PROCEDURE — 96361 HYDRATE IV INFUSION ADD-ON: CPT

## 2018-07-17 PROCEDURE — 84484 ASSAY OF TROPONIN QUANT: CPT

## 2018-07-17 PROCEDURE — 80053 COMPREHEN METABOLIC PANEL: CPT

## 2018-07-17 PROCEDURE — 83880 ASSAY OF NATRIURETIC PEPTIDE: CPT

## 2018-07-17 RX ORDER — ENOXAPARIN SODIUM 100 MG/ML
40 INJECTION SUBCUTANEOUS EVERY 24 HOURS
Status: DISCONTINUED | OUTPATIENT
Start: 2018-07-17 | End: 2018-07-19 | Stop reason: HOSPADM

## 2018-07-17 RX ORDER — ASPIRIN 325 MG
325 TABLET ORAL DAILY
Status: DISCONTINUED | OUTPATIENT
Start: 2018-07-17 | End: 2018-07-19 | Stop reason: HOSPADM

## 2018-07-17 RX ORDER — ATORVASTATIN CALCIUM 20 MG/1
40 TABLET, FILM COATED ORAL DAILY
Status: DISCONTINUED | OUTPATIENT
Start: 2018-07-17 | End: 2018-07-19 | Stop reason: HOSPADM

## 2018-07-17 RX ORDER — FUROSEMIDE 10 MG/ML
40 INJECTION INTRAMUSCULAR; INTRAVENOUS ONCE
Status: COMPLETED | OUTPATIENT
Start: 2018-07-17 | End: 2018-07-17

## 2018-07-17 RX ORDER — HYDROCORTISONE 20 MG/1
20 TABLET ORAL EVERY MORNING
Status: DISCONTINUED | OUTPATIENT
Start: 2018-07-18 | End: 2018-07-19 | Stop reason: HOSPADM

## 2018-07-17 RX ORDER — IBUPROFEN 200 MG
16 TABLET ORAL
Status: DISCONTINUED | OUTPATIENT
Start: 2018-07-17 | End: 2018-07-19 | Stop reason: HOSPADM

## 2018-07-17 RX ORDER — ONDANSETRON 2 MG/ML
4 INJECTION INTRAMUSCULAR; INTRAVENOUS EVERY 8 HOURS PRN
Status: DISCONTINUED | OUTPATIENT
Start: 2018-07-17 | End: 2018-07-18 | Stop reason: SDUPTHER

## 2018-07-17 RX ORDER — ACETAMINOPHEN 500 MG
1000 TABLET ORAL
Status: COMPLETED | OUTPATIENT
Start: 2018-07-17 | End: 2018-07-17

## 2018-07-17 RX ORDER — HYDRALAZINE HYDROCHLORIDE 20 MG/ML
10 INJECTION INTRAMUSCULAR; INTRAVENOUS
Status: DISCONTINUED | OUTPATIENT
Start: 2018-07-17 | End: 2018-07-17

## 2018-07-17 RX ORDER — NITROGLYCERIN 80 MG/1
1 PATCH TRANSDERMAL DAILY
Status: DISCONTINUED | OUTPATIENT
Start: 2018-07-18 | End: 2018-07-17

## 2018-07-17 RX ORDER — INSULIN ASPART 100 [IU]/ML
0-5 INJECTION, SOLUTION INTRAVENOUS; SUBCUTANEOUS
Status: DISCONTINUED | OUTPATIENT
Start: 2018-07-17 | End: 2018-07-19 | Stop reason: HOSPADM

## 2018-07-17 RX ORDER — POTASSIUM CHLORIDE 20 MEQ/1
40 TABLET, EXTENDED RELEASE ORAL
Status: COMPLETED | OUTPATIENT
Start: 2018-07-17 | End: 2018-07-17

## 2018-07-17 RX ORDER — HYDROCORTISONE 10 MG/1
10 TABLET ORAL NIGHTLY
Status: DISCONTINUED | OUTPATIENT
Start: 2018-07-17 | End: 2018-07-19 | Stop reason: HOSPADM

## 2018-07-17 RX ORDER — FLUDROCORTISONE ACETATE 0.1 MG/1
100 TABLET ORAL DAILY
Status: DISCONTINUED | OUTPATIENT
Start: 2018-07-18 | End: 2018-07-19 | Stop reason: HOSPADM

## 2018-07-17 RX ORDER — OXYCODONE HYDROCHLORIDE 5 MG/1
5 TABLET ORAL EVERY 6 HOURS PRN
Status: DISCONTINUED | OUTPATIENT
Start: 2018-07-17 | End: 2018-07-18 | Stop reason: SDUPTHER

## 2018-07-17 RX ORDER — GLUCAGON 1 MG
1 KIT INJECTION
Status: DISCONTINUED | OUTPATIENT
Start: 2018-07-17 | End: 2018-07-19 | Stop reason: HOSPADM

## 2018-07-17 RX ORDER — NIFEDIPINE 30 MG/1
90 TABLET, EXTENDED RELEASE ORAL DAILY
Status: DISCONTINUED | OUTPATIENT
Start: 2018-07-17 | End: 2018-07-19 | Stop reason: HOSPADM

## 2018-07-17 RX ORDER — LOSARTAN POTASSIUM 50 MG/1
50 TABLET ORAL DAILY
Status: DISCONTINUED | OUTPATIENT
Start: 2018-07-17 | End: 2018-07-19 | Stop reason: HOSPADM

## 2018-07-17 RX ORDER — NITROGLYCERIN 80 MG/1
1 PATCH TRANSDERMAL DAILY
Status: DISCONTINUED | OUTPATIENT
Start: 2018-07-17 | End: 2018-07-19 | Stop reason: HOSPADM

## 2018-07-17 RX ORDER — IBUPROFEN 200 MG
24 TABLET ORAL
Status: DISCONTINUED | OUTPATIENT
Start: 2018-07-17 | End: 2018-07-19 | Stop reason: HOSPADM

## 2018-07-17 RX ORDER — SODIUM CHLORIDE 9 MG/ML
1000 INJECTION, SOLUTION INTRAVENOUS
Status: COMPLETED | OUTPATIENT
Start: 2018-07-17 | End: 2018-07-17

## 2018-07-17 RX ORDER — ACETAMINOPHEN 500 MG
1000 TABLET ORAL EVERY 6 HOURS PRN
Status: DISCONTINUED | OUTPATIENT
Start: 2018-07-17 | End: 2018-07-18 | Stop reason: SDUPTHER

## 2018-07-17 RX ADMIN — HYDROCORTISONE 10 MG: 10 TABLET ORAL at 09:07

## 2018-07-17 RX ADMIN — ENOXAPARIN SODIUM 40 MG: 100 INJECTION SUBCUTANEOUS at 05:07

## 2018-07-17 RX ADMIN — ASPIRIN 325 MG ORAL TABLET 325 MG: 325 PILL ORAL at 02:07

## 2018-07-17 RX ADMIN — METHYLPREDNISOLONE ACETATE 40 MG: 40 INJECTION, SUSPENSION INTRA-ARTICULAR; INTRALESIONAL; INTRAMUSCULAR; SOFT TISSUE at 10:07

## 2018-07-17 RX ADMIN — LOSARTAN POTASSIUM 50 MG: 50 TABLET, FILM COATED ORAL at 02:07

## 2018-07-17 RX ADMIN — SODIUM CHLORIDE 1000 ML: 0.9 INJECTION, SOLUTION INTRAVENOUS at 12:07

## 2018-07-17 RX ADMIN — BUPIVACAINE HYDROCHLORIDE 10 ML: 2.5 INJECTION, SOLUTION EPIDURAL; INFILTRATION; INTRACAUDAL; PERINEURAL at 10:07

## 2018-07-17 RX ADMIN — NITROGLYCERIN TRANSDERMAL SYSTEM 1 PATCH: 0.4 PATCH, EXTENDED RELEASE TRANSDERMAL at 05:07

## 2018-07-17 RX ADMIN — POTASSIUM CHLORIDE 40 MEQ: 1500 TABLET, EXTENDED RELEASE ORAL at 01:07

## 2018-07-17 RX ADMIN — ATORVASTATIN CALCIUM 40 MG: 20 TABLET, FILM COATED ORAL at 02:07

## 2018-07-17 RX ADMIN — LIDOCAINE HYDROCHLORIDE 10 ML: 10 INJECTION, SOLUTION INFILTRATION; PERINEURAL at 10:07

## 2018-07-17 RX ADMIN — FUROSEMIDE 40 MG: 10 INJECTION, SOLUTION INTRAVENOUS at 05:07

## 2018-07-17 RX ADMIN — NIFEDIPINE 90 MG: 30 TABLET, FILM COATED, EXTENDED RELEASE ORAL at 02:07

## 2018-07-17 RX ADMIN — ACETAMINOPHEN 1000 MG: 500 TABLET, FILM COATED ORAL at 02:07

## 2018-07-17 RX ADMIN — IOHEXOL 3 ML: 300 INJECTION, SOLUTION INTRAVENOUS at 10:07

## 2018-07-17 NOTE — H&P
[facility] Hospital Medicine  History & Physical    Patient Name: Hailey Sawant  MRN: [id number]  Admission Date: 7/17/2018  Attending Physician: Hipolito Ruelas MD   Primary Care Provider: Rodolfo Burton MD         Patient information was obtained from patient, past medical records and ER records.     Subjective:     Principal Problem:Chest pain    Chief Complaint:   Chief Complaint   Patient presents with    Irregular Heart Beat     Pt sent down from pain mgt clinic for further eval for irregular heart beat. Pt was scheduled for an JERO for which upon preprocedure an irregular heart beat was noted.        HPI: Patient is a 73 year-old woman with hypertension, diabetes mellitus type II, prior history of metastatic pheochromocytoma with recurrent disease for which she underwent an total adrenalectomy and partial nephrectomy, who presents with worsening dyspnea on exertion over the course of the last 2 weeks along with chest pressure which she reports started while she was in the emergency department.  She reports her chest pressure is relatively mild in nature but new and uncomfortable.  Patient states that [facility] was [facility] for epidural steriod injection however the procedure was canceled due to marked bradycardia along with her shortness of breath and therefore she was sent to the emergency department for evaluation.  She denies any fevers, chills, cough, sputum production, nausea, vomiting, or abdominal pain.  She states she did not take her home blood pressure medications this morning as instructed by [facility].    Past Medical History:   Diagnosis Date    Diabetes mellitus     Hypertension     Lumbar spondylosis 6/8/2018    Malignant pheochromocytoma     Pheochromocytoma     Pheochromocytoma, malignant 01/10/2016    Pheochromocytoma, malignant     Sacroiliac joint pain 7/11/2018    Thyroid disease        Past Surgical History:   Procedure  "Laterality Date    ADRENALECTOMY      APPENDECTOMY      INJECTION OF ANESTHETIC AGENT INTO SACROILIAC JOINT Right 6/26/2018    Procedure: BLOCK, SACROILIAC JOINT;  Surgeon: Sydney Reynoso MD;  Location: [facility];  Service: Pain Management;  Laterality: Right;  Right SI Joint Injection    [id number]    NEEDS CONSENT    MIBG Therapy  3/2016, 7/2016, 12/2016    [facility] - Dr. Martines       Review of patient's allergies indicates:  No Known Allergies    Current Facility-Administered Medications on File Prior to Encounter   Medication    [DISCONTINUED] 0.9%  NaCl infusion    [DISCONTINUED] bupivacaine (PF) 0.25% (2.5 mg/ml) injection    [DISCONTINUED] lidocaine HCL 10 mg/ml (1%) injection    [DISCONTINUED] methylPREDNISolone acetate injection    [DISCONTINUED] omnipaque 300 iohexol     Current Outpatient Prescriptions on File Prior to Encounter   Medication Sig    aspirin 81 MG Chew Take 81 mg by mouth once daily.    cholecalciferol, vitamin D3, (VITAMIN D3) 5,000 unit Tab Take 5,000 Units by mouth once daily.    fludrocortisone (FLORINEF) 0.1 mg Tab Take 100 mcg by mouth once daily.    furosemide (LASIX) 40 MG tablet Take 20 mg by mouth. Monday and Friday    hydrocortisone (CORTEF) 20 MG Tab TAKE 1 TABLET EVERY MORNING  AND TAKE 1/2 TABLET EVERY EVENING  AT  5PM    losartan (COZAAR) 50 MG tablet Take 50 mg by mouth once daily.    metFORMIN (GLUCOPHAGE-XR) 500 MG 24 hr tablet TAKE 1 TABLET TWICE DAILY WITH MEALS    NIFEdipine (PROCARDIA-XL) 90 MG (OSM) 24 hr tablet     oxycodone (ROXICODONE) 5 MG immediate release tablet Take 1 tablet (5 mg total) by mouth every 4 (four) hours as needed.    acetaminophen 500 mg/15 mL Liqd     alcohol antiseptic pads (ALCOHOL PREP PADS TOP)     aspirin 81 MG Chew     BD LUER-GENTRY SYRINGE 3 mL 18 x 1 1/2" Syrg     BD REGULAR BEVEL NEEDLES 25 gauge x 1 1/2" Ndle USE AS DIRECTED    bisacodyl (DULCOLAX) 10 mg Supp Place 1 suppository (10 mg total) rectally once daily. "    mometasone 0.1% (ELOCON) 0.1 % cream     TRUE METRIX AIR GLUCOSE METER kit CHECK BLOOD SUGAR ONE TIME DAILY    TRUE METRIX GLUCOSE TEST STRIP Strp CHECK BLOOD SUGAR ONE TIME DAILY    TRUEPLUS LANCETS 28 gauge Misc TEST ONE TIME DAILY    [DISCONTINUED] celecoxib (CELEBREX) 200 MG capsule TAKE 1 CAPSULE(200 MG) BY MOUTH TWICE DAILY     Family History     Problem Relation (Age of Onset)    Diabetes Mother    Heart disease Mother, Father        Social History Main Topics    Smoking status: Never Smoker    Smokeless tobacco: Never Used    Alcohol use No    Drug use: No    Sexual activity: Not on file     Review of Systems   Constitutional: Negative for chills and fever.   HENT: Negative for congestion, hearing loss, sinus pressure and trouble swallowing.    Eyes: Negative for photophobia, pain, redness and visual disturbance.   Respiratory: Positive for chest tightness and shortness of breath. Negative for cough and wheezing.    Cardiovascular: Negative for chest pain and palpitations.   Gastrointestinal: Negative for abdominal distention, abdominal pain, blood in stool, constipation, diarrhea, nausea and vomiting.   Endocrine: Negative for cold intolerance, heat intolerance, polydipsia, polyphagia and polyuria.   Genitourinary: Negative for dysuria and frequency.   Musculoskeletal: Negative for arthralgias, back pain, gait problem, joint swelling, myalgias and neck pain.   Allergic/Immunologic: Negative for environmental allergies, food allergies and immunocompromised state.   Neurological: Negative for dizziness, seizures, syncope, weakness, light-headedness and headaches.   Hematological: Negative for adenopathy.   Psychiatric/Behavioral: Negative for agitation, behavioral problems and confusion.     Objective:     Vital Signs (Most Recent):  Temp: 98.4 °F (36.9 °C) (07/17/18 1612)  Pulse: (!) 54 (07/17/18 1612)  Resp: 18 (07/17/18 1612)  BP: (!) 173/81 (07/17/18 1612)  SpO2: 98 % (07/17/18 1612) Vital  Signs (24h Range):  Temp:  [97.7 °F (36.5 °C)-98.4 °F (36.9 °C)] 98.4 °F (36.9 °C)  Pulse:  [50-63] 54  Resp:  [18-23] 18  SpO2:  [96 %-98 %] 98 %  BP: (147-204)/(70-92) 173/81     Weight: 90.7 kg (200 lb)  Body mass index is 35.43 kg/m².    Physical Exam   Constitutional: She is oriented to person, place, and time. She appears well-developed and well-nourished. No distress.   HENT:   Head: Normocephalic and atraumatic.   Nose: Nose normal.   Mouth/Throat: Oropharynx is clear and moist. No oropharyngeal exudate.   Eyes: Conjunctivae and EOM are normal. Pupils are equal, round, and reactive to light. Right eye exhibits no discharge. Left eye exhibits no discharge. No scleral icterus.   Neck: Normal range of motion. Neck supple. No JVD present. No tracheal deviation present. No thyromegaly present.   Cardiovascular: Regular rhythm and intact distal pulses.  Exam reveals no gallop and no friction rub.    Murmur heard.  Mild bradycardia.   Pulmonary/Chest: Effort normal and breath sounds normal. No stridor. No respiratory distress. She has no wheezes. She has no rales. She exhibits no tenderness.   Abdominal: Soft. Bowel sounds are normal. She exhibits no distension and no mass. There is no tenderness. There is no rebound and no guarding.   Musculoskeletal: Normal range of motion. She exhibits no edema or tenderness.   Lymphadenopathy:     She has no cervical adenopathy.   Neurological: She is alert and oriented to person, place, and time. She has normal reflexes. She displays normal reflexes. No cranial nerve deficit. She exhibits normal muscle tone. Coordination normal.   Skin: Skin is warm and dry. No rash noted. She is not diaphoretic. No erythema. No pallor.   Psychiatric: She has a normal mood and affect. Her behavior is normal. Judgment and thought content normal.         CRANIAL NERVES     CN III, IV, VI   Pupils are equal, round, and reactive to light.  Extraocular motions are normal.        Significant Labs:  All pertinent labs within the past 24 hours have been reviewed.    Significant Imaging: I have reviewed all pertinent imaging results/findings within the past 24 hours.    Assessment/Plan:     * Chest pain    Patient with T wave inversions in the inferior leads and mild elevation in serum troponin.  Possible demand ischemic due to marked hypertension secondary to not taking her home blood pressure medications this morning.  Will admit to rule out acute myocardial infarction with serial troponins and electrocardiograms.  Medical therapy with aspirin, nitroglycerin, and statin therapy.  Hold off on beta-blocker therapy as she is already mildly bradycardic.        Acute heart failure    Dyspnea along with mild evidence of hypervolemia along with elevated BNP suggests component of heart failure which likely causing her dyspnea on exertion.  Will check echocardiogram and give intravenous furosemide to achieve negative fluid balance.         Essential hypertension    Poorly controlled.  Restart home regimen and then adjust further as needed to achieve reasonable blood pressure control.        Controlled type 2 diabetes mellitus without complication    Hold metformin.  Sliding scale insulin for now.   Checking hemoglobin A1c.        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  Resume home regimen of glucocorticoid and mineralocorticoid replacement.          VTE Risk Mitigation         Ordered     enoxaparin injection 40 mg  Daily      07/17/18 1638     IP VTE HIGH RISK PATIENT  Once      07/17/18 1638             Hipolito Ruelas MD  Department of Hospital Medicine   [facility]

## 2018-07-17 NOTE — SUBJECTIVE & OBJECTIVE
Past Medical History:   Diagnosis Date    Diabetes mellitus     Hypertension     Lumbar spondylosis 6/8/2018    Malignant pheochromocytoma     Pheochromocytoma     Pheochromocytoma, malignant 01/10/2016    Pheochromocytoma, malignant     Sacroiliac joint pain 7/11/2018    Thyroid disease        Past Surgical History:   Procedure Laterality Date    ADRENALECTOMY      APPENDECTOMY      INJECTION OF ANESTHETIC AGENT INTO SACROILIAC JOINT Right 6/26/2018    Procedure: BLOCK, SACROILIAC JOINT;  Surgeon: Sydney Reynoso MD;  Location: [facility];  Service: Pain Management;  Laterality: Right;  Right SI Joint Injection    [id number]    NEEDS CONSENT    MIBG Therapy  3/2016, 7/2016, 12/2016    [facility] [place] - Dr. Martines       Review of patient's allergies indicates:  No Known Allergies    Current Facility-Administered Medications on File Prior to Encounter   Medication    [DISCONTINUED] 0.9%  NaCl infusion    [DISCONTINUED] bupivacaine (PF) 0.25% (2.5 mg/ml) injection    [DISCONTINUED] lidocaine HCL 10 mg/ml (1%) injection    [DISCONTINUED] methylPREDNISolone acetate injection    [DISCONTINUED] omnipaque 300 iohexol     Current Outpatient Prescriptions on File Prior to Encounter   Medication Sig    aspirin 81 MG Chew Take 81 mg by mouth once daily.    cholecalciferol, vitamin D3, (VITAMIN D3) 5,000 unit Tab Take 5,000 Units by mouth once daily.    fludrocortisone (FLORINEF) 0.1 mg Tab Take 100 mcg by mouth once daily.    furosemide (LASIX) 40 MG tablet Take 20 mg by mouth. Monday and Friday    hydrocortisone (CORTEF) 20 MG Tab TAKE 1 TABLET EVERY MORNING  AND TAKE 1/2 TABLET EVERY EVENING  AT  5PM    losartan (COZAAR) 50 MG tablet Take 50 mg by mouth once daily.    metFORMIN (GLUCOPHAGE-XR) 500 MG 24 hr tablet TAKE 1 TABLET TWICE DAILY WITH MEALS    NIFEdipine (PROCARDIA-XL) 90 MG (OSM) 24 hr tablet     oxycodone (ROXICODONE) 5 MG immediate release tablet Take 1 tablet (5 mg total) by mouth every 4  "(four) hours as needed.    acetaminophen 500 mg/15 mL Liqd     alcohol antiseptic pads (ALCOHOL PREP PADS TOP)     aspirin 81 MG Chew     BD LUER-GENTRY SYRINGE 3 mL 18 x 1 1/2" Syrg     BD REGULAR BEVEL NEEDLES 25 gauge x 1 1/2" Ndle USE AS DIRECTED    bisacodyl (DULCOLAX) 10 mg Supp Place 1 suppository (10 mg total) rectally once daily.    mometasone 0.1% (ELOCON) 0.1 % cream     TRUE METRIX AIR GLUCOSE METER kit CHECK BLOOD SUGAR ONE TIME DAILY    TRUE METRIX GLUCOSE TEST STRIP Strp CHECK BLOOD SUGAR ONE TIME DAILY    TRUEPLUS LANCETS 28 gauge Misc TEST ONE TIME DAILY    [DISCONTINUED] celecoxib (CELEBREX) 200 MG capsule TAKE 1 CAPSULE(200 MG) BY MOUTH TWICE DAILY     Family History     Problem Relation (Age of Onset)    Diabetes Mother    Heart disease Mother, Father        Social History Main Topics    Smoking status: Never Smoker    Smokeless tobacco: Never Used    Alcohol use No    Drug use: No    Sexual activity: Not on file     Review of Systems   Constitutional: Negative for chills and fever.   HENT: Negative for congestion, hearing loss, sinus pressure and trouble swallowing.    Eyes: Negative for photophobia, pain, redness and visual disturbance.   Respiratory: Positive for chest tightness and shortness of breath. Negative for cough and wheezing.    Cardiovascular: Negative for chest pain and palpitations.   Gastrointestinal: Negative for abdominal distention, abdominal pain, blood in stool, constipation, diarrhea, nausea and vomiting.   Endocrine: Negative for cold intolerance, heat intolerance, polydipsia, polyphagia and polyuria.   Genitourinary: Negative for dysuria and frequency.   Musculoskeletal: Negative for arthralgias, back pain, gait problem, joint swelling, myalgias and neck pain.   Allergic/Immunologic: Negative for environmental allergies, food allergies and immunocompromised state.   Neurological: Negative for dizziness, seizures, syncope, weakness, light-headedness and " headaches.   Hematological: Negative for adenopathy.   Psychiatric/Behavioral: Negative for agitation, behavioral problems and confusion.     Objective:     Vital Signs (Most Recent):  Temp: 98.4 °F (36.9 °C) (07/17/18 1612)  Pulse: (!) 54 (07/17/18 1612)  Resp: 18 (07/17/18 1612)  BP: (!) 173/81 (07/17/18 1612)  SpO2: 98 % (07/17/18 1612) Vital Signs (24h Range):  Temp:  [97.7 °F (36.5 °C)-98.4 °F (36.9 °C)] 98.4 °F (36.9 °C)  Pulse:  [50-63] 54  Resp:  [18-23] 18  SpO2:  [96 %-98 %] 98 %  BP: (147-204)/(70-92) 173/81     Weight: 90.7 kg (200 lb)  Body mass index is 35.43 kg/m².    Physical Exam   Constitutional: She is oriented to person, place, and time. She appears well-developed and well-nourished. No distress.   HENT:   Head: Normocephalic and atraumatic.   Nose: Nose normal.   Mouth/Throat: Oropharynx is clear and moist. No oropharyngeal exudate.   Eyes: Conjunctivae and EOM are normal. Pupils are equal, round, and reactive to light. Right eye exhibits no discharge. Left eye exhibits no discharge. No scleral icterus.   Neck: Normal range of motion. Neck supple. No JVD present. No tracheal deviation present. No thyromegaly present.   Cardiovascular: Regular rhythm and intact distal pulses.  Exam reveals no gallop and no friction rub.    Murmur heard.  Mild bradycardia.   Pulmonary/Chest: Effort normal and breath sounds normal. No stridor. No respiratory distress. She has no wheezes. She has no rales. She exhibits no tenderness.   Abdominal: Soft. Bowel sounds are normal. She exhibits no distension and no mass. There is no tenderness. There is no rebound and no guarding.   Musculoskeletal: Normal range of motion. She exhibits no edema or tenderness.   Lymphadenopathy:     She has no cervical adenopathy.   Neurological: She is alert and oriented to person, place, and time. She has normal reflexes. She displays normal reflexes. No cranial nerve deficit. She exhibits normal muscle tone. Coordination normal.    Skin: Skin is warm and dry. No rash noted. She is not diaphoretic. No erythema. No pallor.   Psychiatric: She has a normal mood and affect. Her behavior is normal. Judgment and thought content normal.         CRANIAL NERVES     CN III, IV, VI   Pupils are equal, round, and reactive to light.  Extraocular motions are normal.        Significant Labs: All pertinent labs within the past 24 hours have been reviewed.    Significant Imaging: I have reviewed all pertinent imaging results/findings within the past 24 hours.

## 2018-07-17 NOTE — HOSPITAL COURSE
Patient is a 73 year-old woman with hypertension, diabetes mellitus type II, prior history of metastatic pheochromocytoma with recurrent disease for which she underwent an total adrenalectomy and partial nephrectomy, who presents with worsening dyspnea on exertion over the course of the last 2 weeks along with chest pressure.  Patient admitted under observation for evaluation of possible acute myocardial infarction.  Patient reports chest pain improved overnight however she continues to have chest discomfort.  Troponins minimally elevated and electrocardiogram shows inverted T waves in the inferior leads.  Cardiology consulted and recommended left heart catheterization and coronary angiography which was performed on 7/18/2018 which did not reveal evidence of obstructive disease.  Patient did demonstrate evidence of sick sinus syndrome during her hospital stay.  No urgent intervention required at this time however Dr. Moustapha Vasquez outpatient cardiology follow-up.

## 2018-07-17 NOTE — ED NOTES
Pt was at pain clinic appt today and they noticed her HR was irregular. This is new development for PT. Denies N/V, denies HA, denies visual changes, denies chest pain. Pt appears to be resting comfortable. EKG was done at bedside.

## 2018-07-17 NOTE — ASSESSMENT & PLAN NOTE
Patient with T wave inversions in the inferior leads and mild elevation in serum troponin.  Possible demand ischemic due to marked hypertension secondary to not taking her home blood pressure medications this morning.  Will admit to rule out acute myocardial infarction with serial troponins and electrocardiograms.  Medical therapy with aspirin, nitroglycerin, and statin therapy.  Hold off on beta-blocker therapy as she is already mildly bradycardic.

## 2018-07-17 NOTE — ASSESSMENT & PLAN NOTE
Poorly controlled.  Restart home regimen and then adjust further as needed to achieve reasonable blood pressure control.

## 2018-07-17 NOTE — ASSESSMENT & PLAN NOTE
Dyspnea along with mild evidence of hypervolemia along with elevated BNP suggests component of heart failure which likely causing her dyspnea on exertion.  Will check echocardiogram and give intravenous furosemide to achieve negative fluid balance.

## 2018-07-17 NOTE — ED PROVIDER NOTES
Encounter Date: 7/17/2018    SCRIBE #1 NOTE: Millie BATES am scribing for, and in the presence of, Dr. Odom.       History     Chief Complaint   Patient presents with    Irregular Heart Beat     Pt sent down from pain mgt clinic for further eval for irregular heart beat. Pt was scheduled for an JERO for which upon preprocedure an irregular heart beat was noted.     Time seen by provider: 11:50 AM    This is a 73 y.o. female who presents with complaint of irregular heart beat. The patient was sent from pain management who noticed onset during pre procedure of epidural steroid injection. She reports SOB with movement that began two weeks ago, but denies fever, nausea, vomiting, abdominal pain, chest pain, palpitations or weakness. She states she is normally compliant with HTN medication but did not take it today secondary to procedure. She denies previous history of arrhythmias or heart murmur. She denies previous history of heart disease but reports family history of heart disease. She denies use of tobacco, alcohol or illicit drugs. Patient lives in [place], [place].      The history is provided by the patient.     Review of patient's allergies indicates:  No Known Allergies  Past Medical History:   Diagnosis Date    Diabetes mellitus     Hypertension     Lumbar spondylosis 6/8/2018    Malignant pheochromocytoma     Pheochromocytoma     Pheochromocytoma, malignant 01/10/2016    Pheochromocytoma, malignant     Sacroiliac joint pain 7/11/2018    Thyroid disease      Past Surgical History:   Procedure Laterality Date    ADRENALECTOMY      APPENDECTOMY      INJECTION OF ANESTHETIC AGENT INTO SACROILIAC JOINT Right 6/26/2018    Procedure: BLOCK, SACROILIAC JOINT;  Surgeon: Sydney Reynoso MD;  Location: [facility];  Service: Pain Management;  Laterality: Right;  Right SI Joint Injection    [address]    NEEDS CONSENT    MIBG Therapy  3/2016, 7/2016, 12/2016    [facility] - Dr. Martines     Family History   Problem  Relation Age of Onset    Heart disease Mother     Diabetes Mother     Heart disease Father     Melanoma Neg Hx      Social History   Substance Use Topics    Smoking status: Never Smoker    Smokeless tobacco: Never Used    Alcohol use No     Review of Systems   Constitutional: Negative for activity change, appetite change, chills, diaphoresis and fever.   HENT: Negative for congestion, sore throat and trouble swallowing.    Eyes: Negative for photophobia and visual disturbance.   Respiratory: Positive for shortness of breath. Negative for cough and chest tightness.    Cardiovascular: Negative for chest pain and palpitations.   Gastrointestinal: Negative for abdominal pain, nausea and vomiting.   Endocrine: Negative for polydipsia and polyuria.   Genitourinary: Negative for difficulty urinating and flank pain.   Musculoskeletal: Negative for back pain and neck pain.   Skin: Negative for rash.   Neurological: Negative for weakness and headaches.   Psychiatric/Behavioral: Negative for confusion.       Physical Exam     Initial Vitals [07/17/18 1128]   BP Pulse Resp Temp SpO2   (!) 188/87 (!) 59 18 97.8 °F (36.6 °C) 97 %      MAP       --         Physical Exam    Nursing note and vitals reviewed.  Constitutional: She appears well-developed and well-nourished. She is not diaphoretic. No distress.   Overweight.    HENT:   Head: Normocephalic and atraumatic.   Right Ear: External ear normal.   Left Ear: External ear normal.   Mucous membranes are dry.   Eyes: EOM are normal. Pupils are equal, round, and reactive to light. Right eye exhibits no discharge. Left eye exhibits no discharge.   No pallor or icterus.   Neck: Normal range of motion.   Cardiovascular: Normal rate and regular rhythm. Exam reveals no gallop and no friction rub.    2/6 systolic murmur heard best at right upper sternal border. Occasional irregular beats.   Pulmonary/Chest: Breath sounds normal. No respiratory distress. She has no wheezes. She has  no rhonchi. She has no rales.   Abdominal: Soft. There is no tenderness. There is no rebound and no guarding.   Musculoskeletal: Normal range of motion. She exhibits no edema or tenderness.   Neurological: She is alert and oriented to person, place, and time.   Skin: Skin is warm and dry. No rash and no abscess noted. No erythema. No pallor.   Psychiatric: She has a normal mood and affect. Her behavior is normal. Judgment and thought content normal.         ED Course   Procedures  Labs Reviewed   CBC W/ AUTO DIFFERENTIAL - Abnormal; Notable for the following:        Result Value    RDW 15.0 (*)     All other components within normal limits   COMPREHENSIVE METABOLIC PANEL - Abnormal; Notable for the following:     Potassium 2.9 (*)     ALT 6 (*)     All other components within normal limits   B-TYPE NATRIURETIC PEPTIDE - Abnormal; Notable for the following:      (*)     All other components within normal limits   TROPONIN I - Abnormal; Notable for the following:     Troponin I 0.092 (*)     All other components within normal limits     EKG Readings: (Independently Interpreted)   Sinus rhythm with frequent PVCs. Rate of 60 bpm. Inferior and lateral precordial T waves inversions.       Imaging Results          X-Ray Chest AP Portable (Final result)  Result time 07/17/18 14:31:34    Final result by Hernandez Alvarado MD (07/17/18 14:31:34)                 Impression:      Cardiomegaly and central vascular congestion.      Electronically signed by: Hernandez Alvarado MD  Date:    07/17/2018  Time:    14:31             Narrative:    EXAMINATION:  XR CHEST AP PORTABLE    CLINICAL HISTORY:  Chest Pain;    TECHNIQUE:  Single frontal view of the chest was performed.    COMPARISON:  10/02/2017    FINDINGS:  The cardiac silhouette is enlarged but unchanged from prior.  There is central vascular congestion and interstitial prominence.  No consolidation.  No definitive pleural effusion.  No pneumothorax.                               X-Rays:   Independently Interpreted Readings:   Chest X-Ray: Cardiomegaly present. No focal infiltrate. No pulmonary edema.     Medical Decision Making:   Clinical Tests:   Lab Tests: Ordered and Reviewed  Radiological Study: Ordered and Reviewed  Medical Tests: Ordered and Reviewed  ED Management:  1:33 PM- Case discussed with Dr. Vega. Will admit to Dr. Ruelas.    1:58 PM- Patient's sister now informs me that pain management clinic scheduled an appointment with her cardiologist, Dr. Duque's nurse practitioner tomorrow at 2:30 PM. Will contact office for further disscusion.    2:14 PM- Patient now reports to hospital services experiencing chest pain. There will admit here for serial enzymes and echocardiogram.            Scribe Attestation:   Scribe #1: I performed the above scribed service and the documentation accurately describes the services I performed. I attest to the accuracy of the note.    Attending Attestation:           Physician Attestation for Scribe:  Physician Attestation Statement for Scribe #1: I, Dr. Odom, reviewed documentation, as scribed by Millie Syed in my presence, and it is both accurate and complete.           Patient referred referred from Pain Management Clinic were a objection procedure was canceled due to irregular, low heart rate.  Patient complained to me of some dyspnea on exertion over the past few weeks but denied chest pain. On exam she has a heart murmur which she was unsure had been described before.  Reports prior echo a few years ago but unsure of results her EKG is abnormal but does not appear to be acute ischemia laboratory studies showed hypokalemia which was orally repleted.  Her BNP was trivially elevated.  Troponin was elevated and she had already had outpatient cardiology clinic arranged for tomorrow however in further discussions with hospitalist service, her cardiologist, the patient subsequently started reporting some chest pain in therefore due to  the multiple cardiac concerns, she will be admitted here for serial enzymes, echocardiogram, observation. Her cardiologist service updated with this plan of care             Clinical Impression:     1. Irregular heart rate    2. Irregular heart beat    3. Dyspnea    4. Chest pain                                   Gibson Odom II, MD  07/17/18 [id number]

## 2018-07-17 NOTE — ASSESSMENT & PLAN NOTE
Secondary to prior adrenalectomy to treat pheochromocytoma.  Resume home regimen of glucocorticoid and mineralocorticoid replacement.

## 2018-07-17 NOTE — ED NOTES
Tried to call report 10 minutes ago. Spoke with Simeon who stated he will call me back. He just received a PACU PT.

## 2018-07-17 NOTE — HPI
Patient is a 73 year-old woman with hypertension, diabetes mellitus type II, prior history of metastatic pheochromocytoma with recurrent disease for which she underwent an total adrenalectomy and partial nephrectomy, who presents with worsening dyspnea on exertion over the course of the last 2 weeks along with chest pressure which she reports started while she was in the emergency department.  She reports her chest pressure is relatively mild in nature but new and uncomfortable.  Patient states that she was at the Pain Management Clinic today for epidural steriod injection however the procedure was canceled due to marked bradycardia along with her shortness of breath and therefore she was sent to the emergency department for evaluation.  She denies any fevers, chills, cough, sputum production, nausea, vomiting, or abdominal pain.  She states she did not take her home blood pressure medications this morning as instructed by Pain Management Clinic.

## 2018-07-17 NOTE — TELEPHONE ENCOUNTER
----- Message from Monica Najera RN sent at 7/17/2018 11:16 AM CDT -----  Mrs Sawant did not have her JERO done today due to cardiac issues. Dr Reynoso requested her to get clearance from her Cardiologist before having the procedure.   Thanks

## 2018-07-18 PROBLEM — I25.110 UNSTABLE ANGINA PECTORIS DUE TO CORONARY ARTERIOSCLEROSIS: Status: ACTIVE | Noted: 2018-07-18

## 2018-07-18 LAB
ANION GAP SERPL CALC-SCNC: 9 MMOL/L
AORTIC VALVE REGURGITATION: ABNORMAL
BUN SERPL-MCNC: 13 MG/DL
CALCIUM SERPL-MCNC: 9.1 MG/DL
CHLORIDE SERPL-SCNC: 106 MMOL/L
CO2 SERPL-SCNC: 28 MMOL/L
CREAT SERPL-MCNC: 0.8 MG/DL
DIASTOLIC DYSFUNCTION: YES
EST. GFR  (AFRICAN AMERICAN): >60 ML/MIN/1.73 M^2
EST. GFR  (NON AFRICAN AMERICAN): >60 ML/MIN/1.73 M^2
ESTIMATED AVG GLUCOSE: 111 MG/DL
ESTIMATED PA SYSTOLIC PRESSURE: 28.3
GLUCOSE SERPL-MCNC: 106 MG/DL
HBA1C MFR BLD HPLC: 5.5 %
MAGNESIUM SERPL-MCNC: 2 MG/DL
MITRAL VALVE REGURGITATION: ABNORMAL
PHOSPHATE SERPL-MCNC: 3.5 MG/DL
POCT GLUCOSE: 106 MG/DL (ref 70–110)
POCT GLUCOSE: 135 MG/DL (ref 70–110)
POCT GLUCOSE: 90 MG/DL (ref 70–110)
POTASSIUM SERPL-SCNC: 3.2 MMOL/L
RETIRED EF AND QEF - SEE NOTES: 61 (ref 55–65)
SODIUM SERPL-SCNC: 143 MMOL/L
TRICUSPID VALVE REGURGITATION: ABNORMAL
TROPONIN I SERPL DL<=0.01 NG/ML-MCNC: 0.07 NG/ML
TROPONIN I SERPL DL<=0.01 NG/ML-MCNC: 0.08 NG/ML

## 2018-07-18 PROCEDURE — 84100 ASSAY OF PHOSPHORUS: CPT

## 2018-07-18 PROCEDURE — 63600175 PHARM REV CODE 636 W HCPCS: Performed by: HOSPITALIST

## 2018-07-18 PROCEDURE — 90471 IMMUNIZATION ADMIN: CPT | Performed by: HOSPITALIST

## 2018-07-18 PROCEDURE — 25000003 PHARM REV CODE 250

## 2018-07-18 PROCEDURE — 84484 ASSAY OF TROPONIN QUANT: CPT

## 2018-07-18 PROCEDURE — 25500020 PHARM REV CODE 255

## 2018-07-18 PROCEDURE — 80048 BASIC METABOLIC PNL TOTAL CA: CPT

## 2018-07-18 PROCEDURE — G0009 ADMIN PNEUMOCOCCAL VACCINE: HCPCS | Performed by: HOSPITALIST

## 2018-07-18 PROCEDURE — 84484 ASSAY OF TROPONIN QUANT: CPT | Mod: 91

## 2018-07-18 PROCEDURE — G0378 HOSPITAL OBSERVATION PER HR: HCPCS

## 2018-07-18 PROCEDURE — 36415 COLL VENOUS BLD VENIPUNCTURE: CPT

## 2018-07-18 PROCEDURE — 83735 ASSAY OF MAGNESIUM: CPT

## 2018-07-18 PROCEDURE — B2151ZZ FLUOROSCOPY OF LEFT HEART USING LOW OSMOLAR CONTRAST: ICD-10-PCS | Performed by: INTERNAL MEDICINE

## 2018-07-18 PROCEDURE — 99226 PR SUBSEQUENT OBSERVATION CARE,LEVEL III: CPT | Mod: ,,, | Performed by: HOSPITALIST

## 2018-07-18 PROCEDURE — 99900035 HC TECH TIME PER 15 MIN (STAT)

## 2018-07-18 PROCEDURE — 25000003 PHARM REV CODE 250: Performed by: HOSPITALIST

## 2018-07-18 PROCEDURE — 4A023N7 MEASUREMENT OF CARDIAC SAMPLING AND PRESSURE, LEFT HEART, PERCUTANEOUS APPROACH: ICD-10-PCS | Performed by: INTERNAL MEDICINE

## 2018-07-18 PROCEDURE — 27201224 CATH LAB PROCEDURE

## 2018-07-18 PROCEDURE — 93005 ELECTROCARDIOGRAM TRACING: CPT

## 2018-07-18 PROCEDURE — B2111ZZ FLUOROSCOPY OF MULTIPLE CORONARY ARTERIES USING LOW OSMOLAR CONTRAST: ICD-10-PCS | Performed by: INTERNAL MEDICINE

## 2018-07-18 PROCEDURE — 11000001 HC ACUTE MED/SURG PRIVATE ROOM

## 2018-07-18 PROCEDURE — 94761 N-INVAS EAR/PLS OXIMETRY MLT: CPT

## 2018-07-18 PROCEDURE — 93010 ELECTROCARDIOGRAM REPORT: CPT | Mod: ,,, | Performed by: INTERNAL MEDICINE

## 2018-07-18 PROCEDURE — 90670 PCV13 VACCINE IM: CPT | Performed by: HOSPITALIST

## 2018-07-18 PROCEDURE — G0269 OCCLUSIVE DEVICE IN VEIN ART: HCPCS

## 2018-07-18 RX ORDER — POTASSIUM CHLORIDE 20 MEQ/1
40 TABLET, EXTENDED RELEASE ORAL ONCE
Status: DISCONTINUED | OUTPATIENT
Start: 2018-07-18 | End: 2018-07-19 | Stop reason: HOSPADM

## 2018-07-18 RX ORDER — HYDROCODONE BITARTRATE AND ACETAMINOPHEN 5; 325 MG/1; MG/1
1 TABLET ORAL EVERY 4 HOURS PRN
Status: DISCONTINUED | OUTPATIENT
Start: 2018-07-18 | End: 2018-07-19 | Stop reason: HOSPADM

## 2018-07-18 RX ORDER — NITROGLYCERIN 0.4 MG/1
0.4 TABLET SUBLINGUAL EVERY 5 MIN PRN
Status: DISCONTINUED | OUTPATIENT
Start: 2018-07-18 | End: 2018-07-19 | Stop reason: HOSPADM

## 2018-07-18 RX ORDER — ACETAMINOPHEN 325 MG/1
650 TABLET ORAL EVERY 4 HOURS PRN
Status: DISCONTINUED | OUTPATIENT
Start: 2018-07-18 | End: 2018-07-19 | Stop reason: HOSPADM

## 2018-07-18 RX ORDER — HYDROCODONE BITARTRATE AND ACETAMINOPHEN 10; 325 MG/1; MG/1
1 TABLET ORAL EVERY 4 HOURS PRN
Status: DISCONTINUED | OUTPATIENT
Start: 2018-07-18 | End: 2018-07-19 | Stop reason: HOSPADM

## 2018-07-18 RX ORDER — MAG HYDROX/ALUMINUM HYD/SIMETH 200-200-20
30 SUSPENSION, ORAL (FINAL DOSE FORM) ORAL
Status: DISCONTINUED | OUTPATIENT
Start: 2018-07-18 | End: 2018-07-19 | Stop reason: HOSPADM

## 2018-07-18 RX ORDER — HYDROCORTISONE 10 MG/1
20 TABLET ORAL EVERY MORNING
Status: DISCONTINUED | OUTPATIENT
Start: 2018-07-18 | End: 2018-07-19 | Stop reason: HOSPADM

## 2018-07-18 RX ORDER — POTASSIUM CHLORIDE 20 MEQ/1
40 TABLET, EXTENDED RELEASE ORAL ONCE
Status: COMPLETED | OUTPATIENT
Start: 2018-07-18 | End: 2018-07-18

## 2018-07-18 RX ORDER — DIPHENHYDRAMINE HYDROCHLORIDE 50 MG/ML
25 INJECTION INTRAMUSCULAR; INTRAVENOUS EVERY 6 HOURS PRN
Status: DISCONTINUED | OUTPATIENT
Start: 2018-07-18 | End: 2018-07-19 | Stop reason: HOSPADM

## 2018-07-18 RX ORDER — ONDANSETRON 2 MG/ML
4 INJECTION INTRAMUSCULAR; INTRAVENOUS EVERY 12 HOURS PRN
Status: DISCONTINUED | OUTPATIENT
Start: 2018-07-18 | End: 2018-07-19 | Stop reason: HOSPADM

## 2018-07-18 RX ORDER — SODIUM CHLORIDE 9 MG/ML
50 INJECTION, SOLUTION INTRAVENOUS CONTINUOUS
Status: ACTIVE | OUTPATIENT
Start: 2018-07-18 | End: 2018-07-18

## 2018-07-18 RX ADMIN — HYDROCORTISONE 20 MG: 10 TABLET ORAL at 07:07

## 2018-07-18 RX ADMIN — ASPIRIN 325 MG ORAL TABLET 325 MG: 325 PILL ORAL at 08:07

## 2018-07-18 RX ADMIN — NITROGLYCERIN TRANSDERMAL SYSTEM 1 PATCH: 0.4 PATCH, EXTENDED RELEASE TRANSDERMAL at 08:07

## 2018-07-18 RX ADMIN — HYDROCORTISONE 10 MG: 10 TABLET ORAL at 08:07

## 2018-07-18 RX ADMIN — ATORVASTATIN CALCIUM 40 MG: 20 TABLET, FILM COATED ORAL at 08:07

## 2018-07-18 RX ADMIN — LOSARTAN POTASSIUM 50 MG: 50 TABLET, FILM COATED ORAL at 08:07

## 2018-07-18 RX ADMIN — NIFEDIPINE 90 MG: 30 TABLET, FILM COATED, EXTENDED RELEASE ORAL at 08:07

## 2018-07-18 RX ADMIN — ACETAMINOPHEN 1000 MG: 500 TABLET ORAL at 08:07

## 2018-07-18 RX ADMIN — POTASSIUM CHLORIDE 40 MEQ: 1500 TABLET, EXTENDED RELEASE ORAL at 11:07

## 2018-07-18 RX ADMIN — FLUDROCORTISONE ACETATE 100 MCG: 0.1 TABLET ORAL at 08:07

## 2018-07-18 RX ADMIN — ENOXAPARIN SODIUM 40 MG: 100 INJECTION SUBCUTANEOUS at 05:07

## 2018-07-18 RX ADMIN — PNEUMOCOCCAL 13-VALENT CONJUGATE VACCINE 0.5 ML: 2.2; 2.2; 2.2; 2.2; 2.2; 4.4; 2.2; 2.2; 2.2; 2.2; 2.2; 2.2; 2.2 INJECTION, SUSPENSION INTRAMUSCULAR at 11:07

## 2018-07-18 NOTE — ASSESSMENT & PLAN NOTE
Poorly controlled on presentation.  Much better with restart her antihypertensive regimen.  Will continue with current regimen and continue to monitor.

## 2018-07-18 NOTE — PLAN OF CARE
Patient discharged home with self care.     No CM needs for discharge.     Patients sister will transport her home.       07/18/18 1555   Final Note   Assessment Type Final Discharge Note   Discharge Disposition Home   What phone number can be called within the next 1-3 days to see how you are doing after discharge? [phone]

## 2018-07-18 NOTE — PHARMACY MED REC
"Admission Medication Reconciliation - Pharmacy Consult Note    The home medication history was taken by Ana Nesbitt.  Based on information gathered and subsequent review by the clinical pharmacist, the items below may need attention.    You may go to "Admission" then "Reconcile Home Medications" tabs to review and/or act upon these items.    No issues noted with the medication reconciliation.    Prescriptions Prior to Admission   Medication Sig Dispense Refill Last Dose    aspirin 81 MG Chew Take 81 mg by mouth once daily.   7/16/2018    cholecalciferol, vitamin D3, (VITAMIN D3) 5,000 unit Tab Take 5,000 Units by mouth once daily.   7/16/2018    fludrocortisone (FLORINEF) 0.1 mg Tab Take 100 mcg by mouth once daily.   7/16/2018    furosemide (LASIX) 40 MG tablet Take 20 mg by mouth. Monday and Friday  6 7/16/2018    hydrocortisone (CORTEF) 20 MG Tab TAKE 1 TABLET EVERY MORNING  AND TAKE 1/2 TABLET EVERY EVENING  AT  5PM 135 tablet 6 7/16/2018    losartan (COZAAR) 50 MG tablet Take 50 mg by mouth once daily.   7/16/2018    metFORMIN (GLUCOPHAGE-XR) 500 MG 24 hr tablet TAKE 1 TABLET TWICE DAILY WITH MEALS 180 tablet 3 7/16/2018    NIFEdipine (PROCARDIA-XL) 90 MG (OSM) 24 hr tablet Take 90 mg by mouth once daily.    7/16/2018    TRUE METRIX AIR GLUCOSE METER kit CHECK BLOOD SUGAR ONE TIME DAILY 1 each 0 Taking    TRUE METRIX GLUCOSE TEST STRIP Strp CHECK BLOOD SUGAR ONE TIME DAILY 100 strip 3 Taking    TRUEPLUS LANCETS 28 gauge Misc TEST ONE TIME DAILY 200 each 3 Taking    alcohol antiseptic pads (ALCOHOL PREP PADS TOP)    Taking    BD LUER-GENTRY SYRINGE 3 mL 18 x 1 1/2" Syrg    Not Taking    BD REGULAR BEVEL NEEDLES 25 gauge x 1 1/2" Ndle USE AS DIRECTED 100 each 0 Not Taking    [DISCONTINUED] celecoxib (CELEBREX) 200 MG capsule TAKE 1 CAPSULE(200 MG) BY MOUTH TWICE DAILY 180 capsule 1 Taking       Please address this information as you see fit.  Feel free to contact us if you have any questions or " require assistance.    Sergei Briseno, Pharm.D., BCPS  [phone]                .  .

## 2018-07-18 NOTE — NURSING
Pt back to room, placed in bed, lying flat. Pt informed of POC and verbalized understanding. Advised to keep right leg straight. VSS. NAD she remains AAOx4. Family at bedside. Resp even and labored. Site assessed, no bleeding, no swelling or harden areas noted. Dressing clean, dry and intact. Ice pack remain in place. Bilateral pedal pulses present and strong.

## 2018-07-18 NOTE — PLAN OF CARE
LMSW met with patient at the bedside.     Patient is alert and oriented with no communication barriers.     Prior to admission patient was independent with all ADLS. Patient denies the use of HH or DME.    Patients PCP is correct on the face sheet. Patient choice pharmacy is [facility] in [place] but [facility]     Patient denies a history of mental illness of substance use.     Patient will be transported home by family at discharge.     No CM needs identified for discharge.     LMSW/CM team will continue to follow.      07/18/18 1012   Discharge Assessment   Assessment Type Discharge Planning Assessment   Confirmed/corrected address and phone number on facesheet? Yes   Assessment information obtained from? Patient   Prior to hospitilization cognitive status: Alert/Oriented   Prior to hospitalization functional status: Independent   Current cognitive status: Alert/Oriented   Current Functional Status: Independent   Lives With alone   Able to Return to Prior Arrangements yes   Is patient able to care for self after discharge? Yes   Patient's perception of discharge disposition home or selfcare   Readmission Within The Last 30 Days no previous admission in last 30 days   Patient currently being followed by outpatient case management? No   Patient currently receives any other outside agency services? No   Equipment Currently Used at Home none   Do you have any problems affording any of your prescribed medications? No   Is the patient taking medications as prescribed? yes   Does the patient have transportation home? Yes   Transportation Available family or friend will provide   Discharge Plan A Home   Patient/Family In Agreement With Plan yes

## 2018-07-18 NOTE — SUBJECTIVE & OBJECTIVE
Interval History: Patient reports that her chest pain improved that she continues to have some chest discomfort this morning.    Review of Systems   Constitutional: Negative for chills and fever.   Respiratory: Negative for shortness of breath and wheezing.    Cardiovascular: Positive for chest pain.   Gastrointestinal: Negative for abdominal distention, abdominal pain, constipation, diarrhea, nausea and vomiting.   Genitourinary: Negative for dysuria and frequency.   Musculoskeletal: Negative for arthralgias and myalgias.   Neurological: Negative for light-headedness.   Psychiatric/Behavioral: Negative for agitation and confusion.     Objective:     Vital Signs (Most Recent):  Temp: 97.5 °F (36.4 °C) (07/18/18 0031)  Pulse: (!) 59 (07/18/18 0827)  Resp: 18 (07/18/18 0827)  BP: 137/73 (07/18/18 0827)  SpO2: 96 % (07/18/18 0827) Vital Signs (24h Range):  Temp:  [97.3 °F (36.3 °C)-98.4 °F (36.9 °C)] 97.5 °F (36.4 °C)  Pulse:  [53-63] 59  Resp:  [18-23] 18  SpO2:  [95 %-98 %] 96 %  BP: (130-204)/(58-92) 137/73     Weight: 90.7 kg (200 lb)  Body mass index is 35.43 kg/m².    Intake/Output Summary (Last 24 hours) at 07/18/18 1019  Last data filed at 07/18/18 0600   Gross per 24 hour   Intake             1699 ml   Output             1000 ml   Net              699 ml      Physical Exam   Constitutional: She is oriented to person, place, and time. She appears well-developed and well-nourished. No distress.   HENT:   Head: Atraumatic.   Eyes: Conjunctivae are normal.   Neck: Neck supple.   Cardiovascular: Normal rate, regular rhythm and normal heart sounds.    No murmur heard.  Pulmonary/Chest: Effort normal and breath sounds normal. She has no wheezes.   Abdominal: Soft. Bowel sounds are normal. She exhibits no distension. There is no tenderness.   Musculoskeletal: Normal range of motion. She exhibits no edema or deformity.   Neurological: She is alert and oriented to person, place, and time.       Significant Labs: All  pertinent labs within the past 24 hours have been reviewed.    Significant Imaging: I have reviewed all pertinent imaging results/findings within the past 24 hours.

## 2018-07-18 NOTE — CONSULTS
HISTORY OF PRESENT ILLNESS:  Mrs. Sawant is a 73-year-old lady that was in Pain   Management for an epidural steroid injection when she complained of discomfort   in the chest, and awareness of skips in the heartbeat.  She says that for the   last 3 months or so she has had heaviness in the center of her chest, which   occur sometimes upon physical effort and sometimes at rest.  She has had no   recent change in the symptoms and when she described her symptoms to the staff   in Pain Management, she was directed to go to the Emergency Room for further   evaluation and treatment.  She has a longstanding history of diabetes mellitus   and essential hypertension.  She has a previous history of a malignant   pheochromocytoma for which she underwent surgery.  She has had hypothyroidism.    She has had lumbar spondylosis.  She is a nonsmoker.  She says she has a family   history of heart disease, her father who  at 77 had a pacemaker and  of   congestive heart failure.  Mother  also of congestive heart failure.  She   had a pacemaker.  She  at the age of 89.  She has one brother who underwent   heart transplant.  She knows her heart muscle was weak, does not know if he had   coronary artery disease.  She has altogether 10 brothers and 5 sisters.  One   nephew has also had some unknown heart disease.    PHYSICAL EXAMINATION:  GENERAL:  Reveals an alert, pleasant lady in no apparent acute distress.  VITAL SIGNS:  Blood pressure 130/80 and a pulse of 70 beats per minute.  HEENT:  The sclerae are nonicteric.  The conjunctivae are pink.  The ENT exam is   unremarkable.  NECK:  Supple.  There is no jugular venous distention.  The carotid upstroke is   brisk.  There is no carotid bruit.  CHEST:  Clear.  HEART:  Size is grossly normal.  S1 and S2 are normal.  There is no audible   murmur or gallop.  ABDOMEN:  Soft and nontender.  The bowel sounds are normal.  EXTREMITIES:  There is no clubbing, cyanosis or edema of  feet.  NEUROLOGIC:  Grossly, the neurological exam is intact.    The troponin levels are mildly elevated at 0.092, 0.088, 0.073 and 0.076.  The   electrocardiogram shows T-wave inversions in II, III, aVF and V6.  This could   represent inferolateral ischemia.    IMPRESSION ON ADMISSION:  Atypical chest pains in a diabetic, hypertensive with   a family history of heart disease and mild troponin elevations.  We will plan to   proceed with coronary angiography.  I have discussed at length with the patient   and her sister.  They seem to understand and want me to proceed.    Thank you for the opportunity of seeing Mrs. Sawant in consultation.      ERICK/RACHEL  dd: 07/18/2018 10:32:26 (CDT)  td: 07/18/2018 17:02:47 (CDT)  Doc ID   #[id number]  Job ID #[id number]    CC:

## 2018-07-18 NOTE — ASSESSMENT & PLAN NOTE
Secondary to prior adrenalectomy to treat pheochromocytoma.  Continue with home regimen of glucocorticoid and mineralocorticoid replacement.

## 2018-07-18 NOTE — ASSESSMENT & PLAN NOTE
Patient with T wave inversions in the inferior leads and mild elevation in serum troponin with persistent chest pain this morning concerning for unstable angina as she is having angina at rest.  Continue with medical therapy.  Consult cardiology for evaluation with consideration for possible inpatient stress testing verus left heart catheterization.

## 2018-07-18 NOTE — PLAN OF CARE
07/18/18 1354   ANGULO Message   Medicare Outpatient and Observation Notification regarding financial responsibility Given to patient/caregiver;Explained to patient/caregiver;Signed/date by patient/caregiver   Date ANGULO was signed 07/18/18   Time ANGULO was signed 0930

## 2018-07-18 NOTE — ASSESSMENT & PLAN NOTE
Patient with T wave inversions in the inferior leads and mild elevation in serum troponin with new onset persistent chest pain prolonged at rest consistent with unstable angina.  Continue with medical therapy.  Consult cardiology for evaluation with consideration for possible inpatient stress testing verus left heart catheterization.

## 2018-07-18 NOTE — INTERVAL H&P NOTE
The patient has been examined and the H&P has been reviewed:    I concur with the findings and no changes have occurred since H&P was written.    Anesthesia/Surgery risks, benefits and alternative options discussed and understood by patient/family.          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

## 2018-07-18 NOTE — PROGRESS NOTES
[facility] Hospital Medicine  Progress Note    Patient Name: Hailey Sawant  MRN: [id number]  Patient Class: OP- Observation   Admission Date: 7/17/2018  Length of Stay: 0 days  Attending Physician: Hipolito Ruelas MD  Primary Care Provider: Rodolfo Burton MD        Subjective:     Principal Problem:Chest pain    HPI:  Patient is a 73 year-old woman with hypertension, diabetes mellitus type II, prior history of metastatic pheochromocytoma with recurrent disease for which she underwent an total adrenalectomy and partial nephrectomy, who presents with worsening dyspnea on exertion over the course of the last 2 weeks along with chest pressure which she reports started while she was in the emergency department.  She reports her chest pressure is relatively mild in nature but new and uncomfortable.  Patient states that [facility]iod injection however the procedure was canceled due to marked bradycardia along with her shortness of breath and therefore she was sent to the emergency department for evaluation.  She denies any fevers, chills, cough, sputum production, nausea, vomiting, or abdominal pain.  She states she did not take her home blood pressure medications this morning as instructed by [facility].    Hospital Course:  Patient is a 73 year-old woman with hypertension, diabetes mellitus type II, prior history of metastatic pheochromocytoma with recurrent disease for which she underwent an total adrenalectomy and partial nephrectomy, who presents with worsening dyspnea on exertion over the course of the last 2 weeks along with chest pressure.  Patient admitted under observation for evaluation of possible acute myocardial infarction.  Patient reports chest pain improved overnight however she continues to have chest discomfort.  Troponins minimally elevated and electrocardiogram shows inverted T waves in the inferior  leads.    Interval History: Patient reports that her chest pain improved that she continues to have some chest discomfort this morning.    Review of Systems   Constitutional: Negative for chills and fever.   Respiratory: Negative for shortness of breath and wheezing.    Cardiovascular: Positive for chest pain.   Gastrointestinal: Negative for abdominal distention, abdominal pain, constipation, diarrhea, nausea and vomiting.   Genitourinary: Negative for dysuria and frequency.   Musculoskeletal: Negative for arthralgias and myalgias.   Neurological: Negative for light-headedness.   Psychiatric/Behavioral: Negative for agitation and confusion.     Objective:     Vital Signs (Most Recent):  Temp: 97.5 °F (36.4 °C) (07/18/18 0031)  Pulse: (!) 59 (07/18/18 0827)  Resp: 18 (07/18/18 0827)  BP: 137/73 (07/18/18 0827)  SpO2: 96 % (07/18/18 0827) Vital Signs (24h Range):  Temp:  [97.3 °F (36.3 °C)-98.4 °F (36.9 °C)] 97.5 °F (36.4 °C)  Pulse:  [53-63] 59  Resp:  [18-23] 18  SpO2:  [95 %-98 %] 96 %  BP: (130-204)/(58-92) 137/73     Weight: 90.7 kg (200 lb)  Body mass index is 35.43 kg/m².    Intake/Output Summary (Last 24 hours) at 07/18/18 1019  Last data filed at 07/18/18 0600   Gross per 24 hour   Intake             1699 ml   Output             1000 ml   Net              699 ml      Physical Exam   Constitutional: She is oriented to person, place, and time. She appears well-developed and well-nourished. No distress.   HENT:   Head: Atraumatic.   Eyes: Conjunctivae are normal.   Neck: Neck supple.   Cardiovascular: Normal rate, regular rhythm and normal heart sounds.    No murmur heard.  Pulmonary/Chest: Effort normal and breath sounds normal. She has no wheezes.   Abdominal: Soft. Bowel sounds are normal. She exhibits no distension. There is no tenderness.   Musculoskeletal: Normal range of motion. She exhibits no edema or deformity.   Neurological: She is alert and oriented to person, place, and time.       Significant  Labs: All pertinent labs within the past 24 hours have been reviewed.    Significant Imaging: I have reviewed all pertinent imaging results/findings within the past 24 hours.    Assessment/Plan:      * Chest pain    Patient with T wave inversions in the inferior leads and mild elevation in serum troponin with new onset persistent chest pain prolonged at rest consistent with unstable angina.  Continue with medical therapy.  Consult cardiology for evaluation with consideration for possible inpatient stress testing verus left heart catheterization.        Acute heart failure    Dyspnea along with mild evidence of hypervolemia along with elevated BNP suggests component of heart failure which likely causing her dyspnea on exertion.  Echocardiogram shows evidence of diastolic heart failure.  Volume status improved following a dose of intravenous furosemide.  Continue with medical therapy and closely monitor volume status.  Holding off on further diuresis as she appears euvolemic this morning and would like to avoid hypovolemia prior to possible angiogram.        Essential hypertension    Poorly controlled on presentation.  Much better with restart her antihypertensive regimen.  Will continue with current regimen and continue to monitor.        Controlled type 2 diabetes mellitus without complication    Well controlled.  Continue with diabetic diet.  Hold metformin.  Sliding scale insulin for now.        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  Continue with home regimen of glucocorticoid and mineralocorticoid replacement.          VTE Risk Mitigation         Ordered     enoxaparin injection 40 mg  Daily      07/17/18 1638     IP VTE HIGH RISK PATIENT  Once      07/17/18 1638              Hipolito Ruelas MD  Department of Hospital Medicine   [facility]

## 2018-07-18 NOTE — ASSESSMENT & PLAN NOTE
Dyspnea along with mild evidence of hypervolemia along with elevated BNP suggests component of heart failure which likely causing her dyspnea on exertion.  Echocardiogram shows evidence of diastolic heart failure.  Volume status improved following a dose of intravenous furosemide.  Continue with medical therapy and closely monitor volume status.  Holding off on further diuresis as she appears euvolemic this morning and would like to avoid hypovolemia prior to possible angiogram.

## 2018-07-19 VITALS
OXYGEN SATURATION: 98 % | HEART RATE: 69 BPM | DIASTOLIC BLOOD PRESSURE: 81 MMHG | WEIGHT: 197.06 LBS | TEMPERATURE: 98 F | BODY MASS INDEX: 34.91 KG/M2 | SYSTOLIC BLOOD PRESSURE: 147 MMHG | RESPIRATION RATE: 16 BRPM | HEIGHT: 63 IN

## 2018-07-19 LAB
ANION GAP SERPL CALC-SCNC: 10 MMOL/L
BUN SERPL-MCNC: 11 MG/DL
CALCIUM SERPL-MCNC: 9.2 MG/DL
CHLORIDE SERPL-SCNC: 107 MMOL/L
CO2 SERPL-SCNC: 27 MMOL/L
CREAT SERPL-MCNC: 0.7 MG/DL
EST. GFR  (AFRICAN AMERICAN): >60 ML/MIN/1.73 M^2
EST. GFR  (NON AFRICAN AMERICAN): >60 ML/MIN/1.73 M^2
GLUCOSE SERPL-MCNC: 101 MG/DL
MAGNESIUM SERPL-MCNC: 2 MG/DL
PHOSPHATE SERPL-MCNC: 3.5 MG/DL
POCT GLUCOSE: 108 MG/DL (ref 70–110)
POTASSIUM SERPL-SCNC: 3.3 MMOL/L
SODIUM SERPL-SCNC: 144 MMOL/L

## 2018-07-19 PROCEDURE — 84100 ASSAY OF PHOSPHORUS: CPT

## 2018-07-19 PROCEDURE — 99239 HOSP IP/OBS DSCHRG MGMT >30: CPT | Mod: ,,, | Performed by: HOSPITALIST

## 2018-07-19 PROCEDURE — 83735 ASSAY OF MAGNESIUM: CPT

## 2018-07-19 PROCEDURE — G0378 HOSPITAL OBSERVATION PER HR: HCPCS

## 2018-07-19 PROCEDURE — 80048 BASIC METABOLIC PNL TOTAL CA: CPT

## 2018-07-19 PROCEDURE — 94761 N-INVAS EAR/PLS OXIMETRY MLT: CPT

## 2018-07-19 PROCEDURE — 25000003 PHARM REV CODE 250: Performed by: HOSPITALIST

## 2018-07-19 PROCEDURE — 36415 COLL VENOUS BLD VENIPUNCTURE: CPT

## 2018-07-19 RX ADMIN — ASPIRIN 325 MG ORAL TABLET 325 MG: 325 PILL ORAL at 09:07

## 2018-07-19 RX ADMIN — ATORVASTATIN CALCIUM 40 MG: 20 TABLET, FILM COATED ORAL at 09:07

## 2018-07-19 RX ADMIN — HYDROCORTISONE 20 MG: 10 TABLET ORAL at 06:07

## 2018-07-19 RX ADMIN — LOSARTAN POTASSIUM 50 MG: 50 TABLET, FILM COATED ORAL at 09:07

## 2018-07-19 RX ADMIN — NITROGLYCERIN TRANSDERMAL SYSTEM 1 PATCH: 0.4 PATCH, EXTENDED RELEASE TRANSDERMAL at 09:07

## 2018-07-19 RX ADMIN — FLUDROCORTISONE ACETATE 100 MCG: 0.1 TABLET ORAL at 09:07

## 2018-07-19 RX ADMIN — NIFEDIPINE 90 MG: 30 TABLET, FILM COATED, EXTENDED RELEASE ORAL at 09:07

## 2018-07-19 NOTE — PLAN OF CARE
Pt did not discharge yesterday as initially planned. Pt had late afternoon angiogram and was returned to room.    Discharge will likely be this am, CM to follow for plans and arrangements.

## 2018-07-19 NOTE — PLAN OF CARE
Pt discharging home this morning.   Family to transport.    Pt and sister state no CM needs for discharge.     07/19/18 1017   Final Note   Assessment Type Final Discharge Note   Discharge Disposition Home   Hospital Follow Up  Appt(s) scheduled? Yes   Discharge plans and expectations educations in teach back method with documentation complete? Yes   Right Care Referral Info   Post Acute Recommendation No Care

## 2018-07-19 NOTE — NURSING
Pt educated on d/c instructions and verbalized understanding. Folder with instructions given to patient. Surgery site dressing remains clean, dry and intact. No bleeding noted and pedal pulses present at time of d/c. Pt will be transported to 2nd floor [facility] via wheelchair by transport.

## 2018-07-19 NOTE — PLAN OF CARE
07/18/18 0900   Medicare Message   Important Message from Medicare regarding Discharge Appeal Rights Given to patient/caregiver;Explained to patient/caregiver;Signed/date by patient/caregiver   Date IMM was signed 07/18/18   Time IMM was signed 0900

## 2018-07-19 NOTE — PROGRESS NOTES
Feels well.  Discussed findings with patient and her family. Reassured re absence of CAD. Note, she has sinus bradycardia and sinus arrhythmia, a manifestation of SSS. Will see her for F/U in office.    Discussed with Dr Ruelas.

## 2018-07-20 ENCOUNTER — PATIENT OUTREACH (OUTPATIENT)
Dept: ADMINISTRATIVE | Facility: CLINIC | Age: 73
End: 2018-07-20

## 2018-08-01 ENCOUNTER — HOSPITAL ENCOUNTER (OUTPATIENT)
Dept: RADIOLOGY | Facility: HOSPITAL | Age: 73
Discharge: HOME | End: 2018-08-01
Attending: INTERNAL MEDICINE
Payer: MEDICARE

## 2018-08-01 DIAGNOSIS — C74.91 MALIGNANT NEOPLASM OF RIGHT ADRENAL GLAND: ICD-10-CM

## 2018-08-01 DIAGNOSIS — C74.90 MALIGNANT PHEOCHROMOCYTOMA, UNSPECIFIED LATERALITY: ICD-10-CM

## 2018-08-01 PROCEDURE — A9587 GALLIUM GA-68: HCPCS | Mod: TB

## 2018-08-01 PROCEDURE — 78815 PET IMAGE W/CT SKULL-THIGH: CPT | Mod: 26,PI,, | Performed by: RADIOLOGY

## 2018-08-01 PROCEDURE — 78815 PET IMAGE W/CT SKULL-THIGH: CPT | Mod: TC,PI

## 2018-08-14 ENCOUNTER — OFFICE VISIT (OUTPATIENT)
Dept: ENDOCRINOLOGY | Facility: CLINIC | Age: 73
End: 2018-08-14
Payer: MEDICARE

## 2018-08-14 VITALS
SYSTOLIC BLOOD PRESSURE: 110 MMHG | HEART RATE: 60 BPM | HEIGHT: 63 IN | WEIGHT: 202.81 LBS | BODY MASS INDEX: 35.93 KG/M2 | DIASTOLIC BLOOD PRESSURE: 72 MMHG | RESPIRATION RATE: 16 BRPM

## 2018-08-14 DIAGNOSIS — C74.91 MALIGNANT PHEOCHROMOCYTOMA OF RIGHT ADRENAL GLAND: Primary | ICD-10-CM

## 2018-08-14 PROCEDURE — 99999 PR PBB SHADOW E&M-EST. PATIENT-LVL III: CPT | Mod: PBBFAC,,, | Performed by: INTERNAL MEDICINE

## 2018-08-14 PROCEDURE — 3074F SYST BP LT 130 MM HG: CPT | Mod: CPTII,S$GLB,, | Performed by: INTERNAL MEDICINE

## 2018-08-14 PROCEDURE — 99214 OFFICE O/P EST MOD 30 MIN: CPT | Mod: S$GLB,,, | Performed by: INTERNAL MEDICINE

## 2018-08-14 PROCEDURE — 3078F DIAST BP <80 MM HG: CPT | Mod: CPTII,S$GLB,, | Performed by: INTERNAL MEDICINE

## 2018-08-14 NOTE — PROGRESS NOTES
Subjective:     Patient ID: Hailey Sawant is a 73 y.o. female.    Chief Complaint: pheochromoctyoma     HPI:   Ms. Sawant is a 73 y.o. female who is here for a follow-up visit for evaluation of  malignant pheochromocytoma.    She follows with Dr. Burton   Last office visit with  Dr. Lopez in 05/2018    This is the patient's first visit with me today      She reports occasional palpitations but she denies flushing or hypertensive emergency     States home BP ranges: systolic 120-130's / diastolic 80's -90's     She endorses shortness of breath which has increased in severity over the past few months   She was hospitalized in July for bradycardia and SOB. Left heart catheterization did not reveal evidence of obstructive disease. She did demonstrate SSS during hosptial admission. The patient is scheduled to see Cardiology on Monday 08/20/2018     Review of her chart from 05/2018:   Had  surgery in Oct 2017 with Dr. Castillo to remove residual right pheochromocytoma.  Pt was treated with I-131 labeled MIBG at [place] on 3/16/2016 and on 7/27/2016, 11/30//2016. She received 200 mCi of I 131 x 3. Thyroid was blocked with SSKI.      s/p bilat adrenalectomy 11/09 and found to have pheochromocytoma.    Labs done 11/2017 - mildly abnl plasma metanephrines.   CT scan 1/2018 - no tumor   MIBG 2 and 5/2018 - faint tracer uptake above right kidney.     Current meds:  Hydrocortisone 20mg qam, 10qpm, not taking fludrocortisone  Losartan 50mg and nifedipine 60mg daily      Dr. Burton ordered NM PET Ga68 Dotatate whole body scan   Performed on 08/01/2018:   FINDINGS:  Patient was administered 4.7 millicuries of gallium 68 octreotide intravenously.  There is a large jugular chain right retromandibular hypermetabolic mass SUV max 167.73.  There is a right infra glenoid bone metastasis SUV max 15.52.  There is a left inferior acetabular metastasis SUV max 15.69.  There is a right liver versus is adrenal lesion SUV max 49.44.    There  is physiologic intracranial, head, and neck activity.  Spleen, pancreas, GI and  activity is unremarkable.  No lung metastases are seen.  Lungs are clear.     Impression       Somatostatin receptor positive neoplastic deposits as follows:    Right jugular chain SUV max 167.73.    Right infra glenoid bone metastasis SUV max 15.52.    Left inferior acetabular metastasis SUV max 15.69.    Right adrenal recurrence versus liver metastasis SUV max 49.44.       Review of Systems   Constitutional: Positive for fatigue. Negative for unexpected weight change.   HENT: Negative for hearing loss.    Eyes: Negative for visual disturbance.   Respiratory: Positive for chest tightness and shortness of breath.    Cardiovascular: Positive for palpitations (occasional ). Negative for chest pain and leg swelling.   Gastrointestinal: Positive for constipation. Negative for abdominal pain, nausea and vomiting.   Endocrine: Positive for cold intolerance.   Genitourinary: Negative for dysuria.   Musculoskeletal: Positive for back pain and gait problem.   Skin: Negative for rash.   Neurological: Positive for headaches (reports slight headaches ). Negative for weakness.   Hematological: Does not bruise/bleed easily.   Psychiatric/Behavioral: Positive for sleep disturbance. Negative for confusion. The patient is not nervous/anxious.       Objective:     Physical Exam   Constitutional: She is oriented to person, place, and time. No distress.   Neck: No thyromegaly present.   Cardiovascular: Normal rate.   Pulmonary/Chest: Effort normal.   Musculoskeletal: She exhibits no edema.   Neurological: She is alert and oriented to person, place, and time.   Skin: Skin is warm.   Psychiatric: She has a normal mood and affect.   Nursing note and vitals reviewed.    LABS:   Results for MIRTHA ENGLE (MRN [id number]) as of 8/14/2018 13:05   Ref. Range 2/1/2017 07:43 6/20/2017 09:38 11/9/2017 12:48 5/22/2018 14:41   Chromogranin A Latest Ref Range: < OR =  15 ng/mL 21 (H)      Metanephrine, Free Latest Ref Range: < OR = 57 pg/mL  <25 SEE NOTE Test Not Performed   Normetanephrine, Free Latest Ref Range: < OR = 148 pg/mL  1350 (H) 333 (H) 292 (H)   Metanephrine, Total, Plasma Latest Ref Range: < OR = 205 pg/mL  1350 (H) 333 (H) Test Not Performed      Results for MIRTHA ENGLE (MRN [id number]) as of 8/14/2018 13:05   Ref. Range 7/17/2018 18:18   Hemoglobin A1C Latest Ref Range: 4.0 - 5.6 % 5.5   Estimated Avg Glucose Latest Ref Range: 68 - 131 mg/dL 111     Results for MIRTHA ENGLE (MRN [id number]) as of 8/14/2018 13:05   Ref. Range 6/27/2017 09:49   ACTH Latest Ref Range: 0 - 46 pg/mL 175 (H)     NM tumor localization 05/2018:   FINDINGS:  Unchanged focus of mild increased radiotracer uptake in the region of the upper pole of the right kidney best seen on 4 hr images and less conspicuous on the 24 hour images (in part related to relatively low counts on 24 hr images).  Overall appearance is similar to prior examination on 02/01/2018.    No other areas of abnormal foci of tracer uptake are seen.      Impression       Mildly MIBG avid neoplasm in the region of the upper pole of the right kidney.     Assessment/Plan:     1. Malignant pheochromocytoma of right adrenal gland  Ambulatory Referral to Neuroendocrine Tumor Program       1. Malignant pheochromocytoma of right adrenal gland   -- with possible liver metastasis   -- case discussed in consultation with staff   -- refer to Neuroendocrine tumor clinic in [place] for further evaluation and treatment       Will forward chart to Dr. Osmar Burton and Dr. Fernandez

## 2018-08-20 ENCOUNTER — OFFICE VISIT (OUTPATIENT)
Dept: CARDIOLOGY | Facility: CLINIC | Age: 73
End: 2018-08-20
Attending: INTERNAL MEDICINE
Payer: MEDICARE

## 2018-08-20 VITALS
HEIGHT: 63 IN | SYSTOLIC BLOOD PRESSURE: 139 MMHG | DIASTOLIC BLOOD PRESSURE: 65 MMHG | BODY MASS INDEX: 35.44 KG/M2 | WEIGHT: 200 LBS | HEART RATE: 65 BPM

## 2018-08-20 DIAGNOSIS — E11.9 CONTROLLED TYPE 2 DIABETES MELLITUS WITHOUT COMPLICATION, WITHOUT LONG-TERM CURRENT USE OF INSULIN: ICD-10-CM

## 2018-08-20 DIAGNOSIS — I10 ESSENTIAL HYPERTENSION: ICD-10-CM

## 2018-08-20 DIAGNOSIS — I49.5 SICK SINUS SYNDROME: ICD-10-CM

## 2018-08-20 DIAGNOSIS — C74.91 MALIGNANT PHEOCHROMOCYTOMA OF RIGHT ADRENAL GLAND: ICD-10-CM

## 2018-08-20 DIAGNOSIS — R07.9 CHEST PAIN, UNSPECIFIED TYPE: Primary | ICD-10-CM

## 2018-08-20 PROCEDURE — 3044F HG A1C LEVEL LT 7.0%: CPT | Mod: CPTII,S$GLB,, | Performed by: INTERNAL MEDICINE

## 2018-08-20 PROCEDURE — 99213 OFFICE O/P EST LOW 20 MIN: CPT | Mod: S$GLB,,, | Performed by: INTERNAL MEDICINE

## 2018-08-20 PROCEDURE — 3075F SYST BP GE 130 - 139MM HG: CPT | Mod: CPTII,S$GLB,, | Performed by: INTERNAL MEDICINE

## 2018-08-20 PROCEDURE — 3078F DIAST BP <80 MM HG: CPT | Mod: CPTII,S$GLB,, | Performed by: INTERNAL MEDICINE

## 2018-08-20 NOTE — LETTER
August 20, 2018      Rodolfo Burton II, MD  [address]  [facility] [address]           [facility]  [address], [address]  [facility] [address]  Phone: [phone]  Fax: [phone]          Patient: Hailey Sawant   MR Number: [id number]   YOB: 1945   Date of Visit: 8/20/2018       Dear Dr. Rodolfo Burton II:    Thank you for referring Hailey Sawant to me for evaluation. Attached you will find relevant portions of my assessment and plan of care.    If you have questions, please do not hesitate to call me. I look forward to following Hailey Sawant along with you.    Sincerely,    Moustapha Vasquez MD    Enclosure  CC:  No Recipients    If you would like to receive this communication electronically, please contact [email or web address] or [phone] to request more information on [facility] Link access.    For providers and/or their staff who would like to refer a patient to [facility], please contact us through our one-stop-shop provider referral line, [facility], at [phone].    If you feel you have received this communication in error or would no longer like to receive these types of communications, please e-mail [email or web address]

## 2018-08-20 NOTE — PROGRESS NOTES
Subjective:    Patient ID:  Hailey Sawant is a 73 y.o. female     HPI   Here for follow-up after recent hospitalization for chest pain, underwent coronary angiography on 07/18/2018 which showed normal coronary arteries, diabetes mellitus, essential hypertension, history of malignant pheochromocytoma, sick sinus syndrome.    I still get some chest pains from time to time.  I am afraid to get around too much.    Current Outpatient Medications   Medication Sig    aspirin 81 MG Chew Take 81 mg by mouth once daily.    cholecalciferol, vitamin D3, (VITAMIN D3) 5,000 unit Tab Take 5,000 Units by mouth once daily.    fludrocortisone (FLORINEF) 0.1 mg Tab Take 100 mcg by mouth once daily.    hydrocortisone (CORTEF) 20 MG Tab TAKE 1 TABLET EVERY MORNING  AND TAKE 1/2 TABLET EVERY EVENING  AT  5PM    losartan (COZAAR) 50 MG tablet Take 50 mg by mouth once daily.    metFORMIN (GLUCOPHAGE-XR) 500 MG 24 hr tablet TAKE 1 TABLET TWICE DAILY WITH MEALS    NIFEdipine (PROCARDIA-XL) 90 MG (OSM) 24 hr tablet Take 90 mg by mouth once daily.     TRUE METRIX AIR GLUCOSE METER kit CHECK BLOOD SUGAR ONE TIME DAILY    TRUE METRIX GLUCOSE TEST STRIP Strp CHECK BLOOD SUGAR ONE TIME DAILY    TRUEPLUS LANCETS 28 gauge Misc TEST ONE TIME DAILY     No current facility-administered medications for this visit.          Review of Systems   Constitution: Negative for chills, decreased appetite, fever, weight gain and weight loss.   HENT: Negative for congestion, hearing loss and sore throat.    Eyes: Negative for blurred vision, double vision and visual disturbance.   Cardiovascular: Positive for chest pain. Negative for claudication, dyspnea on exertion, leg swelling, palpitations and syncope.   Respiratory: Negative for cough, hemoptysis, shortness of breath, sputum production and wheezing.    Endocrine: Negative for cold intolerance and heat intolerance.   Hematologic/Lymphatic: Negative for bleeding problem. Does not bruise/bleed  "easily.   Skin: Negative for color change, dry skin, flushing and itching.   Musculoskeletal: Negative for back pain, joint pain and myalgias.   Gastrointestinal: Negative for abdominal pain, anorexia, constipation, diarrhea, dysphagia, nausea and vomiting.        No bleeding per rectum   Genitourinary: Negative for dysuria, flank pain, frequency, hematuria and nocturia.   Neurological: Negative for dizziness, headaches, light-headedness, loss of balance, seizures and tremors.   Psychiatric/Behavioral: Negative for altered mental status and depression.         Vitals:    08/20/18 1238   BP: 139/65   Pulse: 65   Weight: 90.7 kg (200 lb)   Height: 5' 3" (1.6 m)     Objective:    Physical Exam   Constitutional: She is oriented to person, place, and time. She appears well-developed and well-nourished.   HENT:   Head: Normocephalic and atraumatic.   Nose: Nose normal.   Mouth/Throat: Oropharynx is clear and moist.   Eyes: Conjunctivae and EOM are normal. Pupils are equal, round, and reactive to light.   Neck: Neck supple. No tracheal deviation present. No thyromegaly present.   Cardiovascular: Normal rate, regular rhythm and intact distal pulses. Exam reveals no gallop and no friction rub.   No murmur heard.  Pulmonary/Chest: No respiratory distress. She has no wheezes. She has no rales. She exhibits no tenderness.   Abdominal: Soft. Bowel sounds are normal. She exhibits no distension and no mass. There is no tenderness. There is no rebound and no guarding.   Musculoskeletal: Normal range of motion.   Lymphadenopathy:     She has no cervical adenopathy.   Neurological: She is alert and oriented to person, place, and time.   Skin: Skin is warm and dry.   Psychiatric: Her behavior is normal.         Assessment:       1. Chest pain, unspecified type    2. Controlled type 2 diabetes mellitus without complication, without long-term current use of insulin    3. Essential hypertension    4. Malignant pheochromocytoma of right " adrenal gland    5. Sick sinus syndrome         Plan:       Reassured.  Discussed with the patient and her family regarding the normal coronary angiography.  Urged an exercise program.  Continue the present medical regimen.

## 2018-08-23 ENCOUNTER — TELEPHONE (OUTPATIENT)
Dept: NEUROLOGY | Facility: HOSPITAL | Age: 73
End: 2018-08-23

## 2018-08-23 DIAGNOSIS — D35.00 PHEOCHROMOCYTOMA, UNSPECIFIED LATERALITY: Primary | ICD-10-CM

## 2018-08-23 NOTE — TELEPHONE ENCOUNTER
Phoned patient and discussed the new patient appt and what tests and scans needed.  Appt made with MD, will do labs and CT scans on august 31, 2018.

## 2018-08-23 NOTE — LETTER
August 23, 2018        Rodolfo Burton II, MD  [address] [facility] [address]             [facility] [address] [place] [address]  Phone: [phone]  Fax: [phone]   Patient: Hailey Sawant   MR Number: [id number]   YOB: 1945   Date of Visit: 8/23/2018     Dear Dr. Burton,     We contacted Ms Vergara regarding setting up an appointment for an evaluation at our center.  We scheduled a CT scans and blood tumor markers over the next couple of weeks.  We also scheduled an appointment with Dr. Ryan Prasad DO on Monday September 17, 2018 at 1 PM for recommendations.  We will forward you a copy of the clinic note after the visit.      Thank you for considering our program and for referring this patient.  If you have any questions, please do not hesitate to contact us.      Sincerely,      BRIGID Mccurdy NP Enclosure

## 2018-08-23 NOTE — TELEPHONE ENCOUNTER
----- Message from Ryan Prasad DO, FACP sent at 8/14/2018  2:18 PM CDT -----  Regarding: new referral  Please see below    ----- Message -----  From: Trish Willson NP  Sent: 8/14/2018   2:11 PM  To: Ryan Prasad DO, FACP

## 2018-08-31 ENCOUNTER — HOSPITAL ENCOUNTER (OUTPATIENT)
Dept: RADIOLOGY | Facility: HOSPITAL | Age: 73
Discharge: HOME | End: 2018-08-31
Attending: INTERNAL MEDICINE
Payer: MEDICARE

## 2018-08-31 DIAGNOSIS — D35.00 PHEOCHROMOCYTOMA, UNSPECIFIED LATERALITY: ICD-10-CM

## 2018-08-31 PROCEDURE — 71270 CT THORAX DX C-/C+: CPT | Mod: 26,,, | Performed by: RADIOLOGY

## 2018-08-31 PROCEDURE — 74177 CT ABD & PELVIS W/CONTRAST: CPT | Mod: 26,,, | Performed by: RADIOLOGY

## 2018-08-31 PROCEDURE — 25500020 PHARM REV CODE 255: Performed by: INTERNAL MEDICINE

## 2018-08-31 PROCEDURE — 74177 CT ABD & PELVIS W/CONTRAST: CPT | Mod: TC

## 2018-08-31 PROCEDURE — 71270 CT THORAX DX C-/C+: CPT | Mod: TC

## 2018-08-31 RX ADMIN — IOHEXOL 100 ML: 350 INJECTION, SOLUTION INTRAVENOUS at 12:08

## 2018-08-31 RX ADMIN — IOHEXOL 30 ML: 350 INJECTION, SOLUTION INTRAVENOUS at 10:08

## 2018-09-12 RX ORDER — GLUCOSAM/CHON-MSM1/C/MANG/BOSW 500-416.6
TABLET ORAL
Qty: 100 EACH | Refills: 6 | Status: SHIPPED | OUTPATIENT
Start: 2018-09-12

## 2018-09-12 RX ORDER — CALCIUM CITRATE/VITAMIN D3 200MG-6.25
TABLET ORAL
Qty: 100 STRIP | Refills: 3 | Status: SHIPPED | OUTPATIENT
Start: 2018-09-12 | End: 2019-07-01 | Stop reason: SDUPTHER

## 2018-09-17 ENCOUNTER — RESEARCH ENCOUNTER (OUTPATIENT)
Dept: RESEARCH | Facility: HOSPITAL | Age: 73
End: 2018-09-17

## 2018-09-17 ENCOUNTER — LAB VISIT (OUTPATIENT)
Dept: LAB | Facility: HOSPITAL | Age: 73
End: 2018-09-17
Attending: INTERNAL MEDICINE
Payer: MEDICARE

## 2018-09-17 ENCOUNTER — INITIAL CONSULT (OUTPATIENT)
Dept: HEMATOLOGY/ONCOLOGY | Facility: CLINIC | Age: 73
End: 2018-09-17
Payer: MEDICARE

## 2018-09-17 VITALS
HEART RATE: 60 BPM | RESPIRATION RATE: 24 BRPM | TEMPERATURE: 98 F | DIASTOLIC BLOOD PRESSURE: 77 MMHG | SYSTOLIC BLOOD PRESSURE: 122 MMHG | HEIGHT: 63 IN | WEIGHT: 205.94 LBS | BODY MASS INDEX: 36.49 KG/M2 | OXYGEN SATURATION: 94 %

## 2018-09-17 DIAGNOSIS — C74.91 MALIGNANT PHEOCHROMOCYTOMA OF RIGHT ADRENAL GLAND: Primary | ICD-10-CM

## 2018-09-17 DIAGNOSIS — C7B.8 SECONDARY NEUROENDOCRINE TUMOR OF BONE: ICD-10-CM

## 2018-09-17 DIAGNOSIS — C74.91 MALIGNANT PHEOCHROMOCYTOMA OF RIGHT ADRENAL GLAND: ICD-10-CM

## 2018-09-17 DIAGNOSIS — C7B.8 SECONDARY NEUROENDOCRINE TUMOR OF DISTANT LYMPH NODES: ICD-10-CM

## 2018-09-17 PROCEDURE — 3078F DIAST BP <80 MM HG: CPT | Mod: CPTII,,, | Performed by: INTERNAL MEDICINE

## 2018-09-17 PROCEDURE — 99214 OFFICE O/P EST MOD 30 MIN: CPT | Mod: PBBFAC | Performed by: INTERNAL MEDICINE

## 2018-09-17 PROCEDURE — 99999 PR PBB SHADOW E&M-EST. PATIENT-LVL IV: CPT | Mod: PBBFAC,,, | Performed by: INTERNAL MEDICINE

## 2018-09-17 PROCEDURE — 1101F PT FALLS ASSESS-DOCD LE1/YR: CPT | Mod: CPTII,,, | Performed by: INTERNAL MEDICINE

## 2018-09-17 PROCEDURE — 83835 ASSAY OF METANEPHRINES: CPT

## 2018-09-17 PROCEDURE — 36415 COLL VENOUS BLD VENIPUNCTURE: CPT

## 2018-09-17 PROCEDURE — 3074F SYST BP LT 130 MM HG: CPT | Mod: CPTII,,, | Performed by: INTERNAL MEDICINE

## 2018-09-17 PROCEDURE — 99205 OFFICE O/P NEW HI 60 MIN: CPT | Mod: S$PBB,,, | Performed by: INTERNAL MEDICINE

## 2018-09-17 RX ORDER — FUROSEMIDE 40 MG/1
20 TABLET ORAL
COMMUNITY
Start: 2018-09-08 | End: 2021-04-26

## 2018-09-17 NOTE — Clinical Note
Richard Burton, Ms. Sawant came in for a visit while you were out and we referred her to Dr. Prasad. I'm just forwarding you the note. Thanks so much and I hope all is well Take care Trish

## 2018-09-17 NOTE — PROGRESS NOTES
"Protocol: [employer]/ "an observational study profiling biospecimens from cancer patients to screen for molecular alterations"  Protocol # [id number]-001-001  Amendment 3 Date approved May 1, 2018  PI: Dr. Hugo Marques  Pt: Hailey Sawant         Consent waiver and Eligibility Note      Patient meets eligibility this week for study based on the following criteria. Consent waiver in place.       4.1.1 Patient is ? 17 years of age. YOB: 1945, and she is 73 years old.     4.1.2 Patient has histologically documented metastatic/recurrent pheochromocytoma per path report on 10/02/2017.      4.1.3.3. - 4.1.3.4. Patient has Metastatic pheochromocytoma per clinic note on 09/17/2018.     4.1.4 Pending receipt of adequate tissue from Pathology.     All other eligibility criteria does not apply to this patient.   Specimen requested.  "

## 2018-09-17 NOTE — LETTER
September 17, 2018      Trish Willson, NP  [address]  [facility] [address]           [facility]  [address]  [facility] [address]  Phone: [phone]          Patient: Hailey Sawant   MR Number: [id number]   YOB: 1945   Date of Visit: 9/17/2018       Dear Trish Willson:    Thank you for referring Hailey Sawant to me for evaluation. Attached you will find relevant portions of my assessment and plan of care.    If you have questions, please do not hesitate to call me. I look forward to following Hailey Sawant along with you.    Sincerely,    Ryan Prasad DO, FAC    Enclosure  CC:  No Recipients    If you would like to receive this communication electronically, please contact [email or web address] or [phone] to request more information on [facility] Link access.    For providers and/or their staff who would like to refer a patient to [facility], please contact us through our one-stop-shop provider referral line, [facility] , at [phone].    If you feel you have received this communication in error or would no longer like to receive these types of communications, please e-mail [email or web address]

## 2018-09-17 NOTE — Clinical Note
Tumor board: PRRT, cytoreduction, [facility]?Plasma free metanephrines todaySee me 4 weeks

## 2018-09-17 NOTE — PROGRESS NOTES
PATIENT: Hailey Sawant  MRN: [id number]  DATE: 9/17/2018      Diagnosis:   1. Malignant pheochromocytoma of right adrenal gland    2. Secondary neuroendocrine tumor of distant lymph nodes    3. Secondary neuroendocrine tumor of bone        Chief Complaint: pheochromcytoma      Oncologic History:      Oncologic History Pheochromocytoma, right adrenal, diagnosed 11/2009  Recurrent disease to bone diagnosed 02/2016    Oncologic Treatment Bilateral adrenalectomy 11/2009  Right open partial nephrectomy 10/2017 (Dr. Castillo)  [id number] 03/2016, 07/2016, 11/2016    Pathology 11/2009:  Pheochromocytoma, right          Subjective:    Interval History: Ms. Sawant is a 73 y.o. female who is seen as an initial visit for a pheochromocytoma.  Her history dates to 2009 when she was being worked up for abdominal pain and was found to have cholelithiasis, however, during this workup she was found to have bilateral adrenal masses.  She was also noted to be hypertensive.  She had a 6.5 cm mass on the right with some evidence of necrosis and also a mass just under 3 cm on the left.  Biochemical workup was consistent with pheochromocytoma.  In 11/2009 she underwent a bilateral adrenalectomy.  Pathology had revealed a right adrenal gland pheochromocytoma and a left adrenal gland adrenal cortical adenoma.  In 02/2016 she was noted to have recurrent disease to the bone and underwent I 131 MIBG therapy in 03/2016, 07/2016 and 11/2016.  In 10/2016 she underwent a right partial nephrectomy due to recurrent disease.  Pathology did confirm findings consistent with recurrent pheochromocytoma.      She states that her blood pressure is reasonably well controlled but will vary from time to time.  She does have intermittent chest pain and shortness of breath.  She complains of some occasional headaches.  Her main complaint is related to low back pain over the course of the last year which does limit her mobility.  She has no other new  complaints.    Past Medical History:   Past Medical History:   Diagnosis Date    Diabetes mellitus     Hypertension     Lumbar spondylosis 6/8/2018    Malignant pheochromocytoma     Pheochromocytoma     Pheochromocytoma, malignant 01/10/2016    Pheochromocytoma, malignant     Sacroiliac joint pain 7/11/2018    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    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/17/2018    Thyroid disease        Past Surgical HIstory:   Past Surgical History:   Procedure Laterality Date    ADRENALECTOMY      ADRENALECTOMY open exploratory Right 10/2/2017    Performed by [facility] at [facility] OR 2ND FLR    APPENDECTOMY      [id number] N/A 2/8/2016    Performed by [facility] at [facility] OR 2ND FLR    BLOCK, SACROILIAC JOINT Right 6/26/2018    [facility] at [facility]    CATHETERIZATION, HEART, LEFT Left 7/18/2018    Performed by Moustapha Vasquez MD at [facility] C[facility]    INJECTION OF ANESTHETIC AGENT INTO SACROILIAC JOINT Right 6/26/2018    Procedure: BLOCK, SACROILIAC JOINT;  Surgeon: Sydney Reynoso MD;  Location: [facility];  Service: Pain Management;  Laterality: Right;  Right SI Joint Injection    [id number]    NEEDS CONSENT    LEFT HEART CATHETERIZATION Left 7/18/2018    Procedure: CATHETERIZATION, HEART, LEFT;  Surgeon: Moustapha Vasquez MD;  Location: [facility] CATH LAB;  Service: Cardiovascular;  Laterality: Left;    MIBG Therapy  3/2016, 7/2016, 12/2016    ELIJAH Martines       Family History:   Family History   Problem Relation Age of Onset    Heart disease Mother     Diabetes Mother     Heart disease Father     Cancer Brother     Lung cancer Brother     Cancer Brother     Melanoma Neg Hx        Social History:  reports that  has never smoked. she has never used smokeless tobacco. She reports that she does not drink alcohol or use drugs.    Allergies:  Review of patient's allergies  indicates:  No Known Allergies    Medications:  Current Outpatient Medications   Medication Sig Dispense Refill    aspirin 81 MG Chew Take 81 mg by mouth once daily.      cholecalciferol, vitamin D3, (VITAMIN D3) 5,000 unit Tab Take 5,000 Units by mouth once daily.      fludrocortisone (FLORINEF) 0.1 mg Tab Take 100 mcg by mouth once daily.      hydrocortisone (CORTEF) 20 MG Tab TAKE 1 TABLET EVERY MORNING  AND TAKE 1/2 TABLET EVERY EVENING  AT  5PM 135 tablet 6    losartan (COZAAR) 50 MG tablet Take 50 mg by mouth once daily.      metFORMIN (GLUCOPHAGE-XR) 500 MG 24 hr tablet TAKE 1 TABLET TWICE DAILY WITH MEALS 180 tablet 3    NIFEdipine (PROCARDIA-XL) 90 MG (OSM) 24 hr tablet Take 90 mg by mouth once daily.       TRUE METRIX AIR GLUCOSE METER kit CHECK BLOOD SUGAR ONE TIME DAILY 1 each 0    TRUE METRIX GLUCOSE TEST STRIP Strp CHECK BLOOD SUGAR ONE TIME DAILY 100 strip 3    TRUEPLUS LANCETS 28 gauge Misc TEST ONE TIME DAILY 100 each 6    furosemide (LASIX) 40 MG tablet        No current facility-administered medications for this visit.        Review of Systems   Constitutional: Negative for chills, fever and unexpected weight change.   HENT: Negative for congestion, hearing loss and nosebleeds.    Eyes: Negative for visual disturbance.   Respiratory: Negative for cough and shortness of breath.    Cardiovascular: Positive for chest pain. Negative for palpitations.   Gastrointestinal: Negative for abdominal pain, blood in stool, constipation, diarrhea, nausea and vomiting.   Genitourinary: Negative for dysuria.   Musculoskeletal: Positive for back pain. Negative for gait problem.   Skin: Negative for color change and rash.   Neurological: Positive for headaches. Negative for dizziness and weakness.   Hematological: Negative for adenopathy. Does not bruise/bleed easily.   Psychiatric/Behavioral: Negative for confusion.       ECOG Performance Status:   ECOG SCORE    1 - Restricted in strenuous  "activity-ambulatory and able to carry out work of a light nature         Objective:      Vitals:   Vitals:    09/17/18 1307   BP: 122/77   Pulse: 60   Resp: (!) 24   Temp: 97.7 °F (36.5 °C)   SpO2: (!) 94%   Weight: 93.4 kg (205 lb 14.6 oz)   Height: 5' 2.5" (1.588 m)     BMI: Body mass index is 37.06 kg/m².    Physical Exam   Constitutional: She is oriented to person, place, and time. She appears well-developed and well-nourished. No distress.   HENT:   Head: Normocephalic.   Mouth/Throat: No oropharyngeal exudate.   Eyes: EOM are normal. No scleral icterus.   Neck: Neck supple. No tracheal deviation present. No thyromegaly present.   Cardiovascular: Normal rate and regular rhythm.   Pulmonary/Chest: Effort normal and breath sounds normal. No respiratory distress. She has no wheezes. She has no rales.   Abdominal: Soft. She exhibits no distension and no mass. There is no tenderness. There is no rebound and no guarding.   Musculoskeletal: Normal range of motion. She exhibits no edema.   Lymphadenopathy:     She has no cervical adenopathy.   Neurological: She is alert and oriented to person, place, and time. No cranial nerve deficit.   Skin: Skin is warm and dry.   Psychiatric: She has a normal mood and affect.       Laboratory Data:  No visits with results within 1 Week(s) from this visit.   Latest known visit with results is:   Lab Visit on 08/31/2018   Component Date Value Ref Range Status    Creatinine 08/31/2018 0.9  0.5 - 1.4 mg/dL Final    eGFR if African American 08/31/2018 >60  >60 mL/min/1.73 m^2 Final    eGFR if non African American 08/31/2018 >60  >60 mL/min/1.73 m^2 Final    Comment: Calculation used to obtain the estimated glomerular filtration  rate (eGFR) is the CKD-EPI equation.        Supplemental Diagnosis  1, 2. THE RESULTS OF ADDITIONAL IMMUNOSTAINS ARE AS FOLLOWS: CHROMOGRANIN IS STRONGLY  POSITIVE. S100 IS FOCALLY POSITIVE. PANCYTOKERATIN (AE1/AE3) IS NEGATIVE. THE POSITIVE AND  NEGATIVE " CONTROLS STAIN APPROPRIATELY.THESE RESULTS SUPPORT THE DIAGNOSIS OF  METASTATIC/RECURRENT PHEOCHROMOCYTOMA.  (Electronically Signed: 2017-10-10 10:09:46 )  Diagnosed by: Topher Mohan M.D.  FINAL PATHOLOGIC DIAGNOSIS  1. RIGHT RETROCAVAL LYMPH NODE IS VIRTUALLY REPLACED BY METASTATIC PHEOCHROMOCYTOMA,  SEE [id number] AND [id number]. IMMUNOSTAIN FOR SYNAPTOPHYSIN IS STRONGLY POSITIVE.  IMMUNOSTAINS FOR CALRETININ AND INHIBIN ARE NEGATIVE. THE POSITIVE AND NEGATIVE  CONTROLS STAIN APPROPRIATELY  2. TISSUE FROM THE RIGHT ADRENAL FOSSA SHOWS RECURRENT PHEOCHROMOCYTOMA.  IMMUNOSTAIN FOR SYNAPTOPHYSIN IS STRONGLY POSITIVE. IMMUNOSTAINS FOR CALRETININ AND  INHIBIN ARE NEGATIVE. THE POSITIVE AND NEGATIVE CONTROLS STAIN APPROPRIATELY.  Diagnosed by: Topher Mohan M.D.      Imaging:   CT 08/31/2018  COMPARISON:  Prior PET CT dated 08/01/2018 and prior abdominal and pelvic CT dated 01/25/2018 and prior thoracic CT dated 08/26/2017 and prior abdomen pelvis CT from 10/19/2016    FINDINGS:  There is a 1.2 cm hypoechoic nodule in the left thyroid lobe which is stable from previous exams and demonstrated no abnormal activity on recent PET-CT.  The mediastinal structures are midline.  The heart is mildly enlarged.  There is a left-sided aortic arch with a bovine configuration of branch vessels.  There is no evidence of mediastinal or hilar adenopathy.    The airways are patent.  The lungs appear clear.  There is a small right posterior diaphragmatic hernia, stable which contains fat.  There is no pleural fluid.    The liver is normal in size and contour.  No focal hepatic lesions are seen.  There is postsurgical change consistent with cholecystectomy and prominence of intra and extrahepatic biliary ducts, similar to the prior studies.    There is postsurgical change consistent with prior bilateral adrenalectomy.  A 1.3 cm soft tissue lesion is present in the right adrenal operative bed which corresponds to hypermetabolic activity on  recent PET-CT.  No liver lesions are detected.    There is fatty atrophy of the pancreas.    The kidneys concentrate and excrete contrast appropriately.    Stomach and small bowel are nondilated.  The colon has normal appearance    The bladder has a normal appearance.    There is no free air or free fluid in the abdomen or pelvis.  No adenopathy is seen.  A subtle sclerotic focus in the left acetabulum corresponds to hypermetabolic activity on prior PET-CT worrisome for metastasis.  There is a similar lesion in the right scapula.      Impression       1. Soft tissue nodule measuring 1.3 cm at the right adrenalectomy site corresponds to hypermetabolic activity on recent PET-CT and is most consistent with recurrent pheochromocytoma.  No liver lesions are identified.  2. Sclerotic foci in the left acetabulum and right scapula consistent with known bone metastases  3. Cardiomegaly  4. Postsurgical changes as above  5. Stable small diaphragmatic hernia       Gallium 68 PET-CT 08/01/2018  EXAMINATION:  NM PET 68GA DOTATATE WHOLE BODY    CLINICAL HISTORY:  Malignant neoplasm of unspecified part of right adrenal gland    FINDINGS:  Patient was administered 4.7 millicuries of gallium 68 octreotide intravenously.  There is a large jugular chain right retromandibular hypermetabolic mass SUV max 167.73.  There is a right infra glenoid bone metastasis SUV max 15.52.  There is a left inferior acetabular metastasis SUV max 15.69.  There is a right liver versus is adrenal lesion SUV max 49.44.    There is physiologic intracranial, head, and neck activity.  Spleen, pancreas, GI and  activity is unremarkable.  No lung metastases are seen.  Lungs are clear.      Impression       Somatostatin receptor positive neoplastic deposits as follows:    Right jugular chain SUV max 167.73.    Right infra glenoid bone metastasis SUV max 15.52.    Left inferior acetabular metastasis SUV max 15.69.    Right adrenal recurrence versus liver  metastasis SUV max 49.44.            Assessment:       1. Malignant pheochromocytoma of right adrenal gland    2. Secondary neuroendocrine tumor of distant lymph nodes    3. Secondary neuroendocrine tumor of bone           Plan:     I have had a long discussion with Mrs. Sawant today concerning her disease.  I have reviewed her images with her.  She has already gone through surgical resection and also I 131 MIBG therapy and appears to have progressive disease.  She is minimally symptomatic from this and her blood pressure today is within the normal range.  I will plan to send her for plasma free metanephrines today.  She may be a candidate for Bailey-[id number] given her uptake on her gallium 68 PET-CT if we can get her insurance to approve.  Other options may be Afinitor versus local therapy.  I will discuss her case in our multidisciplinary neuroendocrine tumor board.  I will also send her tissue for next generation sequencing through [facility].   All questions were answered and she is agreeable with this plan.      Ryan Prasad DO, FACP  Hematology & Oncology  [facility] [place] [address]  ph. [phone]  Fax. [phone]    25 minutes were spent in coordination of patient's care, record review and counseling.  More than 50% of the time was face-to-face.

## 2018-09-23 LAB
METANEPH FREE SERPL-MCNC: <25 PG/ML
METANEPHS SERPL-MCNC: 327 PG/ML
NORMETANEPH FREE SERPL-MCNC: 327 PG/ML

## 2018-10-01 ENCOUNTER — CONFERENCE (OUTPATIENT)
Dept: NEUROLOGY | Facility: HOSPITAL | Age: 73
End: 2018-10-01

## 2018-10-01 NOTE — TELEPHONE ENCOUNTER
[facility]      NEUROENDOCRINE TUMOR MULTIDISCIPLINARY TUMOR BOARD  _____________________________________________________________________    PRESENTER:   Ryan Prasad DO    REASON FOR PRESENTATION:  Treatment Plan, Scan Review, Surgical Evaluation and [facility], [facility]    ATTENDEES:   Surgery:              MD NIGEL Chris MD T. Ramcharan, MD  Interventional Radiology - Edwin Taylor MD  Pathology -   Oncology - Ryan Prasad DO, Jared Hernandez MD  Gastroenterology - Not present   Nursing  Research    PATIENT STATUS:  Established Patient    TUMOR SITE (Primary & Mets):  See below    PATIENT SUMMARY:  Past Medical History:   Diagnosis Date    Diabetes mellitus     Hypertension     Lumbar spondylosis 6/8/2018    Malignant pheochromocytoma     Pheochromocytoma     Pheochromocytoma, malignant 01/10/2016    Pheochromocytoma, malignant     Sacroiliac joint pain 7/11/2018    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    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/17/2018    Thyroid disease        Past Surgical History:   Procedure Laterality Date    ADRENALECTOMY      ADRENALECTOMY open exploratory Right 10/2/2017    Performed by [facility] MD at [facility] OR 2ND FLR    APPENDECTOMY      [id number] N/A 2/8/2016    Performed by [facility] at [facility] OR 2ND FLR    BLOCK, SACROILIAC JOINT Right 6/26/2018    Performed by [facility] at [facility] PAIN MGT    CATHETERIZATION, HEART, LEFT Left 7/18/2018    Performed by [facility]oustapha Vasquez [facility] at [facility] CATH LAB    INJECTION OF ANESTHETIC AGENT INTO SACROILIAC JOINT Right 6/26/2018    Procedure: BLOCK, SACROILIAC JOINT;  Surgeon: Sydney Reynoso MD;  [facility];  Service: Pain Management;  Laterality: Right;  Right SI Joint Injection    [id number]    NEEDS CONSENT    LEFT HEART CATHETERIZATION Left 7/18/2018    Procedure: CATHETERIZATION,  HEART, LEFT;  Surgeon: Moustapha Vasquez MD;  Location: [facility] CATH LAB;  Service: Cardiovascular;  Laterality: Left;    MIBG Therapy  3/2016, 7/2016, 12/2016    ELIJAH Martines     ________________________________________________________________  Diagnosis:   1. Malignant pheochromocytoma of right adrenal gland    2. Secondary neuroendocrine tumor of distant lymph nodes    3. Secondary neuroendocrine tumor of bone          Chief Complaint: pheochromcytoma        Oncologic History:       Oncologic History Pheochromocytoma, right adrenal, diagnosed 11/2009  Recurrent disease to bone diagnosed 02/2016    Oncologic Treatment Bilateral adrenalectomy 11/2009  Right open partial nephrectomy 10/2017 (Dr. Castillo)  [id number] 03/2016, 07/2016, 11/2016    Pathology 11/2009:  Pheochromocytoma, right            DISCUSSION:  Right neck, scattered bone, and locally recurrent disease in Rt adrenal. No liver mets IDd.      BOARD RECOMMENDATIONS:     ENT referral   Possible PRRT  Lanreotide   Microwave ablation to adrenal lesion -- needs appt w/ IR

## 2018-10-15 ENCOUNTER — TELEPHONE (OUTPATIENT)
Dept: HEMATOLOGY/ONCOLOGY | Facility: CLINIC | Age: 73
End: 2018-10-15

## 2018-10-15 ENCOUNTER — OFFICE VISIT (OUTPATIENT)
Dept: HEMATOLOGY/ONCOLOGY | Facility: CLINIC | Age: 73
End: 2018-10-15
Payer: MEDICARE

## 2018-10-15 VITALS
BODY MASS INDEX: 36.21 KG/M2 | TEMPERATURE: 98 F | HEART RATE: 57 BPM | WEIGHT: 204.38 LBS | SYSTOLIC BLOOD PRESSURE: 139 MMHG | RESPIRATION RATE: 16 BRPM | HEIGHT: 63 IN | DIASTOLIC BLOOD PRESSURE: 78 MMHG | OXYGEN SATURATION: 96 %

## 2018-10-15 DIAGNOSIS — C74.91 MALIGNANT PHEOCHROMOCYTOMA OF RIGHT ADRENAL GLAND: Primary | ICD-10-CM

## 2018-10-15 DIAGNOSIS — C7B.8 SECONDARY NEUROENDOCRINE TUMOR OF DISTANT LYMPH NODES: ICD-10-CM

## 2018-10-15 PROCEDURE — 99214 OFFICE O/P EST MOD 30 MIN: CPT | Mod: S$PBB,,, | Performed by: INTERNAL MEDICINE

## 2018-10-15 PROCEDURE — 99214 OFFICE O/P EST MOD 30 MIN: CPT | Mod: PBBFAC | Performed by: INTERNAL MEDICINE

## 2018-10-15 PROCEDURE — 3075F SYST BP GE 130 - 139MM HG: CPT | Mod: CPTII,,, | Performed by: INTERNAL MEDICINE

## 2018-10-15 PROCEDURE — 99999 PR PBB SHADOW E&M-EST. PATIENT-LVL IV: CPT | Mod: PBBFAC,,, | Performed by: INTERNAL MEDICINE

## 2018-10-15 PROCEDURE — 1101F PT FALLS ASSESS-DOCD LE1/YR: CPT | Mod: CPTII,,, | Performed by: INTERNAL MEDICINE

## 2018-10-15 PROCEDURE — 3078F DIAST BP <80 MM HG: CPT | Mod: CPTII,,, | Performed by: INTERNAL MEDICINE

## 2018-10-15 NOTE — PROGRESS NOTES
PATIENT: Hailey Sawant  MRN: [id number]  DATE: 10/15/2018      Diagnosis:   1. Malignant pheochromocytoma of right adrenal gland    2. Secondary neuroendocrine tumor of distant lymph nodes        Chief Complaint: Follow-up      Oncologic History:      Oncologic History Pheochromocytoma, right adrenal, diagnosed 11/2009  Recurrent disease to bone diagnosed 02/2016    Oncologic Treatment Bilateral adrenalectomy 11/2009  Right open partial nephrectomy 10/2017 (Dr. Castillo)  [id number] 03/2016, 07/2016, 11/2016    Pathology 11/2009:  Pheochromocytoma, right          Subjective:    Interval History: Ms. Sawant is a 73 y.o. female who is seen in follow-up for a pheochromocytoma.  She states that she is having ongoing low back pain.  Blood pressure has been controlled.  No other new complaints    Her history dates to 2009 when she was being worked up for abdominal pain and was found to have cholelithiasis, however, during this workup she was found to have bilateral adrenal masses.  She was also noted to be hypertensive.  She had a 6.5 cm mass on the right with some evidence of necrosis and also a mass just under 3 cm on the left.  Biochemical workup was consistent with pheochromocytoma.  In 11/2009 she underwent a bilateral adrenalectomy.  Pathology had revealed a right adrenal gland pheochromocytoma and a left adrenal gland adrenal cortical adenoma.  In 02/2016 she was noted to have recurrent disease to the bone and underwent I 131 MIBG therapy in 03/2016, 07/2016 and 11/2016.  In 10/2016 she underwent a right partial nephrectomy due to recurrent disease.  Pathology did confirm findings consistent with recurrent pheochromocytoma.    Past Medical History:   Past Medical History:   Diagnosis Date    Diabetes mellitus     Hypertension     Lumbar spondylosis 6/8/2018    Malignant pheochromocytoma     Pheochromocytoma     Pheochromocytoma, malignant 01/10/2016    Pheochromocytoma, malignant     Sacroiliac joint pain  7/11/2018    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    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/17/2018    Thyroid disease        Past Surgical HIstory:   Past Surgical History:   Procedure Laterality Date    ADRENALECTOMY      ADRENALECTOMY open exploratory Right 10/2/2017    Performed by Sandoval [facility]illo MD at [facility] OR 2ND FLR    APPENDECTOMY      OONPTQ-LJIOTG-RB N/A 2/8/2016    Performed by [facility] Provider at [facility] OR 2ND FLR    BLOCK, SACROILIAC JOINT Right 6/26/2018    Performed by Sydney Reynoso MD at [facility] PAIN MGT    CATHETERIZATION, HEART, LEFT Left 7/18/2018    Performed by Moustapha Vasquez MD at [facility] CATH LAB    INJECTION OF ANESTHETIC AGENT INTO SACROILIAC JOINT Right 6/26/2018    Procedure: BLOCK, SACROILIAC JOINT;  Surgeon: Sydney Reynoso MD;  Location: [facility] PAIN MGT;  Service: Pain Management;  Laterality: Right;  Right SI Joint Injection    [id number]    NEEDS CONSENT    LEFT HEART CATHETERIZATION Left 7/18/2018    Procedure: CATHETERIZATION, HEART, LEFT;  Surgeon: Moustapha Vasquez MD;  Location: [facility] CATH LAB;  Service: Cardiovascular;  Laterality: Left;    MIBG Therapy  3/2016, 7/2016, 12/2016    ELIJAH Martines       Family History:   Family History   Problem Relation Age of Onset    Heart disease Mother     Diabetes Mother     Heart disease Father     Cancer Brother     Lung cancer Brother     Cancer Brother     Melanoma Neg Hx        Social History:  reports that  has never smoked. she has never used smokeless tobacco. She reports that she does not drink alcohol or use drugs.    Allergies:  Review of patient's allergies indicates:  No Known Allergies    Medications:  Current Outpatient Medications   Medication Sig Dispense Refill    aspirin 81 MG Chew Take 81 mg by mouth once daily.      cholecalciferol, vitamin D3, (VITAMIN D3) 5,000 unit Tab Take 5,000 Units by mouth once daily.      fludrocortisone (FLORINEF) 0.1 mg Tab Take  "100 mcg by mouth once daily.      furosemide (LASIX) 40 MG tablet       hydrocortisone (CORTEF) 20 MG Tab TAKE 1 TABLET EVERY MORNING  AND TAKE 1/2 TABLET EVERY EVENING  AT  5PM 135 tablet 6    losartan (COZAAR) 50 MG tablet Take 50 mg by mouth once daily.      metFORMIN (GLUCOPHAGE-XR) 500 MG 24 hr tablet TAKE 1 TABLET TWICE DAILY WITH MEALS 180 tablet 3    NIFEdipine (PROCARDIA-XL) 90 MG (OSM) 24 hr tablet Take 90 mg by mouth once daily.       TRUE METRIX AIR GLUCOSE METER kit CHECK BLOOD SUGAR ONE TIME DAILY 1 each 0    TRUE METRIX GLUCOSE TEST STRIP Strp CHECK BLOOD SUGAR ONE TIME DAILY 100 strip 3    TRUEPLUS LANCETS 28 gauge Misc TEST ONE TIME DAILY 100 each 6     No current facility-administered medications for this visit.        Review of Systems   Constitutional: Negative for chills, fever and unexpected weight change.   HENT: Negative for congestion, hearing loss and nosebleeds.    Eyes: Negative for visual disturbance.   Respiratory: Negative for cough and shortness of breath.    Cardiovascular: Negative for chest pain and palpitations.   Gastrointestinal: Negative for abdominal pain, blood in stool, constipation, diarrhea, nausea and vomiting.   Genitourinary: Negative for dysuria.   Musculoskeletal: Positive for back pain. Negative for gait problem.   Skin: Negative for color change and rash.   Neurological: Positive for headaches. Negative for dizziness and weakness.   Hematological: Negative for adenopathy. Does not bruise/bleed easily.   Psychiatric/Behavioral: Negative for confusion.       ECOG Performance Status:   ECOG SCORE           Objective:      Vitals:   Vitals:    10/15/18 1155   BP: 139/78   BP Location: Left arm   Patient Position: Sitting   BP Method: Large (Automatic)   Pulse: (!) 57   Resp: 16   Temp: 97.6 °F (36.4 °C)   TempSrc: Oral   SpO2: 96%   Weight: 92.7 kg (204 lb 5.9 oz)   Height: 5' 2.5" (1.588 m)     BMI: Body mass index is 36.78 kg/m².    Physical Exam "   Constitutional: She is oriented to person, place, and time. She appears well-developed and well-nourished. No distress.   HENT:   Head: Normocephalic.   Mouth/Throat: No oropharyngeal exudate.   Eyes: EOM are normal. No scleral icterus.   Neck: Neck supple. No tracheal deviation present. No thyromegaly present.   Cardiovascular: Normal rate and regular rhythm.   Pulmonary/Chest: Effort normal and breath sounds normal. No respiratory distress. She has no wheezes. She has no rales.   Abdominal: Soft. She exhibits no distension and no mass. There is no tenderness. There is no rebound and no guarding.   Musculoskeletal: Normal range of motion. She exhibits no edema.   Lymphadenopathy:     She has no cervical adenopathy.   Neurological: She is alert and oriented to person, place, and time. No cranial nerve deficit.   Skin: Skin is warm and dry.   Psychiatric: She has a normal mood and affect.       Laboratory Data:  No visits with results within 1 Week(s) from this visit.   Latest known visit with results is:   Lab Visit on 09/17/2018   Component Date Value Ref Range Status    Metanephrine, Free 09/17/2018 <25  < OR = 57 pg/mL Final    Comment: This test was developed and its analytical performance characteristics  have been determined by [facility]. It has not been cleared or approved by FDA. This assay has  been validated pursuant to the CLIA regulations and is used for  clinical purposes.      Metanephrine, Total, Plasma 09/17/2018 327* < OR = 205 pg/mL Final    Comment: Elevations > 4-fold upper reference range: strongly suggestive of a  pheochromocytoma(1). Elevations >1 - 4-fold upper reference range:  significant but not diagnostic, may be due to medications or stress.  Suggest running 24 hr urine fractionated metanephrines and serum  Chromogranin A for confirmation.  Reference:  (1) Joe BARAHONA et al, Plasma Chromogranin A or Urine  Fractionated  Metanephrines Follow-Up Testing Improves the Diagnostic  Accuracy of Plasma Fractionated Metanephrines for Pheochromocytoma.  The Journal of Clinical Endocrinology and Metabolism 93 (1),91-95,  2008.  For additional information, please refer to  http://education.[employer]irio/faq/MetFractFree  (This link is being provided for informational/educational purposes  only.)  This test was developed and its analytical performance characteristics  have been determined by [facility]. It has not been cleared or approved by FDA. This assay has  been valid                           ated pursuant to the CLIA regulations and is used for  clinical purposes.  Test Performed at:  [facility] [address] [place]  [address]     JANA De León MD, PhD, ELOY      Normetanephrine, Free 09/17/2018 327* < OR = 148 pg/mL Final    Comment: This test was developed and its analytical performance characteristics  have been determined by [facility]. It has not been cleared or approved by FDA. This assay has  been validated pursuant to the CLIA regulations and is used for  clinical purposes.       Supplemental Diagnosis  1, 2. THE RESULTS OF ADDITIONAL IMMUNOSTAINS ARE AS FOLLOWS: CHROMOGRANIN IS STRONGLY  POSITIVE. S100 IS FOCALLY POSITIVE. PANCYTOKERATIN (AE1/AE3) IS NEGATIVE. THE POSITIVE AND  NEGATIVE CONTROLS STAIN APPROPRIATELY.THESE RESULTS SUPPORT THE DIAGNOSIS OF  METASTATIC/RECURRENT PHEOCHROMOCYTOMA.  (Electronically Signed: 2017-10-10 10:09:46 )  Diagnosed by: Topher Mohan M.D.  FINAL PATHOLOGIC DIAGNOSIS  1. RIGHT RETROCAVAL LYMPH NODE IS VIRTUALLY REPLACED BY METASTATIC PHEOCHROMOCYTOMA,  SEE [id number] AND [id number]. IMMUNOSTAIN FOR SYNAPTOPHYSIN IS STRONGLY POSITIVE.  IMMUNOSTAINS FOR CALRETININ AND INHIBIN ARE NEGATIVE. THE POSITIVE AND NEGATIVE  CONTROLS STAIN APPROPRIATELY  2. TISSUE  FROM THE RIGHT ADRENAL FOSSA SHOWS RECURRENT PHEOCHROMOCYTOMA.  IMMUNOSTAIN FOR SYNAPTOPHYSIN IS STRONGLY POSITIVE. IMMUNOSTAINS FOR CALRETININ AND  INHIBIN ARE NEGATIVE. THE POSITIVE AND NEGATIVE CONTROLS STAIN APPROPRIATELY.  Diagnosed by: Topher Mohan M.D.      Imaging:   CT 08/31/2018  COMPARISON:  Prior PET CT dated 08/01/2018 and prior abdominal and pelvic CT dated 01/25/2018 and prior thoracic CT dated 08/26/2017 and prior abdomen pelvis CT from 10/19/2016    FINDINGS:  There is a 1.2 cm hypoechoic nodule in the left thyroid lobe which is stable from previous exams and demonstrated no abnormal activity on recent PET-CT.  The mediastinal structures are midline.  The heart is mildly enlarged.  There is a left-sided aortic arch with a bovine configuration of branch vessels.  There is no evidence of mediastinal or hilar adenopathy.    The airways are patent.  The lungs appear clear.  There is a small right posterior diaphragmatic hernia, stable which contains fat.  There is no pleural fluid.    The liver is normal in size and contour.  No focal hepatic lesions are seen.  There is postsurgical change consistent with cholecystectomy and prominence of intra and extrahepatic biliary ducts, similar to the prior studies.    There is postsurgical change consistent with prior bilateral adrenalectomy.  A 1.3 cm soft tissue lesion is present in the right adrenal operative bed which corresponds to hypermetabolic activity on recent PET-CT.  No liver lesions are detected.    There is fatty atrophy of the pancreas.    The kidneys concentrate and excrete contrast appropriately.    Stomach and small bowel are nondilated.  The colon has normal appearance    The bladder has a normal appearance.    There is no free air or free fluid in the abdomen or pelvis.  No adenopathy is seen.  A subtle sclerotic focus in the left acetabulum corresponds to hypermetabolic activity on prior PET-CT worrisome for metastasis.  There is a similar  lesion in the right scapula.      Impression       1. Soft tissue nodule measuring 1.3 cm at the right adrenalectomy site corresponds to hypermetabolic activity on recent PET-CT and is most consistent with recurrent pheochromocytoma.  No liver lesions are identified.  2. Sclerotic foci in the left acetabulum and right scapula consistent with known bone metastases  3. Cardiomegaly  4. Postsurgical changes as above  5. Stable small diaphragmatic hernia       Gallium 68 PET-CT 08/01/2018  EXAMINATION:  NM PET 68GA DOTATATE WHOLE BODY    CLINICAL HISTORY:  Malignant neoplasm of unspecified part of right adrenal gland    FINDINGS:  Patient was administered 4.7 millicuries of gallium 68 octreotide intravenously.  There is a large jugular chain right retromandibular hypermetabolic mass SUV max 167.73.  There is a right infra glenoid bone metastasis SUV max 15.52.  There is a left inferior acetabular metastasis SUV max 15.69.  There is a right liver versus is adrenal lesion SUV max 49.44.    There is physiologic intracranial, head, and neck activity.  Spleen, pancreas, GI and  activity is unremarkable.  No lung metastases are seen.  Lungs are clear.      Impression       Somatostatin receptor positive neoplastic deposits as follows:    Right jugular chain SUV max 167.73.    Right infra glenoid bone metastasis SUV max 15.52.    Left inferior acetabular metastasis SUV max 15.69.    Right adrenal recurrence versus liver metastasis SUV max 49.44.            Assessment:       1. Malignant pheochromocytoma of right adrenal gland    2. Secondary neuroendocrine tumor of distant lymph nodes           Plan:     Mrs. Sawant was discussed in our multidisciplinary neuroendocrine tumor board with recommendations for head and neck surgery evaluation for possible cytoreduction along with Interventional Radiology evaluation for microwave ablation of her adrenal lesion.  Other options could be PRRT which may work better with a smaller  volume of disease.  She is agreeable for consultations and will make referrals.  I will plan to see her back following this.  All questions were answered and she is agreeable with this plan.      Ryan Prasad DO, FACP  Hematology & Oncology  [facility] [place] [address]  ph. [phone]  Fax. [phone]    25 minutes were spent in coordination of patient's care, record review and counseling.  More than 50% of the time was face-to-face.

## 2018-10-15 NOTE — TELEPHONE ENCOUNTER
Called patient there was no answer. Scheduled appointment for patient to see  on 10/25.  Mailed appt slip.      ----- Message from Ryan Prasad DO, FACP sent at 10/15/2018  1:17 PM CDT -----  Refer ENT (sandoval or marily)  Refer IR at [place]  See me 6 weeks

## 2018-10-24 DIAGNOSIS — C74.90 MALIGNANT PHEOCHROMOCYTOMA, UNSPECIFIED LATERALITY: Primary | ICD-10-CM

## 2018-10-24 NOTE — TELEPHONE ENCOUNTER
Dr Prasad reviewed the tumor board recs in clinic on Monday, October 15 and she is agreeable.  Referral placed to otolaryngology from [facility] and referral placed to IR here at [place] by myself.

## 2018-10-25 ENCOUNTER — OFFICE VISIT (OUTPATIENT)
Dept: OTOLARYNGOLOGY | Facility: CLINIC | Age: 73
End: 2018-10-25
Payer: MEDICARE

## 2018-10-25 ENCOUNTER — ANESTHESIA EVENT (OUTPATIENT)
Dept: SURGERY | Facility: HOSPITAL | Age: 73
DRG: 982 | End: 2018-10-25
Payer: MEDICARE

## 2018-10-25 ENCOUNTER — LAB VISIT (OUTPATIENT)
Dept: LAB | Facility: HOSPITAL | Age: 73
End: 2018-10-25
Attending: OTOLARYNGOLOGY
Payer: MEDICARE

## 2018-10-25 VITALS
SYSTOLIC BLOOD PRESSURE: 129 MMHG | DIASTOLIC BLOOD PRESSURE: 70 MMHG | HEART RATE: 44 BPM | BODY MASS INDEX: 36.43 KG/M2 | WEIGHT: 202.38 LBS

## 2018-10-25 DIAGNOSIS — E08.00 DIABETES MELLITUS DUE TO UNDERLYING CONDITION WITH HYPEROSMOLARITY WITHOUT COMA, WITHOUT LONG-TERM CURRENT USE OF INSULIN: Primary | ICD-10-CM

## 2018-10-25 DIAGNOSIS — D35.00 PHEOCHROMOCYTOMA, UNSPECIFIED LATERALITY: ICD-10-CM

## 2018-10-25 DIAGNOSIS — Z01.818 PREOP TESTING: Primary | ICD-10-CM

## 2018-10-25 DIAGNOSIS — D35.00 PHEOCHROMOCYTOMA, UNSPECIFIED LATERALITY: Primary | ICD-10-CM

## 2018-10-25 LAB
CREAT SERPL-MCNC: 0.8 MG/DL
EST. GFR  (AFRICAN AMERICAN): >60 ML/MIN/1.73 M^2
EST. GFR  (NON AFRICAN AMERICAN): >60 ML/MIN/1.73 M^2

## 2018-10-25 PROCEDURE — 1101F PT FALLS ASSESS-DOCD LE1/YR: CPT | Mod: CPTII,,, | Performed by: OTOLARYNGOLOGY

## 2018-10-25 PROCEDURE — 3074F SYST BP LT 130 MM HG: CPT | Mod: CPTII,,, | Performed by: OTOLARYNGOLOGY

## 2018-10-25 PROCEDURE — 99204 OFFICE O/P NEW MOD 45 MIN: CPT | Mod: 25,S$PBB,, | Performed by: OTOLARYNGOLOGY

## 2018-10-25 PROCEDURE — 99213 OFFICE O/P EST LOW 20 MIN: CPT | Mod: PBBFAC | Performed by: OTOLARYNGOLOGY

## 2018-10-25 PROCEDURE — 82565 ASSAY OF CREATININE: CPT

## 2018-10-25 PROCEDURE — 31575 DIAGNOSTIC LARYNGOSCOPY: CPT | Mod: PBBFAC | Performed by: OTOLARYNGOLOGY

## 2018-10-25 PROCEDURE — 3078F DIAST BP <80 MM HG: CPT | Mod: CPTII,,, | Performed by: OTOLARYNGOLOGY

## 2018-10-25 PROCEDURE — 31575 DIAGNOSTIC LARYNGOSCOPY: CPT | Mod: S$PBB,,, | Performed by: OTOLARYNGOLOGY

## 2018-10-25 PROCEDURE — 36415 COLL VENOUS BLD VENIPUNCTURE: CPT

## 2018-10-25 PROCEDURE — 99999 PR PBB SHADOW E&M-EST. PATIENT-LVL III: CPT | Mod: PBBFAC,,, | Performed by: OTOLARYNGOLOGY

## 2018-10-25 NOTE — LETTER
October 30, 2018      Ryan Prasad, DO, FACP  [address]  [facility] [address]           [facility] Head/Neck Surg Onc  [address]  [facility] [address]  Phone: [phone]  Fax: [phone]          Patient: Hailey Sawant   MR Number: [id number]   YOB: 1945   Date of Visit: 10/25/2018       Dear Dr. Ryan Prasad:    Thank you for referring Hailey Sawant to me for evaluation. Attached you will find relevant portions of my assessment and plan of care.    If you have questions, please do not hesitate to call me. I look forward to following Hailey Sawant along with you.    Sincerely,    Noah Ca MD    Enclosure  CC:  No Recipients    If you would like to receive this communication electronically, please contact [email or web address] or [phone] to request more information on [employer] access.    For providers and/or their staff who would like to refer a patient to [facility], please contact us through our one-stop-shop provider referral line, [facility], at [phone].    If you feel you have received this communication in error or would no longer like to receive these types of communications, please e-mail [email or web address]

## 2018-10-25 NOTE — ANESTHESIA PREPROCEDURE EVALUATION
Anesthesia Assessment: Preoperative EQUATION     Planned Procedure: Procedure(s) (LRB):  DISSECTION, NECK (Right)  Requested Anesthesia Type:General  Surgeon: Noah Ca MD  Service: ENT  Known or anticipated Date of Surgery:11/7/2018     Optimization:  Anesthesia Preop Clinic Assessment  Indicated    Medical Opinion Indicated                            Sub-specialist consult indicated:  Cardiology clearance                                Plan:    Testing:  BMP, A1C and T&S   Pre-anesthesia  visit                                        Visit focus: concerns in complex and/or prolonged anesthesia, airway concerns                           Consultation:Patient's PCP for re-evaluation  11/5/18  Cardiology-statement of optimization    10/30/18 Classification (class II) of physical status of patient for anesthesia was received from Dr Vasquez and is scanned into Media                            Patient  has previously scheduled Medical Appointment:  10/27 CT Neck     Navigation: Tests Scheduled. 11/5                        Consults scheduled. 11/5                        Results will be tracked by Preop Clinic.    RE: pre-op clearance   Received: Today 11/5/2018  Message Contents   MD Pooja Singh II, RN             Cleared. She will need stress doses of hydrocortisone 50 mg IV every 8 hours starting AM of surgery.      Pooja Golden RN                         Electronically signed by Pooja Golden RN at 10/25/2018  5:37 PM                                                                                                   [facility]  Anesthesia Pre-Operative Evaluation         Patient Name: Hailey Sawant  YOB: 1945  MRN: [id number]    SUBJECTIVE:     Pre-operative evaluation for Procedure(s) (LRB):  DISSECTION, NECK (Right)     11/05/2018    Hailey Sawant is a 73 y.o. female w/ a significant PMHx of HTN, T2DM, chronic back pain, hx of  atypical angina w/ normal LHC (7/2018), sick sinus syndrome, metastatic pheochromocytoma s/p bilateral adrenalectomy (2009) with recurrent disease to bone, s/p R partial nephrectomy (2016), now with right neck mass. Patient presents to pre-op clinic for evaluation prior to planned right neck dissection on 11/7.     Patient notes significant IGNACIO that has been present for the last year and has slowly been worsening. She is now only able to walk a short distance (<20 feet) before she gets short of breath. Since her admission in July, she denies any further episodes of chest pain. LHC at that time showed normal coronaries. She is currently bradycardic 2/2 SSS - plan to touch base with cardiology regarding further recs for SSS. She otherwise is doing well this morning with no other complaints.       Prev airway: 10/2/2017  DL Riley 2, 7.5 ETT, Grade I view, no complications, 1 attempt      Patient Active Problem List   Diagnosis    Hypoadrenalism    Essential hypertension    IGNACIO (dyspnea on exertion)    Vitamin D deficient osteomalacia    H/O total adrenalectomy    Pheochromocytoma, malignant    Pheochromocytoma    Controlled type 2 diabetes mellitus without complication    Low back pain    Lumbar spondylosis    Intervertebral disk disease    Chronic bilateral low back pain with right-sided sciatica    Weakness of both hips    Sacroiliac joint pain    Chronic low back pain with right-sided sciatica    Chest pain    Acute heart failure    Unstable angina pectoris due to coronary arteriosclerosis    Sick sinus syndrome    Secondary neuroendocrine tumor of distant lymph nodes    Secondary neuroendocrine tumor of bone       Review of patient's allergies indicates:  No Known Allergies    Current Outpatient Medications:    Current Outpatient Medications:     aspirin 81 MG Chew, Take 81 mg by mouth once daily., Disp: , Rfl:     cholecalciferol, vitamin D3, (VITAMIN D3) 5,000 unit Tab, Take 5,000 Units by  mouth once daily., Disp: , Rfl:     fludrocortisone (FLORINEF) 0.1 mg Tab, Take 100 mcg by mouth once daily., Disp: , Rfl:     furosemide (LASIX) 40 MG tablet, , Disp: , Rfl:     hydrocortisone (CORTEF) 20 MG Tab, TAKE 1 TABLET EVERY MORNING  AND TAKE 1/2 TABLET EVERY EVENING  AT  5PM, Disp: 135 tablet, Rfl: 6    losartan (COZAAR) 50 MG tablet, Take 50 mg by mouth once daily., Disp: , Rfl:     metFORMIN (GLUCOPHAGE-XR) 500 MG 24 hr tablet, TAKE 1 TABLET TWICE DAILY WITH MEALS, Disp: 180 tablet, Rfl: 3    NIFEdipine (PROCARDIA-XL) 90 MG (OSM) 24 hr tablet, Take 90 mg by mouth once daily. , Disp: , Rfl:     TRUE METRIX AIR GLUCOSE METER kit, CHECK BLOOD SUGAR ONE TIME DAILY, Disp: 1 each, Rfl: 0    TRUE METRIX GLUCOSE TEST STRIP Strp, CHECK BLOOD SUGAR ONE TIME DAILY, Disp: 100 strip, Rfl: 3    TRUEPLUS LANCETS 28 gauge Misc, TEST ONE TIME DAILY, Disp: 100 each, Rfl: 6    Past Surgical History:   Procedure Laterality Date    ADRENALECTOMY      ADRENALECTOMY open exploratory Right 10/2/2017    Performed by [facility] at [facility] 2ND FLR    APPENDECTOMY      [id number]-MQ N/A 2/8/2016    Performed by [facility] Diagnostic Provider at [facility] OR 2ND FLR    BLOCK, SACROILIAC JOINT Right 6/26/2018    Performed by Sydney Reynoso MD at [facility] PAIN T    CATHETERIZATION, HEART, LEFT Left 7/18/2018    Performed by Moustapha Vasquez MD at [facility] CATH LAB    INJECTION OF ANESTHETIC AGENT INTO SACROILIAC JOINT Right 6/26/2018    Procedure: BLOCK, SACROILIAC JOINT;  Surgeon: Sydney Reynoso MD;  Location: [facility] PAIN T;  Service: Pain Management;  Laterality: Right;  Right SI Joint Injection    [id number]    NEEDS CONSENT    LEFT HEART CATHETERIZATION Left 7/18/2018    Procedure: CATHETERIZATION, HEART, LEFT;  Surgeon: Moustapha Vasquez MD;  Location: [facility] CATH LAB;  Service: Cardiovascular;  Laterality: Left;    MIBG Therapy  3/2016, 7/2016, 12/2016    [facility] - Dr. Martines       Social History      Socioeconomic History    Marital status: Single     Spouse name: Not on file    Number of children: Not on file    Years of education: Not on file    Highest education level: Not on file   Social Needs    Financial resource strain: Not on file    Food insecurity - worry: Not on file    Food insecurity - inability: Not on file    Transportation needs - medical: Not on file    Transportation needs - non-medical: Not on file   Occupational History    Not on file   Tobacco Use    Smoking status: Never Smoker    Smokeless tobacco: Never Used   Substance and Sexual Activity    Alcohol use: No    Drug use: No    Sexual activity: Not on file   Other Topics Concern    Are you pregnant or think you may be? No    Breast-feeding No   Social History Narrative    Not on file       OBJECTIVE:     Vital Signs Range (Last 24H):  Temp:  [36.5 °C (97.7 °F)]   Pulse:  [52]   Resp:  [18]   BP: (126)/(72)   SpO2:  [97 %]       Significant Labs:  Lab Results   Component Value Date    WBC 5.34 07/17/2018    HGB 12.5 07/17/2018    HCT 38.6 07/17/2018     07/17/2018    ALT 6 (L) 07/17/2018    AST 10 07/17/2018     07/19/2018    K 3.3 (L) 07/19/2018     07/19/2018    CREATININE 0.8 10/25/2018    BUN 11 07/19/2018    CO2 27 07/19/2018    TSH 1.504 11/30/2016    INR 1.3 (H) 10/02/2017    HGBA1C 5.5 07/17/2018       Diagnostic Studies: No relevant studies.    EKG:   Vent. Rate : 053 BPM     Atrial Rate : 053 BPM     P-R Int : 192 ms          QRS Dur : 098 ms      QT Int : 614 ms       P-R-T Axes : 034 -46 -60 degrees     QTc Int : 576 ms    Sinus bradycardia with Premature atrial complexes  Left anterior fascicular block  Voltage criteria for left ventricular hypertrophy  ST and T wave abnormality, consider inferolateral ischemia  Prolonged QT  Abnormal ECG    Confirmed by Moustapha Vasquez MD ([id number]) on 7/22/2018 2:13:32 PM    Referred By: MIKE ZAPATA           Confirmed By:Moustapha Vasquez MD    2D  ECHO:  Results for orders placed or performed during the hospital encounter of 07/17/18   2D echo with color flow doppler   Result Value Ref Range    Calculated EF 61 55 - 65    Mitral Valve Regurgitation MILD     Diastolic Dysfunction Yes (A)     Aortic Valve Regurgitation TRIVIAL     Est. PA Systolic Pressure 28.3     Tricuspid Valve Regurgitation MILD      [facility] 7/18/2018  Narrative        Date of Procedure:  07/18/2018    A. Indication/Pre-Operative Diagnosis     The patient is a 73 year old female that was referred for emergency catheterization by Aaareferral Self for ACS (NSTEMI), abnormal CV function study (High risk findings) and chest discomfort.     B. Summary/Post-Operative Diagnosis       Good left ventricular systolic function.    Normal coronary arteries.    C. HPI     I have reviewed the history and physical completed on 07/18/2018. The patient has been examined and I concur with the findings from 07/18/2018.     Patient history was obtained from the patient.     Height: 63 in.      Weight: 197 lbs.      BMI: 34.90 kg/m2    Laboratory data revealed:     07/17/2018 WBCIR  5.34, PLT  255, HCT  38.6, HGB  12.5   07/18/2018 K  3.2, NA  143, GLU  106, CREAT  0.8, BUN  13            ACS Enzymes:     07/17/2018 TROP  0.092   07/17/2018 TROP  0.088   07/18/2018 TROP  0.073              D. Hemodynamic Results     LVEDP (Pre): 8 mmHg  LVEDP (Post): 9 mmHg  Ejection Fraction: 70%    E. Angiographic Results     Diagnostic:          Patient has a right dominant coronary artery.        - Left Main Coronary Artery:             The LM is normal. There is TATYANA 3 flow.     - Left Anterior Descending Artery:             The LAD is normal. There is TATYANA 3 flow.     - Left Circumflex Artery:             The LCX is normal. There is TATYANA 3 flow.     - Right Coronary Artery:             The RCA is normal. There is TATYANA 3 flow.     - Common Femoral Artery:             The right CFA is normal.    F. Details of  Procedure     PROCEDURES PERFORMED: Placement of Closure Device, LHC, Left Ventriculogram and Coronary Angio    ANESTHESIA: Local anesthesia was achieved with 20 ml of LIDOCAINE 1%.     PRIMARY SURGEON: Moustapha Vasquez MD    COMPLICATIONS: There were no complications.    Medications given on sterile field: Lidocaine 1% (20 ml).    Medications given during procedure: Heparin Bag 1000 Units/500ml (2 bags).    The patient was brought to the catheterization laboratory. Bilateral groin prepped and draped. A Wire Guide .035 145cm was inserted into the right femoral artery. After local anesthesia, a Sheath Diag 6fr 11cm was inserted into the right femoral artery.   The Wire Guide .035 145cm was removed.     LM    A Catheter 6f Jl4 was inserted into the ostial LM. Angiography performed in multiple views in the left coronary arteries. The Catheter 6f Jl4 was removed.     RCA    A Catheter 6f Jr4 was inserted into the ostial RCA. Angiography performed in multiple views in the right coronary arteries. The Catheter 6f Jr4 was removed.     Left  VENTRICLE    A Catheter 6f Pigtail was inserted into the left ventricle. Angiography performed in multiple views in the left ventricle. Hemodynamics recorded in the left ventricle.     The Catheter 6f Pigtail was removed. Angiography performed to evaluate for closure device in the right femoral artery. A Device Mynx  6/7f was inserted into the right femoral artery. The Sheath Diag 6fr 11cm was removed. A Device Mynx  6/7f was   deployed into the right femoral artery. Total Omnipaque used was 150.0 ml. Total Omnipaque injected was 70.0 ml.       Fluoroscopy Time 4.1 minutes   Radiation Dose 1167 mGy   Contrast Injected 70 ml Omnipaque   Contrast Used  70 ml Omnipaque            Procedure log documented by RT Snehal and verified by Moustapha Vasquez MD    ESTIMATED BLOOD LOSS is < 50 cc.    SPECIMEN: No specimen.     I certify that I was present for catheter  insertion, catheter manipulation, angiography, and angiographic interpretation of this patient.     This document has been electronically    SIGNED BY: Moustapha Vasquez MD On: 07/18/2018 15:44     CT Soft Tissue Neck 10/27/2018  Impression       1. Hyperenhancing right carotid body mass, suspicious for a paraganglioma.  Recommend surgical consultation, as clinically indicated.  2. No cervical adenopathy.  3. Bilateral hypodense thyroid nodules.  Recommend thyroid ultrasound for further assessment, as clinically indicated.     MRA Neck pending        ASSESSMENT/PLAN:         Anesthesia Evaluation    I have reviewed the Patient Summary Reports.    I have reviewed the Nursing Notes.   I have reviewed the Medications.   Steroids Taken In Past Year: Hydrocortisone    Review of Systems  Anesthesia Hx:  No problems with previous Anesthesia  History of prior surgery of interest to airway management or planning: Previous anesthesia: General Denies Family Hx of Anesthesia complications.   Denies Personal Hx of Anesthesia complications.   Social:  Non-Smoker  Denies Tobacco Use. Denies Alcohol Use.   Hematology/Oncology:         -- Denies Anemia:   EENT/Dental:   Denies Throat Disease. Denies Jaw Problems   Cardiovascular:   Hypertension, well controlled Denies CAD.     Denies Angina. IGNACIO  Functional Capacity low / < 4 METS  Denies Coronary Artery Disease.  Congestive Heart Failure (CHF) , LV Diastolic HF Hypertension    Pulmonary:   Denies COPD.  Denies Asthma. Shortness of breath  Denies Asthma.  Denies Chronic Obstructive Pulmonary Disease (COPD).  Possible Obstructive Sleep Apnea , (STOP/BANG) Symptoms S - Snoring (loud), P - Pressure being treated for high BP and A - Age > 50    Renal/:  Renal/ Normal   Denies Kidney Function/Disease    Hepatic/GI:  Hepatic/GI Normal  Denies GERD.  Denies Esophageal / Stomach Disorders  Denies Liver Disease    Musculoskeletal:   Arthritis   Joint Disease:  Arthritis  Bone  Disorders: Osteomalacia   Neurological:  Neurology Normal  Neuro Symptoms of numbness Bilateral handDenies Seizure Disorder  Denies CVA - Cerebrovasular Accident  Denies TIA - Transient Ischemic Attack    Endocrine:   Diabetes, well controlled, type 2 Pheochromocytoma Diabetes, Type 2 Diabetes for 7 years , controlled by oral hypoglycemics. , most recent HgA1c value was 5.5 on 7/17/18.  Pheochromocytoma (S/P total adrenalectomy), malignant   Psych:  Psychiatric Normal           Physical Exam  General:  Well nourished    Airway/Jaw/Neck:  Airway Findings: Mouth Opening: Normal General Airway Assessment: Adult  Mallampati: II  Improves to II with phonation.  Jaw/Neck Findings:  Neck ROM: Normal ROM      Dental:  Dental Findings: In tact   Chest/Lungs:  Chest/Lungs Findings: Clear to auscultation, Normal Respiratory Rate     Heart/Vascular:  Heart Findings: Rate: Normal  Vascular Findings:  Edema  Edema Locations: BLE  Edema: +1 or +2        Mental Status:  Mental Status Findings:  Cooperative, Alert and Oriented         Anesthesia Plan  Type of Anesthesia, risks & benefits discussed:  Anesthesia Type:  general  Patient's Preference:   Intra-op Monitoring Plan: arterial line and standard ASA monitors  Intra-op Monitoring Plan Comments:   Post Op Pain Control Plan: multimodal analgesia, IV/PO Opioids PRN and per primary service following discharge from PACU  Post Op Pain Control Plan Comments:   Induction:   IV  Beta Blocker:  Patient is not currently on a Beta-Blocker (No further documentation required).       Informed Consent:    ASA Score: 3     Day of Surgery Review of History & Physical:        Anesthesia Plan Notes: Will require stress dose steroids as above.  K+ remains low (trend since July of this year).  Patient encouraged to increase K+ in diet.  Discussed with ENT resident, as patient has been admitted overnight on 11/15.  Repeat lab to be ordered with replacement as needed.          Ready For Surgery From  Anesthesia Perspective.

## 2018-10-26 ENCOUNTER — TELEPHONE (OUTPATIENT)
Dept: ENDOCRINOLOGY | Facility: CLINIC | Age: 73
End: 2018-10-26

## 2018-10-26 ENCOUNTER — TELEPHONE (OUTPATIENT)
Dept: PREADMISSION TESTING | Facility: HOSPITAL | Age: 73
End: 2018-10-26

## 2018-10-26 NOTE — TELEPHONE ENCOUNTER
----- Message from Pooja Golden RN sent at 10/26/2018  9:12 AM CDT -----  Regarding: RE: preop appts  Hey I just saw that patient has appt with PCP on 11/5. Please coordinate. Thanks  ----- Message -----  From: Pooja Golden RN  Sent: 10/26/2018   9:03 AM  To: Pooja Golden RN, Corie Nelson  Subject: preop appts                                      Richard Fontenot  Please schedule:  POC  Lab  #I sent in baskets to patient's PCP and Cardiologist for clearances. No appts made yet.  Thanks,  Pooja

## 2018-10-26 NOTE — TELEPHONE ENCOUNTER
----- Message from Pooja Golden RN sent at 10/25/2018  5:47 PM CDT -----  Regarding: pre-op clearance  Patient is scheduled for surgery on 11/7/18 for a neck dissection with Dr UMM Ca. Patient needs a PCP clearance. Patient will also have an appt with anesthesia. Hopefully, we can coordinate the same day since she lives out of town.  Thank you for your help.  BRIGID Patton  Pre-op Center Anesthesia  X [phone]

## 2018-10-27 ENCOUNTER — HOSPITAL ENCOUNTER (OUTPATIENT)
Dept: RADIOLOGY | Facility: HOSPITAL | Age: 73
Discharge: HOME | End: 2018-10-27
Attending: OTOLARYNGOLOGY
Payer: MEDICARE

## 2018-10-27 DIAGNOSIS — D35.00 PHEOCHROMOCYTOMA, UNSPECIFIED LATERALITY: ICD-10-CM

## 2018-10-27 PROCEDURE — 70491 CT SOFT TISSUE NECK W/DYE: CPT | Mod: TC

## 2018-10-27 PROCEDURE — 25500020 PHARM REV CODE 255: Performed by: OTOLARYNGOLOGY

## 2018-10-27 PROCEDURE — 70491 CT SOFT TISSUE NECK W/DYE: CPT | Mod: 26,,, | Performed by: RADIOLOGY

## 2018-10-27 RX ADMIN — IOHEXOL 75 ML: 350 INJECTION, SOLUTION INTRAVENOUS at 09:10

## 2018-10-30 NOTE — PROGRESS NOTES
Chief Complaint   Patient presents with    Consult     Tumor/Mass in neck       HPI   73 y.o. female presents from Dr. Prasad with a history of metastatic pheochromocytoma.  She was recently noted to have R neck disease on an MIBG study.  She has no head and neck complaints.  She denies throat pain or dysphagia.  She is feeling well otherwise.      Review of Systems   Constitutional: Negative for fatigue and unexpected weight change.   HENT: Per HPI.  Eyes: Negative for visual disturbance.   Respiratory: Negative for shortness of breath, hemoptysis   Cardiovascular: Negative for chest pain and palpitations.   Musculoskeletal: Negative for decreased ROM, back pain.   Skin: Negative for rash, sunburn, itching.   Neurological: Negative for dizziness and seizures.   Hematological: Negative for adenopathy. Does not bruise/bleed easily.   Endocrine: Negative for rapid weight loss/weight gain, heat/cold intolerance.     Past Medical History   Patient Active Problem List   Diagnosis    Hypoadrenalism    Essential hypertension    IGNACIO (dyspnea on exertion)    Vitamin D deficient osteomalacia    H/O total adrenalectomy    Pheochromocytoma, malignant    Pheochromocytoma    Controlled type 2 diabetes mellitus without complication    Low back pain    Lumbar spondylosis    Intervertebral disk disease    Chronic bilateral low back pain with right-sided sciatica    Weakness of both hips    Sacroiliac joint pain    Chronic low back pain with right-sided sciatica    Chest pain    Acute heart failure    Unstable angina pectoris due to coronary arteriosclerosis    Sick sinus syndrome    Secondary neuroendocrine tumor of distant lymph nodes    Secondary neuroendocrine tumor of bone           Past Surgical History   Past Surgical History:   Procedure Laterality Date    ADRENALECTOMY      ADRENALECTOMY open exploratory Right 10/2/2017    [facility]    APPENDECTOMY       [id number]-HI N/A 2/8/2016    Performed by [facility] Diagnostic Provider at [facility] OR 2ND FLR    BLOCK, SACROILIAC JOINT Right 6/26/2018    [facility]    CATHETERIZATION, HEART, LEFT Left 7/18/2018    Performed by [facility]oustapha [facility]quez [facility] at [facility] CATH LAB    INJECTION OF ANESTHETIC AGENT INTO SACROILIAC JOINT Right 6/26/2018    Procedure: BLOCK, SACROILIAC JOINT;  Surgeon: Sydney Reynoso MD;  Location: [facility] PAIN MGT;  Service: Pain Management;  Laterality: Right;  Right SI Joint Injection    [id number]    NEEDS CONSENT    LEFT HEART CATHETERIZATION Left 7/18/2018    Procedure: CATHETERIZATION, HEART, LEFT;  Surgeon: Moustapha Vasquez MD;  Location: [facility] CATH LAB;  Service: Cardiovascular;  Laterality: Left;    MIBG Therapy  3/2016, 7/2016, 12/2016    [facility] - Dr. Martines         Family History   Family History   Problem Relation Age of Onset    Heart disease Mother     Diabetes Mother     Heart disease Father     Cancer Brother     Lung cancer Brother     Cancer Brother     Melanoma Neg Hx            Social History   .  Social History     Socioeconomic History    Marital status: Single     Spouse name: Not on file    Number of children: Not on file    Years of education: Not on file    Highest education level: Not on file   Social Needs    Financial resource strain: Not on file    Food insecurity - worry: Not on file    Food insecurity - inability: Not on file    Transportation needs - medical: Not on file    Transportation needs - non-medical: Not on file   Occupational History    Not on file   Tobacco Use    Smoking status: Never Smoker    Smokeless tobacco: Never Used   Substance and Sexual Activity    Alcohol use: No    Drug use: No    Sexual activity: Not on file   Other Topics Concern    Are you pregnant or think you may be? No    Breast-feeding No   Social History Narrative    Not on file         Allergies   Review of patient's allergies  indicates:  No Known Allergies        Physical Exam     Vitals:    10/25/18 1054   BP: 129/70   Pulse: (!) 44         Body mass index is 36.43 kg/m².      General: AOx3, NAD   Respiratory:  Symmetric chest rise, normal effort  Nose: No gross nasal septal deviation. Inferior Turbinates WNL bilaterally. No septal perforation. No masses/lesions.   Oral Cavity:  Oral Tongue mobile, no lesions noted. Hard Palate WNL. No buccal or FOM lesions.  Oropharynx:  No masses/lesions of the posterior pharyngeal wall. Tonsillar fossa without lesions. Soft palate without masses. Midline uvula.   Neck: No scars. ~2 cm R neck mass.   No thyromegaly or thyroid nodules.  Normal range of motion.    Face: House Brackmann I bilaterally.     Flex Naso Audrey Hypo Procedures #2    Procedure:  Diagnostic flexible nasopharyngoscopy, laryngoscopy and hypopharyngoscopy:    Routine preparation with local atomizer with 1% neosynephrine/pontocaine with customary flexible endoscope.    Nasopharynx:  No lesions.   Mucosa:  No lesions.   Adenoids:  Present.  Posterior Choanae:  Patent.  Eustachian Tubes:  Patent.  Posterior pharynx:  No lesions.  Larynx/hypopharynx:   Epiglottis:  No lesions, without edema.   AE Folds:  No lesions.   Vocal cords:  No polyps, nodules, ulcers or lesions.   Mobility:  Equal and normal bilateral.   Hypopharynx:  No lesions.   Piriform sinus:  No pooling, no lesions.   Post Cricoid:  No erythema, no edema.    Assessment/Plan  Problem List Items Addressed This Visit        Endocrine    Pheochromocytoma - Primary     R neck mass consistent with metastatic pheochromocytoma.  No mucosal lesions noted on head and neck exam.  I ordered a CT scan of the neck to assess in greater detail.  I also tentatively recommended a R neck dissection in an effort to minimize her tumor burden in anticipation of PRRT.      We discussed the risks, benefits, and indications to right neck dissection. The risks were noted to include, but not be  limited to, infection, bleeding, scarring, collection of blood or tissue fluid requiring drainage, weakness of the lip which may be temporary or permanent, weakness of the tongue which could be temporary or permanent and cause speech and/or swallowing difficulty, weakness of the shoulder which could be temporary or permanent, pain, chyle leakage which would require dietary modification and perhaps additional procedures, and the need for additional procedures. Time was allowed for questions, and all questions were answered to the patient's apparent satisfaction.     Surgery is scheduled on 11/7/18.         Relevant Orders    CT Soft Tissue Neck With Contrast (Completed)    Creatinine, serum (Completed)    Case Request Operating Room: DISSECTION, NECK (Completed)

## 2018-10-30 NOTE — ASSESSMENT & PLAN NOTE
R neck mass consistent with metastatic pheochromocytoma.  No mucosal lesions noted on head and neck exam.  I ordered a CT scan of the neck to assess in greater detail.  I also tentatively recommended a R neck dissection in an effort to minimize her tumor burden in anticipation of PRRT.      We discussed the risks, benefits, and indications to right neck dissection. The risks were noted to include, but not be limited to, infection, bleeding, scarring, collection of blood or tissue fluid requiring drainage, weakness of the lip which may be temporary or permanent, weakness of the tongue which could be temporary or permanent and cause speech and/or swallowing difficulty, weakness of the shoulder which could be temporary or permanent, pain, chyle leakage which would require dietary modification and perhaps additional procedures, and the need for additional procedures. Time was allowed for questions, and all questions were answered to the patient's apparent satisfaction.     Surgery is scheduled on 11/7/18.

## 2018-11-01 ENCOUNTER — TELEPHONE (OUTPATIENT)
Dept: OTOLARYNGOLOGY | Facility: CLINIC | Age: 73
End: 2018-11-01

## 2018-11-01 DIAGNOSIS — D44.6 CAROTID BODY TUMOR: Primary | ICD-10-CM

## 2018-11-01 DIAGNOSIS — D35.5 BENIGN NEOPLASM OF CAROTID BODY: ICD-10-CM

## 2018-11-05 ENCOUNTER — TELEPHONE (OUTPATIENT)
Dept: PREADMISSION TESTING | Facility: HOSPITAL | Age: 73
End: 2018-11-05

## 2018-11-05 ENCOUNTER — HOSPITAL ENCOUNTER (OUTPATIENT)
Dept: CARDIOLOGY | Facility: CLINIC | Age: 73
Discharge: HOME | End: 2018-11-05
Payer: MEDICARE

## 2018-11-05 ENCOUNTER — HOSPITAL ENCOUNTER (OUTPATIENT)
Dept: PREADMISSION TESTING | Facility: HOSPITAL | Age: 73
Discharge: HOME | End: 2018-11-05
Attending: ANESTHESIOLOGY
Payer: MEDICARE

## 2018-11-05 ENCOUNTER — OFFICE VISIT (OUTPATIENT)
Dept: ENDOCRINOLOGY | Facility: CLINIC | Age: 73
End: 2018-11-05
Payer: MEDICARE

## 2018-11-05 ENCOUNTER — HOSPITAL ENCOUNTER (OUTPATIENT)
Dept: RADIOLOGY | Facility: HOSPITAL | Age: 73
Discharge: HOME | End: 2018-11-05
Attending: INTERNAL MEDICINE
Payer: MEDICARE

## 2018-11-05 VITALS
HEIGHT: 63 IN | WEIGHT: 205.69 LBS | BODY MASS INDEX: 36.45 KG/M2 | HEART RATE: 60 BPM | SYSTOLIC BLOOD PRESSURE: 150 MMHG | DIASTOLIC BLOOD PRESSURE: 60 MMHG

## 2018-11-05 VITALS
TEMPERATURE: 98 F | RESPIRATION RATE: 18 BRPM | HEART RATE: 52 BPM | DIASTOLIC BLOOD PRESSURE: 72 MMHG | BODY MASS INDEX: 34.37 KG/M2 | OXYGEN SATURATION: 97 % | HEIGHT: 65 IN | SYSTOLIC BLOOD PRESSURE: 126 MMHG | WEIGHT: 206.31 LBS

## 2018-11-05 DIAGNOSIS — E11.9 CONTROLLED TYPE 2 DIABETES MELLITUS WITHOUT COMPLICATION, WITHOUT LONG-TERM CURRENT USE OF INSULIN: ICD-10-CM

## 2018-11-05 DIAGNOSIS — Z01.811 PRE-OP CHEST EXAM: ICD-10-CM

## 2018-11-05 DIAGNOSIS — E27.40 HYPOADRENALISM: ICD-10-CM

## 2018-11-05 DIAGNOSIS — C74.91 MALIGNANT PHEOCHROMOCYTOMA OF RIGHT ADRENAL GLAND: Primary | ICD-10-CM

## 2018-11-05 DIAGNOSIS — I15.2 HYPERTENSION DUE TO ENDOCRINE DISORDER: ICD-10-CM

## 2018-11-05 DIAGNOSIS — Z01.818 PREOP TESTING: Primary | ICD-10-CM

## 2018-11-05 DIAGNOSIS — C74.91 MALIGNANT PHEOCHROMOCYTOMA OF RIGHT ADRENAL GLAND: ICD-10-CM

## 2018-11-05 PROCEDURE — 3074F SYST BP LT 130 MM HG: CPT | Mod: CPTII,S$GLB,, | Performed by: INTERNAL MEDICINE

## 2018-11-05 PROCEDURE — 99214 OFFICE O/P EST MOD 30 MIN: CPT | Mod: S$GLB,,, | Performed by: INTERNAL MEDICINE

## 2018-11-05 PROCEDURE — 93005 ELECTROCARDIOGRAM TRACING: CPT | Mod: S$GLB,,, | Performed by: INTERNAL MEDICINE

## 2018-11-05 PROCEDURE — 3078F DIAST BP <80 MM HG: CPT | Mod: CPTII,S$GLB,, | Performed by: INTERNAL MEDICINE

## 2018-11-05 PROCEDURE — 1101F PT FALLS ASSESS-DOCD LE1/YR: CPT | Mod: CPTII,S$GLB,, | Performed by: INTERNAL MEDICINE

## 2018-11-05 PROCEDURE — 71046 X-RAY EXAM CHEST 2 VIEWS: CPT | Mod: TC,FY

## 2018-11-05 PROCEDURE — 71046 X-RAY EXAM CHEST 2 VIEWS: CPT | Mod: 26,,, | Performed by: RADIOLOGY

## 2018-11-05 PROCEDURE — 99999 PR PBB SHADOW E&M-EST. PATIENT-LVL III: CPT | Mod: PBBFAC,,, | Performed by: INTERNAL MEDICINE

## 2018-11-05 PROCEDURE — 93000 ELECTROCARDIOGRAM COMPLETE: CPT | Mod: S$GLB,,, | Performed by: INTERNAL MEDICINE

## 2018-11-05 PROCEDURE — 3044F HG A1C LEVEL LT 7.0%: CPT | Mod: CPTII,S$GLB,, | Performed by: INTERNAL MEDICINE

## 2018-11-05 RX ORDER — PHENOXYBENZAMINE HYDROCHLORIDE 10 MG/1
10 CAPSULE ORAL DAILY
Qty: 30 CAPSULE | Refills: 11 | Status: SHIPPED | OUTPATIENT
Start: 2018-11-05 | End: 2018-11-13

## 2018-11-05 NOTE — DISCHARGE INSTRUCTIONS
Your surgery has been scheduled for:_________11/16/2018_________________________________    You should report to:  ____[facility], located on the [place] side of the first floor of the           [facility] ([phone])  ___[facility], located on the [facility] side of the            Second floor of the [facility] ([phone])  ____3rd Floor [facility] located on the [facility] side of the [facility] [phone]  Please Note   - Tell your doctor if you take Aspirin, products containing Aspirin, herbal medications  or blood thinners, such as Coumadin, Ticlid, or Plavix.  (Consult your provider regarding holding or stopping before surgery).  - Arrange for someone to drive you home following surgery.  You will not be allowed to leave the surgical facility alone or drive yourself home following sedation and anesthesia.  Before Surgery  - Stop taking all herbal medications 14days prior to surgery  - No Motrin/Advil (Ibuprofen) 7 days before surgery  - No Aleve (Naproxen) 7 days before surgery  - No Goody's/BC powder 7 days before surgery  - Stop Taking Asprin, products containing Asprin ___7__days before surgery  - Stop taking blood thinners____7___days before surgery  - Refrain from drinking alcoholic beverages for 24hours before and after surgery  - Stop or limit smoking ___N/A______days before surgery  - You may take Tylenol for pain  Night before Surgery  - Take a shower or bath (shower is recommended).  Bathe with Hibiclens soap or an antibacterial soap from the neck down.  If not supplied by your surgeon, hibiclens soap will need to be purchased over the counter in pharmacy.  Rinse soap off thoroughly.  - Shampoo your hair with your regular shampoo                           Food and Beverage Instructions  1. Stop ALL solid food, gum, candy (including vitamins) 8 hours before surgery/procedure time.  2. The  patient should be ENCOURAGED to drink carbohydrate-rich clear liquids (sports drinks, clear juices) until 2 hours prior to surgery/procedure time.  3. CLEAR liquids include only water, black coffee NO creamer, clear oral rehydration drinks, clear sports drinks or clear fruit juices (no orange juice, no pulpy juices, no apple cider). Advise patients if they can read newsprint through the liquid, it qualifies as clear liquid.   4. IF IN DOUBT, drink water instead.   5. NOTHING  TO DRINK 2 hours before to arrival for surgery/procedure time. If you are told to take medication on the morning of surgery, it may be taken with a sip of water.     The Day of Surgery  - Take another bath or shower with hibiclens or any antibacterial soap, to reduce the chance of infection.  - Take heart and blood pressure medications with a small sip of water, as advised by the perioperative team.  - Do not take fluid pills  - You may brush your teeth and rinse your mouth, but do not swall any additional water.   - Do not apply perfumes, powder, body lotions or deodorant on the day of surgery.  - Nail polish should be removed.  - Do not wear makeup or moisturizer  - Wear comfortable clothes, such as a button front shirt and loose fitting pants.  - Leave all jewelry, including body piercings, and valuables at home.    - Bring any devices you will neeed after surgery such as crutches or canes.  - If you have sleep apnea, please bring your CPAP machine  In the event that your physical condition changes including the onset of a cold or respiratory illness, or if you have to delay or cancel your surgery, please notify your surgeon.    Anesthesia: General Anesthesia     You are watched continuously during your procedure by your anesthesia provider.     Youre due to have surgery. During surgery, youll be given medicine called anesthesia or anesthetic. This will keep you comfortable and pain-free. Your anesthesia provider will use general  anesthesia.  What is general anesthesia?  General anesthesia puts you into a state like deep sleep. It goes into the bloodstream (IV anesthetics), into the lungs (gas anesthetics), or both. You feel nothing during the procedure. You will not remember it. During the procedure, the anesthesia provider monitors you continuously. He or she checks your heart rate and rhythm, blood pressure, breathing, and blood oxygen.  · IV anesthetics. IV anesthetics are given through an IV line in your arm. Theyre often given first. This is so you are asleep before a gas anesthetic is started. Some kinds of IV anesthetics relieve pain. Others relax you. Your doctor will decide which kind is best in your case.  · Gas anesthetics. Gas anesthetics are breathed into the lungs. They are often used to keep you asleep. They can be given through a facemask or a tube placed in your larynx or trachea (breathing tube).  ? If you have a facemask, your anesthesia provider will most likely place it over your nose and mouth while youre still awake. Youll breathe oxygen through the mask as your IV anesthetic is started. Gas anesthetic may be added through the mask.  ? If you have a tube in the larynx or trachea, it will be inserted into your throat after youre asleep.  Anesthesia tools and medicines  You will likely have:  · IV anesthetics. These are put into an IV line into your bloodstream.  · Gas anesthetics. You breathe these anesthetics into your lungs, where they pass into your bloodstream.  · Pulse oximeter. This is a small clip that is attached to the end of your finger. This measures your blood oxygen level.  · Electrocardiography leads (electrodes). These are small sticky pads that are placed on your chest. They record your heart rate and rhythm.  · Blood pressure cuff. This reads your blood pressure.  Risks and possible complications  General anesthesia has some risks. These include:  · Breathing problems  · Nausea and vomiting  · Sore  throat or hoarseness (usually temporary)  · Allergic reaction to the anesthetic  · Irregular heartbeat (rare)  · Cardiac arrest (rare)   Anesthesia safety  · Follow all instructions you are given for how long not to eat or drink before your procedure.  · Be sure your doctor knows what medicines and drugs you take. This includes over-the-counter medicines, herbs, supplements, alcohol or other drugs. You will be asked when those were last taken.  · Have an adult family member or friend drive you home after the procedure.  · For the first 24 hours after your surgery:  ? Do not drive or use heavy equipment.  ? Do not make important decisions or sign legal documents. If important decisions or signing legal documents is necessary during the first 24 hours after surgery, have a trusted family member or spouse act on your behalf.  ? Avoid alcohol.  ? Have a responsible adult stay with you. He or she can watch for problems and help keep you safe.  Date Last Reviewed: 12/1/2016  © 3596-3698 [employer]. [address] [place] [address], [place] [address]. All rights reserved. This information is not intended as a substitute for professional medical care. Always follow your healthcare professional's instructions

## 2018-11-05 NOTE — PROGRESS NOTES
Subjective     Chief Complaint: pre op clearance    History of Present Illness:  Ms. Hailey Sawant is a 73 y.o. female with malignant pheochromocytoma, DMII, HTN here for preop clearance for neck surgery. Patient states that she has been feeling well . She has no complaints other than back pain. She states that she had a cath recently in the hospital for atypical chest pain, found to have sick sinus syndrome ( coronaries free of disease). Patient states having shortess of breath with long activities but can go up a flight of stairs and can do her daily activities with no problems     Patient denies any palpitations, chest pain, any dizzyness or light headedness      She states her BP is controlled at home with SBPs in the 120    Review of Systems   Constitutional: Negative for chills, fever and weight loss.   HENT: Negative for congestion.    Eyes: Negative for blurred vision.   Respiratory: Negative for cough, hemoptysis, sputum production and shortness of breath.    Cardiovascular: Positive for leg swelling. Negative for chest pain, palpitations and orthopnea.   Gastrointestinal: Negative for abdominal pain, nausea and vomiting.   Genitourinary: Negative for dysuria, flank pain, frequency and urgency.   Musculoskeletal: Positive for back pain.   Skin: Negative for rash.   Neurological: Negative for dizziness and headaches.   Psychiatric/Behavioral: Negative for depression.       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    Thyroid disease        Past Surgical History:   Procedure Laterality Date    ADRENALECTOMY      ADRENALECTOMY open  exploratory Right 10/2/2017    Performed by [facility] at [facility] OR 2ND FLR    APPENDECTOMY      LOGCGN-TNEBPA-CA N/A 2/8/2016    Performed by [facility] at [facility] OR 2ND FLR    BLOCK, SACROILIAC JOINT Right 6/26/2018    Performed by Sydney Reynoso MD at [facility] PAIN MGT    CATHETERIZATION, HEART, LEFT Left 7/18/2018    Performed by Moustapha Vasquez MD at [facility] CATH LAB    INJECTION OF ANESTHETIC AGENT INTO SACROILIAC JOINT Right 6/26/2018    Procedure: BLOCK, SACROILIAC JOINT;  Surgeon: Sydney Reynoso MD;  Location: [facility] PAIN MGT;  Service: Pain Management;  Laterality: Right;  Right SI Joint Injection    [id number]    NEEDS CONSENT    LEFT HEART CATHETERIZATION Left 7/18/2018    Procedure: CATHETERIZATION, HEART, LEFT;  Surgeon: Moustapha Vasquez MD;  Location: [facility] CATH LAB;  Service: Cardiovascular;  Laterality: Left;    MIBG Therapy  3/2016, 7/2016, 12/2016    [facility] [place]       Family History   Problem Relation Age of Onset    Heart disease Mother     Diabetes Mother     Heart disease Father     Cancer Brother     Lung cancer Brother     Cancer Brother     Melanoma Neg Hx        Social History     Socioeconomic History    Marital status: Single     Spouse name: None    Number of children: None    Years of education: None    Highest education level: None   Social Needs    Financial resource strain: None    Food insecurity - worry: None    Food insecurity - inability: None    Transportation needs - medical: None    Transportation needs - non-medical: None   Occupational History    None   Tobacco Use    Smoking status: Never Smoker    Smokeless tobacco: Never Used   Substance and Sexual Activity    Alcohol use: No    Drug use: No    Sexual activity: None   Other Topics Concern    Are you pregnant or think you may be? No    Breast-feeding No   Social History Narrative    None       MEDICATIONS & ALLERGIES:     Current Outpatient Medications on File  "Prior to Visit   Medication Sig    aspirin 81 MG Chew Take 81 mg by mouth once daily.    cholecalciferol, vitamin D3, (VITAMIN D3) 5,000 unit Tab Take 5,000 Units by mouth once daily.    fludrocortisone (FLORINEF) 0.1 mg Tab Take 100 mcg by mouth once daily.    furosemide (LASIX) 40 MG tablet     hydrocortisone (CORTEF) 20 MG Tab TAKE 1 TABLET EVERY MORNING  AND TAKE 1/2 TABLET EVERY EVENING  AT  5PM    losartan (COZAAR) 50 MG tablet Take 50 mg by mouth once daily.    metFORMIN (GLUCOPHAGE-XR) 500 MG 24 hr tablet TAKE 1 TABLET TWICE DAILY WITH MEALS    NIFEdipine (PROCARDIA-XL) 90 MG (OSM) 24 hr tablet Take 90 mg by mouth once daily.     TRUE METRIX AIR GLUCOSE METER kit CHECK BLOOD SUGAR ONE TIME DAILY    TRUE METRIX GLUCOSE TEST STRIP Strp CHECK BLOOD SUGAR ONE TIME DAILY    TRUEPLUS LANCETS 28 gauge Misc TEST ONE TIME DAILY     No current facility-administered medications on file prior to visit.        Review of patient's allergies indicates:  No Known Allergies    OBJECTIVE:     Vital Signs:  Vitals:    11/05/18 1045   BP: (!) 150/60  Comment: lying down   Pulse: 60   Weight: 93.3 kg (205 lb 11 oz)   Height: 5' 3" (1.6 m)       Body mass index is 36.44 kg/m².     Physical Exam:  General:  Well developed, well nourished, no acute distress  Head: Normocephalic, atraumatic  Eyes: PERRL, EOMI, clear sclera  Throat: No posterior pharyngeal erythema or exudate, no tonsillar exudate  Neck: supple, normal ROM, no thyromegaly   CVS:  RRR, S1 and S2 normal, no murmurs, rubs, gallops, 2+ peripheral pulses  Resp:  Lungs clear to auscultation, no wheezes, rales, rhonchi, cough  GI:  Abdomen soft, non-tender, non-distended, normoactive bowel sounds  MSK:  No muscle atrophy, cyanosis, peripheral edema   Skin:  No rashes, ulcers, erythema  Neuro:  CNII-XII grossly intact, no focal deficits noted  Psych:  Appropriate mood and affect, normal judgement    Laboratory  Lab Results   Component Value Date    WBC 5.34 " 07/17/2018    HGB 12.5 07/17/2018    HCT 38.6 07/17/2018    MCV 89 07/17/2018     07/17/2018     @[id number](GLU,NA,K,Cl,CO2,BUN,Creatinine,Calcium,MG)@  Lab Results   Component Value Date    INR 1.3 (H) 10/02/2017    INR 1.1 02/08/2016    INR 1.1 11/04/2009     Lab Results   Component Value Date    HGBA1C 5.6 11/05/2018     No results for input(s): POCTGLUCOSE in the last 72 hours.    Diagnostic Results:  Labs: Reviewed  CT: Reviewed    ASSESSMENT & PLAN:   Ms. Hailey Sawant is a 73 y.o. female here for preop clearnace and is going to require alpha blockade and Stress dose steroids with surgery .    Malignant pheochromocytoma of right adrenal gland  Hypertension due to endocrine disorder  Hypoadrenalism        - Patient to start alpha blockade today and call us Monday to report BP, will need to uptitrate this medication for her surgery         - Patient will need stress dose steroids IV hydrocortisone 50 mg Q 8 hours day of and day after surgery after surgery.         - Patient to get chest xray and EKG   -     phenoxybenzamine (DIBENZYLINE) 10 mg capsule; Take 1 capsule (10 mg total) by mouth once daily.  Dispense: 30 capsule; Refill: 11  -     ECG 12 lead; Future; Expected date: 11/05/2018  -     X-Ray Chest PA And Lateral; Future; Expected date: 11/05/2018      Pre-op chest exam    Patient undergoing non-emergent non-cardiac surgery.  Patient with no active cardiac problems (Patient does not have unstable angina, decompensated heart failure, significant uncontrolled tachy or walter arrhythmias or severe valvular heart disease).  Patient undergoing intermediate risk surgery.  Functional Status: Patient has functional METs < 4 and > or = 4  Revised cardiac risk index (RCRI) score is 1, for HF         Other Issues:       Patient on long term anticoagulation: No      Recent coronary stenting: No    Preoperative evaluation:  Acceptable cardiac risk (RCRI score of 1) going for high risk surgery.  No absolute  contraindications to the proposed surgery at this time. low risk of post-op pulmonary complications.  low risk of post-op delirium.       Patient seen and examined this morning. Discussed with Dr. Burton  - staff attestation to follow.      Kayleigh Alford MD, MPH  Internal Medicine PGY2  [address] [place] [address]

## 2018-11-05 NOTE — PROGRESS NOTES
I have personally taken the history and examined this patient and agree with the resident's or fellow's note as stated above   Start alpha blockade and have patient monitor BP sitting and standing daily and notify me of results in 7 days and call for BP<90 systolic.  Stress steroids as outlined below.    Rodolfo Burton MD, FACE

## 2018-11-05 NOTE — TELEPHONE ENCOUNTER
----- Message from Pooja Golden RN sent at 11/5/2018  2:55 PM CST -----  Regarding: lab appt  Please schedule patient for lab on Monday, 11/12 in the morning at [facility]. Call her with the time on her cell [phone]  Thanks,  Pooja

## 2018-11-06 NOTE — TELEPHONE ENCOUNTER
----- Message from Pooja Golden RN sent at 11/5/2018  2:55 PM CST -----  Regarding: lab appt  Please schedule patient for lab on Monday, 11/12 in the morning at [facility]. Call her with the time on her cell [phone]  Thanks,  Pooja

## 2018-11-07 ENCOUNTER — ANESTHESIA (OUTPATIENT)
Dept: SURGERY | Facility: HOSPITAL | Age: 73
DRG: 982 | End: 2018-11-07
Payer: MEDICARE

## 2018-11-07 ENCOUNTER — TELEPHONE (OUTPATIENT)
Dept: ENDOCRINOLOGY | Facility: CLINIC | Age: 73
End: 2018-11-07

## 2018-11-07 ENCOUNTER — TELEPHONE (OUTPATIENT)
Dept: PHARMACY | Facility: CLINIC | Age: 73
End: 2018-11-07

## 2018-11-07 NOTE — TELEPHONE ENCOUNTER
----- Message from Diamond Guerrier sent at 11/6/2018  8:38 AM CST -----  Contact: self  .Needs Advice    Reason for call: Pt can't afford the med-phenoxybenzamine, $5000.        Communication Preference: [phone]    Additional Information:

## 2018-11-08 ENCOUNTER — INITIAL CONSULT (OUTPATIENT)
Dept: INTERVENTIONAL RADIOLOGY/VASCULAR | Facility: CLINIC | Age: 73
End: 2018-11-08
Payer: MEDICARE

## 2018-11-08 VITALS
SYSTOLIC BLOOD PRESSURE: 136 MMHG | HEART RATE: 65 BPM | WEIGHT: 202.19 LBS | BODY MASS INDEX: 33.69 KG/M2 | HEIGHT: 65 IN | DIASTOLIC BLOOD PRESSURE: 78 MMHG

## 2018-11-08 DIAGNOSIS — C74.91 MALIGNANT PHEOCHROMOCYTOMA OF RIGHT ADRENAL GLAND: Primary | ICD-10-CM

## 2018-11-08 DIAGNOSIS — D44.6 CAROTID BODY PARAGANGLIOMA: Primary | ICD-10-CM

## 2018-11-08 PROCEDURE — 3075F SYST BP GE 130 - 139MM HG: CPT | Mod: CPTII,S$GLB,, | Performed by: RADIOLOGY

## 2018-11-08 PROCEDURE — 3078F DIAST BP <80 MM HG: CPT | Mod: CPTII,S$GLB,, | Performed by: RADIOLOGY

## 2018-11-08 PROCEDURE — 99999 PR PBB SHADOW E&M-EST. PATIENT-LVL III: CPT | Mod: PBBFAC,,,

## 2018-11-08 PROCEDURE — 99203 OFFICE O/P NEW LOW 30 MIN: CPT | Mod: S$GLB,,, | Performed by: RADIOLOGY

## 2018-11-08 PROCEDURE — 1101F PT FALLS ASSESS-DOCD LE1/YR: CPT | Mod: CPTII,S$GLB,, | Performed by: RADIOLOGY

## 2018-11-09 NOTE — PROGRESS NOTES
OUTPATIENT INTERVENTIONAL RADIOLOGY CONSULTATION    PATIENT:  Hailey Sawant  MRN:  [id number]  :  1945  DATE OF SERVICE:  2018    REFERRING PHYSICIAN: Ryan Prasad DO      CC: Metastatic pheochromocytoma    HISTORY OF PRESENT ILLNESS: Hailey Sawant is a 73 y.o. female with the PMH listed below referred for metastatic pheochromocytoma. Her history dates to  when she presented with abd pain and found to have bilateral adrenal masses. These were resected in 2009. Pathology demonstrated right adrenal phenochromocytoma and left adrenal adenoma. She p/w osseous metastatic disease to bone in 2016 and underwent I-131 MIBG therapy 3/2016, 2016 and 2016. She underwent partial right nephrectomy for recurrence in 10/2016. A recent Ga-68 PET/CT demonstrated somatostatin avid tumor in the right neck, right adrenalectomy bed, right scapula and left pelvis. She was presented at NET board and the recommendation was for resection of the neck mass and ablation of the adrenal recurrence, with possible PRRT in the future.     She endorses intermittent back pain, a several month history SOB, ankle swelling, and bilateral posterior thigh pain after walking long distances. She says she can walk just a few blocks before stopping to rest. She is retired and lives alone. She has no difficulty with ADLs.    Heart cath 18 normal. She was recently seen by IM and cleared for surgery.    PAST MEDICAL HISTORY:  Past Medical History:   Diagnosis Date    Diabetes mellitus     Hypertension     Lumbar spondylosis 2018    Malignant pheochromocytoma     Pheochromocytoma     Pheochromocytoma, malignant 01/10/2016    Pheochromocytoma, malignant     Sacroiliac joint pain 2018    Secondary neuroendocrine tumor of bone 2018    Secondary neuroendocrine tumor of distant lymph nodes 2018    Thyroid disease         PAST SURGICAL HISTORY:  Past Surgical History:   Procedure Laterality Date     ADRENALECTOMY      ADRENALECTOMY open exploratory Right 10/2/2017    Performed by [facility] at [facility] OR 2ND FLR    APPENDECTOMY      [id number] N/A 2/8/2016    Performed by [facility] at [facility] OR 2ND FLR    BLOCK, SACROILIAC JOINT Right 6/26/2018    Performed by Sydney Reynoso MD at [facility] PAIN MGT    CATHETERIZATION, HEART, LEFT Left 7/18/2018    Performed by Moustapha Vasquez MD at [facility] CATH LAB    INJECTION OF ANESTHETIC AGENT INTO SACROILIAC JOINT Right 6/26/2018    Procedure: BLOCK, SACROILIAC JOINT;  Surgeon: Sydney Reynoso MD;  Location: [facility] PAIN MGT;  Service: Pain Management;  Laterality: Right;  Right SI Joint Injection    [id number]    NEEDS CONSENT    LEFT HEART CATHETERIZATION Left 7/18/2018    Procedure: CATHETERIZATION, HEART, LEFT;  Surgeon: Moustapha Vasquez MD;  Location: [facility] CATH LAB;  Service: Cardiovascular;  Laterality: Left;    MIBG Therapy  3/2016, 7/2016, 12/2016    ELIJAH Martines        FAMILY HISTORY:  Family History   Problem Relation Age of Onset    Heart disease Mother     Diabetes Mother     Heart disease Father     Cancer Brother     Lung cancer Brother     Cancer Brother     Melanoma Neg Hx         SOCIAL HISTORY:  Social History     Socioeconomic History    Marital status: Single     Spouse name: None    Number of children: None    Years of education: None    Highest education level: None   Social Needs    Financial resource strain: None    Food insecurity - worry: None    Food insecurity - inability: None    Transportation needs - medical: None    Transportation needs - non-medical: None   Occupational History    None   Tobacco Use    Smoking status: Never Smoker    Smokeless tobacco: Never Used   Substance and Sexual Activity    Alcohol use: No    Drug use: No    Sexual activity: None   Other Topics Concern    Are you pregnant or think you may be? No    Breast-feeding No   Social History Narrative    None         ALLERGIES:   Review of patient's allergies indicates:  No Known Allergies    MEDICATIONS:     Current Outpatient Medications:     phenoxybenzamine (DIBENZYLINE) 10 mg capsule, Take 1 capsule (10 mg total) by mouth once daily., Disp: 30 capsule, Rfl: 11    TRUE METRIX AIR GLUCOSE METER kit, CHECK BLOOD SUGAR ONE TIME DAILY, Disp: 1 each, Rfl: 0    TRUE METRIX GLUCOSE TEST STRIP Strp, CHECK BLOOD SUGAR ONE TIME DAILY, Disp: 100 strip, Rfl: 3    TRUEPLUS LANCETS 28 gauge Misc, TEST ONE TIME DAILY, Disp: 100 each, Rfl: 6    aspirin 81 MG Chew, Take 81 mg by mouth once daily., Disp: , Rfl:     cholecalciferol, vitamin D3, (VITAMIN D3) 5,000 unit Tab, Take 5,000 Units by mouth once daily., Disp: , Rfl:     fludrocortisone (FLORINEF) 0.1 mg Tab, Take 100 mcg by mouth once daily., Disp: , Rfl:     furosemide (LASIX) 40 MG tablet, , Disp: , Rfl:     hydrocortisone (CORTEF) 20 MG Tab, TAKE 1 TABLET EVERY MORNING  AND TAKE 1/2 TABLET EVERY EVENING  AT  5PM, Disp: 135 tablet, Rfl: 6    losartan (COZAAR) 50 MG tablet, Take 50 mg by mouth once daily., Disp: , Rfl:     metFORMIN (GLUCOPHAGE-XR) 500 MG 24 hr tablet, TAKE 1 TABLET TWICE DAILY WITH MEALS, Disp: 180 tablet, Rfl: 3    NIFEdipine (PROCARDIA-XL) 90 MG (OSM) 24 hr tablet, Take 90 mg by mouth once daily. , Disp: , Rfl:     REVIEW OF SYSTEMS:  [x]10 organ review of systems is otherwise negative unless stated above in HPI.    PHYSICAL EXAMINATION:    Vitals:    11/08/18 0853   BP: 136/78   Pulse: 65     GENERAL:  Well-nourished, in no apparent distress.    HEENT:  Head is normocephalic. Extraocular movements intact. No scleral icterus or conjunctival pallor noted. Nares is patent. Oropharynx is clear.   NECK:  Palpable Rt neck mass, trachea midline.   HEART:  Regular rate and rhythm.   LUNGS:  Symmetric excursions, breathing unlabored.  ABDOMEN:  Soft, nontender, and nondistended without palpable masses.  EXTREMITIES:  2+ pitting edema at ankles.  PULSES:  2+  dorsalis pedis bilaterally  NEUROLOGICAL:  Gross nonfocal.     ECOG Score: 2    LABORATORY DATA:    Lab Results   Component Value Date     11/05/2018    K 3.0 (L) 11/05/2018     11/05/2018    CO2 28 11/05/2018    BUN 19 11/05/2018      Lab Results   Component Value Date    CALCIUM 9.7 11/05/2018    PHOS 3.5 07/19/2018      Lab Results   Component Value Date    ALKPHOS 108 07/17/2018    AST 10 07/17/2018    ALT 6 (L) 07/17/2018    BILITOT 0.7 07/17/2018    ALBUMIN 3.6 07/17/2018      Lab Results   Component Value Date    WBC 5.34 07/17/2018    HGB 12.5 07/17/2018    HCT 38.6 07/17/2018    MCV 89 07/17/2018     07/17/2018        Lab Results   Component Value Date    INR 1.3 (H) 10/02/2017     No results found for: AFP  No results found for this or any previous visit (from the past 24 hour(s)).  Recent Results (from the past 168 hour(s))   BASIC METABOLIC PANEL    Collection Time: 11/05/18 10:28 AM   Result Value Ref Range    Sodium 145 136 - 145 mmol/L    Potassium 3.0 (L) 3.5 - 5.1 mmol/L    Chloride 105 95 - 110 mmol/L    CO2 28 23 - 29 mmol/L    Glucose 97 70 - 110 mg/dL    BUN, Bld 19 8 - 23 mg/dL    Creatinine 0.8 0.5 - 1.4 mg/dL    Calcium 9.7 8.7 - 10.5 mg/dL    Anion Gap 12 8 - 16 mmol/L    eGFR if African American >60.0 >60 mL/min/1.73 m^2    eGFR if non African American >60.0 >60 mL/min/1.73 m^2   TYPE AND SCREEN PREOP    Collection Time: 11/05/18 10:28 AM   Result Value Ref Range    Group & Rh O POS     Indirect Óscar NEG    HEMOGLOBIN A1C    Collection Time: 11/05/18 10:28 AM   Result Value Ref Range    Hemoglobin A1C 5.6 4.0 - 5.6 %    Estimated Avg Glucose 114 68 - 131 mg/dL       IMAGING:   CT AP 8/31/18 and Ga-68 PET/CT 8/1/18 reviewed. 1.3 cm somatostatin avid nodule in the right adrenalectomy bed, abutting cava.    IMPRESSION/PLAN:   Hailey Sawant is a 73 y.o. female with history of malignant pheochromocytoma with re-recurrence in the right adrenalectomy bed. Lesion amenable to  percutaneous ablation. Per pt, she is already scheduled for right neck dissection in the next week, so will probably need to give this time to recovery somewhat before proceeding.     - Microwave ablation of right adrenal pheochromocytoma under general anesthesia and with CT guidance, tentatively in December so that patient can recovery from neck surgery.  - Will need to coordinate with Endocrinology regarding pre-medication/alpha blockade. Epic message sent.  - Plan for overnight admit after for pain control and to monitor for complications.  - Hold ASA for 5 days prior to procedure- this was d/w patient at length.  - Recommend follow-up imaging 2-3 months post, preferably with Ga-68 PET/CT, though CT w contrast may be sufficient if there are issues getting another Ga-68 PET/CT. Will need to coordinate with Dr. Prasad since his office has more experience ordering these exams.  - RTC after follow-up imaging is complete.    Risks (including, but not limited to, pain, bleeding, infection, damage to nearby structures, hypertensive jose c, failure to completely ablation the lesion, and the need for additional procedures), benefits, and alternatives were discussed with the patient. All questions were answered to the best of my abilities. The patient verbalized understanding and wishes to proceed with ablation.    Thank you for the opportunity to participate in the care of this patient.    I, Chet Henson MD, was personally present for this clinic visit and examined the patient.  I agree with the findings, assessment and plan of care as documented in this note.  I spent a total of 60 minutes reviewing records and imaging, counseling, discussing the examination, therapy, and treatment plan of the patient's condition.        Chet Henson MD  [facility]  Pager [phone]

## 2018-11-09 NOTE — TELEPHONE ENCOUNTER
Documentation Only:    Faxed Prior Authorization form and supporting documentation for Dibenzyline to insurance on 11/09/2018.

## 2018-11-12 DIAGNOSIS — D44.6 CAROTID BODY PARAGANGLIOMA: Primary | ICD-10-CM

## 2018-11-12 NOTE — TELEPHONE ENCOUNTER
Documentation Only:    Prior Authorization for PhenoxyBenzamine has been approved for one year.    Approval dates:  11/12/2018 through 12/31/2019    Patient co-pay:  $3.35

## 2018-11-13 ENCOUNTER — TELEPHONE (OUTPATIENT)
Dept: ENDOCRINOLOGY | Facility: CLINIC | Age: 73
End: 2018-11-13

## 2018-11-13 RX ORDER — DOXAZOSIN 1 MG/1
1 TABLET ORAL NIGHTLY
Qty: 30 TABLET | Refills: 11 | Status: SHIPPED | OUTPATIENT
Start: 2018-11-13 | End: 2019-08-21 | Stop reason: SDUPTHER

## 2018-11-13 NOTE — TELEPHONE ENCOUNTER
11/12 Potassium repeat level resulted at 2.8.  Contacted patient to notify PCP for orders message left for nurse with at Dr. Omer Galloway's Office.      11/13 Message on VM that PCP office will be closed on today.  Discussed with Dr. Leopold.  Concern redirected to endocrine.

## 2018-11-14 ENCOUNTER — TELEPHONE (OUTPATIENT)
Dept: INTERVENTIONAL RADIOLOGY/VASCULAR | Facility: HOSPITAL | Age: 73
End: 2018-11-14

## 2018-11-15 ENCOUNTER — HOSPITAL ENCOUNTER (INPATIENT)
Facility: HOSPITAL | Age: 73
LOS: 2 days | Discharge: HOME | DRG: 982 | End: 2018-11-17
Attending: OTOLARYNGOLOGY | Admitting: SURGERY
Payer: MEDICARE

## 2018-11-15 DIAGNOSIS — E11.9 CONTROLLED TYPE 2 DIABETES MELLITUS WITHOUT COMPLICATION, WITHOUT LONG-TERM CURRENT USE OF INSULIN: ICD-10-CM

## 2018-11-15 DIAGNOSIS — D44.6 CAROTID BODY PARAGANGLIOMA: ICD-10-CM

## 2018-11-15 DIAGNOSIS — D44.7 PARAGANGLIOMA: ICD-10-CM

## 2018-11-15 DIAGNOSIS — E27.40 HYPOADRENALISM: ICD-10-CM

## 2018-11-15 DIAGNOSIS — D35.01 PHEOCHROMOCYTOMA OF RIGHT ADRENAL GLAND: Primary | ICD-10-CM

## 2018-11-15 DIAGNOSIS — D35.00 PHEOCHROMOCYTOMA, UNSPECIFIED LATERALITY: ICD-10-CM

## 2018-11-15 LAB
ANION GAP SERPL CALC-SCNC: 10 MMOL/L
BASOPHILS # BLD AUTO: 0.04 K/UL
BASOPHILS NFR BLD: 0.7 %
BUN SERPL-MCNC: 10 MG/DL
CALCIUM SERPL-MCNC: 9 MG/DL
CHLORIDE SERPL-SCNC: 108 MMOL/L
CO2 SERPL-SCNC: 28 MMOL/L
CREAT SERPL-MCNC: 0.7 MG/DL
DIFFERENTIAL METHOD: ABNORMAL
EOSINOPHIL # BLD AUTO: 0.3 K/UL
EOSINOPHIL NFR BLD: 4.8 %
ERYTHROCYTE [DISTWIDTH] IN BLOOD BY AUTOMATED COUNT: 14.4 %
EST. GFR  (AFRICAN AMERICAN): >60 ML/MIN/1.73 M^2
EST. GFR  (NON AFRICAN AMERICAN): >60 ML/MIN/1.73 M^2
GLUCOSE SERPL-MCNC: 100 MG/DL
HCT VFR BLD AUTO: 34.2 %
HGB BLD-MCNC: 10.8 G/DL
IMM GRANULOCYTES # BLD AUTO: 0.03 K/UL
IMM GRANULOCYTES NFR BLD AUTO: 0.5 %
INR PPP: 1
LYMPHOCYTES # BLD AUTO: 1.8 K/UL
LYMPHOCYTES NFR BLD: 30.2 %
MCH RBC QN AUTO: 28.6 PG
MCHC RBC AUTO-ENTMCNC: 31.6 G/DL
MCV RBC AUTO: 91 FL
MONOCYTES # BLD AUTO: 0.6 K/UL
MONOCYTES NFR BLD: 10.2 %
NEUTROPHILS # BLD AUTO: 3.1 K/UL
NEUTROPHILS NFR BLD: 53.6 %
NRBC BLD-RTO: 0 /100 WBC
PLATELET # BLD AUTO: 255 K/UL
PMV BLD AUTO: 9.9 FL
POCT GLUCOSE: 104 MG/DL (ref 70–110)
POCT GLUCOSE: 95 MG/DL (ref 70–110)
POTASSIUM SERPL-SCNC: 2.6 MMOL/L
PROTHROMBIN TIME: 10.7 SEC
RBC # BLD AUTO: 3.78 M/UL
SODIUM SERPL-SCNC: 146 MMOL/L
WBC # BLD AUTO: 5.8 K/UL

## 2018-11-15 PROCEDURE — 85025 COMPLETE CBC W/AUTO DIFF WBC: CPT

## 2018-11-15 PROCEDURE — 25500020 PHARM REV CODE 255: Performed by: OTOLARYNGOLOGY

## 2018-11-15 PROCEDURE — 63600175 PHARM REV CODE 636 W HCPCS: Performed by: PSYCHIATRY & NEUROLOGY

## 2018-11-15 PROCEDURE — 36415 COLL VENOUS BLD VENIPUNCTURE: CPT

## 2018-11-15 PROCEDURE — 85610 PROTHROMBIN TIME: CPT

## 2018-11-15 PROCEDURE — 80048 BASIC METABOLIC PNL TOTAL CA: CPT

## 2018-11-15 PROCEDURE — 82962 GLUCOSE BLOOD TEST: CPT

## 2018-11-15 PROCEDURE — 11000001 HC ACUTE MED/SURG PRIVATE ROOM

## 2018-11-15 PROCEDURE — 25000003 PHARM REV CODE 250: Performed by: STUDENT IN AN ORGANIZED HEALTH CARE EDUCATION/TRAINING PROGRAM

## 2018-11-15 PROCEDURE — 25000003 PHARM REV CODE 250: Performed by: FAMILY MEDICINE

## 2018-11-15 RX ORDER — NIFEDIPINE 30 MG/1
90 TABLET, EXTENDED RELEASE ORAL DAILY
Status: DISCONTINUED | OUTPATIENT
Start: 2018-11-16 | End: 2018-11-17 | Stop reason: HOSPADM

## 2018-11-15 RX ORDER — ENOXAPARIN SODIUM 100 MG/ML
40 INJECTION SUBCUTANEOUS EVERY 24 HOURS
Status: DISCONTINUED | OUTPATIENT
Start: 2018-11-16 | End: 2018-11-17 | Stop reason: HOSPADM

## 2018-11-15 RX ORDER — IBUPROFEN 200 MG
24 TABLET ORAL
Status: DISCONTINUED | OUTPATIENT
Start: 2018-11-15 | End: 2018-11-16

## 2018-11-15 RX ORDER — LOSARTAN POTASSIUM 50 MG/1
50 TABLET ORAL DAILY
Status: DISCONTINUED | OUTPATIENT
Start: 2018-11-16 | End: 2018-11-17

## 2018-11-15 RX ORDER — RAMELTEON 8 MG/1
8 TABLET ORAL NIGHTLY PRN
Status: DISCONTINUED | OUTPATIENT
Start: 2018-11-15 | End: 2018-11-17 | Stop reason: HOSPADM

## 2018-11-15 RX ORDER — SODIUM CHLORIDE 0.9 % (FLUSH) 0.9 %
5 SYRINGE (ML) INJECTION
Status: DISCONTINUED | OUTPATIENT
Start: 2018-11-15 | End: 2018-11-17 | Stop reason: HOSPADM

## 2018-11-15 RX ORDER — FENTANYL CITRATE 50 UG/ML
INJECTION, SOLUTION INTRAMUSCULAR; INTRAVENOUS CODE/TRAUMA/SEDATION MEDICATION
Status: COMPLETED | OUTPATIENT
Start: 2018-11-15 | End: 2018-11-15

## 2018-11-15 RX ORDER — SODIUM CHLORIDE 0.9 % (FLUSH) 0.9 %
3 SYRINGE (ML) INJECTION
Status: DISCONTINUED | OUTPATIENT
Start: 2018-11-15 | End: 2018-11-17 | Stop reason: HOSPADM

## 2018-11-15 RX ORDER — MIDAZOLAM HYDROCHLORIDE 1 MG/ML
1 INJECTION INTRAMUSCULAR; INTRAVENOUS
Status: DISCONTINUED | OUTPATIENT
Start: 2018-11-15 | End: 2018-11-15 | Stop reason: HOSPADM

## 2018-11-15 RX ORDER — FLUDROCORTISONE ACETATE 0.1 MG/1
100 TABLET ORAL DAILY
Status: DISCONTINUED | OUTPATIENT
Start: 2018-11-16 | End: 2018-11-16

## 2018-11-15 RX ORDER — ACETAMINOPHEN 500 MG
5000 TABLET ORAL DAILY
Status: DISCONTINUED | OUTPATIENT
Start: 2018-11-16 | End: 2018-11-17 | Stop reason: HOSPADM

## 2018-11-15 RX ORDER — METFORMIN HYDROCHLORIDE 500 MG/1
500 TABLET, EXTENDED RELEASE ORAL 2 TIMES DAILY WITH MEALS
Status: DISCONTINUED | OUTPATIENT
Start: 2018-11-15 | End: 2018-11-16

## 2018-11-15 RX ORDER — HEPARIN SODIUM 1000 [USP'U]/ML
3000 INJECTION, SOLUTION INTRAVENOUS; SUBCUTANEOUS ONCE
Status: DISCONTINUED | OUTPATIENT
Start: 2018-11-15 | End: 2018-11-15 | Stop reason: HOSPADM

## 2018-11-15 RX ORDER — GLUCAGON 1 MG
1 KIT INJECTION
Status: DISCONTINUED | OUTPATIENT
Start: 2018-11-15 | End: 2018-11-16

## 2018-11-15 RX ORDER — IBUPROFEN 200 MG
16 TABLET ORAL
Status: DISCONTINUED | OUTPATIENT
Start: 2018-11-15 | End: 2018-11-16

## 2018-11-15 RX ORDER — IODIXANOL 320 MG/ML
100 INJECTION, SOLUTION INTRAVASCULAR
Status: COMPLETED | OUTPATIENT
Start: 2018-11-15 | End: 2018-11-15

## 2018-11-15 RX ORDER — ONDANSETRON 8 MG/1
8 TABLET, ORALLY DISINTEGRATING ORAL EVERY 8 HOURS PRN
Status: DISCONTINUED | OUTPATIENT
Start: 2018-11-15 | End: 2018-11-17 | Stop reason: HOSPADM

## 2018-11-15 RX ORDER — HYDRALAZINE HYDROCHLORIDE 20 MG/ML
10 INJECTION INTRAMUSCULAR; INTRAVENOUS EVERY 6 HOURS PRN
Status: DISCONTINUED | OUTPATIENT
Start: 2018-11-15 | End: 2018-11-17 | Stop reason: HOSPADM

## 2018-11-15 RX ORDER — HYDROCORTISONE 20 MG/1
20 TABLET ORAL DAILY
Status: DISCONTINUED | OUTPATIENT
Start: 2018-11-16 | End: 2018-11-16

## 2018-11-15 RX ORDER — ACETAMINOPHEN 325 MG/1
650 TABLET ORAL EVERY 8 HOURS PRN
Status: DISCONTINUED | OUTPATIENT
Start: 2018-11-15 | End: 2018-11-17 | Stop reason: HOSPADM

## 2018-11-15 RX ORDER — SODIUM CHLORIDE 9 MG/ML
INJECTION, SOLUTION INTRAVENOUS CONTINUOUS
Status: DISCONTINUED | OUTPATIENT
Start: 2018-11-15 | End: 2018-11-15

## 2018-11-15 RX ORDER — DOXAZOSIN 1 MG/1
1 TABLET ORAL NIGHTLY
Status: DISCONTINUED | OUTPATIENT
Start: 2018-11-15 | End: 2018-11-17 | Stop reason: HOSPADM

## 2018-11-15 RX ORDER — INSULIN ASPART 100 [IU]/ML
1-10 INJECTION, SOLUTION INTRAVENOUS; SUBCUTANEOUS
Status: DISCONTINUED | OUTPATIENT
Start: 2018-11-15 | End: 2018-11-16

## 2018-11-15 RX ORDER — FENTANYL CITRATE 50 UG/ML
50 INJECTION, SOLUTION INTRAMUSCULAR; INTRAVENOUS
Status: DISCONTINUED | OUTPATIENT
Start: 2018-11-15 | End: 2018-11-15 | Stop reason: HOSPADM

## 2018-11-15 RX ORDER — SODIUM CHLORIDE 9 MG/ML
INJECTION, SOLUTION INTRAVENOUS CONTINUOUS
Status: DISCONTINUED | OUTPATIENT
Start: 2018-11-16 | End: 2018-11-17

## 2018-11-15 RX ORDER — LIDOCAINE HYDROCHLORIDE 10 MG/ML
1 INJECTION, SOLUTION EPIDURAL; INFILTRATION; INTRACAUDAL; PERINEURAL ONCE
Status: DISCONTINUED | OUTPATIENT
Start: 2018-11-15 | End: 2018-11-17 | Stop reason: HOSPADM

## 2018-11-15 RX ORDER — MIDAZOLAM HYDROCHLORIDE 1 MG/ML
INJECTION INTRAMUSCULAR; INTRAVENOUS CODE/TRAUMA/SEDATION MEDICATION
Status: COMPLETED | OUTPATIENT
Start: 2018-11-15 | End: 2018-11-15

## 2018-11-15 RX ADMIN — METFORMIN HYDROCHLORIDE 500 MG: 500 TABLET, EXTENDED RELEASE ORAL at 06:11

## 2018-11-15 RX ADMIN — IODIXANOL 60 ML: 320 INJECTION, SOLUTION INTRAVASCULAR at 11:11

## 2018-11-15 RX ADMIN — DOXAZOSIN 1 MG: 1 TABLET ORAL at 09:11

## 2018-11-15 RX ADMIN — FENTANYL CITRATE 25 MCG: 50 INJECTION, SOLUTION INTRAMUSCULAR; INTRAVENOUS at 09:11

## 2018-11-15 RX ADMIN — SODIUM CHLORIDE: 0.9 INJECTION, SOLUTION INTRAVENOUS at 07:11

## 2018-11-15 RX ADMIN — MIDAZOLAM HYDROCHLORIDE 2 MG: 1 INJECTION, SOLUTION INTRAMUSCULAR; INTRAVENOUS at 09:11

## 2018-11-15 NOTE — PROGRESS NOTES
Procedure complete, pt tolerated well.  Hemostasis achieved by angioseal at this time, dry sterile drsg applied. Pt to remain flat until 1255. To ROCU for recovery, report will be given at bedside.

## 2018-11-15 NOTE — H&P
Radiology History & Physical      SUBJECTIVE:     Chief Complaint: carotid body tumor    History of Present Illness:  Hailey Sawant is a 73 y.o. female who presents for elective embolization of right carotid body tumor prior to surgery.    Past Medical History:   Diagnosis Date    Diabetes mellitus     Hypertension     Lumbar spondylosis 6/8/2018    Malignant pheochromocytoma     Pheochromocytoma     Pheochromocytoma, malignant 01/10/2016    Pheochromocytoma, malignant     Sacroiliac joint pain 7/11/2018    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    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/17/2018    Thyroid disease      Past Surgical History:   Procedure Laterality Date    ADRENALECTOMY      ADRENALECTOMY open exploratory Right 10/2/2017    Performed by [facility] at [facility] OR 2ND FLR    APPENDECTOMY      [id number] N/A 2/8/2016    Performed by [facility] Diagnostic Provider at [facility] OR 2ND FLR    BLOCK, SACROILIAC JOINT Right 6/26/2018    Performed by Sydney Reynoso MD at [facility] PAIN MGT    CATHETERIZATION, HEART, LEFT Left 7/18/2018    Performed by Moustapha Vasquez MD at [facility] CATH LAB    INJECTION OF ANESTHETIC AGENT INTO SACROILIAC JOINT Right 6/26/2018    Procedure: BLOCK, SACROILIAC JOINT;  Surgeon: Sydney Reynoso MD;  Location: [facility] PAIN MGT;  Service: Pain Management;  Laterality: Right;  Right SI Joint Injection    [id number]    NEEDS CONSENT    LEFT HEART CATHETERIZATION Left 7/18/2018    Procedure: CATHETERIZATION, HEART, LEFT;  Surgeon: Moustapha Vasquez MD;  Location: [facility] CATH LAB;  Service: Cardiovascular;  Laterality: Left;    MIBG Therapy  3/2016, 7/2016, 12/2016    ELIJAH Martines       Home Meds:   Prior to Admission medications    Medication Sig Start Date End Date Taking? Authorizing Provider   aspirin 81 MG Chew Take 81 mg by mouth once daily.   Yes Historical Provider, MD   cholecalciferol, vitamin D3, (VITAMIN D3) 5,000 unit Tab  Take 5,000 Units by mouth once daily.   Yes Historical Provider, MD   doxazosin (CARDURA) 1 MG tablet Take 1 tablet (1 mg total) by mouth every evening. 11/13/18 11/13/19 Yes Rodolfo Burton II, MD   fludrocortisone (FLORINEF) 0.1 mg Tab Take 100 mcg by mouth once daily.   Yes Historical Provider, MD   furosemide (LASIX) 40 MG tablet  9/8/18  Yes Historical Provider, MD   hydrocortisone (CORTEF) 20 MG Tab TAKE 1 TABLET EVERY MORNING  AND TAKE 1/2 TABLET EVERY EVENING  AT  5PM 12/11/17  Yes Rodolfo Burton II, MD   losartan (COZAAR) 50 MG tablet Take 50 mg by mouth once daily.   Yes Historical Provider, MD   metFORMIN (GLUCOPHAGE-XR) 500 MG 24 hr tablet TAKE 1 TABLET TWICE DAILY WITH MEALS 2/26/18  Yes Rodolfo Burton II, MD   NIFEdipine (PROCARDIA-XL) 90 MG (OSM) 24 hr tablet Take 90 mg by mouth once daily.  11/24/17   Historical Provider, MD   TRUE METRIX AIR GLUCOSE METER kit CHECK BLOOD SUGAR ONE TIME DAILY 3/6/17   Rodolfo Burton II, MD   TRUE METRIX GLUCOSE TEST STRIP Strp CHECK BLOOD SUGAR ONE TIME DAILY 9/12/18   Rodolfo Burton II, MD   TRUEPLUS LANCETS 28 gauge Misc TEST ONE TIME DAILY 9/12/18   Rodolfo Burton II, MD     Anticoagulants/Antiplatelets: aspirin    Allergies: Review of patient's allergies indicates:  No Known Allergies  Sedation History:  no adverse reactions    Review of Systems:   Hematological: negative  no known coagulopathies  Respiratory: no shortness of breath  Cardiovascular: no chest pain  Gastrointestinal: no abdominal pain  Genito-Urinary: no dysuria  Musculoskeletal: negative  Neurological: no TIA or stroke symptoms         OBJECTIVE:     Vital Signs (Most Recent)  Temp: 97.6 °F (36.4 °C) (11/15/18 0719)  Pulse: 62 (11/15/18 0719)  Resp: 18 (11/15/18 0719)  BP: (!) 146/68 (11/15/18 0719)  SpO2: 98 % (11/15/18 0719)    Physical Exam:  ASA: 2  Mallampati: 2    General: no acute distress  Mental Status: alert and oriented to person, place and time  HEENT: normocephalic,  atraumatic  Chest: unlabored breathing  Heart: regular heart rate  Abdomen: nondistended  Extremity: moves all extremities    Laboratory  Lab Results   Component Value Date    INR 1.0 11/15/2018       Lab Results   Component Value Date    WBC 5.80 11/15/2018    HGB 10.8 (L) 11/15/2018    HCT 34.2 (L) 11/15/2018    MCV 91 11/15/2018     11/15/2018      Lab Results   Component Value Date    GLU 97 11/05/2018     11/05/2018    K 2.8 (L) 11/12/2018     11/05/2018    CO2 28 11/05/2018    BUN 19 11/05/2018    CREATININE 0.8 11/05/2018    CALCIUM 9.7 11/05/2018    MG 2.0 07/19/2018    ALT 6 (L) 07/17/2018    AST 10 07/17/2018    ALBUMIN 3.6 07/17/2018    BILITOT 0.7 07/17/2018       ASSESSMENT/PLAN:     Sedation Plan: conscious sedation  Patient will undergo cerebral angiogram w possible tumor embo.  Admit to ICU after.    Wojciech Richter MD  Vascular and Interventional Neurology Staff  Director of Comprehensive St[facility] Center  [facility]  [phone]

## 2018-11-15 NOTE — PLAN OF CARE
Ready for angiogram needs procedural consent, site ladonna and h/p update. Will notify radiology department

## 2018-11-15 NOTE — PROCEDURES
Radiology Post-Procedure Note    Pre Op Diagnosis: R carotid body tumor    Post Op Diagnosis: same    Procedure: Cerebral angiogram    Procedure performed by: Wojciech Richter MD    Written Informed Consent Obtained: Yes    Specimen Removed: NO    Estimated Blood Loss: Minimal    Procedure report:     A 6F sheath was placed into the right femoral artery and a 5F Paramjit catheter was advanced into the aortic arch.  The right common carotid arteries were subselected and angiography of the brain was performed after injection into each of these vessels.    Preliminary interpretation: no stenosis, tumor blush from ECA branches, normal intracranial flow.  Attempts made to climb the R CCA but unsucessful.  Please see Imaging report for full details.    A right femoral artery angiogram was performed, the sheath removed and hemostasis achieved using angioseal.  No hematoma was present at the time of hemostasis.    The patient tolerated the procedure well.     Wojciech Richter MD  Vascular and Interventional Neurology Staff  Director of [facility]  [facility]  [phone]

## 2018-11-15 NOTE — H&P
Otolaryngology - Head and Neck Surgery H&P      History of Present Illness: 73 y.o. female presents from Dr. Prasad with a history of metastatic pheochromocytoma.  She was recently noted to have R neck disease on an MIBG study.  She has no head and neck complaints.  She denies throat pain or dysphagia.  She is feeling well otherwise.      Underwent attempted embolization of tumor today which was unsuccessful. She is being admitted for observation prior to her surgery tomorrow.     Review of Systems   Constitutional: Negative for fatigue and unexpected weight change.   HENT: Per HPI.  Eyes: Negative for visual disturbance.   Respiratory: Negative for shortness of breath, hemoptysis   Cardiovascular: Negative for chest pain and palpitations.   Musculoskeletal: Negative for decreased ROM, back pain.   Skin: Negative for rash, sunburn, itching.   Neurological: Negative for dizziness and seizures.   Hematological: Negative for adenopathy. Does not bruise/bleed easily.   Endocrine: Negative for rapid weight loss/weight gain, heat/cold intolerance.      Past Medical History: Patient has a past medical history of Diabetes mellitus, Hypertension, Lumbar spondylosis (6/8/2018), Malignant pheochromocytoma, Pheochromocytoma, Pheochromocytoma, malignant (01/10/2016), Pheochromocytoma, malignant, Sacroiliac joint pain (7/11/2018), Secondary neuroendocrine tumor of bone (9/17/2018), Secondary neuroendocrine tumor of distant lymph nodes (9/17/2018), and Thyroid disease.    Past Surgical History: Patient has a past surgical history that includes Adrenalectomy; Appendectomy; MIBG Therapy (3/2016, 7/2016, 12/2016); CATHETERIZATION, HEART, LEFT (Left, 7/18/2018); BLOCK, SACROILIAC JOINT (Right, 6/26/2018); ADRENALECTOMY open exploratory (Right, 10/2/2017); and [id number] (N/A, 2/8/2016).    Social History: Patient reports that  has never smoked. she has never used smokeless tobacco. She reports that she does not drink alcohol or  use drugs.    Family History: family history includes Cancer in her brother and brother; Diabetes in her mother; Heart disease in her father and mother; Lung cancer in her brother.    Medications:    [START ON 11/16/2018] cholecalciferol (vitamin D3)  5,000 Units Oral Daily    doxazosin  1 mg Oral QHS    [START ON 11/16/2018] enoxaparin  40 mg Subcutaneous Daily    [START ON 11/16/2018] fludrocortisone  100 mcg Oral Daily    [START ON 11/16/2018] hydrocortisone  20 mg Oral Daily    lidocaine (PF) 10 mg/ml (1%)  1 mL Other Once    [START ON 11/16/2018] losartan  50 mg Oral Daily    metFORMIN  500 mg Oral BID WM    [START ON 11/16/2018] NIFEdipine  90 mg Oral Daily       Allergies: Patient has No Known Allergies.    Physical Exam:  Temp:  [96.1 °F (35.6 °C)-97.6 °F (36.4 °C)] 97 °F (36.1 °C)  Pulse:  [41-66] 62  Resp:  [13-20] 16  SpO2:  [94 %-100 %] 95 %  BP: (106-146)/(44-68) 119/58        General: AOx3, NAD   Respiratory:  Symmetric chest rise, normal effort  Nose: No gross nasal septal deviation. Inferior Turbinates WNL bilaterally. No septal perforation. No masses/lesions.   Oral Cavity:  Oral Tongue mobile, no lesions noted. Hard Palate WNL. No buccal or FOM lesions.  Oropharynx:  No masses/lesions of the posterior pharyngeal wall. Tonsillar fossa without lesions. Soft palate without masses. Midline uvula.   Neck: No scars. ~2 cm R neck mass.   No thyromegaly or thyroid nodules.  Normal range of motion.    Face: House Brackmann I bilaterally.       CBC  Recent Labs   Lab 11/15/18  0730   WBC 5.80   HGB 10.8*   HCT 34.2*   MCV 91        BMP  No results for input(s): GLU, NA, K, CL, CO2, BUN, CREATININE, CALCIUM, MG in the last 24 hours.         Assessment: 72 yo female with metastatic pheochromocytoma admitted for obs before surgery tomorrow.    Plan:   -NPO, mIVF at midnight tonight  -to OR tomorrow with Dr. Ca

## 2018-11-15 NOTE — PROGRESS NOTES
Pt arrives to  via stretcher for embolization of right carotid body tumor .  Transferred to table without issue, cardiac monitor, BP cuff, sp02 applied. Consents on chart.

## 2018-11-16 LAB
ABO + RH BLD: NORMAL
ANION GAP SERPL CALC-SCNC: 6 MMOL/L
ANION GAP SERPL CALC-SCNC: 7 MMOL/L
BASOPHILS # BLD AUTO: 0.02 K/UL
BASOPHILS NFR BLD: 0.2 %
BLD GP AB SCN CELLS X3 SERPL QL: NORMAL
BUN SERPL-MCNC: 7 MG/DL
BUN SERPL-MCNC: 7 MG/DL
CALCIUM SERPL-MCNC: 6.3 MG/DL
CALCIUM SERPL-MCNC: 8.7 MG/DL
CHLORIDE SERPL-SCNC: 110 MMOL/L
CHLORIDE SERPL-SCNC: 117 MMOL/L
CO2 SERPL-SCNC: 22 MMOL/L
CO2 SERPL-SCNC: 24 MMOL/L
CREAT SERPL-MCNC: 0.6 MG/DL
CREAT SERPL-MCNC: 0.7 MG/DL
DIFFERENTIAL METHOD: ABNORMAL
EOSINOPHIL # BLD AUTO: 0 K/UL
EOSINOPHIL NFR BLD: 0.2 %
ERYTHROCYTE [DISTWIDTH] IN BLOOD BY AUTOMATED COUNT: 14.4 %
EST. GFR  (AFRICAN AMERICAN): >60 ML/MIN/1.73 M^2
EST. GFR  (AFRICAN AMERICAN): >60 ML/MIN/1.73 M^2
EST. GFR  (NON AFRICAN AMERICAN): >60 ML/MIN/1.73 M^2
EST. GFR  (NON AFRICAN AMERICAN): >60 ML/MIN/1.73 M^2
GLUCOSE SERPL-MCNC: 135 MG/DL
GLUCOSE SERPL-MCNC: 71 MG/DL
GLUCOSE SERPL-MCNC: 97 MG/DL (ref 70–110)
GLUCOSE SERPL-MCNC: 99 MG/DL (ref 70–110)
HCO3 UR-SCNC: 24.1 MMOL/L (ref 24–28)
HCO3 UR-SCNC: 25 MMOL/L (ref 24–28)
HCT VFR BLD AUTO: 32.5 %
HCT VFR BLD CALC: 29 %PCV (ref 36–54)
HCT VFR BLD CALC: 33 %PCV (ref 36–54)
HGB BLD-MCNC: 10.2 G/DL
IMM GRANULOCYTES # BLD AUTO: 0.03 K/UL
IMM GRANULOCYTES NFR BLD AUTO: 0.3 %
LYMPHOCYTES # BLD AUTO: 1.2 K/UL
LYMPHOCYTES NFR BLD: 12.1 %
MAGNESIUM SERPL-MCNC: 1.6 MG/DL
MCH RBC QN AUTO: 28.5 PG
MCHC RBC AUTO-ENTMCNC: 31.4 G/DL
MCV RBC AUTO: 91 FL
MONOCYTES # BLD AUTO: 0.4 K/UL
MONOCYTES NFR BLD: 4.3 %
NEUTROPHILS # BLD AUTO: 8.4 K/UL
NEUTROPHILS NFR BLD: 82.9 %
NRBC BLD-RTO: 0 /100 WBC
PCO2 BLDA: 37.9 MMHG (ref 35–45)
PCO2 BLDA: 38.9 MMHG (ref 35–45)
PH SMN: 7.4 [PH] (ref 7.35–7.45)
PH SMN: 7.43 [PH] (ref 7.35–7.45)
PHOSPHATE SERPL-MCNC: 3.3 MG/DL
PLATELET # BLD AUTO: 221 K/UL
PMV BLD AUTO: 9.5 FL
PO2 BLDA: 164 MMHG (ref 80–100)
PO2 BLDA: 82 MMHG (ref 80–100)
POC BE: -1 MMOL/L
POC BE: 1 MMOL/L
POC IONIZED CALCIUM: 1.09 MMOL/L (ref 1.06–1.42)
POC IONIZED CALCIUM: 1.23 MMOL/L (ref 1.06–1.42)
POC SATURATED O2: 100 % (ref 95–100)
POC SATURATED O2: 96 % (ref 95–100)
POC TCO2: 25 MMOL/L (ref 23–27)
POC TCO2: 26 MMOL/L (ref 23–27)
POCT GLUCOSE: 122 MG/DL (ref 70–110)
POCT GLUCOSE: 127 MG/DL (ref 70–110)
POCT GLUCOSE: 135 MG/DL (ref 70–110)
POCT GLUCOSE: 158 MG/DL (ref 70–110)
POTASSIUM BLD-SCNC: 2.6 MMOL/L (ref 3.5–5.1)
POTASSIUM BLD-SCNC: 3.6 MMOL/L (ref 3.5–5.1)
POTASSIUM SERPL-SCNC: 3 MMOL/L
POTASSIUM SERPL-SCNC: 4 MMOL/L
RBC # BLD AUTO: 3.58 M/UL
SAMPLE: ABNORMAL
SAMPLE: ABNORMAL
SODIUM BLD-SCNC: 145 MMOL/L (ref 136–145)
SODIUM BLD-SCNC: 147 MMOL/L (ref 136–145)
SODIUM SERPL-SCNC: 140 MMOL/L
SODIUM SERPL-SCNC: 146 MMOL/L
WBC # BLD AUTO: 10.11 K/UL

## 2018-11-16 PROCEDURE — 25000003 PHARM REV CODE 250: Performed by: STUDENT IN AN ORGANIZED HEALTH CARE EDUCATION/TRAINING PROGRAM

## 2018-11-16 PROCEDURE — 83735 ASSAY OF MAGNESIUM: CPT

## 2018-11-16 PROCEDURE — 37000009 HC ANESTHESIA EA ADD 15 MINS: Performed by: OTOLARYNGOLOGY

## 2018-11-16 PROCEDURE — 86920 COMPATIBILITY TEST SPIN: CPT

## 2018-11-16 PROCEDURE — 63600175 PHARM REV CODE 636 W HCPCS: Performed by: OTOLARYNGOLOGY

## 2018-11-16 PROCEDURE — 88305 TISSUE EXAM BY PATHOLOGIST: CPT | Mod: 26,,, | Performed by: PATHOLOGY

## 2018-11-16 PROCEDURE — 63600175 PHARM REV CODE 636 W HCPCS: Performed by: STUDENT IN AN ORGANIZED HEALTH CARE EDUCATION/TRAINING PROGRAM

## 2018-11-16 PROCEDURE — 71000039 HC RECOVERY, EACH ADD'L HOUR: Performed by: OTOLARYNGOLOGY

## 2018-11-16 PROCEDURE — 80048 BASIC METABOLIC PNL TOTAL CA: CPT | Mod: 91

## 2018-11-16 PROCEDURE — 27201037 HC PRESSURE MONITORING SET UP

## 2018-11-16 PROCEDURE — 80048 BASIC METABOLIC PNL TOTAL CA: CPT

## 2018-11-16 PROCEDURE — 0GB70ZZ EXCISION OF RIGHT CAROTID BODY, OPEN APPROACH: ICD-10-PCS | Performed by: OTOLARYNGOLOGY

## 2018-11-16 PROCEDURE — 60600 REMOVE CAROTID BODY LESION: CPT | Mod: 62,,, | Performed by: OTOLARYNGOLOGY

## 2018-11-16 PROCEDURE — 25000003 PHARM REV CODE 250: Performed by: OTOLARYNGOLOGY

## 2018-11-16 PROCEDURE — 36000707: Performed by: OTOLARYNGOLOGY

## 2018-11-16 PROCEDURE — 60600 REMOVE CAROTID BODY LESION: CPT | Mod: 62,,, | Performed by: SURGERY

## 2018-11-16 PROCEDURE — 27000221 HC OXYGEN, UP TO 24 HOURS

## 2018-11-16 PROCEDURE — 27201423 OPTIME MED/SURG SUP & DEVICES STERILE SUPPLY: Performed by: OTOLARYNGOLOGY

## 2018-11-16 PROCEDURE — C1729 CATH, DRAINAGE: HCPCS | Performed by: OTOLARYNGOLOGY

## 2018-11-16 PROCEDURE — 71000033 HC RECOVERY, INTIAL HOUR: Performed by: OTOLARYNGOLOGY

## 2018-11-16 PROCEDURE — 63600175 PHARM REV CODE 636 W HCPCS

## 2018-11-16 PROCEDURE — 88307 TISSUE EXAM BY PATHOLOGIST: CPT | Performed by: PATHOLOGY

## 2018-11-16 PROCEDURE — 37000008 HC ANESTHESIA 1ST 15 MINUTES: Performed by: OTOLARYNGOLOGY

## 2018-11-16 PROCEDURE — 63600175 PHARM REV CODE 636 W HCPCS: Performed by: HOSPITALIST

## 2018-11-16 PROCEDURE — 36415 COLL VENOUS BLD VENIPUNCTURE: CPT

## 2018-11-16 PROCEDURE — 63600175 PHARM REV CODE 636 W HCPCS: Performed by: ANESTHESIOLOGY

## 2018-11-16 PROCEDURE — D9220A PRA ANESTHESIA: Mod: ,,, | Performed by: ANESTHESIOLOGY

## 2018-11-16 PROCEDURE — 94761 N-INVAS EAR/PLS OXIMETRY MLT: CPT

## 2018-11-16 PROCEDURE — 36000706: Performed by: OTOLARYNGOLOGY

## 2018-11-16 PROCEDURE — 88307 TISSUE EXAM BY PATHOLOGIST: CPT | Mod: 26,,, | Performed by: PATHOLOGY

## 2018-11-16 PROCEDURE — 20000000 HC ICU ROOM

## 2018-11-16 PROCEDURE — 36620 INSERTION CATHETER ARTERY: CPT | Mod: 59,,, | Performed by: ANESTHESIOLOGY

## 2018-11-16 PROCEDURE — 85025 COMPLETE CBC W/AUTO DIFF WBC: CPT

## 2018-11-16 PROCEDURE — 88305 TISSUE EXAM BY PATHOLOGIST: CPT | Performed by: PATHOLOGY

## 2018-11-16 PROCEDURE — 86850 RBC ANTIBODY SCREEN: CPT

## 2018-11-16 PROCEDURE — 82962 GLUCOSE BLOOD TEST: CPT | Performed by: OTOLARYNGOLOGY

## 2018-11-16 PROCEDURE — 84100 ASSAY OF PHOSPHORUS: CPT

## 2018-11-16 RX ORDER — POTASSIUM CHLORIDE 14.9 MG/ML
INJECTION INTRAVENOUS CONTINUOUS PRN
Status: DISCONTINUED | OUTPATIENT
Start: 2018-11-16 | End: 2018-11-16

## 2018-11-16 RX ORDER — HYDROCODONE BITARTRATE AND ACETAMINOPHEN 7.5; 325 MG/15ML; MG/15ML
15 SOLUTION ORAL EVERY 6 HOURS PRN
Status: DISCONTINUED | OUTPATIENT
Start: 2018-11-16 | End: 2018-11-17 | Stop reason: HOSPADM

## 2018-11-16 RX ORDER — EPHEDRINE SULFATE 50 MG/ML
INJECTION, SOLUTION INTRAVENOUS
Status: DISCONTINUED | OUTPATIENT
Start: 2018-11-16 | End: 2018-11-16

## 2018-11-16 RX ORDER — ROCURONIUM BROMIDE 10 MG/ML
INJECTION, SOLUTION INTRAVENOUS
Status: DISCONTINUED | OUTPATIENT
Start: 2018-11-16 | End: 2018-11-16

## 2018-11-16 RX ORDER — FENTANYL CITRATE 50 UG/ML
INJECTION, SOLUTION INTRAMUSCULAR; INTRAVENOUS
Status: COMPLETED
Start: 2018-11-16 | End: 2018-11-16

## 2018-11-16 RX ORDER — FENTANYL CITRATE 50 UG/ML
INJECTION, SOLUTION INTRAMUSCULAR; INTRAVENOUS
Status: DISCONTINUED | OUTPATIENT
Start: 2018-11-16 | End: 2018-11-16

## 2018-11-16 RX ORDER — INSULIN ASPART 100 [IU]/ML
0-5 INJECTION, SOLUTION INTRAVENOUS; SUBCUTANEOUS
Status: DISCONTINUED | OUTPATIENT
Start: 2018-11-16 | End: 2018-11-16

## 2018-11-16 RX ORDER — ACETAMINOPHEN 10 MG/ML
INJECTION, SOLUTION INTRAVENOUS
Status: DISCONTINUED | OUTPATIENT
Start: 2018-11-16 | End: 2018-11-16

## 2018-11-16 RX ORDER — IBUPROFEN 200 MG
24 TABLET ORAL
Status: DISCONTINUED | OUTPATIENT
Start: 2018-11-16 | End: 2018-11-17 | Stop reason: HOSPADM

## 2018-11-16 RX ORDER — INSULIN ASPART 100 [IU]/ML
0-5 INJECTION, SOLUTION INTRAVENOUS; SUBCUTANEOUS
Status: DISCONTINUED | OUTPATIENT
Start: 2018-11-16 | End: 2018-11-17 | Stop reason: HOSPADM

## 2018-11-16 RX ORDER — PROPOFOL 10 MG/ML
VIAL (ML) INTRAVENOUS
Status: DISCONTINUED | OUTPATIENT
Start: 2018-11-16 | End: 2018-11-16

## 2018-11-16 RX ORDER — ONDANSETRON 2 MG/ML
4 INJECTION INTRAMUSCULAR; INTRAVENOUS DAILY PRN
Status: DISCONTINUED | OUTPATIENT
Start: 2018-11-16 | End: 2018-11-16

## 2018-11-16 RX ORDER — LIDOCAINE HYDROCHLORIDE AND EPINEPHRINE 10; 10 MG/ML; UG/ML
INJECTION, SOLUTION INFILTRATION; PERINEURAL
Status: DISCONTINUED | OUTPATIENT
Start: 2018-11-16 | End: 2018-11-16 | Stop reason: HOSPADM

## 2018-11-16 RX ORDER — FENTANYL CITRATE 50 UG/ML
25 INJECTION, SOLUTION INTRAMUSCULAR; INTRAVENOUS EVERY 5 MIN PRN
Status: DISCONTINUED | OUTPATIENT
Start: 2018-11-16 | End: 2018-11-16

## 2018-11-16 RX ORDER — POTASSIUM CHLORIDE 7.46 G/1000ML
10 INJECTION, SOLUTION INTRAVENOUS
Status: DISPENSED | OUTPATIENT
Start: 2018-11-16 | End: 2018-11-16

## 2018-11-16 RX ORDER — GLYCOPYRROLATE 0.2 MG/ML
INJECTION INTRAMUSCULAR; INTRAVENOUS
Status: DISCONTINUED | OUTPATIENT
Start: 2018-11-16 | End: 2018-11-16

## 2018-11-16 RX ORDER — MIDAZOLAM HYDROCHLORIDE 1 MG/ML
INJECTION, SOLUTION INTRAMUSCULAR; INTRAVENOUS
Status: DISCONTINUED | OUTPATIENT
Start: 2018-11-16 | End: 2018-11-16

## 2018-11-16 RX ORDER — ONDANSETRON 2 MG/ML
INJECTION INTRAMUSCULAR; INTRAVENOUS
Status: DISCONTINUED | OUTPATIENT
Start: 2018-11-16 | End: 2018-11-16

## 2018-11-16 RX ORDER — CEFAZOLIN SODIUM 1 G/3ML
2 INJECTION, POWDER, FOR SOLUTION INTRAMUSCULAR; INTRAVENOUS
Status: COMPLETED | OUTPATIENT
Start: 2018-11-16 | End: 2018-11-16

## 2018-11-16 RX ORDER — HYDROMORPHONE HYDROCHLORIDE 1 MG/ML
0.2 INJECTION, SOLUTION INTRAMUSCULAR; INTRAVENOUS; SUBCUTANEOUS EVERY 4 HOURS PRN
Status: DISCONTINUED | OUTPATIENT
Start: 2018-11-16 | End: 2018-11-17 | Stop reason: HOSPADM

## 2018-11-16 RX ORDER — FENTANYL CITRATE 50 UG/ML
25 INJECTION, SOLUTION INTRAMUSCULAR; INTRAVENOUS
Status: DISCONTINUED | OUTPATIENT
Start: 2018-11-16 | End: 2018-11-16 | Stop reason: HOSPADM

## 2018-11-16 RX ORDER — NEOSTIGMINE METHYLSULFATE 1 MG/ML
INJECTION, SOLUTION INTRAVENOUS
Status: DISCONTINUED | OUTPATIENT
Start: 2018-11-16 | End: 2018-11-16

## 2018-11-16 RX ORDER — POTASSIUM CHLORIDE 7.45 MG/ML
10 INJECTION INTRAVENOUS
Status: COMPLETED | OUTPATIENT
Start: 2018-11-16 | End: 2018-11-16

## 2018-11-16 RX ORDER — IBUPROFEN 200 MG
16 TABLET ORAL
Status: DISCONTINUED | OUTPATIENT
Start: 2018-11-16 | End: 2018-11-17 | Stop reason: HOSPADM

## 2018-11-16 RX ORDER — SODIUM CHLORIDE 0.9 % (FLUSH) 0.9 %
3 SYRINGE (ML) INJECTION
Status: DISCONTINUED | OUTPATIENT
Start: 2018-11-16 | End: 2018-11-17 | Stop reason: HOSPADM

## 2018-11-16 RX ORDER — GLUCAGON 1 MG
1 KIT INJECTION
Status: DISCONTINUED | OUTPATIENT
Start: 2018-11-16 | End: 2018-11-17 | Stop reason: HOSPADM

## 2018-11-16 RX ORDER — LIDOCAINE HCL/PF 100 MG/5ML
SYRINGE (ML) INTRAVENOUS
Status: DISCONTINUED | OUTPATIENT
Start: 2018-11-16 | End: 2018-11-16

## 2018-11-16 RX ADMIN — FENTANYL CITRATE 150 MCG: 50 INJECTION, SOLUTION INTRAMUSCULAR; INTRAVENOUS at 09:11

## 2018-11-16 RX ADMIN — FLUDROCORTISONE ACETATE 100 MCG: 0.1 TABLET ORAL at 04:11

## 2018-11-16 RX ADMIN — PROPOFOL 120 MG: 10 INJECTION, EMULSION INTRAVENOUS at 09:11

## 2018-11-16 RX ADMIN — ONDANSETRON 4 MG: 2 INJECTION INTRAMUSCULAR; INTRAVENOUS at 11:11

## 2018-11-16 RX ADMIN — CALCIUM CHLORIDE 0.5 G: 100 INJECTION, SOLUTION INTRAVENOUS at 10:11

## 2018-11-16 RX ADMIN — DOXAZOSIN 1 MG: 1 TABLET ORAL at 09:11

## 2018-11-16 RX ADMIN — LOSARTAN POTASSIUM 50 MG: 25 TABLET, FILM COATED ORAL at 04:11

## 2018-11-16 RX ADMIN — SODIUM CHLORIDE: 0.9 INJECTION, SOLUTION INTRAVENOUS at 08:11

## 2018-11-16 RX ADMIN — CEFAZOLIN 2 G: 330 INJECTION, POWDER, FOR SOLUTION INTRAMUSCULAR; INTRAVENOUS at 09:11

## 2018-11-16 RX ADMIN — HYDROCORTISONE SODIUM SUCCINATE 50 MG: 100 INJECTION, POWDER, FOR SOLUTION INTRAMUSCULAR; INTRAVENOUS at 09:11

## 2018-11-16 RX ADMIN — ACETAMINOPHEN 1000 MG: 10 INJECTION, SOLUTION INTRAVENOUS at 11:11

## 2018-11-16 RX ADMIN — NEOSTIGMINE METHYLSULFATE 5 MG: 1 INJECTION INTRAVENOUS at 11:11

## 2018-11-16 RX ADMIN — HYDROCORTISONE 20 MG: 20 TABLET ORAL at 04:11

## 2018-11-16 RX ADMIN — CHOLECALCIFEROL TAB 125 MCG (5000 UNIT) 5000 UNITS: 125 TAB at 04:11

## 2018-11-16 RX ADMIN — ROCURONIUM BROMIDE 10 MG: 10 INJECTION, SOLUTION INTRAVENOUS at 11:11

## 2018-11-16 RX ADMIN — POTASSIUM CHLORIDE 10 MEQ: 10 INJECTION, SOLUTION INTRAVENOUS at 03:11

## 2018-11-16 RX ADMIN — ROCURONIUM BROMIDE 30 MG: 10 INJECTION, SOLUTION INTRAVENOUS at 09:11

## 2018-11-16 RX ADMIN — LIDOCAINE HYDROCHLORIDE 50 MG: 20 INJECTION, SOLUTION INTRAVENOUS at 09:11

## 2018-11-16 RX ADMIN — HYDROCODONE BITARTRATE AND ACETAMINOPHEN 15 ML: 7.5; 325 SOLUTION ORAL at 01:11

## 2018-11-16 RX ADMIN — ENOXAPARIN SODIUM 40 MG: 100 INJECTION SUBCUTANEOUS at 04:11

## 2018-11-16 RX ADMIN — HYDROCODONE BITARTRATE AND ACETAMINOPHEN 15 ML: 7.5; 325 SOLUTION ORAL at 09:11

## 2018-11-16 RX ADMIN — SODIUM CHLORIDE, SODIUM GLUCONATE, SODIUM ACETATE, POTASSIUM CHLORIDE, MAGNESIUM CHLORIDE, SODIUM PHOSPHATE, DIBASIC, AND POTASSIUM PHOSPHATE: .53; .5; .37; .037; .03; .012; .00082 INJECTION, SOLUTION INTRAVENOUS at 09:11

## 2018-11-16 RX ADMIN — FENTANYL CITRATE 25 MCG: 50 INJECTION INTRAMUSCULAR; INTRAVENOUS at 04:11

## 2018-11-16 RX ADMIN — EPHEDRINE SULFATE 5 MG: 50 INJECTION, SOLUTION INTRAMUSCULAR; INTRAVENOUS; SUBCUTANEOUS at 09:11

## 2018-11-16 RX ADMIN — SODIUM CHLORIDE: 0.9 INJECTION, SOLUTION INTRAVENOUS at 01:11

## 2018-11-16 RX ADMIN — GLYCOPYRROLATE 0.4 MG: 0.2 INJECTION, SOLUTION INTRAMUSCULAR; INTRAVENOUS at 11:11

## 2018-11-16 RX ADMIN — SODIUM CHLORIDE 50 ML: 9 INJECTION, SOLUTION INTRAVENOUS at 09:11

## 2018-11-16 RX ADMIN — SODIUM CHLORIDE 50 ML: 9 INJECTION, SOLUTION INTRAVENOUS at 11:11

## 2018-11-16 RX ADMIN — EPHEDRINE SULFATE 5 MG: 50 INJECTION, SOLUTION INTRAMUSCULAR; INTRAVENOUS; SUBCUTANEOUS at 10:11

## 2018-11-16 RX ADMIN — POTASSIUM CHLORIDE 10 MEQ: 149 INJECTION, SOLUTION, CONCENTRATE INTRAVENOUS at 06:11

## 2018-11-16 RX ADMIN — NIFEDIPINE 90 MG: 30 TABLET, FILM COATED, EXTENDED RELEASE ORAL at 04:11

## 2018-11-16 RX ADMIN — SODIUM CHLORIDE: 0.9 INJECTION, SOLUTION INTRAVENOUS at 03:11

## 2018-11-16 RX ADMIN — ROCURONIUM BROMIDE 10 MG: 10 INJECTION, SOLUTION INTRAVENOUS at 09:11

## 2018-11-16 RX ADMIN — POTASSIUM CHLORIDE: 14.9 INJECTION, SOLUTION INTRAVENOUS at 10:11

## 2018-11-16 RX ADMIN — FENTANYL CITRATE 25 MCG: 50 INJECTION, SOLUTION INTRAMUSCULAR; INTRAVENOUS at 12:11

## 2018-11-16 RX ADMIN — MIDAZOLAM HYDROCHLORIDE 2 MG: 1 INJECTION, SOLUTION INTRAMUSCULAR; INTRAVENOUS at 08:11

## 2018-11-16 RX ADMIN — POTASSIUM CHLORIDE 10 MEQ: 149 INJECTION, SOLUTION, CONCENTRATE INTRAVENOUS at 08:11

## 2018-11-16 RX ADMIN — POTASSIUM CHLORIDE 10 MEQ: 10 INJECTION, SOLUTION INTRAVENOUS at 04:11

## 2018-11-16 NOTE — OP NOTE
DATE OF PROCEDURE:  11/16/2018    PREOPERATIVE DIAGNOSIS:  Right carotid artery pheochromocytoma.    POSTOPERATIVE DIAGNOSIS:  Right carotid artery pheochromocytoma.    PROCEDURE PERFORMED:  Resection of right carotid tumor.    SURGEON:  Rebecca Nava M.D.    CO-SURGEON:  Noah Ca M.D.    ANESTHESIA:  General endotracheal.    FINDINGS:  Excision of tumor arising from the right carotid body, suspected to   be metastatic pheochromocytoma.  No arterial repair required.    ESTIMATED BLOOD LOSS:  200 mL.    COMPLICATIONS:  None.    SPECIMEN:  Right carotid tumor to Pathology.    HISTORY:  Ms. Sawant is a 73-year-old female being managed by the Oncology   Service.  She was found to have a right neck mass arising from the right carotid   bifurcation.  This was suspicious for a metastatic pheochromocytoma given her   past history.  She was seen and evaluated in clinic by Dr. Noah Ca of   the ENT Surgical Service, deemed a good candidate for excision of this mass.    She understood that this is a palliative procedure.  She was counseled   preoperatively on the benefits and risks of the operation including the   possibility of stroke, death, bleeding, carotid arterial injury or the need for   arterial reconstruction as well as cranial nerve injuries with permanent   impairments in swallowing or facial function.  She understood these risks and   gave informed consent to proceed.    PROCEDURE IN DETAIL:  After obtaining consent, she was brought to the Operating   Room.  General endotracheal anesthesia was administered and appropriate   monitoring lines were placed by the Anesthesiology Service.  The patient was   positioned, prepped and the operation was commenced by Dr. Ca and his team.    I entered the Operating Room after the carotid artery had been exposed through   a small upper cervical transverse incision.  The jugular vein had been retracted   laterally.  The facial vein had previously been  divided.  The hypoglossal nerve   had been identified in the field and the ansa cervicalis was ligated.  I began   by dissecting first along the internal carotid artery using a combination of   sharp and electrocautery dissection, we were able to remove the tumor from the   internal carotid artery cranially up to the level of the digastric muscle.  We   carried our dissection through the carotid bifurcation and then along the   external carotid artery.  The superior laryngeal artery was identified and   ligated with 3-0 silk suture in order to aid in mobilization of the carotid and   the tumor mass.  The tumor mass was then dissected circumferentially along its   posterior border using Bovie and bipolar to obtain hemostasis until the mass was   completely removed and passed off the field.  We inspected the field.  It   appeared to be hemostatic.  There did not appear to be any arterial bleeding   from the artery itself.  The vagus nerve and hypoglossal nerves were identified   throughout the operation and were kept free of our dissection field.  The wound   was then irrigated and closed as per Dr. Ca and his team.      EMMETT/IN  dd: 11/16/2018 13:40:20 (CST)  td: 11/16/2018 14:55:37 (CST)  Doc ID   #[id number]  Job ID #[id number]    CC:

## 2018-11-16 NOTE — TRANSFER OF CARE
"Anesthesia Transfer of Care Note    Patient: Hailey Sawant    Procedure(s) Performed: Procedure(s) (LRB):  DISSECTION, NECK to remove right carotid body tumor (Right)    Patient location: PACU    Anesthesia Type: general    Transport from OR: Transported from OR on 6-10 L/min O2 by face mask with adequate spontaneous ventilation    Post pain: adequate analgesia    Post assessment: no apparent anesthetic complications    Post vital signs: stable    Level of consciousness: awake and alert    Nausea/Vomiting: no nausea/vomiting    Complications: none    Transfer of care protocol was followed      Last vitals:   Visit Vitals  BP (!) 153/65   Pulse (!) 52   Temp 36.6 °C (97.9 °F)   Resp 16   Ht 5' 6" (1.676 m)   Wt 93 kg (205 lb)   SpO2 98%   Breastfeeding? No   BMI 33.09 kg/m²     "

## 2018-11-16 NOTE — OP NOTE
DATE OF PROCEDURE:  11/16/2018    PREOPERATIVE DIAGNOSES:  1.  Metastatic pheochromocytoma.  2.  Right carotid body tumor.    POSTOPERATIVE DIAGNOSES:  1.  Metastatic pheochromocytoma.  2.  Right carotid body tumor.    PROCEDURE:  Resection of right carotid body tumor.    SURGEON:  Noah Ca M.D.    CO-SURGEON:  Rebecca Nava M.D.    ANESTHESIA:  General endotracheal.    ASSISTANT:  Jacob Brunner, M.D. (RES).    ESTIMATED BLOOD LOSS:  50 mL.    SPECIMENS:  Right carotid body tumor for permanent.    INDICATIONS FOR PROCEDURE:  Ms. Sawant is a 73-year-old woman who was recently   referred to me for evaluation of metastatic pheochromocytoma of the right neck.    She had a history of pheo, status post surgery and adjuvant treatment.  She was   recently noted to have a hypermetabolic lesion of the right neck on a recent   PET scan.  Anatomic imaging revealed a lesion of the carotid body, possibly   consistent with metastatic disease versus a second paraganglioma.  MRA was   performed, which revealed the internal and external carotid arteries were   displaced rather than encased by this lesion.  A consultation was initially   placed in order for us to perform cytoreductive surgery in anticipation of   additional treatment by the Oncology Service.  I contacted her oncologist, Dr. Ryan Prasad, who indicated that there would be benefit in resecting this   lesion in anticipation of a chemotherapeutic treatment.  In light of these   findings, I consulted Dr. Rebecca Nava to assist with surgery given the close   approximation of this tumor to the carotid artery.  She was apprised of the   risks, benefits and alternatives to surgery.  She underwent attempted   embolization yesterday; however, this embolization was unsuccessful.  In spite   of the risks inherent to surgery, she provided informed consent for the   aforementioned procedures.    PROCEDURE IN DETAIL:  The patient was taken to the operating room and  placed on   the operating table in the supine position.  General endotracheal anesthesia was   induced by the Anesthesia team.  The right neck was addressed.  A linear   incision was marked in a natural skin crease roughly 2 fingerbreadths below the   angle of the mandible.  The intended incision site was injected with several mL   of 1% lidocaine with epinephrine and the neck was prepped and draped in the   standard sterile fashion.    To begin, a #15 blade was utilized to incise the skin with sharp dissection   proceeding through the underlying subcutaneous tissue and platysmal muscle.    Superiorly and inferiorly based subplatysmal flaps were elevated from the level   of the mandible down to the level of the thyroid notch.  The anterior border of   the sternocleidomastoid muscle was skeletonized and the spinal accessory nerve   was identified in the standard position.  It was followed cephalad to the skull   base.  It was noted to pass deep to the internal jugular vein.  The posterior   belly of the digastric muscle was also skeletonized and followed anteriorly.    The facial vein was isolated, doubly clamped, divided and suture ligated with   2-0 silk ties.  The mass was palpable just deep to the internal jugular vein.    The internal jugular vein was then circumferentially dissected.  A vessel loop   was passed around it and the vein was retracted laterally.  The vagus nerve was   identified and preserved deep to the carotid artery and internal jugular vein.    It had been posteriorly displaced by the tumor.  The tumor was noted.  The   hypoglossal nerve was noted to be traversing the superior aspect.  The ansa   hypoglossi was isolated, clipped and divided and the nerve was meticulously   dissected off of the underlying carotid body tumor and swept superiorly.  Next,   the common carotid artery, internal and external carotid arteries were   circumferentially dissected and vessel loops were placed around them  in order to   achieve both proximal and distal control of the carotid system.  At this time,   Dr. Nava entered the room.  With his assistance, a sub-adventitial dissection   was carried out freeing the tumor from the carotid.  A small defect at the   carotid bifurcation was noted and repaired with a single 6-0 Prolene suture by   Dr. Nava.  The numerous branches were isolated and either clipped or clamped,   divided and suture ligated.  Once all attachments had been freed, the specimen   was amputated and sent to Pathology for permanent analysis.  Hemostasis was   achieved utilizing bipolar as well as placement of Surgicel.  Once the wound was   noted to be hemostatic, it was closed over a 15-Divehi Rufino drain.  The drain   was secured with a 2-0 silk suture.  The neck was then closed with 3-0 Vicryl,   4-0 Monocryl and Dermabond.    Once the wound was closed, the patient was handed back to Anesthesia, awakened,   extubated and transported to Recovery in satisfactory condition.  There were no   intraoperative complications.  I was present and participated in the entire   procedure.    Please note that 2 surgeons were necessary for this procedure as my expertise   was necessary with dissection of the neck, namely identification and   preservation of cranial nerves as well as for resection of the tumor and Dr. Nava's services were necessary in order to protect and repair any injuries to   the carotid system during tumor ablation.      CPH/HN  dd: 11/16/2018 12:26:43 (CST)  td: 11/16/2018 14:50:39 (CST)  Doc ID   #[id number]  Job ID #[id number]    CC:

## 2018-11-16 NOTE — HPI
Ms. 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  During a recent MIBG study noting, she was noted to have and afterwards underwent an aborted attempt at pre-op embolization of the tumor on 11/15 after being unable to pass the guidewire to the R CCA. Pre-operatively, she was given alpha blockers, she tolerated the procedure well and was transferred to SICU post-operatively closer monitoring for hypoTN and hypoglycemia monitoring.       .

## 2018-11-16 NOTE — BRIEF OP NOTE
[facility]  Brief Operative Note    SUMMARY     Surgery Date: 11/16/2018     Surgeon(s) and Role:     * Noah Ca MD      * Jacob Patrick Brunner, MD - Resident - Assisting     * Rebecca Nava MD       Pre-op Diagnosis:  Pheochromocytoma, Right Carotid artery    Post-op Diagnosis:  Same    Procedure(s) (LRB):  DISSECTION, NECK to remove right carotid body tumor (Right)    Anesthesia: General    Description of Procedure: Excision of right carotid body tumor    Description of the findings of the procedure: no arterial repair required    Estimated Blood Loss: 200 mL         Specimens:   Specimen (12h ago, onward)    Start     Ordered    11/16/18 1125  Specimen to Pathology - Surgery  Once     Comments:  1. Right level 2 lymph node - PERMANENT2. Right carotid body tumor - PERMANENT     Start Status   11/16/18 1125 Collected (11/16/18 1131)       11/16/18 1131

## 2018-11-16 NOTE — PLAN OF CARE
Problem: Patient Care Overview  Goal: Plan of Care Review  Outcome: Ongoing (interventions implemented as appropriate)  VSS. Pt. With complaints of pain to groin area at beginning of shift that resolved on its own, VSS, will continue to monitor.

## 2018-11-16 NOTE — PROGRESS NOTES
[facility]  Otorhinolaryngology-Head & Neck Surgery  Progress Note    Subjective:     Post-Op Info:  Procedure(s) (LRB):  DISSECTION, NECK (Right)      Hospital Day: 2     Interval History: No acute events overnight.  Understands plan for surgery today.    Medications:  Continuous Infusions:   sodium chloride 0.9% 125 mL/hr at 11/16/18 0114     Scheduled Meds:   cholecalciferol (vitamin D3)  5,000 Units Oral Daily    doxazosin  1 mg Oral QHS    enoxaparin  40 mg Subcutaneous Daily    fludrocortisone  100 mcg Oral Daily    hydrocortisone  20 mg Oral Daily    lidocaine (PF) 10 mg/ml (1%)  1 mL Other Once    losartan  50 mg Oral Daily    metFORMIN  500 mg Oral BID WM    NIFEdipine  90 mg Oral Daily    potassium chloride  10 mEq Intravenous Q1H     PRN Meds:acetaminophen, dextrose 50%, dextrose 50%, glucagon (human recombinant), glucose, glucose, hydrALAZINE, hydrocodone-apap 7.5-325 MG/15 ML, insulin aspart U-100, ondansetron, promethazine (PHENERGAN) IVPB, ramelteon, sodium chloride 0.9%, sodium chloride 0.9%     Review of patient's allergies indicates:  No Known Allergies  Objective:     Vital Signs (24h Range):  Temp:  [96.1 °F (35.6 °C)-97.9 °F (36.6 °C)] 97.9 °F (36.6 °C)  Pulse:  [41-82] 60  Resp:  [13-20] 16  SpO2:  [94 %-100 %] 95 %  BP: (106-146)/(44-68) 137/61        Lines/Drains/Airways     Peripheral Intravenous Line                 Peripheral IV - Single Lumen 10/27/18 0917 Left Antecubital 19 days         Peripheral IV - Single Lumen 11/15/18 0729 Right Hand less than 1 day              Physical Exam  Awake, alert, pleasant  Cranial nerve exam without deficits  Right neck mass, non-tender  Normal neck ROM    Significant Labs:  BMP:   Recent Labs   Lab 11/15/18  1611         CO2 28   BUN 10   CREATININE 0.7   CALCIUM 9.0     CBC:   Recent Labs   Lab 11/15/18  0730   WBC 5.80   RBC 3.78*   HGB 10.8*   HCT 34.2*      MCV 91   MCH 28.6   MCHC 31.6*      11/15/18 PM labs with Potassium 2.6    Significant Diagnostics:  None    Assessment/Plan:     Carotid body paraganglioma    - Plan for OR today with Dr. Ca  - Will re-check K and talk to anesthesia team         Pedro Tejada MD  Otorhinolaryngology-Head & Neck Surgery  [facility]

## 2018-11-16 NOTE — SUBJECTIVE & OBJECTIVE
Interval History: No acute events overnight.  Understands plan for surgery today.    Medications:  Continuous Infusions:   sodium chloride 0.9% 125 mL/hr at 11/16/18 0114     Scheduled Meds:   cholecalciferol (vitamin D3)  5,000 Units Oral Daily    doxazosin  1 mg Oral QHS    enoxaparin  40 mg Subcutaneous Daily    fludrocortisone  100 mcg Oral Daily    hydrocortisone  20 mg Oral Daily    lidocaine (PF) 10 mg/ml (1%)  1 mL Other Once    losartan  50 mg Oral Daily    metFORMIN  500 mg Oral BID WM    NIFEdipine  90 mg Oral Daily    potassium chloride  10 mEq Intravenous Q1H     PRN Meds:acetaminophen, dextrose 50%, dextrose 50%, glucagon (human recombinant), glucose, glucose, hydrALAZINE, hydrocodone-apap 7.5-325 MG/15 ML, insulin aspart U-100, ondansetron, promethazine (PHENERGAN) IVPB, ramelteon, sodium chloride 0.9%, sodium chloride 0.9%     Review of patient's allergies indicates:  No Known Allergies  Objective:     Vital Signs (24h Range):  Temp:  [96.1 °F (35.6 °C)-97.9 °F (36.6 °C)] 97.9 °F (36.6 °C)  Pulse:  [41-82] 60  Resp:  [13-20] 16  SpO2:  [94 %-100 %] 95 %  BP: (106-146)/(44-68) 137/61        Lines/Drains/Airways     Peripheral Intravenous Line                 Peripheral IV - Single Lumen 10/27/18 0917 Left Antecubital 19 days         Peripheral IV - Single Lumen 11/15/18 0729 Right Hand less than 1 day              Physical Exam  Awake, alert, pleasant  Cranial nerve exam without deficits  Right neck mass, non-tender  Normal neck ROM    Significant Labs:  BMP:   Recent Labs   Lab 11/15/18  1611         CO2 28   BUN 10   CREATININE 0.7   CALCIUM 9.0     CBC:   Recent Labs   Lab 11/15/18  0730   WBC 5.80   RBC 3.78*   HGB 10.8*   HCT 34.2*      MCV 91   MCH 28.6   MCHC 31.6*     11/15/18 PM labs with Potassium 2.6    Significant Diagnostics:  None

## 2018-11-16 NOTE — BRIEF OP NOTE
[facility]  Brief Operative Note    SUMMARY     Surgery Date: 11/16/2018     Surgeon(s) and Role:     * Noah Ca MD - Primary     * Jacob Patrick Brunner, MD - Resident - Assisting     * Rebecca Nava MD - Co-Surgeon        Pre-op Diagnosis:  Pheochromocytoma, unspecified laterality [D35.00]    Post-op Diagnosis:  Post-Op Diagnosis Codes:     * Pheochromocytoma, unspecified laterality [D35.00]    Procedure(s) (LRB):  DISSECTION, NECK to remove right carotid body tumor (Right)    Anesthesia: General    Description of Procedure: Resection right carotid body tumor    Description of the findings of the procedure: right carotid body tumor    Estimated Blood Loss: 200 mL         Specimens:   Specimen (12h ago, onward)    Start     Ordered    11/16/18 1125  Specimen to Pathology - Surgery  Once     Comments:  1. Right level 2 lymph node - PERMANENT2. Right carotid body tumor - PERMANENT     Start Status   11/16/18 1125 Collected (11/16/18 1131)       11/16/18 1131

## 2018-11-17 VITALS
HEIGHT: 66 IN | TEMPERATURE: 99 F | OXYGEN SATURATION: 95 % | RESPIRATION RATE: 18 BRPM | SYSTOLIC BLOOD PRESSURE: 120 MMHG | DIASTOLIC BLOOD PRESSURE: 58 MMHG | HEART RATE: 63 BPM | BODY MASS INDEX: 32.95 KG/M2 | WEIGHT: 205 LBS

## 2018-11-17 LAB
ANION GAP SERPL CALC-SCNC: 6 MMOL/L
BASOPHILS # BLD AUTO: 0.03 K/UL
BASOPHILS NFR BLD: 0.3 %
BUN SERPL-MCNC: 7 MG/DL
CALCIUM SERPL-MCNC: 8.7 MG/DL
CHLORIDE SERPL-SCNC: 109 MMOL/L
CO2 SERPL-SCNC: 25 MMOL/L
CREAT SERPL-MCNC: 0.7 MG/DL
DIFFERENTIAL METHOD: ABNORMAL
EOSINOPHIL # BLD AUTO: 0 K/UL
EOSINOPHIL NFR BLD: 0.1 %
ERYTHROCYTE [DISTWIDTH] IN BLOOD BY AUTOMATED COUNT: 14.5 %
EST. GFR  (AFRICAN AMERICAN): >60 ML/MIN/1.73 M^2
EST. GFR  (NON AFRICAN AMERICAN): >60 ML/MIN/1.73 M^2
GLUCOSE SERPL-MCNC: 143 MG/DL
HCT VFR BLD AUTO: 32 %
HGB BLD-MCNC: 9.9 G/DL
IMM GRANULOCYTES # BLD AUTO: 0.03 K/UL
IMM GRANULOCYTES NFR BLD AUTO: 0.3 %
LYMPHOCYTES # BLD AUTO: 1 K/UL
LYMPHOCYTES NFR BLD: 11 %
MAGNESIUM SERPL-MCNC: 2.4 MG/DL
MCH RBC QN AUTO: 28.9 PG
MCHC RBC AUTO-ENTMCNC: 30.9 G/DL
MCV RBC AUTO: 93 FL
MONOCYTES # BLD AUTO: 0.3 K/UL
MONOCYTES NFR BLD: 3.4 %
NEUTROPHILS # BLD AUTO: 7.7 K/UL
NEUTROPHILS NFR BLD: 84.9 %
NRBC BLD-RTO: 0 /100 WBC
PHOSPHATE SERPL-MCNC: 3.2 MG/DL
PLATELET # BLD AUTO: 227 K/UL
PMV BLD AUTO: 9.8 FL
POCT GLUCOSE: 103 MG/DL (ref 70–110)
POCT GLUCOSE: 136 MG/DL (ref 70–110)
POTASSIUM SERPL-SCNC: 3.6 MMOL/L
RBC # BLD AUTO: 3.43 M/UL
SODIUM SERPL-SCNC: 140 MMOL/L
WBC # BLD AUTO: 9.04 K/UL

## 2018-11-17 PROCEDURE — 25000003 PHARM REV CODE 250: Performed by: STUDENT IN AN ORGANIZED HEALTH CARE EDUCATION/TRAINING PROGRAM

## 2018-11-17 PROCEDURE — 94761 N-INVAS EAR/PLS OXIMETRY MLT: CPT

## 2018-11-17 PROCEDURE — 63600175 PHARM REV CODE 636 W HCPCS: Performed by: STUDENT IN AN ORGANIZED HEALTH CARE EDUCATION/TRAINING PROGRAM

## 2018-11-17 PROCEDURE — 27000221 HC OXYGEN, UP TO 24 HOURS

## 2018-11-17 PROCEDURE — 99222 1ST HOSP IP/OBS MODERATE 55: CPT | Mod: ,,, | Performed by: SURGERY

## 2018-11-17 PROCEDURE — 99222 1ST HOSP IP/OBS MODERATE 55: CPT | Mod: GC,,, | Performed by: INTERNAL MEDICINE

## 2018-11-17 PROCEDURE — 84100 ASSAY OF PHOSPHORUS: CPT

## 2018-11-17 PROCEDURE — 80048 BASIC METABOLIC PNL TOTAL CA: CPT

## 2018-11-17 PROCEDURE — 85025 COMPLETE CBC W/AUTO DIFF WBC: CPT

## 2018-11-17 PROCEDURE — 83735 ASSAY OF MAGNESIUM: CPT

## 2018-11-17 PROCEDURE — 25000003 PHARM REV CODE 250: Performed by: UROLOGY

## 2018-11-17 PROCEDURE — 63600175 PHARM REV CODE 636 W HCPCS: Performed by: HOSPITALIST

## 2018-11-17 RX ORDER — HYDROCODONE BITARTRATE AND ACETAMINOPHEN 5; 325 MG/1; MG/1
1 TABLET ORAL EVERY 6 HOURS PRN
Qty: 20 TABLET | Refills: 0 | Status: SHIPPED | OUTPATIENT
Start: 2018-11-17 | End: 2019-03-20

## 2018-11-17 RX ORDER — DOCUSATE SODIUM 100 MG/1
100 CAPSULE, LIQUID FILLED ORAL 2 TIMES DAILY
Status: DISCONTINUED | OUTPATIENT
Start: 2018-11-17 | End: 2018-11-17 | Stop reason: HOSPADM

## 2018-11-17 RX ORDER — NAPROXEN SODIUM 220 MG/1
81 TABLET, FILM COATED ORAL DAILY
Refills: 0 | Status: ON HOLD | COMMUNITY
Start: 2018-11-17 | End: 2023-03-01

## 2018-11-17 RX ORDER — CEPHALEXIN 500 MG/1
500 CAPSULE ORAL EVERY 6 HOURS
Status: DISCONTINUED | OUTPATIENT
Start: 2018-11-17 | End: 2018-11-17 | Stop reason: HOSPADM

## 2018-11-17 RX ORDER — ONDANSETRON 8 MG/1
8 TABLET, ORALLY DISINTEGRATING ORAL EVERY 8 HOURS PRN
Qty: 20 TABLET | Refills: 0 | Status: SHIPPED | OUTPATIENT
Start: 2018-11-17 | End: 2018-12-04

## 2018-11-17 RX ORDER — LOSARTAN POTASSIUM 50 MG/1
50 TABLET ORAL DAILY
Status: DISCONTINUED | OUTPATIENT
Start: 2018-11-18 | End: 2018-11-17 | Stop reason: HOSPADM

## 2018-11-17 RX ORDER — POTASSIUM CHLORIDE 20 MEQ/1
40 TABLET, EXTENDED RELEASE ORAL ONCE
Status: COMPLETED | OUTPATIENT
Start: 2018-11-17 | End: 2018-11-17

## 2018-11-17 RX ORDER — CEPHALEXIN 500 MG/1
500 CAPSULE ORAL EVERY 12 HOURS
Qty: 10 CAPSULE | Refills: 0 | Status: SHIPPED | OUTPATIENT
Start: 2018-11-17 | End: 2018-11-22

## 2018-11-17 RX ADMIN — POTASSIUM CHLORIDE 40 MEQ: 1500 TABLET, EXTENDED RELEASE ORAL at 08:11

## 2018-11-17 RX ADMIN — NIFEDIPINE 90 MG: 30 TABLET, FILM COATED, EXTENDED RELEASE ORAL at 08:11

## 2018-11-17 RX ADMIN — DOCUSATE SODIUM 100 MG: 100 CAPSULE, LIQUID FILLED ORAL at 08:11

## 2018-11-17 RX ADMIN — POTASSIUM CHLORIDE 40 MEQ: 1500 TABLET, EXTENDED RELEASE ORAL at 04:11

## 2018-11-17 RX ADMIN — HYDROCORTISONE SODIUM SUCCINATE 50 MG: 100 INJECTION, POWDER, FOR SOLUTION INTRAMUSCULAR; INTRAVENOUS at 06:11

## 2018-11-17 RX ADMIN — CHOLECALCIFEROL TAB 125 MCG (5000 UNIT) 5000 UNITS: 125 TAB at 08:11

## 2018-11-17 RX ADMIN — MAGNESIUM SULFATE HEPTAHYDRATE 3 G: 500 INJECTION, SOLUTION INTRAMUSCULAR; INTRAVENOUS at 12:11

## 2018-11-17 RX ADMIN — CEPHALEXIN 500 MG: 500 CAPSULE ORAL at 11:11

## 2018-11-17 NOTE — SUBJECTIVE & OBJECTIVE
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    Thyroid disease        Past Surgical History:   Procedure Laterality Date    ADRENALECTOMY      ADRENALECTOMY open exploratory Right 10/2/2017    Performed by Sandoval Castillo MD at [facility] OR [address] [place] [address]    ANGIOGRAM N/A 11/15/2018    Performed by [facility] Diagnostic Provider at [facility] OR [address] [place] [address]    ANGIOGRAPHY N/A 11/15/2018    Procedure: ANGIOGRAM;  Surgeon: [facility] Diagnostic Provider;  Location: [facility] OR [address] [place] [address];  Service: Radiology;  Laterality: N/A;    APPENDECTOMY      [id number]-MR N/A 2/8/2016    Performed by [facility] Diagnostic Provider at [facility] OR [address] [place] [address]    BLOCK, SACROILIAC JOINT Right 6/26/2018    [facility]    CATHETERIZATION, HEART, LEFT Left 7/18/2018    Performed by Moustapha Vasquez MD at [facility] CATH LAB    INJECTION OF ANESTHETIC AGENT INTO SACROILIAC JOINT Right 6/26/2018    Procedure: BLOCK, SACROILIAC JOINT;  Surgeon: Sydney Reynoso MD;  Location: [facility];  Service: Pain Management;  Laterality: Right;  Right SI Joint Injection    [id number]    NEEDS CONSENT    LEFT HEART CATHETERIZATION Left 7/18/2018    Procedure: CATHETERIZATION, HEART, LEFT;  Surgeon: Moustapha Vasquez MD;  Location: [facility] CATH LAB;  Service: Cardiovascular;  Laterality: Left;    MIBG Therapy  3/2016, 7/2016, 12/2016    ELIJAH Martines       Review of patient's allergies indicates:  No Known Allergies    Family History     Problem Relation (Age of  Onset)    Cancer Brother, Brother    Diabetes Mother    Heart disease Mother, Father    Lung cancer Brother        Tobacco Use    Smoking status: Never Smoker    Smokeless tobacco: Never Used   Substance and Sexual Activity    Alcohol use: No    Drug use: No    Sexual activity: Not on file      Review of Systems   Constitutional: Negative for activity change and fever.   HENT: Negative for congestion, rhinorrhea and sore throat.    Eyes: Negative for pain and discharge.   Respiratory: Negative for chest tightness and shortness of breath.    Cardiovascular: Negative for chest pain.   Gastrointestinal: Negative for abdominal distention, abdominal pain, diarrhea, nausea and vomiting.   Endocrine: Negative for polydipsia and polyphagia.   Genitourinary: Negative for difficulty urinating.   Musculoskeletal: Negative for gait problem.   Skin: Negative for color change and rash.   Neurological: Negative for facial asymmetry and weakness.     Objective:     Vital Signs (Most Recent):  Temp: 97.7 °F (36.5 °C) (11/16/18 1845)  Pulse: 65 (11/16/18 2030)  Resp: (!) 27 (11/16/18 2030)  BP: (!) 162/73 (11/16/18 2030)  SpO2: 98 % (11/16/18 2030) Vital Signs (24h Range):  Temp:  [96.5 °F (35.8 °C)-97.9 °F (36.6 °C)] 97.7 °F (36.5 °C)  Pulse:  [52-89] 65  Resp:  [12-28] 27  SpO2:  [93 %-100 %] 98 %  BP: (132-226)/(56-94) 162/73  Arterial Line BP: (110-177)/(50-88) 145/68     Weight: 93 kg (205 lb)  Body mass index is 33.09 kg/m².      Intake/Output Summary (Last 24 hours) at 11/16/2018 2034  Last data filed at 11/16/2018 1745  Gross per 24 hour   Intake 2860.83 ml   Output 630 ml   Net 2230.83 ml       Physical Exam   Constitutional: She is oriented to person, place, and time. She appears well-developed and well-nourished.   HENT:   Head: Normocephalic and atraumatic.   R neck incision c/d/i with RUTH ANN drain with minimal dark bloody output    Eyes: EOM are normal.   Neck: Normal range of motion. No JVD present.   Cardiovascular:  Normal rate.   Pulmonary/Chest: Effort normal.   Sating well while on NC   Abdominal: Soft. There is no rebound and no guarding.   Musculoskeletal: Normal range of motion.   Neurological: She is alert and oriented to person, place, and time.   Skin: Skin is warm and dry.   Psychiatric: She has a normal mood and affect.       Vents:       Lines/Drains/Airways     Drain                 Closed/Suction Drain 11/16/18 1132 Right Neck Bulb 15 Fr. less than 1 day          Arterial Line                 Arterial Line 11/16/18 0934 Left Radial less than 1 day          Peripheral Intravenous Line                 Peripheral IV - Single Lumen 10/27/18 0917 Left Antecubital 20 days         Peripheral IV - Single Lumen 11/16/18 1545 Left Antecubital less than 1 day         Peripheral IV - Single Lumen 11/16/18 1545 Right Hand less than 1 day                Significant Labs:    CBC/Anemia Profile:  Recent Labs   Lab 11/15/18  0730   WBC 5.80   HGB 10.8*   HCT 34.2*      MCV 91   RDW 14.4        Chemistries:  Recent Labs   Lab 11/15/18  1611 11/16/18  0817   * 146*   K 2.6* 3.0*    117*   CO2 28 22*   BUN 10 7*   CREATININE 0.7 0.6   CALCIUM 9.0 6.3*

## 2018-11-17 NOTE — ASSESSMENT & PLAN NOTE
- s/p resection of carotid body tumor concerning for metastatic pheochromocytoma 11/16/2018, now in ICU  - BP is stable at this time, no hypotension overnight  - otherwise, plan per surgical team with drain  - per note, pt could not afford phenoxybenzamine so she was never on medication, given stable VS, no need to start      Update: Pt is being discharge home per ENT

## 2018-11-17 NOTE — SUBJECTIVE & OBJECTIVE
Interval History/Significant Events:  Transferred to SICU during the evening, reporting some right incisional neck pain after movement with minimal RUTH ANN drain output (dark venous) HR in the 60s, SBP on lucho remaining in the 110s-120s, MAPS 70s, remains on NS at 125, with Q4H glucose checks as per endocrine recs ranging in the 120s-150s.     Follow-up For: Procedure(s) (LRB):  DISSECTION, NECK to remove right carotid body tumor (Right)    Post-Operative Day: 1 Day Post-Op    Objective:     Vital Signs (Most Recent):  Temp: 98.4 °F (36.9 °C) (11/16/18 2230)  Pulse: 60 (11/17/18 0200)  Resp: 16 (11/17/18 0200)  BP: (!) 150/65 (11/17/18 0200)  SpO2: 100 % (11/17/18 0200) Vital Signs (24h Range):  Temp:  [96.5 °F (35.8 °C)-98.4 °F (36.9 °C)] 98.4 °F (36.9 °C)  Pulse:  [52-89] 60  Resp:  [11-29] 16  SpO2:  [92 %-100 %] 100 %  BP: (137-226)/(61-94) 150/65  Arterial Line BP: (110-177)/(50-88) 123/57     Weight: 93 kg (205 lb)  Body mass index is 33.09 kg/m².      Intake/Output Summary (Last 24 hours) at 11/17/2018 0309  Last data filed at 11/16/2018 2300  Gross per 24 hour   Intake 2400 ml   Output 705 ml   Net 1695 ml       Physical Exam   Constitutional: She appears well-developed and well-nourished.   HENT:   Head: Normocephalic and atraumatic.   Neck:   R incision remains c/d/i with RUTH ANN drain with dark sanguineous output    Cardiovascular: Normal rate.   Pulmonary/Chest: Effort normal.   On NC sating well    Abdominal: Soft.   Neurological: She is alert.   Skin: Skin is warm and dry.   Psychiatric: She has a normal mood and affect.   Nursing note and vitals reviewed.         Lines/Drains/Airways     Drain                 Closed/Suction Drain 11/16/18 1132 Right Neck Bulb 15 Fr. less than 1 day          Arterial Line                 Arterial Line 11/16/18 0934 Left Radial less than 1 day          Peripheral Intravenous Line                 Peripheral IV - Single Lumen 10/27/18 0917 Left Antecubital 20 days          Peripheral IV - Single Lumen 11/16/18 1545 Left Antecubital less than 1 day         Peripheral IV - Single Lumen 11/16/18 1545 Right Hand less than 1 day                Significant Labs:    CBC/Anemia Profile:  Recent Labs   Lab 11/15/18  0730 11/16/18  1017 11/16/18  1126 11/16/18  2234   WBC 5.80  --   --  10.11   HGB 10.8*  --   --  10.2*   HCT 34.2* 33* 29* 32.5*     --   --  221   MCV 91  --   --  91   RDW 14.4  --   --  14.4        Chemistries:  Recent Labs   Lab 11/15/18  1611 11/16/18  0817 11/16/18  2234   * 146* 140   K 2.6* 3.0* 4.0    117* 110   CO2 28 22* 24   BUN 10 7* 7*   CREATININE 0.7 0.6 0.7   CALCIUM 9.0 6.3* 8.7   MG  --   --  1.6   PHOS  --   --  3.3

## 2018-11-17 NOTE — ASSESSMENT & PLAN NOTE
Ms. 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      Neuro:   - Sedation: none   - Pain control: prn hycet, fent     Pulmonary:   - on 2L NC, wean as tolerated to room air      Cardiac:  - Drips: none   - Nifedipine restarted  -ARB held due to borderline adequate BP  -Resume tomorrow     Renal:   - BUN/CR: 0.6 pre-op f/u post op, voiding spontaneously   - Trend BMP  - on cardura       Infectious Disease:   - AF   - Antibiotics: none post op      Hematology/Oncology:  - H/H post op 10/32,    - Trend CBC     Endocrine:  - bg POCT < 180s, monitoring for hyperglycemia, possible need for insulin gtt  - hydrocortisone stress dose as per endo - consulted with recs appreciated      Fluids/Electrolytes/Nutrition/GI:   - Diabetic diet  - Trend CMP  - Replace Lytes PRN     Prophylaxis:  - Lovenox    Dispo:  Patient is stable for transfer out of SICU   Primary team: ENT

## 2018-11-17 NOTE — PLAN OF CARE
Patient is a 73 year old female admitted from home 11/15/2018 for Elective Embolization of Right Carotid Body Tumor prior to Surgery.  Unsuccessful attempt at procedure and only had cerebral angiogram done.  Patient to OR today, 11/16/2018 and underwent Neck Dissection and Resection of Right Carotid Body Tumor.  Patient is expected to discharge home with no needs vs home health +/- 11/19/2018.    Patient in surgery when attempted to visit today, information obtained from patient's sister over the phone.  Patient is known to Dr Ca's service, lives alone in a one story ariane.  Patient's sister live across the street and another sister and her brother live nearby.  Patient's siblings will drive patient home and obtain anything she needs after discharge.  Patient used [facility] after Adrenalectomy surgery last year.  Patient does wear her medic alert bracelet according to her sister.  Will continue to follow for needs.    PCP  Omer Hubbard MD  [address] / [place] [id number] [phone]      [facility] [address] [place] [address] [place] - [address] AT [facility] [address]  [address]  [place] [address]  Phone: [phone] Fax: [phone]    [facility] - [facility] [address]  [address]  [facility] [address]  Phone: [phone] Fax: [phone]      Extended Emergency Contact Information  Primary Emergency Contact: Claudia Washburn  Address: [address]           [place], [place] [id number] [place] [address] of [facility]  Home Phone: [phone]  Relation: Sister       11/16/18 1840   Discharge Assessment   Assessment Type Discharge Planning Assessment   Confirmed/corrected address and phone number on facesheet? Yes   Assessment information obtained from? Other  (sister)   Expected Length of Stay (days) 4   Communicated expected length of stay with patient/caregiver yes   Prior to hospitilization cognitive status:  Alert/Oriented   Prior to hospitalization functional status: Independent;Assistive Equipment   Current cognitive status: Alert/Oriented   Current Functional Status: Independent;Assistive Equipment   Lives With alone   Able to Return to Prior Arrangements yes   Is patient able to care for self after discharge? Yes   Who are your caregiver(s) and their phone number(s)? siblings   Patient's perception of discharge disposition home or selfcare   Equipment Currently Used at Home cane, straight   Do you have any problems affording any of your prescribed medications? No   Is the patient taking medications as prescribed? yes   Does the patient have transportation home? Yes   Transportation Available family or friend will provide   Discharge Plan A Home   Discharge Plan B Home;Home Health

## 2018-11-17 NOTE — ASSESSMENT & PLAN NOTE
Ms. 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     Neuro:   - Sedation: none   - Pain control: prn hycet, fent     Pulmonary:   - on 2L NC, wean as tolerated to room air      Cardiac:  - Drips: none   - restarting home anti-HTN meds - watch for hypoTN post op      Renal:   - BUN/CR: 0.6 pre-op f/u post op   - Trend BMP  = on cardura       Infectious Disease:   - AF   - Trend WBC  - Antibiotics: yosi-op abx as per ENT and Vascular, had ancef pre-op      Hematology/Oncology:  - H/H f/u post op Hct    - Trend CBC     Endocrine:  - bg POCT < 180s, monitoring for hyperglycemia, possible need for insulin gtt  - hydrocortisone stress dose as per enfo - consulted with recs appreciated      Fluids/Electrolytes/Nutrition/GI:   - diabetic diet, on NS at 125, decrease and HLIV once tolerating diet   - Trend CMP  - Replace Lytes PRN     Prophylaxis:  - Plov     Dispo:  SICU   Primary team: ENT w/ Vascular

## 2018-11-17 NOTE — HOSPITAL COURSE
Ms. 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  During a recent MIBG study noting, she was noted to have and afterwards underwent an aborted attempt at pre-op embolization of the tumor on 11/15 after being unable to pass the guidewire to the R CCA. Pre-operatively, she was given alpha blockers, she tolerated the procedure well and was transferred to SICU post-operatively closer monitoring for hypoTN and hypoglycemia monitoring.

## 2018-11-17 NOTE — SUBJECTIVE & OBJECTIVE
Interval History/Significant Events:     No issues overnight  Tolerating Diet  No need for pressors  No need for glucose replacement    Follow-up For: Procedure(s) (LRB):  DISSECTION, NECK to remove right carotid body tumor (Right)    Post-Operative Day: 1 Day Post-Op    Objective:     Vital Signs (Most Recent):  Temp: 98.4 °F (36.9 °C) (11/17/18 0700)  Pulse: (!) 59 (11/17/18 0715)  Resp: (!) 23 (11/17/18 0715)  BP: (!) 113/56 (11/17/18 0700)  SpO2: 99 % (11/17/18 0715) Vital Signs (24h Range):  Temp:  [96.5 °F (35.8 °C)-98.7 °F (37.1 °C)] 98.4 °F (36.9 °C)  Pulse:  [53-74] 59  Resp:  [11-29] 23  SpO2:  [92 %-100 %] 99 %  BP: (113-226)/(55-94) 113/56  Arterial Line BP: (104-177)/(46-88) 131/57     Weight: 93 kg (205 lb)  Body mass index is 33.09 kg/m².      Intake/Output Summary (Last 24 hours) at 11/17/2018 0808  Last data filed at 11/17/2018 0700  Gross per 24 hour   Intake 3931.25 ml   Output 1380 ml   Net 2551.25 ml       Physical Exam   Constitutional: She is oriented to person, place, and time. She appears well-developed and well-nourished.   HENT:   Head: Normocephalic and atraumatic.   Eyes: EOM are normal. Pupils are equal, round, and reactive to light.   Neck:   Right neck incision clean and dry   Cardiovascular: Normal rate and regular rhythm.   Abdominal: Soft.   Musculoskeletal: Normal range of motion.   Neurological: She is alert and oriented to person, place, and time.   Skin: Skin is warm and dry.   Nursing note and vitals reviewed.      Vents:  Oxygen Concentration (%): 28 (11/16/18 2245)    Lines/Drains/Airways     Drain                 Closed/Suction Drain 11/16/18 1132 Right Neck Bulb 15 Fr. less than 1 day    Female External Urinary Catheter 11/17/18 0200 less than 1 day          Arterial Line                 Arterial Line 11/16/18 0934 Left Radial less than 1 day          Peripheral Intravenous Line                 Peripheral IV - Single Lumen 10/27/18 0917 Left Antecubital 20 days          Peripheral IV - Single Lumen 11/16/18 1545 Left Antecubital less than 1 day         Peripheral IV - Single Lumen 11/16/18 1545 Right Hand less than 1 day                Significant Labs:    CBC/Anemia Profile:  Recent Labs   Lab 11/16/18  1126 11/16/18  2234 11/17/18  0320   WBC  --  10.11 9.04   HGB  --  10.2* 9.9*   HCT 29* 32.5* 32.0*   PLT  --  221 227   MCV  --  91 93   RDW  --  14.4 14.5        Chemistries:  Recent Labs   Lab 11/16/18  0817 11/16/18  2234 11/17/18  0320   * 140 140   K 3.0* 4.0 3.6   * 110 109   CO2 22* 24 25   BUN 7* 7* 7*   CREATININE 0.6 0.7 0.7   CALCIUM 6.3* 8.7 8.7   MG  --  1.6 2.4   PHOS  --  3.3 3.2       All pertinent labs within the past 24 hours have been reviewed.    Significant Imaging:  I have reviewed all pertinent imaging results/findings within the past 24 hours.

## 2018-11-17 NOTE — PLAN OF CARE
"Problem: Patient Care Overview  Goal: Plan of Care Review  Outcome: Ongoing (interventions implemented as appropriate)  Dx: s/p neck dissection/tumor removal    Shift Events: VSS; pt rested comfortably throughout the night.     Neuro: AAO x4, Arouses to voice, Moves all extremities and Follows commands    Vital Signs: BP (!) 141/66 (BP Location: Right arm, Patient Position: Lying)   Pulse 62   Temp 98.7 °F (37.1 °C) (Oral)   Resp (!) 27   Ht 5' 6" (1.676 m)   Wt 93 kg (205 lb)   SpO2 99%   Breastfeeding? No   BMI 33.09 kg/m²     Intake/Gtts/Diet: Diabetic diet; normal saline gtt @ 125cc/hr    Output: Purewick in place; RUTH ANN drain with minimal output     Labs: ACHS accuchecks    Skin: CDI; heels, sacrum and elbows with no breakdown         "

## 2018-11-17 NOTE — PROGRESS NOTES
"[facility]  Endocrinology  Progress Note    Admit Date: 11/15/2018     Hailey Sawant is a 73 y.o. female with malignant pheochromocytoma, DMII, HTN s/p excision of right carotid body tumor 11/16/2018. Patient is known to endocrine clinic team. Endocrinology was consulted for "s/p resection of carotid body tumor- metastatic pheochromocytoma".     Interval HPI:   Overnight events: Doing well BP is stable this AM. With drain in place    BP (!) 120/57 (BP Location: Right arm, Patient Position: Sitting)   Pulse 64   Temp 98.5 °F (36.9 °C)   Resp 19   Ht 5' 6" (1.676 m)   Wt 93 kg (205 lb)   SpO2 98%   Breastfeeding? No   BMI 33.09 kg/m²      Labs Reviewed and Include    Recent Labs   Lab 11/17/18  0320   *   CALCIUM 8.7      K 3.6   CO2 25      BUN 7*   CREATININE 0.7     Lab Results   Component Value Date    WBC 9.04 11/17/2018    HGB 9.9 (L) 11/17/2018    HCT 32.0 (L) 11/17/2018    MCV 93 11/17/2018     11/17/2018     No results for input(s): TSH, FREET4 in the last 168 hours.  Lab Results   Component Value Date    HGBA1C 5.6 11/05/2018       Nutritional status:   Body mass index is 33.09 kg/m².  Lab Results   Component Value Date    ALBUMIN 3.6 07/17/2018    ALBUMIN 3.3 (L) 01/25/2018    ALBUMIN 2.2 (L) 10/06/2017     No results found for: PREALBUMIN    Estimated Creatinine Clearance: 81 mL/min (based on SCr of 0.7 mg/dL).    Accu-Checks  Recent Labs     11/15/18  0725 11/15/18  1611 11/16/18  0833 11/16/18  1307 11/16/18  1856 11/16/18  2215 11/16/18  2238 11/17/18  0204 11/17/18  0926   POCTGLUCOSE 95 104 94 135* 127* 158* 122* 136* 103       Current Medications and/or Treatments Impacting Glycemic Control  Immunotherapy:    Immunosuppressants     None        Steroids:   Hormones (From admission, onward)    Start     Stop Route Frequency Ordered    11/16/18 2200  hydrocortisone sodium succinate injection 50 mg      -- IV Every 8 hours 11/16/18 1840    "     Pressors:    Autonomic Drugs (From admission, onward)    None        Hyperglycemia/Diabetes Medications:   Antihyperglycemics (From admission, onward)    Start     Stop Route Frequency Ordered    11/16/18 1918  insulin aspart U-100 pen 0-5 Units      -- SubQ Before meals & nightly PRN 11/16/18 1819          ASSESSMENT and PLAN    * Pheochromocytoma    - s/p resection of carotid body tumor concerning for metastatic pheochromocytoma 11/16/2018, now in ICU  - BP is stable at this time, no hypotension overnight  - otherwise, plan per surgical team with drain  - per note, pt could not afford phenoxybenzamine so she was never on medication, started on doxazosin outpatient    Update: Pt is being discharge home per ENT       Controlled type 2 diabetes mellitus without complication    - on metformin at home A1C at 5.6  - started on on IV hydrocortisol stress dose given post surgery  - POCT glucose and low dose correction scale  - glucose is stable  - resume oral med for home       Hypoadrenalism    - follow on outpatient plan (Dr Burton plan) of IV hydrocortisol post surgery Q8hr  11/16  - no sign of AI, lab work is stable, VS are stable  - restart PO  Home steroid regiment    - OK to resume home steroid med on discharge          Patient to be D/C home today per ENT    Nacho Rivas MD  Endocrinology  [facility]

## 2018-11-17 NOTE — DISCHARGE SUMMARY
[facility]  Discharge Summary      Admit Date: 11/15/2018    Discharge Date and Time:  11/17/2018 12:08 PM    Attending Physician: Noah Ca MD     Reason for Admission: Patient with metastatic pheochromocytoma, s/p below procedure    Procedures Performed: Procedure(s) (LRB):  DISSECTION, NECK to remove right carotid body tumor (Right)    Hospital Course (synopsis of major diagnoses, care, treatment, and services provided during the course of the hospital stay): Patient was admitted one day prior to surgery, attempted embolization of the carotid body tumor with IR which was unsuccessful. She underwent uneventful removal of the right sided carotid body tumor. Her pain is well controlled this morning. She is tolerating PO without any issue. The patient was offered step down versus discharge, she would prefer discharge home with her sister as caregiver. The patient's drain will be left in place, and she has instructions to call the clinic Monday morning to arrange removal. All questions were answered.     Physical Exam:    Vitals:    11/17/18 1156   BP:    Pulse: 64   Resp: 19   Temp:        General: AAOx3, NAD.  Right Ear: External Auditory Canal WNL  Left Ear:  External Auditory Canal WNL  Nose: No gross nasal septal deviation.  Inferior Turbinates WNL bilaterally.    Oral Cavity: FOM Soft, no masses palpated. Oral Tongue mobile. Hard Palate WNL.  Neck: No palpable lymphadenopathy at levels I - VI. Full ROM. No thyroid nodules palpated. Right neck soft. Incision clean and intact. RUTH ANN drain with 80 ccs serosanguinous output  Face: HB I bilaterally. Normal sensation.  Eyes: EOM intact bilaterally, PERRLA bilaterally. No exophthalmos/enopthalmos.  Neuro: CN II-XII grossly intact    Consults: vascular surgery and interventional radiology    Significant Diagnostic Studies: Labs:   CMP   Recent Labs   Lab 11/16/18  0817 11/16/18  2234 11/17/18  0320   * 140 140   K 3.0* 4.0 3.6   *  110 109   CO2 22* 24 25   GLU 71 135* 143*   BUN 7* 7* 7*   CREATININE 0.6 0.7 0.7   CALCIUM 6.3* 8.7 8.7   ANIONGAP 7* 6* 6*   ESTGFRAFRICA >60.0 >60.0 >60.0   EGFRNONAA >60.0 >60.0 >60.0    and CBC   Recent Labs   Lab 11/16/18  2234 11/17/18  0320   WBC 10.11 9.04   HGB 10.2* 9.9*   HCT 32.5* 32.0*    227       Final Diagnoses:    Principal Problem: Pheochromocytoma   Secondary Diagnoses:   Active Hospital Problems    Diagnosis  POA    *Pheochromocytoma [D35.00]  Yes    Carotid body paraganglioma [D44.6]  Yes    Paraganglioma [D44.7]  Yes    Controlled type 2 diabetes mellitus without complication [E11.9]  Yes    Hypoadrenalism [E27.40]  Yes      Resolved Hospital Problems   No resolved problems to display.       Discharged Condition: good    Disposition: Home or Self Care    Follow Up/Patient Instructions:     Medications:  Reconciled Home Medications:      Medication List      START taking these medications    cephALEXin 500 MG capsule  Commonly known as:  KEFLEX  Take 1 capsule (500 mg total) by mouth every 12 (twelve) hours. for 5 days     HYDROcodone-acetaminophen 5-325 mg per tablet  Commonly known as:  NORCO  Take 1 tablet by mouth every 6 (six) hours as needed for Pain.     ondansetron 8 MG Tbdl  Commonly known as:  ZOFRAN-ODT  Take 1 tablet (8 mg total) by mouth every 8 (eight) hours as needed.        CHANGE how you take these medications    aspirin 81 MG Chew  Take 1 tablet (81 mg total) by mouth once daily. Resume on Sunday, 11/18  What changed:  additional instructions        CONTINUE taking these medications    doxazosin 1 MG tablet  Commonly known as:  CARDURA  Take 1 tablet (1 mg total) by mouth every evening.     fludrocortisone 0.1 mg Tab  Commonly known as:  FLORINEF  Take 100 mcg by mouth once daily.     furosemide 40 MG tablet  Commonly known as:  LASIX     hydrocortisone 20 MG Tab  Commonly known as:  CORTEF  TAKE 1 TABLET EVERY MORNING  AND TAKE 1/2 TABLET EVERY EVENING  AT  5PM      losartan 50 MG tablet  Commonly known as:  COZAAR  Take 50 mg by mouth once daily.     metFORMIN 500 MG 24 hr tablet  Commonly known as:  GLUCOPHAGE-XR  TAKE 1 TABLET TWICE DAILY WITH MEALS     NIFEdipine 90 MG (OSM) 24 hr tablet  Commonly known as:  PROCARDIA-XL  Take 90 mg by mouth once daily.     TRUE METRIX AIR GLUCOSE METER kit  Generic drug:  blood-glucose meter  CHECK BLOOD SUGAR ONE TIME DAILY     TRUE METRIX GLUCOSE TEST STRIP Strp  Generic drug:  blood sugar diagnostic  CHECK BLOOD SUGAR ONE TIME DAILY     TRUEPLUS LANCETS 28 gauge Misc  Generic drug:  lancets  TEST ONE TIME DAILY     VITAMIN D3 5,000 unit Tab  Generic drug:  cholecalciferol (vitamin D3)  Take 5,000 Units by mouth once daily.          Discharge Procedure Orders   Diet Adult Regular     Other restrictions (specify):   Order Comments: No strenuous activity or heavy lifting for 2 weeks. Please do not submerge your incision in water until the drain has been removed. You may bathe from the shoulders down. Once drain has been removed, you may shower and allow water to run over the incision, but do not scrub the incision.     Notify your health care provider if you experience any of the following:  temperature >100.4     Notify your health care provider if you experience any of the following:  persistent nausea and vomiting or diarrhea     Notify your health care provider if you experience any of the following:  severe uncontrolled pain     Notify your health care provider if you experience any of the following:  redness, tenderness, or signs of infection (pain, swelling, redness, odor or green/yellow discharge around incision site)     Notify your health care provider if you experience any of the following:  difficulty breathing or increased cough     Change dressing (specify)   Order Comments: Please empty drain and record output as per nursing education. You can call the clinic Monday morning to discuss drain output and likely removal.      Follow-up Information     Kika Raines NP. Schedule an appointment as soon as possible for a visit on 11/19/2018.    Specialty:  Otolaryngology  Why:  for drain removal  Contact information:  [address]  [facility] [address]  [phone]                 Quintin Ramirez MD  Otolaryngology-Head and Neck Surgery  [facility]

## 2018-11-17 NOTE — ASSESSMENT & PLAN NOTE
- on metformin at home A1C at 5.6  - started on on IV hydrocortisol stress dose given post surgery  - will monitor with Q4hr POCT glucose and low dose correction scale, will consider IV insulin gtt if persistently high glucose tonight

## 2018-11-17 NOTE — PROGRESS NOTES
[facility]  Critical Care - Surgery  Progress Note    Patient Name: Hailey Sawant  MRN: [id number]  Admission Date: 11/15/2018  Hospital Length of Stay: 2 days  Code Status: Full Code  Attending Provider: Noah Ca MD  Primary Care Provider: Omer Hubbard MD   Principal Problem: Pheochromocytoma    Subjective:     Hospital/ICU Course:  Ms. 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  During a recent MIBG study noting, she was noted to have and afterwards underwent an aborted attempt at pre-op embolization of the tumor on 11/15 after being unable to pass the guidewire to the R CCA. Pre-operatively, she was given alpha blockers, she tolerated the procedure well and was transferred to SICU post-operatively closer monitoring for hypoTN and hypoglycemia monitoring.     Interval History/Significant Events:  Transferred to SICU during the evening, reporting some right incisional neck pain after movement with minimal RUTH ANN drain output (dark venous) HR in the 60s, SBP on lucho remaining in the 110s-120s, MAPS 70s, remains on NS at 125, with Q4H glucose checks as per endocrine recs ranging in the 120s-150s.     Follow-up For: Procedure(s) (LRB):  DISSECTION, NECK to remove right carotid body tumor (Right)    Post-Operative Day: 1 Day Post-Op    Objective:     Vital Signs (Most Recent):  Temp: 98.4 °F (36.9 °C) (11/16/18 2230)  Pulse: 60 (11/17/18 0200)  Resp: 16 (11/17/18 0200)  BP: (!) 150/65 (11/17/18 0200)  SpO2: 100 % (11/17/18 0200) Vital Signs (24h Range):  Temp:  [96.5 °F (35.8 °C)-98.4 °F (36.9 °C)] 98.4 °F (36.9 °C)  Pulse:  [52-89] 60  Resp:  [11-29] 16  SpO2:  [92 %-100 %] 100 %  BP: (137-226)/(61-94) 150/65  Arterial Line BP: (110-177)/(50-88) 123/57     Weight: 93 kg (205 lb)  Body mass index is 33.09 kg/m².      Intake/Output Summary (Last 24 hours) at 11/17/2018 030  Last data filed at 11/16/2018 2300  Gross  per 24 hour   Intake 2400 ml   Output 705 ml   Net 1695 ml       Physical Exam   Constitutional: She appears well-developed and well-nourished.   HENT:   Head: Normocephalic and atraumatic.   Neck:   R incision remains c/d/i with RUTH ANN drain with dark sanguineous output    Cardiovascular: Normal rate.   Pulmonary/Chest: Effort normal.   On NC sating well    Abdominal: Soft.   Neurological: She is alert.   Skin: Skin is warm and dry.   Psychiatric: She has a normal mood and affect.   Nursing note and vitals reviewed.         Lines/Drains/Airways     Drain                 Closed/Suction Drain 11/16/18 1132 Right Neck Bulb 15 Fr. less than 1 day          Arterial Line                 Arterial Line 11/16/18 0934 Left Radial less than 1 day          Peripheral Intravenous Line                 Peripheral IV - Single Lumen 10/27/18 0917 Left Antecubital 20 days         Peripheral IV - Single Lumen 11/16/18 1545 Left Antecubital less than 1 day         Peripheral IV - Single Lumen 11/16/18 1545 Right Hand less than 1 day                Significant Labs:    CBC/Anemia Profile:  Recent Labs   Lab 11/15/18  0730 11/16/18  1017 11/16/18  1126 11/16/18  2234   WBC 5.80  --   --  10.11   HGB 10.8*  --   --  10.2*   HCT 34.2* 33* 29* 32.5*     --   --  221   MCV 91  --   --  91   RDW 14.4  --   --  14.4        Chemistries:  Recent Labs   Lab 11/15/18  1611 11/16/18  0817 11/16/18  2234   * 146* 140   K 2.6* 3.0* 4.0    117* 110   CO2 28 22* 24   BUN 10 7* 7*   CREATININE 0.7 0.6 0.7   CALCIUM 9.0 6.3* 8.7   MG  --   --  1.6   PHOS  --   --  3.3         Assessment/Plan:     Carotid body paraganglioma    Ms. 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      Neuro:   - Sedation: none   - Pain control: prn hycet, fent     Pulmonary:   - on 2L NC, wean as tolerated to  room air      Cardiac:  - Drips: none   - restarting home anti-HTN meds - watch for hypoTN post op, lucho in place, possible d/c if stable after 24 hours      Renal:   - BUN/CR: 0.6 pre-op f/u post op, voiding spontaneously   - Trend BMP  - on cardura       Infectious Disease:   - AF   - Trend WBC at 10 from 5.8  - Antibiotics: none post op      Hematology/Oncology:  - H/H post op 10/32,    - Trend CBC     Endocrine:  - bg POCT < 180s, monitoring for hyperglycemia, possible need for insulin gtt  - hydrocortisone stress dose as per enfo - consulted with recs appreciated      Fluids/Electrolytes/Nutrition/GI:   - diabetic diet, on NS at 125, decrease and HLIV once tolerating diet   - Trend CMP  - Replace Lytes PRN     Prophylaxis:  - Plov     Dispo:  SICU   Primary team: ENT w/ Vascular        Critical care was time spent personally by me on the following activities: development of treatment plan with patient or surrogate and bedside caregivers, discussions with consultants, evaluation of patient's response to treatment, examination of patient, ordering and performing treatments and interventions, ordering and review of laboratory studies, ordering and review of radiographic studies, pulse oximetry, re-evaluation of patient's condition.  This critical care time did not overlap with that of any other provider or involve time for any procedures.     Lillian Connors MD  Critical Care - Surgery  [facility]

## 2018-11-17 NOTE — PROGRESS NOTES
[facility]  Critical Care - Surgery  Progress Note    Patient Name: Hailey Sawant  MRN: [id number]  Admission Date: 11/15/2018  Hospital Length of Stay: 2 days  Code Status: Full Code  Attending Provider: Noah Ca MD  Primary Care Provider: Omer Hubbard MD   Principal Problem: Pheochromocytoma    Subjective:     Hospital/ICU Course:  Ms. 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  During a recent MIBG study noting, she was noted to have and afterwards underwent an aborted attempt at pre-op embolization of the tumor on 11/15 after being unable to pass the guidewire to the R CCA. Pre-operatively, she was given alpha blockers, she tolerated the procedure well and was transferred to SICU post-operatively closer monitoring for hypoTN and hypoglycemia monitoring.     Interval History/Significant Events:     No issues overnight  Tolerating Diet  No need for pressors  No need for glucose replacement    Follow-up For: Procedure(s) (LRB):  DISSECTION, NECK to remove right carotid body tumor (Right)    Post-Operative Day: 1 Day Post-Op    Objective:     Vital Signs (Most Recent):  Temp: 98.4 °F (36.9 °C) (11/17/18 0700)  Pulse: (!) 59 (11/17/18 0715)  Resp: (!) 23 (11/17/18 0715)  BP: (!) 113/56 (11/17/18 0700)  SpO2: 99 % (11/17/18 0715) Vital Signs (24h Range):  Temp:  [96.5 °F (35.8 °C)-98.7 °F (37.1 °C)] 98.4 °F (36.9 °C)  Pulse:  [53-74] 59  Resp:  [11-29] 23  SpO2:  [92 %-100 %] 99 %  BP: (113-226)/(55-94) 113/56  Arterial Line BP: (104-177)/(46-88) 131/57     Weight: 93 kg (205 lb)  Body mass index is 33.09 kg/m².      Intake/Output Summary (Last 24 hours) at 11/17/2018 0808  Last data filed at 11/17/2018 0700  Gross per 24 hour   Intake 3931.25 ml   Output 1380 ml   Net 2551.25 ml       Physical Exam   Constitutional: She is oriented to person, place, and time. She appears well-developed and well-nourished.    HENT:   Head: Normocephalic and atraumatic.   Eyes: EOM are normal. Pupils are equal, round, and reactive to light.   Neck:   Right neck incision clean and dry   Cardiovascular: Normal rate and regular rhythm.   Abdominal: Soft.   Musculoskeletal: Normal range of motion.   Neurological: She is alert and oriented to person, place, and time.   Skin: Skin is warm and dry.   Nursing note and vitals reviewed.      Vents:  Oxygen Concentration (%): 28 (11/16/18 2245)    Lines/Drains/Airways     Drain                 Closed/Suction Drain 11/16/18 1132 Right Neck Bulb 15 Fr. less than 1 day    Female External Urinary Catheter 11/17/18 0200 less than 1 day          Arterial Line                 Arterial Line 11/16/18 0934 Left Radial less than 1 day          Peripheral Intravenous Line                 Peripheral IV - Single Lumen 10/27/18 0917 Left Antecubital 20 days         Peripheral IV - Single Lumen 11/16/18 1545 Left Antecubital less than 1 day         Peripheral IV - Single Lumen 11/16/18 1545 Right Hand less than 1 day                Significant Labs:    CBC/Anemia Profile:  Recent Labs   Lab 11/16/18  1126 11/16/18  2234 11/17/18  0320   WBC  --  10.11 9.04   HGB  --  10.2* 9.9*   HCT 29* 32.5* 32.0*   PLT  --  221 227   MCV  --  91 93   RDW  --  14.4 14.5        Chemistries:  Recent Labs   Lab 11/16/18  0817 11/16/18  2234 11/17/18  0320   * 140 140   K 3.0* 4.0 3.6   * 110 109   CO2 22* 24 25   BUN 7* 7* 7*   CREATININE 0.6 0.7 0.7   CALCIUM 6.3* 8.7 8.7   MG  --  1.6 2.4   PHOS  --  3.3 3.2       All pertinent labs within the past 24 hours have been reviewed.    Significant Imaging:  I have reviewed all pertinent imaging results/findings within the past 24 hours.    Assessment/Plan:     Carotid body paraganglioma    Ms. Sawant is a 73 year old female with a R carotid body paraganglioma s/p resection on 1116 who tolerated the procedure well with pending bed for SICU for further monitoring, extubated,  on no pressors.      Neuro:   - Sedation: none   - Pain control: prn hycet, fent     Pulmonary:   - on 2L NC, wean as tolerated to room air      Cardiac:  - Drips: none   - Nifedipine restarted  -ARB held due to borderline adequate BP  -Resume tomorrow     Renal:   - BUN/CR: 0.6 pre-op f/u post op, voiding spontaneously   - Trend BMP  - on cardura       Infectious Disease:   - AF   - Antibiotics: none post op      Hematology/Oncology:  - H/H post op 10/32,    - Trend CBC     Endocrine:  - bg POCT < 180s, monitoring for hyperglycemia, possible need for insulin gtt  - hydrocortisone stress dose as per endo - consulted with recs appreciated      Fluids/Electrolytes/Nutrition/GI:   - Diabetic diet  - Trend CMP  - Replace Lytes PRN     Prophylaxis:  - Lovenox    Dispo:  Patient is stable for transfer out of SICU   Primary team: ENT        Critical care was time spent personally by me on the following activities: development of treatment plan with patient or surrogate and bedside caregivers, discussions with consultants, evaluation of patient's response to treatment, examination of patient, ordering and performing treatments and interventions, ordering and review of laboratory studies, ordering and review of radiographic studies, pulse oximetry, re-evaluation of patient's condition.  This critical care time did not overlap with that of any other provider or involve time for any procedures.     Maximino Wilburn MD  Critical Care - Surgery  [facility]

## 2018-11-17 NOTE — ASSESSMENT & PLAN NOTE
- on metformin at home A1C at 5.6  - started on on IV hydrocortisol stress dose given post surgery  - POCT glucose and low dose correction scale  - glucose is stable  - resume oral med for home

## 2018-11-17 NOTE — ASSESSMENT & PLAN NOTE
- follow on outpatient plan (Dr Burton plan) of IV hydrocortisol post surgery Q8hr for now  - will monitor for sign of AI  - will restart PO  Home steroid POD1 pending vital signs

## 2018-11-17 NOTE — HPI
"Hailey Sawant is a 73 y.o. female with malignant pheochromocytoma, DMII, HTN s/p excision of right carotid body tumor 11/16/2018. Patient is known to endocrine clinic team. Endocrinology was consulted for "s/p resection of carotid body tumor- metastatic pheochromocytoma".   "

## 2018-11-17 NOTE — CONSULTS
"[facility]  Endocrinology  Consult Note    Consult Requested by: Noah Ca MD   Reason for admit: Pheochromocytoma    HISTORY OF PRESENT ILLNESS:  Hailey Sawant is a 73 y.o. female with malignant pheochromocytoma, DMII, HTN s/p excision of right carotid body tumor 11/16/2018. Patient is known to endocrine clinic team. Endocrinology was consulted for "s/p resection of carotid body tumor- metastatic pheochromocytoma".     Medications and/or Treatments Impacting Glycemic Control:  Immunotherapy:    Immunosuppressants     None        Steroids:   Hormones (From admission, onward)    Start     Stop Route Frequency Ordered    11/16/18 2200  hydrocortisone sodium succinate injection 50 mg      -- IV Every 8 hours 11/16/18 1840        Pressors:    Autonomic Drugs (From admission, onward)    None          Medications Prior to Admission   Medication Sig Dispense Refill Last Dose    aspirin 81 MG Chew Take 81 mg by mouth once daily.   Past Week at Unknown time    cholecalciferol, vitamin D3, (VITAMIN D3) 5,000 unit Tab Take 5,000 Units by mouth once daily.   Past Week at Unknown time    doxazosin (CARDURA) 1 MG tablet Take 1 tablet (1 mg total) by mouth every evening. 30 tablet 11 11/14/2018 at 2200    fludrocortisone (FLORINEF) 0.1 mg Tab Take 100 mcg by mouth once daily.   11/15/2018 at 0430    furosemide (LASIX) 40 MG tablet    11/14/2018 at Unknown time    hydrocortisone (CORTEF) 20 MG Tab TAKE 1 TABLET EVERY MORNING  AND TAKE 1/2 TABLET EVERY EVENING  AT  5PM 135 tablet 6 11/14/2018 at Unknown time    losartan (COZAAR) 50 MG tablet Take 50 mg by mouth once daily.   11/14/2018 at Unknown time    metFORMIN (GLUCOPHAGE-XR) 500 MG 24 hr tablet TAKE 1 TABLET TWICE DAILY WITH MEALS 180 tablet 3 11/14/2018 at 0800    NIFEdipine (PROCARDIA-XL) 90 MG (OSM) 24 hr tablet Take 90 mg by mouth once daily.    Not Taking    TRUE METRIX AIR GLUCOSE METER kit CHECK BLOOD SUGAR ONE TIME DAILY 1 " each 0 Taking    TRUE METRIX GLUCOSE TEST STRIP Strp CHECK BLOOD SUGAR ONE TIME DAILY 100 strip 3 Taking    TRUEPLUS LANCETS 28 gauge Misc TEST ONE TIME DAILY 100 each 6 Taking       Current Facility-Administered Medications   Medication Dose Route Frequency Provider Last Rate Last Dose    0.9%  NaCl infusion   Intravenous Continuous José Miguel Tafoya Jr.,  mL/hr at 11/16/18 1545      acetaminophen tablet 650 mg  650 mg Oral Q8H PRN José Miguel Tafoya Jr., MD        cholecalciferol (vitamin D3) 5,000 unit tablet 5,000 Units  5,000 Units Oral Daily José Miguel Tafoya Jr., MD   5,000 Units at 11/16/18 1637    dextrose 50% injection 12.5 g  12.5 g Intravenous PRN Nacho Rivas MD        dextrose 50% injection 25 g  25 g Intravenous PRN Nacho Rivas MD        doxazosin tablet 1 mg  1 mg Oral QHS José Miguel Tafoya Jr., MD   1 mg at 11/15/18 2153    enoxaparin injection 40 mg  40 mg Subcutaneous Daily José Miguel Tafoya Jr., MD   40 mg at 11/16/18 1643    fentaNYL injection 25 mcg  25 mcg Intravenous Q15 Min PRN Ricky Rojas MD   25 mcg at 11/16/18 1650    glucagon (human recombinant) injection 1 mg  1 mg Intramuscular PRN Nacho Rivas MD        glucose chewable tablet 16 g  16 g Oral PRN Nacho Rivas MD        glucose chewable tablet 24 g  24 g Oral PRN Nacho Rivas MD        hydrALAZINE injection 10 mg  10 mg Intravenous Q6H PRN José Miguel Tafoya Jr., MD        hydrocodone-apap 7.5-325 MG/15 ML oral solution 15 mL  15 mL Oral Q6H PRN José Miguel Tafoya Jr., MD   15 mL at 11/16/18 1354    hydrocortisone sodium succinate injection 50 mg  50 mg Intravenous Q8H Nacho Rivas MD        insulin aspart U-100 pen 0-5 Units  0-5 Units Subcutaneous QID (AC + HS) PRN Nacho Rivas MD        lidocaine (PF) 10 mg/ml (1%) injection 10 mg  1 mL Other Once José Miguel Tafoya Jr., MD        losartan tablet 50 mg  50 mg Oral Daily José Miguel Tafoya Jr., MD   50  mg at 11/16/18 1643    NIFEdipine 24 hr tablet 90 mg  90 mg Oral Daily José Miguel Tafoya Jr., MD   90 mg at 11/16/18 1637    ondansetron disintegrating tablet 8 mg  8 mg Oral Q8H PRN José Miguel Tafoya Jr., MD        promethazine (PHENERGAN) 6.25 mg in dextrose 5 % 50 mL IVPB  6.25 mg Intravenous Q6H PRN José Miguel Tafoya Jr., MD        ramelteon tablet 8 mg  8 mg Oral Nightly PRN José Miguel Tafoya Jr., MD        sodium chloride 0.9% flush 3 mL  3 mL Intravenous PRN José Miguel Tafoya Jr., MD        sodium chloride 0.9% flush 3 mL  3 mL Intravenous PRN Mary Kay Jaramillo MD        sodium chloride 0.9% flush 5 mL  5 mL Intravenous PRN Wojciech Richter MD           Interval HPI:   Overnight events: s/p s/p resection of carotid body tumor today, still in PACU, doing well     PMH, PSH, FH, SH updated and reviewed     ROS:    Review of Systems   Constitutional: Negative for activity change and unexpected weight change.   HENT: Negative for sore throat and voice change.    Eyes: Negative for visual disturbance.   Respiratory: Negative for shortness of breath.    Cardiovascular: Negative for chest pain.   Gastrointestinal: Negative for abdominal pain.   Genitourinary: Negative for urgency.   Musculoskeletal: Negative for arthralgias.   Skin: Negative for wound.   Neurological: Negative for headaches.   Psychiatric/Behavioral: Negative for confusion and sleep disturbance.       Labs Reviewed and Include:  BASELINE Creatinine:   No results for input(s): WBC, RBC, HGB, HCT, PLT, MCV, MCH, MCHC in the last 24 hours.    Recent Labs   Lab 11/16/18  0817   GLU 71   CALCIUM 6.3*   *   K 3.0*   CO2 22*   *   BUN 7*   CREATININE 0.6     Lab Results   Component Value Date    HGBA1C 5.6 11/05/2018       Nutritional status:   Body mass index is 33.09 kg/m².  Lab Results   Component Value Date    ALBUMIN 3.6 07/17/2018    ALBUMIN 3.3 (L) 01/25/2018    ALBUMIN 2.2 (L) 10/06/2017     No results found  for: PREALBUMIN    Estimated Creatinine Clearance: 94.5 mL/min (based on SCr of 0.6 mg/dL).    POCT Glucose results:    Current Insulin Regimen:         PHYSICAL EXAMINATION:  Vitals:    11/16/18 1545   BP: (!) 165/70   Pulse: 71   Resp:    Temp:      Body mass index is 33.09 kg/m².    Physical Exam   Constitutional: She is oriented to person, place, and time. She appears well-developed and well-nourished. No distress.   HENT:   Neck drain noted   Eyes: Conjunctivae are normal. No scleral icterus.   Neck: Normal range of motion. No tracheal deviation present. No thyromegaly present.   Cardiovascular: Normal rate and normal heart sounds.   Pulmonary/Chest: Effort normal and breath sounds normal.   Abdominal: Soft. There is no tenderness. No hernia.   Musculoskeletal: Normal range of motion. She exhibits no edema or tenderness.   Neurological: She is alert and oriented to person, place, and time.   Skin: Skin is warm. No erythema.   Psychiatric: She has a normal mood and affect. Her behavior is normal.   Nursing note and vitals reviewed.    .     ASSESSMENT and PLAN:    * Pheochromocytoma    - s/p resection of carotid body tumor concerning for metastatic pheochromocytoma 11/16/2018  - pt is going to ICU for close observation  - BP is stable at this time, monitor for hypotension as withdrawal of catecholamine effect will result in hypotension  - otherwise, plan per surgical team       Controlled type 2 diabetes mellitus without complication    - on metformin at home A1C at 5.6  - started on on IV hydrocortisol stress dose given post surgery  - will monitor with Q4hr POCT glucose and low dose correction scale, will consider IV insulin gtt if persistently high glucose tonight       Hypoadrenalism    - follow on outpatient plan (Dr Burton plan) of IV hydrocortisol post surgery Q8hr for now  - will monitor for sign of AI  - will restart PO  Home steroid POD1 pending vital signs           DISCHARGE NEEDS: will assess  daily    Nacho Rivas MD  Endocrinology  [facility]

## 2018-11-17 NOTE — ASSESSMENT & PLAN NOTE
- follow on outpatient plan (Dr Burton plan) of IV hydrocortisol post surgery Q8hr  11/16  - no sign of AI, lab work is stable, VS are stable  - restart PO  Home steroid regiment    - OK to resume home steroid med on discharge

## 2018-11-17 NOTE — NURSING
Patient discharged and transported by RN and PCT via wheelchair. All of patient's personal belongings given to patient's sister who walked down with patient while being transported down to patient's second sibling's car. All discharge instructions given and explained. All VSS prior to discharge.

## 2018-11-17 NOTE — ASSESSMENT & PLAN NOTE
Ms. 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      Neuro:   - Sedation: none   - Pain control: prn hycet, fent     Pulmonary:   - on 2L NC, wean as tolerated to room air      Cardiac:  - Drips: none   - restarting home anti-HTN meds - watch for hypoTN post op, lucho in place, possible d/c if stable after 24 hours      Renal:   - BUN/CR: 0.6 pre-op f/u post op, voiding spontaneously   - Trend BMP  - on cardura       Infectious Disease:   - AF   - Trend WBC at 10 from 5.8  - Antibiotics: none post op      Hematology/Oncology:  - H/H post op 10/32,    - Trend CBC     Endocrine:  - bg POCT < 180s, monitoring for hyperglycemia, possible need for insulin gtt  - hydrocortisone stress dose as per enfo - consulted with recs appreciated      Fluids/Electrolytes/Nutrition/GI:   - diabetic diet, on NS at 125, decrease and HLIV once tolerating diet   - Trend CMP  - Replace Lytes PRN     Prophylaxis:  - Plov     Dispo:  SICU   Primary team: ENT w/ Vascular

## 2018-11-17 NOTE — H&P
[facility]  Critical Care - Surgery  History & Physical    Patient Name: Hailey Sawant  MRN: [id number]  Admission Date: 11/15/2018  Code Status: Full Code  Attending Physician: Noah Ca MD   Primary Care Provider: Omer Hubbard MD   Principal Problem: Pheochromocytoma    Subjective:     HPI:  Ms. 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  During a recent MIBG study noting, she was noted to have and afterwards underwent an aborted attempt at pre-op embolization of the tumor on 11/15 after being unable to pass the guidewire to the R CCA. Pre-operatively, she was given alpha blockers, she tolerated the procedure well and was transferred to SICU post-operatively closer monitoring for hypoTN and hypoglycemia monitoring.       .      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    Thyroid disease        Past Surgical History:   Procedure Laterality Date    ADRENALECTOMY      ADRENALECTOMY open exploratory Right 10/2/2017    Performed by Sandoval Castillo MD at [facility] OR [address] [place] [address]    ANGIOGRAM N/A 11/15/2018    Performed by [facility] Diagnostic Provider at [facility] OR [address] [place] [address]    ANGIOGRAPHY N/A 11/15/2018    Procedure: ANGIOGRAM;  Surgeon: [facility] Diagnostic Provider;  Location: [facility] OR [address] [place] [address];  Service: Radiology;  Laterality: N/A;    APPENDECTOMY      NFPRMO-NKIDDI-EQ N/A 2/8/2016    Performed by [facility] Diagnostic Provider at [facility] OR [facility]  FLR    BLOCK, SACROILIAC JOINT Right 6/26/2018    Performed by Sydney Reynoso MD at [facility] PAIN MGT    CATHETERIZATION, HEART, LEFT Left 7/18/2018    Performed by Moustapha Vasquez MD at [facility] CATH LAB    INJECTION OF ANESTHETIC AGENT INTO SACROILIAC JOINT Right 6/26/2018    Procedure: BLOCK, SACROILIAC JOINT;  Surgeon: Sydney Reynoso MD;  Location: [facility] PAIN MGT;  Service: Pain Management;  Laterality: Right;  Right SI Joint Injection    [id number]    NEEDS CONSENT    LEFT HEART CATHETERIZATION Left 7/18/2018    Procedure: CATHETERIZATION, HEART, LEFT;  Surgeon: Moustapha Vasquez MD;  Location: [facility] CATH LAB;  Service: Cardiovascular;  Laterality: Left;    MIBG Therapy  3/2016, 7/2016, 12/2016    [facility] - Dr. Martines       Review of patient's allergies indicates:  No Known Allergies    Family History     Problem Relation (Age of Onset)    Cancer Brother, Brother    Diabetes Mother    Heart disease Mother, Father    Lung cancer Brother        Tobacco Use    Smoking status: Never Smoker    Smokeless tobacco: Never Used   Substance and Sexual Activity    Alcohol use: No    Drug use: No    Sexual activity: Not on file      Review of Systems   Constitutional: Negative for activity change and fever.   HENT: Negative for congestion, rhinorrhea and sore throat.    Eyes: Negative for pain and discharge.   Respiratory: Negative for chest tightness and shortness of breath.    Cardiovascular: Negative for chest pain.   Gastrointestinal: Negative for abdominal distention, abdominal pain, diarrhea, nausea and vomiting.   Endocrine: Negative for polydipsia and polyphagia.   Genitourinary: Negative for difficulty urinating.   Musculoskeletal: Negative for gait problem.   Skin: Negative for color change and rash.   Neurological: Negative for facial asymmetry and weakness.     Objective:     Vital Signs (Most Recent):  Temp: 97.7 °F (36.5 °C) (11/16/18 1845)  Pulse: 65 (11/16/18 2030)  Resp: (!) 27 (11/16/18 2030)  BP:  (!) 162/73 (11/16/18 2030)  SpO2: 98 % (11/16/18 2030) Vital Signs (24h Range):  Temp:  [96.5 °F (35.8 °C)-97.9 °F (36.6 °C)] 97.7 °F (36.5 °C)  Pulse:  [52-89] 65  Resp:  [12-28] 27  SpO2:  [93 %-100 %] 98 %  BP: (132-226)/(56-94) 162/73  Arterial Line BP: (110-177)/(50-88) 145/68     Weight: 93 kg (205 lb)  Body mass index is 33.09 kg/m².      Intake/Output Summary (Last 24 hours) at 11/16/2018 2034  Last data filed at 11/16/2018 1745  Gross per 24 hour   Intake 2860.83 ml   Output 630 ml   Net 2230.83 ml       Physical Exam   Constitutional: She is oriented to person, place, and time. She appears well-developed and well-nourished.   HENT:   Head: Normocephalic and atraumatic.   R neck incision c/d/i with RUTH ANN drain with minimal dark bloody output    Eyes: EOM are normal.   Neck: Normal range of motion. No JVD present.   Cardiovascular: Normal rate.   Pulmonary/Chest: Effort normal.   Sating well while on NC   Abdominal: Soft. There is no rebound and no guarding.   Musculoskeletal: Normal range of motion.   Neurological: She is alert and oriented to person, place, and time.   Skin: Skin is warm and dry.   Psychiatric: She has a normal mood and affect.       Vents:       Lines/Drains/Airways     Drain                 Closed/Suction Drain 11/16/18 1132 Right Neck Bulb 15 Fr. less than 1 day          Arterial Line                 Arterial Line 11/16/18 0934 Left Radial less than 1 day          Peripheral Intravenous Line                 Peripheral IV - Single Lumen 10/27/18 0917 Left Antecubital 20 days         Peripheral IV - Single Lumen 11/16/18 1545 Left Antecubital less than 1 day         Peripheral IV - Single Lumen 11/16/18 1545 Right Hand less than 1 day                Significant Labs:    CBC/Anemia Profile:  Recent Labs   Lab 11/15/18  0730   WBC 5.80   HGB 10.8*   HCT 34.2*      MCV 91   RDW 14.4        Chemistries:  Recent Labs   Lab 11/15/18  1611 11/16/18  0817   * 146*   K 2.6* 3.0*     117*   CO2 28 22*   BUN 10 7*   CREATININE 0.7 0.6   CALCIUM 9.0 6.3*         Assessment/Plan:     Carotid body paraganglioma    Ms. 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     Neuro:   - Sedation: none   - Pain control: prn hycet, fent     Pulmonary:   - on 2L NC, wean as tolerated to room air      Cardiac:  - Drips: none   - restarting home anti-HTN meds - watch for hypoTN post op      Renal:   - BUN/CR: 0.6 pre-op f/u post op   - Trend BMP  = on cardura       Infectious Disease:   - AF   - Trend WBC  - Antibiotics: yosi-op abx as per ENT and Vascular, had ancef pre-op      Hematology/Oncology:  - H/H f/u post op Hct    - Trend CBC     Endocrine:  - bg POCT < 180s, monitoring for hyperglycemia, possible need for insulin gtt  - hydrocortisone stress dose as per enfo - consulted with recs appreciated      Fluids/Electrolytes/Nutrition/GI:   - diabetic diet, on NS at 125, decrease and HLIV once tolerating diet   - Trend CMP  - Replace Lytes PRN     Prophylaxis:  - Plov     Dispo:  SICU   Primary team: ENT w/ Vascular         Critical care was time spent personally by me on the following activities: development of treatment plan with patient or surrogate and bedside caregivers, discussions with consultants, evaluation of patient's response to treatment, examination of patient, ordering and performing treatments and interventions, ordering and review of laboratory studies, ordering and review of radiographic studies, pulse oximetry, re-evaluation of patient's condition.  This critical care time did not overlap with that of any other provider or involve time for any procedures.     Lillian Connors MD  Critical Care - Surgery  [facility]

## 2018-11-17 NOTE — SUBJECTIVE & OBJECTIVE
"Interval HPI:   Overnight events: Doing well BP is stable this AM. With drain in place    BP (!) 120/57 (BP Location: Right arm, Patient Position: Sitting)   Pulse 64   Temp 98.5 °F (36.9 °C)   Resp 19   Ht 5' 6" (1.676 m)   Wt 93 kg (205 lb)   SpO2 98%   Breastfeeding? No   BMI 33.09 kg/m²     Labs Reviewed and Include    Recent Labs   Lab 11/17/18  0320   *   CALCIUM 8.7      K 3.6   CO2 25      BUN 7*   CREATININE 0.7     Lab Results   Component Value Date    WBC 9.04 11/17/2018    HGB 9.9 (L) 11/17/2018    HCT 32.0 (L) 11/17/2018    MCV 93 11/17/2018     11/17/2018     No results for input(s): TSH, FREET4 in the last 168 hours.  Lab Results   Component Value Date    HGBA1C 5.6 11/05/2018       Nutritional status:   Body mass index is 33.09 kg/m².  Lab Results   Component Value Date    ALBUMIN 3.6 07/17/2018    ALBUMIN 3.3 (L) 01/25/2018    ALBUMIN 2.2 (L) 10/06/2017     No results found for: PREALBUMIN    Estimated Creatinine Clearance: 81 mL/min (based on SCr of 0.7 mg/dL).    Accu-Checks  Recent Labs     11/15/18  0725 11/15/18  1611 11/16/18  0833 11/16/18  1307 11/16/18  1856 11/16/18  2215 11/16/18  2238 11/17/18  0204 11/17/18  0926   POCTGLUCOSE 95 104 94 135* 127* 158* 122* 136* 103       Current Medications and/or Treatments Impacting Glycemic Control  Immunotherapy:    Immunosuppressants     None        Steroids:   Hormones (From admission, onward)    Start     Stop Route Frequency Ordered    11/16/18 2200  hydrocortisone sodium succinate injection 50 mg      -- IV Every 8 hours 11/16/18 1840        Pressors:    Autonomic Drugs (From admission, onward)    None        Hyperglycemia/Diabetes Medications:   Antihyperglycemics (From admission, onward)    Start     Stop Route Frequency Ordered    11/16/18 1918  insulin aspart U-100 pen 0-5 Units      -- SubQ Before meals & nightly PRN 11/16/18 1819        "

## 2018-11-17 NOTE — ASSESSMENT & PLAN NOTE
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

## 2018-11-17 NOTE — SUBJECTIVE & OBJECTIVE
Interval HPI:   Overnight events: s/p s/p resection of carotid body tumor today, still in PACU, doing well     PMH, PSH, FH, SH updated and reviewed     ROS:    Review of Systems   Constitutional: Negative for activity change and unexpected weight change.   HENT: Negative for sore throat and voice change.    Eyes: Negative for visual disturbance.   Respiratory: Negative for shortness of breath.    Cardiovascular: Negative for chest pain.   Gastrointestinal: Negative for abdominal pain.   Genitourinary: Negative for urgency.   Musculoskeletal: Negative for arthralgias.   Skin: Negative for wound.   Neurological: Negative for headaches.   Psychiatric/Behavioral: Negative for confusion and sleep disturbance.       Labs Reviewed and Include:  BASELINE Creatinine:   No results for input(s): WBC, RBC, HGB, HCT, PLT, MCV, MCH, MCHC in the last 24 hours.    Recent Labs   Lab 11/16/18  0817   GLU 71   CALCIUM 6.3*   *   K 3.0*   CO2 22*   *   BUN 7*   CREATININE 0.6     Lab Results   Component Value Date    HGBA1C 5.6 11/05/2018       Nutritional status:   Body mass index is 33.09 kg/m².  Lab Results   Component Value Date    ALBUMIN 3.6 07/17/2018    ALBUMIN 3.3 (L) 01/25/2018    ALBUMIN 2.2 (L) 10/06/2017     No results found for: PREALBUMIN    Estimated Creatinine Clearance: 94.5 mL/min (based on SCr of 0.6 mg/dL).    POCT Glucose results:    Current Insulin Regimen:         PHYSICAL EXAMINATION:  Vitals:    11/16/18 1545   BP: (!) 165/70   Pulse: 71   Resp:    Temp:      Body mass index is 33.09 kg/m².    Physical Exam   Constitutional: She is oriented to person, place, and time. She appears well-developed and well-nourished. No distress.   HENT:   Neck drain noted   Eyes: Conjunctivae are normal. No scleral icterus.   Neck: Normal range of motion. No tracheal deviation present. No thyromegaly present.   Cardiovascular: Normal rate and normal heart sounds.   Pulmonary/Chest: Effort normal and breath sounds  normal.   Abdominal: Soft. There is no tenderness. No hernia.   Musculoskeletal: Normal range of motion. She exhibits no edema or tenderness.   Neurological: She is alert and oriented to person, place, and time.   Skin: Skin is warm. No erythema.   Psychiatric: She has a normal mood and affect. Her behavior is normal.   Nursing note and vitals reviewed.

## 2018-11-18 ENCOUNTER — NURSE TRIAGE (OUTPATIENT)
Dept: ADMINISTRATIVE | Facility: CLINIC | Age: 73
End: 2018-11-18

## 2018-11-18 NOTE — OP NOTE
11/16/2018      Surgeon(s) and Role:     * Noah Ca MD - Primary     * Jacob Patrick Brunner, MD - Resident - Assisting     * Rebecca Nava MD - Co surgeon     * Paramjit Morales MD - Fellow - Assisting     Pre-op Diagnosis:  Pheochromocytoma, Right Carotid artery     Post-op Diagnosis:  Same     Procedure:Excision of right carotid body tumor      Anesthesia: General      Description of Procedure: Excision of right carotid body tumor     Description of the findings of the procedure: no arterial repair required     Estimated Blood Loss: 200 mL           Complications:  None; patient tolerated the procedure well.           Disposition: PACU.           Condition: stable    Procedure Details:  Patient was under anesthesia and prepped and draped. Dr. Ca and the team had exposed common, external, and internal carotid artery through a right neck oblique incision. Dr. Nava joined the operation.  Ansa had been ligated. Hypoglossal nerve and vagus nerve identified and protected. Careful dissection around the carotid artery was performed using sharp dissection and bipolar electrocautery. Arterial supply from the external carotid artery was ligated and divided. Tumor was circumferentially dissected and delivered in one piece and sent for pathology. No arterial reconstruction was necessary. Hemostasis was obtained and incision was closed by Dr. Ca's team. Please refer to Dr. Ca's dictation for more complete detail.     Paramjit Morales MD  Vascular/Endovascular Surgery Fellow

## 2018-11-18 NOTE — PLAN OF CARE
Patient discharged home today with on needs.    Patient instructed go to ENT clinic with Kika Raines NP 11/19/2018.    Future Appointments   Date Time Provider Department Center   11/26/2018 11:20 AM [facility]   2/20/2019 10:30 AM Moustapha Vasquez MD [id number]          11/17/18 3956   Final Note   Assessment Type Final Discharge Note   Anticipated Discharge Disposition Home   Hospital Follow Up  Appt(s) scheduled? Yes   Right Care Referral Info   Post Acute Recommendation No Care

## 2018-11-20 ENCOUNTER — OFFICE VISIT (OUTPATIENT)
Dept: OTOLARYNGOLOGY | Facility: CLINIC | Age: 73
End: 2018-11-20
Payer: MEDICARE

## 2018-11-20 VITALS — SYSTOLIC BLOOD PRESSURE: 131 MMHG | HEART RATE: 44 BPM | DIASTOLIC BLOOD PRESSURE: 79 MMHG | TEMPERATURE: 98 F

## 2018-11-20 DIAGNOSIS — C74.91 MALIGNANT PHEOCHROMOCYTOMA OF RIGHT ADRENAL GLAND: Primary | ICD-10-CM

## 2018-11-20 LAB
BLD PROD TYP BPU: NORMAL
BLD PROD TYP BPU: NORMAL
BLOOD UNIT EXPIRATION DATE: NORMAL
BLOOD UNIT EXPIRATION DATE: NORMAL
BLOOD UNIT TYPE CODE: 5100
BLOOD UNIT TYPE CODE: 5100
BLOOD UNIT TYPE: NORMAL
BLOOD UNIT TYPE: NORMAL
CODING SYSTEM: NORMAL
CODING SYSTEM: NORMAL
DISPENSE STATUS: NORMAL
DISPENSE STATUS: NORMAL
TRANS ERYTHROCYTES VOL PATIENT: NORMAL ML
TRANS ERYTHROCYTES VOL PATIENT: NORMAL ML

## 2018-11-20 PROCEDURE — 99999 PR PBB SHADOW E&M-EST. PATIENT-LVL III: CPT | Mod: PBBFAC,,, | Performed by: NURSE PRACTITIONER

## 2018-11-20 PROCEDURE — 99024 POSTOP FOLLOW-UP VISIT: CPT | Mod: S$GLB,,, | Performed by: NURSE PRACTITIONER

## 2018-11-20 NOTE — PROGRESS NOTES
Chief Complaint   Patient presents with    Post-op Evaluation/ drain removal       HPI   73 y.o. female presents for a post op visit. She has been doing well since her surgery. She has some discomfort to her incision, relieved with pain medication. RUTH ANN drain output has been <30ml in 24 hours. No complaints.    She was referred from Dr. Prasad with a history of metastatic pheochromocytoma.  She was recently noted to have R neck disease on an MIBG study.  She has no head and neck complaints.  She denies throat pain or dysphagia.  She is feeling well otherwise.      Review of Systems   Constitutional: Negative for fatigue and unexpected weight change.   HENT: Per HPI.  Eyes: Negative for visual disturbance.   Respiratory: Negative for shortness of breath, hemoptysis   Cardiovascular: Negative for chest pain and palpitations.   Musculoskeletal: Negative for decreased ROM, back pain.   Skin: Negative for rash, sunburn, itching.   Neurological: Negative for dizziness and seizures.   Hematological: Negative for adenopathy. Does not bruise/bleed easily.   Endocrine: Negative for rapid weight loss/weight gain, heat/cold intolerance.     Past Medical History   Patient Active Problem List   Diagnosis    Hypoadrenalism    Essential hypertension    IGNACIO (dyspnea on exertion)    Vitamin D deficient osteomalacia    H/O total adrenalectomy    Pheochromocytoma, malignant    Pheochromocytoma    Controlled type 2 diabetes mellitus without complication    Low back pain    Lumbar spondylosis    Intervertebral disk disease    Chronic bilateral low back pain with right-sided sciatica    Weakness of both hips    Sacroiliac joint pain    Chronic low back pain with right-sided sciatica    Chest pain    Acute heart failure    Unstable angina pectoris due to coronary arteriosclerosis    Sick sinus syndrome    Secondary neuroendocrine tumor of distant lymph nodes    Secondary neuroendocrine tumor of bone    Carotid body  paraganglioma    Paraganglioma           Past Surgical History   Past Surgical History:   Procedure Laterality Date    ADRENALECTOMY      ADRENALECTOMY open exploratory Right 10/2/2017    Performed by Sandoval Castillo MD at [facility] OR [address] [place] [address]    ANGIOGRAM N/A 11/15/2018    Performed by [facility] Diagnostic Provider at [facility] OR [address] [place] [address]    ANGIOGRAPHY N/A 11/15/2018    Procedure: ANGIOGRAM;  Surgeon: [facility] Diagnostic Provider;  Location: [facility] OR [address] [place] [address];  Service: Radiology;  Laterality: N/A;    APPENDECTOMY      VFKIPS-ECXIBA-OH N/A 2/8/2016    Performed by [facility] Diagnostic Provider at [facility] OR [address] [place] [address]    BLOCK, SACROILIAC JOINT Right 6/26/2018    Performed by [facility] at [facility]    CATHETERIZATION, HEART, LEFT Left 7/18/2018    Performed by Moustapha Vasquez MD at [facility] CATH LAB    DISSECTION OF NECK Right 11/16/2018    Procedure: DISSECTION, NECK to remove right carotid body tumor;  Surgeon: Noah Ca MD;  Location: [facility] OR [address] [place] [address];  Service: ENT;  Laterality: Right;    DISSECTION, NECK to remove right carotid body tumor Right 11/16/2018    Performed by Noah Ca MD at [facility] OR [address] [place] [address]    INJECTION OF ANESTHETIC AGENT INTO SACROILIAC JOINT Right 6/26/2018    Procedure: BLOCK, SACROILIAC JOINT;  Surgeon: Sydney Reynoso MD;  Location: [facility] PAIN T;  Service: Pain Management;  Laterality: Right;  Right SI Joint Injection    [id number]    NEEDS CONSENT    LEFT HEART CATHETERIZATION Left 7/18/2018    Procedure: CATHETERIZATION, HEART, LEFT;  Surgeon: Moustapha Vasquez MD;  Location: [facility] CATH LAB;  Service: Cardiovascular;  Laterality: Left;    MIBG Therapy  3/2016, 7/2016, 12/2016    ELIJAH Martines         Family History   Family History   Problem Relation Age of Onset    Heart disease Mother     Diabetes Mother     Heart disease Father     Cancer Brother     Lung cancer Brother     Cancer Brother     Melanoma Neg Hx            Social History   .  Social History      Socioeconomic History    Marital status: Single     Spouse name: Not on file    Number of children: Not on file    Years of education: Not on file    Highest education level: Not on file   Social Needs    Financial resource strain: Not on file    Food insecurity - worry: Not on file    Food insecurity - inability: Not on file    Transportation needs - medical: Not on file    Transportation needs - non-medical: Not on file   Occupational History    Not on file   Tobacco Use    Smoking status: Never Smoker    Smokeless tobacco: Never Used   Substance and Sexual Activity    Alcohol use: No    Drug use: No    Sexual activity: Not on file   Other Topics Concern    Are you pregnant or think you may be? No    Breast-feeding No   Social History Narrative    Not on file         Allergies   Review of patient's allergies indicates:  No Known Allergies        Physical Exam     Vitals:    11/20/18 0906   BP: 131/79   Pulse: (!) 44   Temp: 98.2 °F (36.8 °C)         There is no height or weight on file to calculate BMI.      General: AOx3, NAD   Respiratory:  Symmetric chest rise, normal effort  Nose: No gross nasal septal deviation. Inferior Turbinates WNL bilaterally. No septal perforation. No masses/lesions.   Oral Cavity:  Oral Tongue mobile, no lesions noted. Hard Palate WNL. No buccal or FOM lesions.  Oropharynx:  No masses/lesions of the posterior pharyngeal wall. Tonsillar fossa without lesions. Soft palate without masses. Midline uvula.   Neck: Right neck Incision CDI.  No redness, drainage, or fluid collection noted.  Edges well approximated. RUTH ANN drain removed without difficulty.  Face: House Brackmann I bilaterally.       Assessment/Plan  Problem List Items Addressed This Visit        Oncology    Pheochromocytoma, malignant - Primary     Hailey Sawant is 4 days s/p resection of carotid body tumor. Healing well. RUTH ANN drain removed. Path pending. Questions answered. RTC in 2 weeks, sooner if  needed.

## 2018-11-20 NOTE — ANESTHESIA POSTPROCEDURE EVALUATION
"Anesthesia Post Evaluation    Patient: Hailey Sawant    Procedure(s) Performed: Procedure(s) (LRB):  DISSECTION, NECK to remove right carotid body tumor (Right)    Final Anesthesia Type: general  Patient location during evaluation: PACU  Patient participation: Yes- Able to Participate  Level of consciousness: awake and alert and oriented  Post-procedure vital signs: reviewed and stable  Pain management: adequate  Airway patency: patent  PONV status at discharge: No PONV  Anesthetic complications: no      Cardiovascular status: hemodynamically stable  Respiratory status: unassisted, spontaneous ventilation and room air  Hydration status: euvolemic  Follow-up not needed.        Visit Vitals  BP (!) 120/58 (BP Location: Left arm, Patient Position: Sitting)   Pulse 63   Temp 36.9 °C (98.5 °F)   Resp 18   Ht 5' 6" (1.676 m)   Wt 93 kg (205 lb)   SpO2 95%   Breastfeeding? No   BMI 33.09 kg/m²       Pain/Aaliyah Score: No Data Recorded      "

## 2018-11-26 ENCOUNTER — OFFICE VISIT (OUTPATIENT)
Dept: HEMATOLOGY/ONCOLOGY | Facility: CLINIC | Age: 73
End: 2018-11-26
Payer: MEDICARE

## 2018-11-26 ENCOUNTER — OFFICE VISIT (OUTPATIENT)
Dept: OTOLARYNGOLOGY | Facility: CLINIC | Age: 73
End: 2018-11-26
Payer: MEDICARE

## 2018-11-26 VITALS
TEMPERATURE: 98 F | RESPIRATION RATE: 20 BRPM | BODY MASS INDEX: 34.28 KG/M2 | HEART RATE: 59 BPM | DIASTOLIC BLOOD PRESSURE: 63 MMHG | SYSTOLIC BLOOD PRESSURE: 135 MMHG | HEIGHT: 64 IN | WEIGHT: 200.81 LBS | OXYGEN SATURATION: 98 %

## 2018-11-26 DIAGNOSIS — D44.6: ICD-10-CM

## 2018-11-26 DIAGNOSIS — D44.6 CAROTID BODY PARAGANGLIOMA: Primary | ICD-10-CM

## 2018-11-26 DIAGNOSIS — C7B.8 SECONDARY NEUROENDOCRINE TUMOR OF BONE: ICD-10-CM

## 2018-11-26 DIAGNOSIS — C7B.8 SECONDARY NEUROENDOCRINE TUMOR OF DISTANT LYMPH NODES: Primary | ICD-10-CM

## 2018-11-26 PROCEDURE — 99024 POSTOP FOLLOW-UP VISIT: CPT | Mod: S$GLB,,, | Performed by: NURSE PRACTITIONER

## 2018-11-26 PROCEDURE — 3078F DIAST BP <80 MM HG: CPT | Mod: CPTII,S$GLB,, | Performed by: INTERNAL MEDICINE

## 2018-11-26 PROCEDURE — 1101F PT FALLS ASSESS-DOCD LE1/YR: CPT | Mod: CPTII,S$GLB,, | Performed by: INTERNAL MEDICINE

## 2018-11-26 PROCEDURE — 99999 PR PBB SHADOW E&M-EST. PATIENT-LVL IV: CPT | Mod: PBBFAC,,, | Performed by: INTERNAL MEDICINE

## 2018-11-26 PROCEDURE — 99999 PR PBB SHADOW E&M-EST. PATIENT-LVL III: CPT | Mod: PBBFAC,,, | Performed by: NURSE PRACTITIONER

## 2018-11-26 PROCEDURE — 3075F SYST BP GE 130 - 139MM HG: CPT | Mod: CPTII,S$GLB,, | Performed by: INTERNAL MEDICINE

## 2018-11-26 PROCEDURE — 99214 OFFICE O/P EST MOD 30 MIN: CPT | Mod: S$GLB,,, | Performed by: INTERNAL MEDICINE

## 2018-11-26 RX ORDER — CEPHALEXIN 500 MG/1
500 CAPSULE ORAL 4 TIMES DAILY
COMMUNITY
End: 2018-11-26 | Stop reason: ALTCHOICE

## 2018-11-26 RX ORDER — ONDANSETRON 4 MG/1
4 TABLET, FILM COATED ORAL EVERY 8 HOURS PRN
COMMUNITY
End: 2018-12-04

## 2018-11-26 NOTE — PROGRESS NOTES
PATIENT: Hailey Sawant  MRN: [id number]  DATE: 11/26/2018      Diagnosis:   1. Secondary neuroendocrine tumor of distant lymph nodes    2. Carotid paraganglioma    3. Secondary neuroendocrine tumor of bone        Chief Complaint: pheochromocytoma      Oncologic History:      Oncologic History Pheochromocytoma, right adrenal, diagnosed 11/2009  Recurrent disease to bone diagnosed 02/2016    Oncologic Treatment Bilateral adrenalectomy 11/2009  Right open partial nephrectomy 10/2017 (Dr. Castillo)  [id number] 03/2016, 07/2016, 11/2016  Removal of right carotid body tumor 11/2018 (Dr. Ca)    Pathology 11/2009:  Pheochromocytoma, right          Subjective:    Interval History: Ms. Sawant is a 73 y.o. female who is seen in follow-up for a pheochromocytoma.  Since I had seen her last she underwent removal of a right carotid body tumor.  Pathology did reveal paraganglioma.  Lymph nodes were negative.  She has been healing from the surgery and states her pain continues to improve.  She has no other new complaints.    Her history dates to 2009 when she was being worked up for abdominal pain and was found to have cholelithiasis, however, during this workup she was found to have bilateral adrenal masses.  She was also noted to be hypertensive.  She had a 6.5 cm mass on the right with some evidence of necrosis and also a mass just under 3 cm on the left.  Biochemical workup was consistent with pheochromocytoma.  In 11/2009 she underwent a bilateral adrenalectomy.  Pathology had revealed a right adrenal gland pheochromocytoma and a left adrenal gland adrenal cortical adenoma.  In 02/2016 she was noted to have recurrent disease to the bone and underwent I 131 MIBG therapy in 03/2016, 07/2016 and 11/2016.  In 10/2016 she underwent a right partial nephrectomy due to recurrent disease.  Pathology did confirm findings consistent with recurrent pheochromocytoma.  In 11/2018 she underwent removal of the right carotid body tumor  with pathology showing paraganglioma.    Past Medical History:   Past Medical History:   Diagnosis Date    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    Diabetes mellitus     Hypertension     Lumbar spondylosis 6/8/2018    Malignant pheochromocytoma     Pheochromocytoma     Pheochromocytoma, malignant 01/10/2016    Pheochromocytoma, malignant     Sacroiliac joint pain 7/11/2018    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    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/17/2018    Thyroid disease        Past Surgical HIstory:   Past Surgical History:   Procedure Laterality Date    ADRENALECTOMY      ADRENALECTOMY open exploratory Right 10/2/2017    Performed by Sandoval Castillo MD at [facility] OR [address] [place] [address]    ANGIOGRAM N/A 11/15/2018    Performed by [facility] Diagnostic Provider at [facility] OR [address] [place] [address]    ANGIOGRAPHY N/A 11/15/2018    Procedure: ANGIOGRAM;  Surgeon: [facility] Diagnostic Provider;  Location: [facility] OR [address] [place] [address];  Service: Radiology;  Laterality: N/A;    APPENDECTOMY      [id number] N/A 2/8/2016    Performed by [facility] Diagnostic Provider at [facility] OR [address] [place] [address]    BLOCK, SACROILIAC JOINT Right 6/26/2018    Performed by Sydney Reynoso MD at [facility] PAIN MGT    CATHETERIZATION, HEART, LEFT Left 7/18/2018    Performed by Moustapha Vasquez MD at [facility] CATH LAB    DISSECTION OF NECK Right 11/16/2018    Procedure: DISSECTION, NECK to remove right carotid body tumor;  Surgeon: Noah Ca MD;  Location: [facility] OR [address] [place] [address];  Service: ENT;  Laterality: Right;    DISSECTION, NECK to remove right carotid body tumor Right 11/16/2018    Performed by Noah Ca MD at [facility] OR [address] [place] [address]    INJECTION OF ANESTHETIC AGENT INTO SACROILIAC JOINT Right 6/26/2018    Procedure: BLOCK, SACROILIAC JOINT;  Surgeon: Sydney Reynoso MD;  Location: [facility] PAIN MGT;  Service: Pain Management;  Laterality: Right;  Right SI Joint Injection    [id number]    NEEDS CONSENT    LEFT HEART CATHETERIZATION Left 7/18/2018     Procedure: CATHETERIZATION, HEART, LEFT;  Surgeon: Moustapha Vasquez MD;  Location: [facility] CATH LAB;  Service: Cardiovascular;  Laterality: Left;    MIBG Therapy  3/2016, 7/2016, 12/2016    ELIJAH Martines       Family History:   Family History   Problem Relation Age of Onset    Heart disease Mother     Diabetes Mother     Heart disease Father     Cancer Brother     Lung cancer Brother     Cancer Brother     Melanoma Neg Hx        Social History:  reports that  has never smoked. she has never used smokeless tobacco. She reports that she does not drink alcohol or use drugs.    Allergies:  Review of patient's allergies indicates:  No Known Allergies    Medications:  Current Outpatient Medications   Medication Sig Dispense Refill    aspirin 81 MG Chew Take 1 tablet (81 mg total) by mouth once daily. Resume on Sunday, 11/18  0    cholecalciferol, vitamin D3, (VITAMIN D3) 5,000 unit Tab Take 5,000 Units by mouth once daily.      doxazosin (CARDURA) 1 MG tablet Take 1 tablet (1 mg total) by mouth every evening. 30 tablet 11    fludrocortisone (FLORINEF) 0.1 mg Tab Take 100 mcg by mouth once daily.      furosemide (LASIX) 40 MG tablet       HYDROcodone-acetaminophen (NORCO) 5-325 mg per tablet Take 1 tablet by mouth every 6 (six) hours as needed for Pain. 20 tablet 0    hydrocortisone (CORTEF) 20 MG Tab TAKE 1 TABLET EVERY MORNING  AND TAKE 1/2 TABLET EVERY EVENING  AT  5PM 135 tablet 6    losartan (COZAAR) 50 MG tablet Take 50 mg by mouth once daily.      metFORMIN (GLUCOPHAGE-XR) 500 MG 24 hr tablet TAKE 1 TABLET TWICE DAILY WITH MEALS 180 tablet 3    NIFEdipine (PROCARDIA-XL) 90 MG (OSM) 24 hr tablet Take 90 mg by mouth once daily.       ondansetron (ZOFRAN) 4 MG tablet Take 4 mg by mouth every 8 (eight) hours as needed for Nausea.      ondansetron (ZOFRAN-ODT) 8 MG TbDL Take 1 tablet (8 mg total) by mouth every 8 (eight) hours as needed. 20 tablet 0    TRUE METRIX AIR GLUCOSE METER kit CHECK  "BLOOD SUGAR ONE TIME DAILY 1 each 0    TRUE METRIX GLUCOSE TEST STRIP Strp CHECK BLOOD SUGAR ONE TIME DAILY 100 strip 3    TRUEPLUS LANCETS 28 gauge Misc TEST ONE TIME DAILY 100 each 6     No current facility-administered medications for this visit.        Review of Systems   Constitutional: Negative for chills, fever and unexpected weight change.   HENT: Negative for congestion, hearing loss and nosebleeds.    Eyes: Negative for visual disturbance.   Respiratory: Negative for cough and shortness of breath.    Cardiovascular: Negative for chest pain and palpitations.   Gastrointestinal: Negative for abdominal pain, blood in stool, constipation, diarrhea, nausea and vomiting.   Genitourinary: Negative for dysuria.   Musculoskeletal: Positive for back pain and neck pain. Negative for gait problem.   Skin: Negative for color change and rash.   Neurological: Negative for dizziness, weakness and headaches.   Hematological: Negative for adenopathy. Does not bruise/bleed easily.   Psychiatric/Behavioral: Negative for confusion.       ECOG Performance Status:   ECOG SCORE    0 - Fully active-able to carry on all pre-disease performance without restriction         Objective:      Vitals:   Vitals:    11/26/18 1149   BP: 135/63   Pulse: (!) 59   Resp: 20   Temp: 97.9 °F (36.6 °C)   SpO2: 98%   Weight: 91.1 kg (200 lb 13.4 oz)   Height: 5' 4" (1.626 m)     BMI: Body mass index is 34.47 kg/m².    Physical Exam   Constitutional: She is oriented to person, place, and time. She appears well-developed and well-nourished. No distress.   HENT:   Head: Normocephalic.   Mouth/Throat: No oropharyngeal exudate.   Eyes: EOM are normal. No scleral icterus.   Neck: Neck supple. No tracheal deviation present. No thyromegaly present.   Cardiovascular: Normal rate and regular rhythm.   Pulmonary/Chest: Effort normal and breath sounds normal. No respiratory distress. She has no wheezes. She has no rales.   Abdominal: Soft. She exhibits no " distension and no mass. There is no tenderness. There is no rebound and no guarding.   Musculoskeletal: Normal range of motion. She exhibits no edema.   Lymphadenopathy:     She has no cervical adenopathy.   Neurological: She is alert and oriented to person, place, and time. No cranial nerve deficit.   Skin: Skin is warm and dry.   Psychiatric: She has a normal mood and affect.       Laboratory Data:  No visits with results within 1 Week(s) from this visit.   Latest known visit with results is:   Admission on 11/15/2018, Discharged on 11/17/2018   Component Date Value Ref Range Status    WBC 11/15/2018 5.80  3.90 - 12.70 K/uL Final    RBC 11/15/2018 3.78* 4.00 - 5.40 M/uL Final    Hemoglobin 11/15/2018 10.8* 12.0 - 16.0 g/dL Final    Hematocrit 11/15/2018 34.2* 37.0 - 48.5 % Final    MCV 11/15/2018 91  82 - 98 fL Final    MCH 11/15/2018 28.6  27.0 - 31.0 pg Final    MCHC 11/15/2018 31.6* 32.0 - 36.0 g/dL Final    RDW 11/15/2018 14.4  11.5 - 14.5 % Final    Platelets 11/15/2018 255  150 - 350 K/uL Final    MPV 11/15/2018 9.9  9.2 - 12.9 fL Final    Immature Granulocytes 11/15/2018 0.5  0.0 - 0.5 % Final    Gran # (ANC) 11/15/2018 3.1  1.8 - 7.7 K/uL Final    Immature Grans (Abs) 11/15/2018 0.03  0.00 - 0.04 K/uL Final    Comment: Mild elevation in immature granulocytes is non specific and   can be seen in a variety of conditions including stress response,   acute inflammation, trauma and pregnancy. Correlation with other   laboratory and clinical findings is essential.      Lymph # 11/15/2018 1.8  1.0 - 4.8 K/uL Final    Mono # 11/15/2018 0.6  0.3 - 1.0 K/uL Final    Eos # 11/15/2018 0.3  0.0 - 0.5 K/uL Final    Baso # 11/15/2018 0.04  0.00 - 0.20 K/uL Final    nRBC 11/15/2018 0  0 /100 WBC Final    Gran% 11/15/2018 53.6  38.0 - 73.0 % Final    Lymph% 11/15/2018 30.2  18.0 - 48.0 % Final    Mono% 11/15/2018 10.2  4.0 - 15.0 % Final    Eosinophil% 11/15/2018 4.8  0.0 - 8.0 % Final    Basophil%  11/15/2018 0.7  0.0 - 1.9 % Final    Differential Method 11/15/2018 Automated   Final    Prothrombin Time 11/15/2018 10.7  9.0 - 12.5 sec Final    INR 11/15/2018 1.0  0.8 - 1.2 Final    Comment: Coumadin Therapy:  2.0 - 3.0 for INR for all indicators except mechanical heart valves  and antiphospholipid syndromes which should use 2.5 - 3.5.      POCT Glucose 11/15/2018 95  70 - 110 mg/dL Final    Sodium 11/15/2018 146* 136 - 145 mmol/L Final    Potassium 11/15/2018 2.6* 3.5 - 5.1 mmol/L Final    Comment: *Critical value -  Results called to and read back by:Sheila High RN      Chloride 11/15/2018 108  95 - 110 mmol/L Final    CO2 11/15/2018 28  23 - 29 mmol/L Final    Glucose 11/15/2018 100  70 - 110 mg/dL Final    BUN, Bld 11/15/2018 10  8 - 23 mg/dL Final    Creatinine 11/15/2018 0.7  0.5 - 1.4 mg/dL Final    Calcium 11/15/2018 9.0  8.7 - 10.5 mg/dL Final    Anion Gap 11/15/2018 10  8 - 16 mmol/L Final    eGFR if African American 11/15/2018 >60.0  >60 mL/min/1.73 m^2 Final    eGFR if non African American 11/15/2018 >60.0  >60 mL/min/1.73 m^2 Final    Comment: Calculation used to obtain the estimated glomerular filtration  rate (eGFR) is the CKD-EPI equation.       POCT Glucose 11/15/2018 104  70 - 110 mg/dL Final    Sodium 11/16/2018 146* 136 - 145 mmol/L Final    Potassium 11/16/2018 3.0* 3.5 - 5.1 mmol/L Final    Chloride 11/16/2018 117* 95 - 110 mmol/L Final    CO2 11/16/2018 22* 23 - 29 mmol/L Final    Glucose 11/16/2018 71  70 - 110 mg/dL Final    BUN, Bld 11/16/2018 7* 8 - 23 mg/dL Final    Creatinine 11/16/2018 0.6  0.5 - 1.4 mg/dL Final    Calcium 11/16/2018 6.3* 8.7 - 10.5 mg/dL Final    *Critical value -  Results called to and read back by:DIONTE LOZADA. BRIGID    Anion Gap 11/16/2018 7* 8 - 16 mmol/L Final    eGFR if African American 11/16/2018 >60.0  >60 mL/min/1.73 m^2 Final    eGFR if non African American 11/16/2018 >60.0  >60 mL/min/1.73 m^2 Final    Comment: Calculation  used to obtain the estimated glomerular filtration  rate (eGFR) is the CKD-EPI equation.       UNIT NUMBER 11/16/2018 [id number]   Final    PRODUCT CODE 11/16/2018 [id number]   Final    DISPENSE STATUS 11/16/2018 RETURNED   Final    CODING SYSTEM 11/16/2018 [id number]   Final    Unit Blood Type Code 11/16/2018 5100   Final    Unit Blood Type 11/16/2018 O POS   Final    Unit Expiration 11/16/2018 [id number]   Final    UNIT NUMBER 11/16/2018 [id number]   Final    PRODUCT CODE 11/16/2018 [id number]   Final    DISPENSE STATUS 11/16/2018 RETURNED   Final    CODING SYSTEM 11/16/2018 [id number]   Final    Unit Blood Type Code 11/16/2018 5100   Final    Unit Blood Type 11/16/2018 O POS   Final    Unit Expiration 11/16/2018 [id number]   Final    Group & Rh 11/16/2018 O POS   Final    Indirect Óscar 11/16/2018 NEG   Final    POCT Glucose 11/16/2018 135* 70 - 110 mg/dL Final    WBC 11/16/2018 10.11  3.90 - 12.70 K/uL Final    RBC 11/16/2018 3.58* 4.00 - 5.40 M/uL Final    Hemoglobin 11/16/2018 10.2* 12.0 - 16.0 g/dL Final    Hematocrit 11/16/2018 32.5* 37.0 - 48.5 % Final    MCV 11/16/2018 91  82 - 98 fL Final    MCH 11/16/2018 28.5  27.0 - 31.0 pg Final    MCHC 11/16/2018 31.4* 32.0 - 36.0 g/dL Final    RDW 11/16/2018 14.4  11.5 - 14.5 % Final    Platelets 11/16/2018 221  150 - 350 K/uL Final    MPV 11/16/2018 9.5  9.2 - 12.9 fL Final    Immature Granulocytes 11/16/2018 0.3  0.0 - 0.5 % Final    Gran # (ANC) 11/16/2018 8.4* 1.8 - 7.7 K/uL Final    Immature Grans (Abs) 11/16/2018 0.03  0.00 - 0.04 K/uL Final    Comment: Mild elevation in immature granulocytes is non specific and   can be seen in a variety of conditions including stress response,   acute inflammation, trauma and pregnancy. Correlation with other   laboratory and clinical findings is essential.      Lymph # 11/16/2018 1.2  1.0 - 4.8 K/uL Final    Mono # 11/16/2018 0.4  0.3 - 1.0 K/uL Final    Eos # 11/16/2018 0.0  0.0 - 0.5 K/uL  Final    Baso # 11/16/2018 0.02  0.00 - 0.20 K/uL Final    nRBC 11/16/2018 0  0 /100 WBC Final    Gran% 11/16/2018 82.9* 38.0 - 73.0 % Final    Lymph% 11/16/2018 12.1* 18.0 - 48.0 % Final    Mono% 11/16/2018 4.3  4.0 - 15.0 % Final    Eosinophil% 11/16/2018 0.2  0.0 - 8.0 % Final    Basophil% 11/16/2018 0.2  0.0 - 1.9 % Final    Differential Method 11/16/2018 Automated   Final    Sodium 11/16/2018 140  136 - 145 mmol/L Final    Potassium 11/16/2018 4.0  3.5 - 5.1 mmol/L Final    Chloride 11/16/2018 110  95 - 110 mmol/L Final    CO2 11/16/2018 24  23 - 29 mmol/L Final    Glucose 11/16/2018 135* 70 - 110 mg/dL Final    BUN, Bld 11/16/2018 7* 8 - 23 mg/dL Final    Creatinine 11/16/2018 0.7  0.5 - 1.4 mg/dL Final    Calcium 11/16/2018 8.7  8.7 - 10.5 mg/dL Final    Anion Gap 11/16/2018 6* 8 - 16 mmol/L Final    eGFR if African American 11/16/2018 >60.0  >60 mL/min/1.73 m^2 Final    eGFR if non African American 11/16/2018 >60.0  >60 mL/min/1.73 m^2 Final    Comment: Calculation used to obtain the estimated glomerular filtration  rate (eGFR) is the CKD-EPI equation.       Magnesium 11/16/2018 1.6  1.6 - 2.6 mg/dL Final    Phosphorus 11/16/2018 3.3  2.7 - 4.5 mg/dL Final    POC PH 11/16/2018 7.427  7.35 - 7.45 Final    POC PCO2 11/16/2018 37.9  35 - 45 mmHg Final    POC PO2 11/16/2018 164* 80 - 100 mmHg Final    POC HCO3 11/16/2018 25.0  24 - 28 mmol/L Final    POC BE 11/16/2018 1  -2 to 2 mmol/L Final    POC SATURATED O2 11/16/2018 100  95 - 100 % Final    POC Glucose 11/16/2018 97  70 - 110 mg/dL Final    POC Sodium 11/16/2018 147* 136 - 145 mmol/L Final    POC Potassium 11/16/2018 2.6* 3.5 - 5.1 mmol/L Final    POC TCO2 11/16/2018 26  23 - 27 mmol/L Final    POC Ionized Calcium 11/16/2018 1.09  1.06 - 1.42 mmol/L Final    POC Hematocrit 11/16/2018 33* 36 - 54 %PCV Final    Sample 11/16/2018 ARTERIAL   Final    POC PH 11/16/2018 7.400  7.35 - 7.45 Final    POC PCO2 11/16/2018 38.9  35  - 45 mmHg Final    POC PO2 11/16/2018 82  80 - 100 mmHg Final    POC HCO3 11/16/2018 24.1  24 - 28 mmol/L Final    POC BE 11/16/2018 -1  -2 to 2 mmol/L Final    POC SATURATED O2 11/16/2018 96  95 - 100 % Final    POC Glucose 11/16/2018 99  70 - 110 mg/dL Final    POC Sodium 11/16/2018 145  136 - 145 mmol/L Final    POC Potassium 11/16/2018 3.6  3.5 - 5.1 mmol/L Final    POC TCO2 11/16/2018 25  23 - 27 mmol/L Final    POC Ionized Calcium 11/16/2018 1.23  1.06 - 1.42 mmol/L Final    POC Hematocrit 11/16/2018 29* 36 - 54 %PCV Final    Sample 11/16/2018 ARTERIAL   Final    POCT Glucose 11/16/2018 127* 70 - 110 mg/dL Final    POCT Glucose 11/16/2018 158* 70 - 110 mg/dL Final    WBC 11/17/2018 9.04  3.90 - 12.70 K/uL Final    RBC 11/17/2018 3.43* 4.00 - 5.40 M/uL Final    Hemoglobin 11/17/2018 9.9* 12.0 - 16.0 g/dL Final    Hematocrit 11/17/2018 32.0* 37.0 - 48.5 % Final    MCV 11/17/2018 93  82 - 98 fL Final    MCH 11/17/2018 28.9  27.0 - 31.0 pg Final    MCHC 11/17/2018 30.9* 32.0 - 36.0 g/dL Final    RDW 11/17/2018 14.5  11.5 - 14.5 % Final    Platelets 11/17/2018 227  150 - 350 K/uL Final    MPV 11/17/2018 9.8  9.2 - 12.9 fL Final    Immature Granulocytes 11/17/2018 0.3  0.0 - 0.5 % Final    Gran # (ANC) 11/17/2018 7.7  1.8 - 7.7 K/uL Final    Immature Grans (Abs) 11/17/2018 0.03  0.00 - 0.04 K/uL Final    Comment: Mild elevation in immature granulocytes is non specific and   can be seen in a variety of conditions including stress response,   acute inflammation, trauma and pregnancy. Correlation with other   laboratory and clinical findings is essential.      Lymph # 11/17/2018 1.0  1.0 - 4.8 K/uL Final    Mono # 11/17/2018 0.3  0.3 - 1.0 K/uL Final    Eos # 11/17/2018 0.0  0.0 - 0.5 K/uL Final    Baso # 11/17/2018 0.03  0.00 - 0.20 K/uL Final    nRBC 11/17/2018 0  0 /100 WBC Final    Gran% 11/17/2018 84.9* 38.0 - 73.0 % Final    Lymph% 11/17/2018 11.0* 18.0 - 48.0 % Final    Mono%  11/17/2018 3.4* 4.0 - 15.0 % Final    Eosinophil% 11/17/2018 0.1  0.0 - 8.0 % Final    Basophil% 11/17/2018 0.3  0.0 - 1.9 % Final    Differential Method 11/17/2018 Automated   Final    Sodium 11/17/2018 140  136 - 145 mmol/L Final    Potassium 11/17/2018 3.6  3.5 - 5.1 mmol/L Final    Chloride 11/17/2018 109  95 - 110 mmol/L Final    CO2 11/17/2018 25  23 - 29 mmol/L Final    Glucose 11/17/2018 143* 70 - 110 mg/dL Final    BUN, Bld 11/17/2018 7* 8 - 23 mg/dL Final    Creatinine 11/17/2018 0.7  0.5 - 1.4 mg/dL Final    Calcium 11/17/2018 8.7  8.7 - 10.5 mg/dL Final    Anion Gap 11/17/2018 6* 8 - 16 mmol/L Final    eGFR if African American 11/17/2018 >60.0  >60 mL/min/1.73 m^2 Final    eGFR if non African American 11/17/2018 >60.0  >60 mL/min/1.73 m^2 Final    Comment: Calculation used to obtain the estimated glomerular filtration  rate (eGFR) is the CKD-EPI equation.       Magnesium 11/17/2018 2.4  1.6 - 2.6 mg/dL Final    Phosphorus 11/17/2018 3.2  2.7 - 4.5 mg/dL Final    POCT Glucose 11/16/2018 122* 70 - 110 mg/dL Final    POCT Glucose 11/17/2018 136* 70 - 110 mg/dL Final    POCT Glucose 11/17/2018 103  70 - 110 mg/dL Final     Supplemental Diagnosis  1, 2. THE RESULTS OF ADDITIONAL IMMUNOSTAINS ARE AS FOLLOWS: CHROMOGRANIN IS STRONGLY  POSITIVE. S100 IS FOCALLY POSITIVE. PANCYTOKERATIN (AE1/AE3) IS NEGATIVE. THE POSITIVE AND  NEGATIVE CONTROLS STAIN APPROPRIATELY.THESE RESULTS SUPPORT THE DIAGNOSIS OF  METASTATIC/RECURRENT PHEOCHROMOCYTOMA.  (Electronically Signed: 2017-10-10 10:09:46 )  Diagnosed by: Topher Mohan M.D.  FINAL PATHOLOGIC DIAGNOSIS  1. RIGHT RETROCAVAL LYMPH NODE IS VIRTUALLY REPLACED BY METASTATIC PHEOCHROMOCYTOMA,  SEE [id number] AND [id number]. IMMUNOSTAIN FOR SYNAPTOPHYSIN IS STRONGLY POSITIVE.  IMMUNOSTAINS FOR CALRETININ AND INHIBIN ARE NEGATIVE. THE POSITIVE AND NEGATIVE  CONTROLS STAIN APPROPRIATELY  2. TISSUE FROM THE RIGHT ADRENAL FOSSA SHOWS RECURRENT  PHEOCHROMOCYTOMA.  IMMUNOSTAIN FOR SYNAPTOPHYSIN IS STRONGLY POSITIVE. IMMUNOSTAINS FOR CALRETININ AND  INHIBIN ARE NEGATIVE. THE POSITIVE AND NEGATIVE CONTROLS STAIN APPROPRIATELY.  Diagnosed by: Topher Mohan M.D.      Imaging:   CT 08/31/2018  COMPARISON:  Prior PET CT dated 08/01/2018 and prior abdominal and pelvic CT dated 01/25/2018 and prior thoracic CT dated 08/26/2017 and prior abdomen pelvis CT from 10/19/2016    FINDINGS:  There is a 1.2 cm hypoechoic nodule in the left thyroid lobe which is stable from previous exams and demonstrated no abnormal activity on recent PET-CT.  The mediastinal structures are midline.  The heart is mildly enlarged.  There is a left-sided aortic arch with a bovine configuration of branch vessels.  There is no evidence of mediastinal or hilar adenopathy.    The airways are patent.  The lungs appear clear.  There is a small right posterior diaphragmatic hernia, stable which contains fat.  There is no pleural fluid.    The liver is normal in size and contour.  No focal hepatic lesions are seen.  There is postsurgical change consistent with cholecystectomy and prominence of intra and extrahepatic biliary ducts, similar to the prior studies.    There is postsurgical change consistent with prior bilateral adrenalectomy.  A 1.3 cm soft tissue lesion is present in the right adrenal operative bed which corresponds to hypermetabolic activity on recent PET-CT.  No liver lesions are detected.    There is fatty atrophy of the pancreas.    The kidneys concentrate and excrete contrast appropriately.    Stomach and small bowel are nondilated.  The colon has normal appearance    The bladder has a normal appearance.    There is no free air or free fluid in the abdomen or pelvis.  No adenopathy is seen.  A subtle sclerotic focus in the left acetabulum corresponds to hypermetabolic activity on prior PET-CT worrisome for metastasis.  There is a similar lesion in the right scapula.      Impression        1. Soft tissue nodule measuring 1.3 cm at the right adrenalectomy site corresponds to hypermetabolic activity on recent PET-CT and is most consistent with recurrent pheochromocytoma.  No liver lesions are identified.  2. Sclerotic foci in the left acetabulum and right scapula consistent with known bone metastases  3. Cardiomegaly  4. Postsurgical changes as above  5. Stable small diaphragmatic hernia       Gallium 68 PET-CT 08/01/2018  EXAMINATION:  NM PET 68GA DOTATATE WHOLE BODY    CLINICAL HISTORY:  Malignant neoplasm of unspecified part of right adrenal gland    FINDINGS:  Patient was administered 4.7 millicuries of gallium 68 octreotide intravenously.  There is a large jugular chain right retromandibular hypermetabolic mass SUV max 167.73.  There is a right infra glenoid bone metastasis SUV max 15.52.  There is a left inferior acetabular metastasis SUV max 15.69.  There is a right liver versus is adrenal lesion SUV max 49.44.    There is physiologic intracranial, head, and neck activity.  Spleen, pancreas, GI and  activity is unremarkable.  No lung metastases are seen.  Lungs are clear.      Impression       Somatostatin receptor positive neoplastic deposits as follows:    Right jugular chain SUV max 167.73.    Right infra glenoid bone metastasis SUV max 15.52.    Left inferior acetabular metastasis SUV max 15.69.    Right adrenal recurrence versus liver metastasis SUV max 49.44.            Assessment:       1. Secondary neuroendocrine tumor of distant lymph nodes    2. Carotid paraganglioma    3. Secondary neuroendocrine tumor of bone           Plan:     Mrs. Sawant is recovering well from her surgery.  She will now follow up with Interventional Radiology for consideration of a microwave ablation to her adrenal lesion.  She is already met with Dr. Henson.  I will plan to see her back following this procedure.  All questions were answered and she is agreeable with this plan.      Ryan Prasad DO,  FACP  Hematology & Oncology  [address] [place] [address]  ph. [phone]  Fax. [phone]    25 minutes were spent in coordination of patient's care, record review and counseling.  More than 50% of the time was face-to-face.

## 2018-11-26 NOTE — ASSESSMENT & PLAN NOTE
Hailey Sawant is 10 days s/p resection of carotid body tumor. Healing well. We discussed her path report. Questions answered. Follow up with Dr. Prasad as scheduled. RTC here prn.

## 2018-11-26 NOTE — Clinical Note
She will need to get scheduled with Interventional Radiology in Dr. Natiivdad [place] for a microwave ablation of an adrenal lesion.  She has previously met with him in clinic.  I will see her back about a month after this procedure.

## 2018-11-26 NOTE — PROGRESS NOTES
Chief Complaint   Patient presents with    Post-op Evaluation       HPI   73 y.o. female presents for a post op visit. She has been doing well since her surgery. She reports some pain to her neck and jaw since surgery. No other complaints.    She was referred from Dr. Prasad with a history of metastatic pheochromocytoma.  She was recently noted to have R neck disease on an MIBG study.  She has no head and neck complaints.  She denies throat pain or dysphagia.  She is feeling well otherwise.      Review of Systems   Constitutional: Negative for fatigue and unexpected weight change.   HENT: Per HPI.  Eyes: Negative for visual disturbance.   Respiratory: Negative for shortness of breath, hemoptysis   Cardiovascular: Negative for chest pain and palpitations.   Musculoskeletal: Negative for decreased ROM, back pain.   Skin: Negative for rash, sunburn, itching.   Neurological: Negative for dizziness and seizures.   Hematological: Negative for adenopathy. Does not bruise/bleed easily.   Endocrine: Negative for rapid weight loss/weight gain, heat/cold intolerance.     Past Medical History   Patient Active Problem List   Diagnosis    Hypoadrenalism    Essential hypertension    IGNACIO (dyspnea on exertion)    Vitamin D deficient osteomalacia    H/O total adrenalectomy    Pheochromocytoma, malignant    Pheochromocytoma    Controlled type 2 diabetes mellitus without complication    Low back pain    Lumbar spondylosis    Intervertebral disk disease    Chronic bilateral low back pain with right-sided sciatica    Weakness of both hips    Sacroiliac joint pain    Chronic low back pain with right-sided sciatica    Chest pain    Acute heart failure    Unstable angina pectoris due to coronary arteriosclerosis    Sick sinus syndrome    Secondary neuroendocrine tumor of distant lymph nodes    Secondary neuroendocrine tumor of bone    Carotid body paraganglioma    Paraganglioma    Carotid paraganglioma            Past Surgical History   Past Surgical History:   Procedure Laterality Date    ADRENALECTOMY      ADRENALECTOMY open exploratory Right 10/2/2017    Performed by Sandoval Castillo MD at [facility] OR [address] [place] [address]    ANGIOGRAM N/A 11/15/2018    Performed by [facility] Diagnostic Provider at [facility] OR [address] [place] [address]    ANGIOGRAPHY N/A 11/15/2018    Procedure: ANGIOGRAM;  Surgeon: [facility] Provider;  Location: [facility] OR [address] [place] [address];  Service: Radiology;  Laterality: N/A;    APPENDECTOMY      [id number] N/A 2/8/2016    Performed by [facility] Diagnostic Provider at [facility] OR [address] [place] [address]    BLOCK, SACROILIAC JOINT Right 6/26/2018    Per[facility] by [facility] at [facility]    CATHETERIZATION, HEART, LEFT Left 7/18/2018    Performed by Moustapha Vasquez MD at [facility] CATH LAB    DISSECTION OF NECK Right 11/16/2018    Procedure: DISSECTION, NECK to remove right carotid body tumor;  Surgeon: Noah Ca MD;  Location: [facility] OR [address] [place] [address];  Service: ENT;  Laterality: Right;    DISSECTION, NECK to remove right carotid body tumor Right 11/16/2018    Performed by Noah Ca MD at [facility] OR [address] [place] [address]    INJECTION OF ANESTHETIC AGENT INTO SACROILIAC JOINT Right 6/26/2018    Procedure: BLOCK, SACROILIAC JOINT;  Surgeon: Sydney Reynoso MD;  Location: [facility] PAIN T;  Service: Pain Management;  Laterality: Right;  Right SI Joint Injection    [address]    NEEDS CONSENT    LEFT HEART CATHETERIZATION Left 7/18/2018    Procedure: CATHETERIZATION, HEART, LEFT;  Surgeon: Moustapha Vasquez MD;  Location: [facility] CATH LAB;  Service: Cardiovascular;  Laterality: Left;    MIBG Therapy  3/2016, 7/2016, 12/2016    ELIJAH Martines         Family History   Family History   Problem Relation Age of Onset    Heart disease Mother     Diabetes Mother     Heart disease Father     Cancer Brother     Lung cancer Brother     Cancer Brother     Melanoma Neg Hx            Social History   .  Social History     Socioeconomic History     Marital status: Single     Spouse name: Not on file    Number of children: Not on file    Years of education: Not on file    Highest education level: Not on file   Social Needs    Financial resource strain: Not on file    Food insecurity - worry: Not on file    Food insecurity - inability: Not on file    Transportation needs - medical: Not on file    Transportation needs - non-medical: Not on file   Occupational History    Not on file   Tobacco Use    Smoking status: Never Smoker    Smokeless tobacco: Never Used   Substance and Sexual Activity    Alcohol use: No    Drug use: No    Sexual activity: Not on file   Other Topics Concern    Are you pregnant or think you may be? No    Breast-feeding No   Social History Narrative    Not on file         Allergies   Review of patient's allergies indicates:  No Known Allergies        Physical Exam     There were no vitals filed for this visit.      There is no height or weight on file to calculate BMI.      General: AOx3, NAD   Respiratory:  Symmetric chest rise, normal effort  Nose: No gross nasal septal deviation. Inferior Turbinates WNL bilaterally. No septal perforation. No masses/lesions.   Oral Cavity:  Oral Tongue mobile, no lesions noted. Hard Palate WNL. No buccal or FOM lesions.  Oropharynx:  No masses/lesions of the posterior pharyngeal wall. Tonsillar fossa without lesions. Soft palate without masses. Midline uvula.   Neck: Right neck Incision CDI.  No redness, drainage, or fluid collection noted.  Edges well approximated. RUTH ANN drain removed without difficulty.  Face: House Brackmann I bilaterally.     FINAL PATHOLOGIC DIAGNOSIS  1. RIGHT LEVEL 2 LYMPH NODE:  INNOCUOUS LYMPH NODE WITH NO NEOPLASIA IDENTIFIED  2. EXCISION FROM RIGHT CAROTID ARTERY:  CAROTID BODY TUMOR (PARAGANGLIOMA)    Assessment/Plan  Problem List Items Addressed This Visit        Oncology    Carotid body paraganglioma - Primary     Hailey Sawant is  10 days s/p resection of  carotid body tumor. Healing well. We discussed her path report. Questions answered. Follow up with Dr. Prasad as scheduled. RTC here prn.

## 2018-12-04 ENCOUNTER — OFFICE VISIT (OUTPATIENT)
Dept: ENDOCRINOLOGY | Facility: CLINIC | Age: 73
End: 2018-12-04
Payer: MEDICARE

## 2018-12-04 VITALS
BODY MASS INDEX: 34.25 KG/M2 | HEIGHT: 64 IN | WEIGHT: 200.63 LBS | HEART RATE: 60 BPM | SYSTOLIC BLOOD PRESSURE: 120 MMHG | DIASTOLIC BLOOD PRESSURE: 64 MMHG

## 2018-12-04 DIAGNOSIS — D44.6 PARAGANGLIOMA, CAROTID BODY: ICD-10-CM

## 2018-12-04 DIAGNOSIS — C74.91 MALIGNANT PHEOCHROMOCYTOMA OF RIGHT ADRENAL GLAND: Primary | ICD-10-CM

## 2018-12-04 PROCEDURE — 3078F DIAST BP <80 MM HG: CPT | Mod: CPTII,S$GLB,, | Performed by: INTERNAL MEDICINE

## 2018-12-04 PROCEDURE — 99999 PR PBB SHADOW E&M-EST. PATIENT-LVL III: CPT | Mod: PBBFAC,,, | Performed by: INTERNAL MEDICINE

## 2018-12-04 PROCEDURE — 1101F PT FALLS ASSESS-DOCD LE1/YR: CPT | Mod: CPTII,S$GLB,, | Performed by: INTERNAL MEDICINE

## 2018-12-04 PROCEDURE — 3074F SYST BP LT 130 MM HG: CPT | Mod: CPTII,S$GLB,, | Performed by: INTERNAL MEDICINE

## 2018-12-04 PROCEDURE — 99214 OFFICE O/P EST MOD 30 MIN: CPT | Mod: S$GLB,,, | Performed by: INTERNAL MEDICINE

## 2018-12-04 NOTE — PROGRESS NOTES
"Subjective:      Patient ID: Hailey Sawant is a 73 y.o. female.    Chief Complaint: malignant pheochromocytoma     is presenting for follow up of malignant pheochromocytoma    Had recent surgery in Oct 2017 with Dr. Castillo to remove residual right pheochromocytoma.  Pt was treated with I-131 labeled MIBG at [facility] on 3/16/2016 and on 2016, . She received 200 mCi of I 131 x 3. Thyroid was blocked with SSKI.     s/p bilat adrenalectomy  and found to have pheochromocytoma.  Current meds: hydrocortisone 20mg qam, 10qpm, not taking fludrocortisone  For bp she is currently on losartan 50mg and nifedical 60mg daily  Previously on alpha blocker (doxazosin)  And taking now since recent surgery for right carotid paragangiloma    Denies palpitations, flushing, severe bp lability    Checks BP daily at home with readings averagin-130's systolic /60-80's diastolic    DM diagnosed in . On metformin 500mg BID. Denies complications. a1c 5.4 from 10/2017    History of Vit D deficiency  Pt has lost 7 pounds since 2017.  Review of Systems   Constitutional: Negative for unexpected weight change.   Eyes: Negative for visual disturbance.   Respiratory: Negative for shortness of breath.    Cardiovascular: Negative for chest pain.   Gastrointestinal: Negative for abdominal pain.        Anorexia   Musculoskeletal: Positive for back pain and gait problem. Negative for arthralgias.   Skin: Negative for wound.   Neurological: Positive for light-headedness and headaches.   Hematological: Does not bruise/bleed easily.   Psychiatric/Behavioral: Negative for sleep disturbance.       Objective:     /64 (BP Location: Left arm, Patient Position: Sitting, BP Method: Medium (Manual))   Pulse 60   Ht 5' 4" (1.626 m)   Wt 91 kg (200 lb 9.9 oz)   BMI 34.44 kg/m²      Physical Exam   Constitutional: She appears well-developed and well-nourished.   In wheelchair but is ambulatory   HENT:   Head: " Normocephalic and atraumatic.   Neck: Normal range of motion. No thyromegaly present.   Cardiovascular: Normal rate and regular rhythm.    Pulmonary/Chest: Effort normal and breath sounds normal.   Abdominal: Soft. Bowel sounds are normal.   Musculoskeletal: Normal range of motion. She exhibits no edema.   Skin: Skin is warm and dry.   Psychiatric: She has a normal mood and affect. Her behavior is normal.   Vitals reviewed.      Assessment:     1. Malignant pheochromocytoma of right adrenal gland    2. Paraganglioma, carotid body        Plan:   1.Malignant pheo with recent repeat adrenal surgery in 10/2017 with Dr. Castillo. Recheck plasma metanephrines. Continue losartan 50mg and nifedical 60mg daily as bp is controlled on these agents. If metanephrines increase will need to consider repeat MIBG imaging and possible re-treatment with I-131.  2.On HC 20mg qam, 10mg evening. Likely obtaining some mineralocorticoids from HC, however previously on florinef. Will recheck bmp and renin level today and have re-ordered florinef 0.1mg daily. Have ordered emergency dexamethasone. Discussed sick day precautions in doubling/tripling HC dose.  3. Management as above  4. Continue metformin 500mg BID. A1C at goal.  5. Recheck vit d level  6 Repeat MIBG in 3 months  7. See pain management for low back pain which is chronic and limiting activity  8. Plasma metanephrine.  Consider genetic testing no children but several siblings        Subjective

## 2019-01-04 RX ORDER — HYDROCORTISONE 20 MG/1
TABLET ORAL
Qty: 135 TABLET | Refills: 6 | Status: SHIPPED | OUTPATIENT
Start: 2019-01-04 | End: 2020-02-06 | Stop reason: SDUPTHER

## 2019-02-20 ENCOUNTER — OFFICE VISIT (OUTPATIENT)
Dept: CARDIOLOGY | Facility: CLINIC | Age: 74
End: 2019-02-20
Attending: INTERNAL MEDICINE
Payer: MEDICARE

## 2019-02-20 VITALS
HEART RATE: 65 BPM | BODY MASS INDEX: 35 KG/M2 | HEIGHT: 64 IN | WEIGHT: 205 LBS | SYSTOLIC BLOOD PRESSURE: 151 MMHG | DIASTOLIC BLOOD PRESSURE: 76 MMHG

## 2019-02-20 DIAGNOSIS — I10 ESSENTIAL HYPERTENSION: Primary | ICD-10-CM

## 2019-02-20 DIAGNOSIS — R07.89 ATYPICAL CHEST PAIN: ICD-10-CM

## 2019-02-20 DIAGNOSIS — E11.9 CONTROLLED TYPE 2 DIABETES MELLITUS WITHOUT COMPLICATION, WITHOUT LONG-TERM CURRENT USE OF INSULIN: ICD-10-CM

## 2019-02-20 DIAGNOSIS — E89.6 H/O TOTAL ADRENALECTOMY: ICD-10-CM

## 2019-02-20 DIAGNOSIS — C74.91 MALIGNANT PHEOCHROMOCYTOMA OF RIGHT ADRENAL GLAND: ICD-10-CM

## 2019-02-20 DIAGNOSIS — I49.5 SICK SINUS SYNDROME: ICD-10-CM

## 2019-02-20 PROCEDURE — 3078F DIAST BP <80 MM HG: CPT | Mod: CPTII,S$GLB,, | Performed by: INTERNAL MEDICINE

## 2019-02-20 PROCEDURE — 3077F PR MOST RECENT SYSTOLIC BLOOD PRESSURE >= 140 MM HG: ICD-10-PCS | Mod: CPTII,S$GLB,, | Performed by: INTERNAL MEDICINE

## 2019-02-20 PROCEDURE — 1101F PT FALLS ASSESS-DOCD LE1/YR: CPT | Mod: CPTII,S$GLB,, | Performed by: INTERNAL MEDICINE

## 2019-02-20 PROCEDURE — 99214 OFFICE O/P EST MOD 30 MIN: CPT | Mod: S$GLB,,, | Performed by: INTERNAL MEDICINE

## 2019-02-20 PROCEDURE — 3044F HG A1C LEVEL LT 7.0%: CPT | Mod: CPTII,S$GLB,, | Performed by: INTERNAL MEDICINE

## 2019-02-20 PROCEDURE — 1101F PR PT FALLS ASSESS DOC 0-1 FALLS W/OUT INJ PAST YR: ICD-10-PCS | Mod: CPTII,S$GLB,, | Performed by: INTERNAL MEDICINE

## 2019-02-20 PROCEDURE — 99214 PR OFFICE/OUTPT VISIT, EST, LEVL IV, 30-39 MIN: ICD-10-PCS | Mod: S$GLB,,, | Performed by: INTERNAL MEDICINE

## 2019-02-20 PROCEDURE — 3078F PR MOST RECENT DIASTOLIC BLOOD PRESSURE < 80 MM HG: ICD-10-PCS | Mod: CPTII,S$GLB,, | Performed by: INTERNAL MEDICINE

## 2019-02-20 PROCEDURE — 3077F SYST BP >= 140 MM HG: CPT | Mod: CPTII,S$GLB,, | Performed by: INTERNAL MEDICINE

## 2019-02-20 PROCEDURE — 3044F PR MOST RECENT HEMOGLOBIN A1C LEVEL <7.0%: ICD-10-PCS | Mod: CPTII,S$GLB,, | Performed by: INTERNAL MEDICINE

## 2019-02-20 NOTE — PROGRESS NOTES
Subjective:    Patient ID:  Hailey Sawant is a 73 y.o. female     HPI   Here for follow-up of essential hypertension, atypical chest pain, history of normal coronary angiography in July of 2018, sick sinus syndrome, malignant pheochromocytoma, diabetes mellitus, history of total adrenalectomy.    I am doing well.  I had neck surgery done last November, and now I have no complaints.    Current Outpatient Medications   Medication Sig    aspirin 81 MG Chew Take 1 tablet (81 mg total) by mouth once daily. Resume on Sunday, 11/18    cholecalciferol, vitamin D3, (VITAMIN D3) 5,000 unit Tab Take 5,000 Units by mouth once daily.    doxazosin (CARDURA) 1 MG tablet Take 1 tablet (1 mg total) by mouth every evening.    fludrocortisone (FLORINEF) 0.1 mg Tab Take 100 mcg by mouth once daily.    furosemide (LASIX) 40 MG tablet Take 20 mg by mouth twice a week.     HYDROcodone-acetaminophen (NORCO) 5-325 mg per tablet Take 1 tablet by mouth every 6 (six) hours as needed for Pain.    hydrocortisone (CORTEF) 20 MG Tab TAKE 1 TABLET EVERY MORNING AND TAKE 1/2 TABLET EVERY EVENING  AT  5PM    losartan (COZAAR) 50 MG tablet Take 50 mg by mouth once daily.    metFORMIN (GLUCOPHAGE-XR) 500 MG 24 hr tablet TAKE 1 TABLET TWICE DAILY WITH MEALS    NIFEdipine (PROCARDIA-XL) 90 MG (OSM) 24 hr tablet Take 90 mg by mouth once daily.     TRUE METRIX AIR GLUCOSE METER kit CHECK BLOOD SUGAR ONE TIME DAILY    TRUE METRIX GLUCOSE TEST STRIP Strp CHECK BLOOD SUGAR ONE TIME DAILY    TRUEPLUS LANCETS 28 gauge Misc TEST ONE TIME DAILY     No current facility-administered medications for this visit.          Review of Systems   Constitution: Negative for chills, decreased appetite, fever, weight gain and weight loss.   HENT: Negative for congestion, hearing loss and sore throat.    Eyes: Negative for blurred vision, double vision and visual disturbance.   Cardiovascular: Negative for chest pain, claudication, dyspnea on exertion, leg  "swelling, palpitations and syncope.   Respiratory: Negative for cough, hemoptysis, shortness of breath, sputum production and wheezing.    Endocrine: Negative for cold intolerance and heat intolerance.   Hematologic/Lymphatic: Negative for bleeding problem. Does not bruise/bleed easily.   Skin: Negative for color change, dry skin, flushing and itching.   Musculoskeletal: Negative for back pain, joint pain and myalgias.   Gastrointestinal: Negative for abdominal pain, anorexia, constipation, diarrhea, dysphagia, nausea and vomiting.        No bleeding per rectum   Genitourinary: Negative for dysuria, flank pain, frequency, hematuria and nocturia.   Neurological: Negative for dizziness, headaches, light-headedness, loss of balance, seizures and tremors.   Psychiatric/Behavioral: Negative for altered mental status and depression.         Vitals:    02/20/19 0959   BP: (!) 151/76   Pulse: 65   Weight: 93 kg (205 lb)   Height: 5' 4" (1.626 m)    Blood pressure recheck 126/72.    Objective:    Physical Exam   Constitutional: She is oriented to person, place, and time. She appears well-developed and well-nourished.   HENT:   Head: Normocephalic and atraumatic.   Nose: Nose normal.   Mouth/Throat: Oropharynx is clear and moist.   Eyes: Conjunctivae and EOM are normal. Pupils are equal, round, and reactive to light.   Neck: Neck supple. No tracheal deviation present. No thyromegaly present.   Cardiovascular: Normal rate, regular rhythm and intact distal pulses. Exam reveals no gallop and no friction rub.   No murmur heard.  Pulmonary/Chest: No respiratory distress. She has no wheezes. She has no rales. She exhibits no tenderness.   Abdominal: Soft. Bowel sounds are normal. She exhibits no distension and no mass. There is no tenderness. There is no rebound and no guarding.   Musculoskeletal: Normal range of motion.   Lymphadenopathy:     She has no cervical adenopathy.   Neurological: She is alert and oriented to person, " place, and time.   Skin: Skin is warm and dry.   Psychiatric: Her behavior is normal.         Assessment:       1. Essential hypertension    2. Sick sinus syndrome    3. Malignant pheochromocytoma of right adrenal gland    4. H/O total adrenalectomy    5. Controlled type 2 diabetes mellitus without complication, without long-term current use of insulin    6. Atypical chest pain         Plan:       Stable from a cardiac standpoint.  Follow-up with head and neck surgery.  Continue the present pharmacological regimen.

## 2019-02-26 ENCOUNTER — TELEPHONE (OUTPATIENT)
Dept: ENDOCRINOLOGY | Facility: CLINIC | Age: 74
End: 2019-02-26

## 2019-02-26 NOTE — TELEPHONE ENCOUNTER
----- Message from Johana Jamil sent at 2/26/2019 12:53 PM CST -----  Contact: patient  Patient called to speak with a nurse. Gave no further details.    She would like a callback at [phone]    Thanks  KB

## 2019-03-13 ENCOUNTER — TELEPHONE (OUTPATIENT)
Dept: ENDOCRINOLOGY | Facility: CLINIC | Age: 74
End: 2019-03-13

## 2019-03-13 NOTE — TELEPHONE ENCOUNTER
Spoke to patient. Patient stated that she did as advise an seen her PCP for her headache and he advise her to see her endocrinologist regarding her pain assured pt that ill send provider message and get back with her asap

## 2019-03-13 NOTE — TELEPHONE ENCOUNTER
----- Message from Vee Butler sent at 3/13/2019 10:31 AM CDT -----  Contact: self [phone]  ..Needs Advice    Reason for call:        Communication Preference:phone     Additional Information:pt states she's been having headaches and the pain is shooting from her legs to her back side please call back to discuss

## 2019-03-19 ENCOUNTER — TELEPHONE (OUTPATIENT)
Dept: ENDOCRINOLOGY | Facility: CLINIC | Age: 74
End: 2019-03-19

## 2019-03-20 ENCOUNTER — OFFICE VISIT (OUTPATIENT)
Dept: ENDOCRINOLOGY | Facility: CLINIC | Age: 74
End: 2019-03-20
Payer: MEDICARE

## 2019-03-20 ENCOUNTER — TELEPHONE (OUTPATIENT)
Dept: ENDOCRINOLOGY | Facility: CLINIC | Age: 74
End: 2019-03-20

## 2019-03-20 ENCOUNTER — HOSPITAL ENCOUNTER (EMERGENCY)
Facility: HOSPITAL | Age: 74
Discharge: HOME | End: 2019-03-20
Attending: EMERGENCY MEDICINE
Payer: MEDICARE

## 2019-03-20 VITALS
OXYGEN SATURATION: 94 % | HEART RATE: 62 BPM | SYSTOLIC BLOOD PRESSURE: 166 MMHG | WEIGHT: 205 LBS | HEIGHT: 63 IN | BODY MASS INDEX: 36.32 KG/M2 | DIASTOLIC BLOOD PRESSURE: 81 MMHG | TEMPERATURE: 98 F | RESPIRATION RATE: 16 BRPM

## 2019-03-20 VITALS
WEIGHT: 205.94 LBS | RESPIRATION RATE: 16 BRPM | BODY MASS INDEX: 35.16 KG/M2 | SYSTOLIC BLOOD PRESSURE: 112 MMHG | DIASTOLIC BLOOD PRESSURE: 78 MMHG | HEART RATE: 84 BPM | HEIGHT: 64 IN

## 2019-03-20 DIAGNOSIS — S39.012A LUMBAR STRAIN, INITIAL ENCOUNTER: Primary | ICD-10-CM

## 2019-03-20 DIAGNOSIS — D35.01 PHEOCHROMOCYTOMA OF RIGHT ADRENAL GLAND: ICD-10-CM

## 2019-03-20 DIAGNOSIS — M54.42 ACUTE BILATERAL LOW BACK PAIN WITH LEFT-SIDED SCIATICA: ICD-10-CM

## 2019-03-20 DIAGNOSIS — E27.1 ADRENAL INSUFFICIENCY, PRIMARY: ICD-10-CM

## 2019-03-20 DIAGNOSIS — M25.559 HIP PAIN: ICD-10-CM

## 2019-03-20 DIAGNOSIS — M54.50 RECURRENT LOW BACK PAIN: ICD-10-CM

## 2019-03-20 DIAGNOSIS — E11.9 CONTROLLED TYPE 2 DIABETES MELLITUS WITHOUT COMPLICATION, WITHOUT LONG-TERM CURRENT USE OF INSULIN: ICD-10-CM

## 2019-03-20 DIAGNOSIS — C74.90 MALIGNANT PHEOCHROMOCYTOMA, UNSPECIFIED LATERALITY: Primary | ICD-10-CM

## 2019-03-20 DIAGNOSIS — D44.6 CAROTID BODY PARAGANGLIOMA: Primary | ICD-10-CM

## 2019-03-20 PROBLEM — D44.7 PARAGANGLIOMA: Status: RESOLVED | Noted: 2018-11-15 | Resolved: 2019-03-20

## 2019-03-20 PROBLEM — M54.41 CHRONIC LOW BACK PAIN WITH RIGHT-SIDED SCIATICA: Status: RESOLVED | Noted: 2018-07-17 | Resolved: 2019-03-20

## 2019-03-20 PROBLEM — G89.29 CHRONIC LOW BACK PAIN WITH RIGHT-SIDED SCIATICA: Status: RESOLVED | Noted: 2018-07-17 | Resolved: 2019-03-20

## 2019-03-20 LAB
ALBUMIN SERPL BCP-MCNC: 3.5 G/DL
ALP SERPL-CCNC: 107 U/L
ALT SERPL W/O P-5'-P-CCNC: 7 U/L
ANION GAP SERPL CALC-SCNC: 12 MMOL/L
ANISOCYTOSIS BLD QL SMEAR: SLIGHT
AST SERPL-CCNC: 15 U/L
BASOPHILS # BLD AUTO: 0.04 K/UL
BASOPHILS NFR BLD: 0.5 %
BILIRUB SERPL-MCNC: 0.3 MG/DL
BILIRUB UR QL STRIP: NEGATIVE
BUN SERPL-MCNC: 15 MG/DL
BUN SERPL-MCNC: 17 MG/DL (ref 6–30)
CALCIUM SERPL-MCNC: 9.6 MG/DL
CHLORIDE SERPL-SCNC: 102 MMOL/L (ref 95–110)
CHLORIDE SERPL-SCNC: 105 MMOL/L
CLARITY UR REFRACT.AUTO: CLEAR
CO2 SERPL-SCNC: 25 MMOL/L
COLOR UR AUTO: ABNORMAL
CREAT SERPL-MCNC: 0.7 MG/DL (ref 0.5–1.4)
CREAT SERPL-MCNC: 0.8 MG/DL
CRP SERPL-MCNC: 3.2 MG/L
DIFFERENTIAL METHOD: ABNORMAL
EOSINOPHIL # BLD AUTO: 0.2 K/UL
EOSINOPHIL NFR BLD: 2.8 %
ERYTHROCYTE [DISTWIDTH] IN BLOOD BY AUTOMATED COUNT: 15.7 %
ERYTHROCYTE [SEDIMENTATION RATE] IN BLOOD BY WESTERGREN METHOD: 52 MM/HR
EST. GFR  (AFRICAN AMERICAN): >60 ML/MIN/1.73 M^2
EST. GFR  (NON AFRICAN AMERICAN): >60 ML/MIN/1.73 M^2
GLUCOSE SERPL-MCNC: 102 MG/DL (ref 70–110)
GLUCOSE SERPL-MCNC: 99 MG/DL
GLUCOSE UR QL STRIP: NEGATIVE
HCT VFR BLD AUTO: 36.9 %
HCT VFR BLD CALC: 38 %PCV (ref 36–54)
HGB BLD-MCNC: 11.9 G/DL
HGB UR QL STRIP: ABNORMAL
HYPOCHROMIA BLD QL SMEAR: ABNORMAL
IMM GRANULOCYTES # BLD AUTO: 0.02 K/UL
IMM GRANULOCYTES NFR BLD AUTO: 0.2 %
KETONES UR QL STRIP: NEGATIVE
LEUKOCYTE ESTERASE UR QL STRIP: NEGATIVE
LYMPHOCYTES # BLD AUTO: 2.4 K/UL
LYMPHOCYTES NFR BLD: 29.2 %
MCH RBC QN AUTO: 28.7 PG
MCHC RBC AUTO-ENTMCNC: 32.2 G/DL
MCV RBC AUTO: 89 FL
MICROSCOPIC COMMENT: NORMAL
MONOCYTES # BLD AUTO: 0.5 K/UL
MONOCYTES NFR BLD: 6.2 %
NEUTROPHILS # BLD AUTO: 5 K/UL
NEUTROPHILS NFR BLD: 61.1 %
NITRITE UR QL STRIP: NEGATIVE
NRBC BLD-RTO: 0 /100 WBC
OVALOCYTES BLD QL SMEAR: ABNORMAL
PH UR STRIP: 6 [PH] (ref 5–8)
PLATELET # BLD AUTO: 261 K/UL
PLATELET BLD QL SMEAR: ABNORMAL
PMV BLD AUTO: 9.8 FL
POC IONIZED CALCIUM: 1.21 MMOL/L (ref 1.06–1.42)
POC TCO2 (MEASURED): 29 MMOL/L (ref 23–29)
POIKILOCYTOSIS BLD QL SMEAR: SLIGHT
POLYCHROMASIA BLD QL SMEAR: ABNORMAL
POTASSIUM BLD-SCNC: 4.3 MMOL/L (ref 3.5–5.1)
POTASSIUM SERPL-SCNC: 4.5 MMOL/L
PROT SERPL-MCNC: 7.4 G/DL
PROT UR QL STRIP: NEGATIVE
RBC # BLD AUTO: 4.14 M/UL
RBC #/AREA URNS AUTO: 3 /HPF (ref 0–4)
SAMPLE: NORMAL
SODIUM BLD-SCNC: 142 MMOL/L (ref 136–145)
SODIUM SERPL-SCNC: 142 MMOL/L
SP GR UR STRIP: 1.01 (ref 1–1.03)
SQUAMOUS #/AREA URNS AUTO: 0 /HPF
TARGETS BLD QL SMEAR: ABNORMAL
TSH SERPL DL<=0.005 MIU/L-ACNC: 1.86 UIU/ML
URN SPEC COLLECT METH UR: ABNORMAL
WBC # BLD AUTO: 8.21 K/UL
WBC #/AREA URNS AUTO: 0 /HPF (ref 0–5)

## 2019-03-20 PROCEDURE — 1101F PT FALLS ASSESS-DOCD LE1/YR: CPT | Mod: CPTII,S$GLB,, | Performed by: INTERNAL MEDICINE

## 2019-03-20 PROCEDURE — 96374 THER/PROPH/DIAG INJ IV PUSH: CPT

## 2019-03-20 PROCEDURE — 99999 PR PBB SHADOW E&M-EST. PATIENT-LVL IV: ICD-10-PCS | Mod: PBBFAC,,, | Performed by: INTERNAL MEDICINE

## 2019-03-20 PROCEDURE — 80053 COMPREHEN METABOLIC PANEL: CPT

## 2019-03-20 PROCEDURE — 3044F PR MOST RECENT HEMOGLOBIN A1C LEVEL <7.0%: ICD-10-PCS | Mod: CPTII,S$GLB,, | Performed by: INTERNAL MEDICINE

## 2019-03-20 PROCEDURE — A9585 GADOBUTROL INJECTION: HCPCS | Performed by: EMERGENCY MEDICINE

## 2019-03-20 PROCEDURE — 1101F PR PT FALLS ASSESS DOC 0-1 FALLS W/OUT INJ PAST YR: ICD-10-PCS | Mod: CPTII,S$GLB,, | Performed by: INTERNAL MEDICINE

## 2019-03-20 PROCEDURE — 81001 URINALYSIS AUTO W/SCOPE: CPT

## 2019-03-20 PROCEDURE — 84443 ASSAY THYROID STIM HORMONE: CPT

## 2019-03-20 PROCEDURE — 99999 PR PBB SHADOW E&M-EST. PATIENT-LVL IV: CPT | Mod: PBBFAC,,, | Performed by: INTERNAL MEDICINE

## 2019-03-20 PROCEDURE — 86140 C-REACTIVE PROTEIN: CPT

## 2019-03-20 PROCEDURE — 99214 OFFICE O/P EST MOD 30 MIN: CPT | Mod: S$GLB,,, | Performed by: INTERNAL MEDICINE

## 2019-03-20 PROCEDURE — 99285 EMERGENCY DEPT VISIT HI MDM: CPT | Mod: ,,, | Performed by: EMERGENCY MEDICINE

## 2019-03-20 PROCEDURE — 25500020 PHARM REV CODE 255: Performed by: EMERGENCY MEDICINE

## 2019-03-20 PROCEDURE — 3044F HG A1C LEVEL LT 7.0%: CPT | Mod: CPTII,S$GLB,, | Performed by: INTERNAL MEDICINE

## 2019-03-20 PROCEDURE — 85025 COMPLETE CBC W/AUTO DIFF WBC: CPT

## 2019-03-20 PROCEDURE — 25000003 PHARM REV CODE 250: Performed by: NURSE PRACTITIONER

## 2019-03-20 PROCEDURE — 3078F PR MOST RECENT DIASTOLIC BLOOD PRESSURE < 80 MM HG: ICD-10-PCS | Mod: CPTII,S$GLB,, | Performed by: INTERNAL MEDICINE

## 2019-03-20 PROCEDURE — 99285 PR EMERGENCY DEPT VISIT,LEVEL V: ICD-10-PCS | Mod: ,,, | Performed by: EMERGENCY MEDICINE

## 2019-03-20 PROCEDURE — 63600175 PHARM REV CODE 636 W HCPCS: Performed by: NURSE PRACTITIONER

## 2019-03-20 PROCEDURE — 3074F PR MOST RECENT SYSTOLIC BLOOD PRESSURE < 130 MM HG: ICD-10-PCS | Mod: CPTII,S$GLB,, | Performed by: INTERNAL MEDICINE

## 2019-03-20 PROCEDURE — 99285 EMERGENCY DEPT VISIT HI MDM: CPT | Mod: 25

## 2019-03-20 PROCEDURE — 3074F SYST BP LT 130 MM HG: CPT | Mod: CPTII,S$GLB,, | Performed by: INTERNAL MEDICINE

## 2019-03-20 PROCEDURE — 99214 PR OFFICE/OUTPT VISIT, EST, LEVL IV, 30-39 MIN: ICD-10-PCS | Mod: S$GLB,,, | Performed by: INTERNAL MEDICINE

## 2019-03-20 PROCEDURE — 3078F DIAST BP <80 MM HG: CPT | Mod: CPTII,S$GLB,, | Performed by: INTERNAL MEDICINE

## 2019-03-20 PROCEDURE — 85652 RBC SED RATE AUTOMATED: CPT

## 2019-03-20 RX ORDER — GABAPENTIN 300 MG/1
300 CAPSULE ORAL
Status: COMPLETED | OUTPATIENT
Start: 2019-03-20 | End: 2019-03-20

## 2019-03-20 RX ORDER — KETOROLAC TROMETHAMINE 30 MG/ML
15 INJECTION, SOLUTION INTRAMUSCULAR; INTRAVENOUS
Status: COMPLETED | OUTPATIENT
Start: 2019-03-20 | End: 2019-03-20

## 2019-03-20 RX ORDER — NAPROXEN 500 MG/1
500 TABLET ORAL 2 TIMES DAILY WITH MEALS
Qty: 14 TABLET | Refills: 0 | Status: SHIPPED | OUTPATIENT
Start: 2019-03-20 | End: 2019-03-27

## 2019-03-20 RX ORDER — MELOXICAM 15 MG/1
15 TABLET ORAL DAILY PRN
COMMUNITY

## 2019-03-20 RX ORDER — GADOBUTROL 604.72 MG/ML
10 INJECTION INTRAVENOUS
Status: COMPLETED | OUTPATIENT
Start: 2019-03-20 | End: 2019-03-20

## 2019-03-20 RX ORDER — GABAPENTIN 300 MG/1
300 CAPSULE ORAL 3 TIMES DAILY
Qty: 90 CAPSULE | Refills: 0 | Status: SHIPPED | OUTPATIENT
Start: 2019-03-20 | End: 2021-04-26

## 2019-03-20 RX ADMIN — GABAPENTIN 300 MG: 300 CAPSULE ORAL at 07:03

## 2019-03-20 RX ADMIN — KETOROLAC TROMETHAMINE 15 MG: 30 INJECTION, SOLUTION INTRAMUSCULAR at 07:03

## 2019-03-20 RX ADMIN — GADOBUTROL 10 ML: 604.72 INJECTION INTRAVENOUS at 08:03

## 2019-03-20 NOTE — ASSESSMENT & PLAN NOTE
Continue current steroid regimen for now HC 20/10 and fludro 100 daily.    If she requires surgery, she should have stress steroids before and after surgery. Recommend consult endocrinology inpatient if this is the case.

## 2019-03-20 NOTE — ASSESSMENT & PLAN NOTE
We will further address this issue in the future after the acute issues have resolved.    Her A1c is excellent on metformin, however.

## 2019-03-20 NOTE — ED PROVIDER NOTES
Encounter Date: 3/20/2019       History     Chief Complaint   Patient presents with    Scans/Admission     pt's sister states they were sent to the ER for further eval of cancer/where it has spread-- SCANS AND THEN ADMISSION     Pt is a 74 yo female with medical history of carotid paraganglioma, DM, HTN, malignant pheochromocytoma, thyroid disease presenting to the ED for low back pain radiating down her left leg and headaches.  Pt states symptoms have been present for the past 3 weeks.  Pt states she saw her endocrinologist today who advised her to come to the hospital for evaluation.  Pt denies any changes in her headache or back pain.  Pt denies taking any medications for her pain.  Pt does endorse some subjective weakness.  Pt denies any bowel or bladder incontinence or numbness of groin.        The history is provided by the patient and a relative.     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    Thyroid disease      Past Surgical History:   Procedure Laterality Date    ADRENALECTOMY      ADRENALECTOMY open exploratory Right 10/2/2017    Performed by Sandoval Castillo MD at [facility] OR 2ND FLR    ANGIOGRAM N/A 11/15/2018    Performed by [facility] Provider at [facility] OR 2ND FLR    APPENDECTOMY      EAMFZO-OXATCJ-LN N/A 2/8/2016    Performed by [facility] Diagnostic Provider at [facility] OR 2ND FLR    BLOCK, SACROILIAC JOINT Right 6/26/2018    Performed by [facility] at [facility] PAIN MGT    CATHETERIZATION, HEART, LEFT Left 7/18/2018    Performed by Moustapha  MD Pedro at [facility] CATH LAB    DISSECTION, NECK to remove right carotid body tumor Right 11/16/2018    Performed by [facility] Ca [facility] at [facility] OR 2ND FLR    MIBG Therapy  3/2016, 7[place]2016, 12[place]2016    [facility] [place]     Family History   Problem Relation Age of Onset    Heart disease Mother     Diabetes Mother     Heart disease Father     Cancer Brother     Lung cancer Brother     Cancer Brother     Melanoma Neg Hx      Social History     Tobacco Use    Smoking status: Never Smoker    Smokeless tobacco: Never Used   Substance Use Topics    Alcohol use: No    Drug use: No     Review of Systems   Constitutional: Negative for activity change, appetite change, chills, fatigue and fever.   HENT: Negative for congestion, sinus pressure, sinus pain and sore throat.    Eyes: Negative for photophobia and visual disturbance.   Respiratory: Positive for shortness of breath. Negative for cough, chest tightness and wheezing.    Cardiovascular: Negative for chest pain, palpitations and leg swelling.   Gastrointestinal: Positive for constipation ( chronic). Negative for abdominal pain, diarrhea, nausea and vomiting.   Genitourinary: Negative for decreased urine volume, difficulty urinating, dysuria, flank pain, frequency and urgency.   Musculoskeletal: Positive for arthralgias ( left hip) and back pain. Negative for myalgias, neck pain and neck stiffness.   Skin: Negative for color change, pallor, rash and wound.   Allergic/Immunologic: Positive for immunocompromised state.   Neurological: Positive for headaches. Negative for dizziness, syncope, weakness and numbness.   Hematological: Does not bruise/bleed easily.   All other systems reviewed and are negative.      Physical Exam     Initial Vitals [03/20/19 1531]   BP Pulse Resp Temp SpO2   (!) 120/56 66 16 98.4 °F (36.9 °C) 97 %      MAP       --         Physical Exam    Nursing note and vitals reviewed.  Constitutional: Vital signs are normal. She appears  well-developed and well-nourished. She is cooperative. She does not have a sickly appearance. She does not appear ill. No distress.   HENT:   Head: Normocephalic and atraumatic.   Mouth/Throat: Uvula is midline, oropharynx is clear and moist and mucous membranes are normal.   Eyes: Conjunctivae, EOM and lids are normal. Pupils are equal, round, and reactive to light. Lids are everted and swept, no foreign bodies found.   Pupils equal round reactive 3-2 mm   Neck: Trachea normal, normal range of motion, full passive range of motion without pain and phonation normal. Neck supple. No spinous process tenderness and no muscular tenderness present. No JVD present.   Cardiovascular: Normal rate and regular rhythm.   Pulses:       Radial pulses are 2+ on the right side, and 2+ on the left side.        Dorsalis pedis pulses are 2+ on the right side, and 2+ on the left side.   Pulmonary/Chest: Effort normal and breath sounds normal.   Abdominal: Soft. Normal appearance and bowel sounds are normal. She exhibits no distension. There is no tenderness. There is no rigidity, no rebound and no guarding.   Musculoskeletal:        Left hip: She exhibits tenderness. She exhibits normal range of motion, normal strength, no bony tenderness, no swelling, no crepitus, no deformity and no laceration.        Lumbar back: She exhibits decreased range of motion, tenderness, bony tenderness, pain and spasm. She exhibits no swelling, no edema, no deformity, no laceration and normal pulse.   Neurological: She is alert and oriented to person, place, and time. She has normal strength. No cranial nerve deficit or sensory deficit. She displays a negative Romberg sign. GCS eye subscore is 4. GCS verbal subscore is 5. GCS motor subscore is 6.   Strength 5/5 in all extremities.   Skin: Skin is warm, dry and intact. Capillary refill takes less than 2 seconds. No abrasion, no ecchymosis, no laceration and no rash noted. No cyanosis. Nails show no  clubbing.         ED Course   Procedures  Labs Reviewed   CBC W/ AUTO DIFFERENTIAL - Abnormal; Notable for the following components:       Result Value    Hemoglobin 11.9 (*)     Hematocrit 36.9 (*)     RDW 15.7 (*)     All other components within normal limits    Narrative:     ONE LAVENDER SHARED   COMPREHENSIVE METABOLIC PANEL - Abnormal; Notable for the following components:    ALT 7 (*)     All other components within normal limits    Narrative:     ONE LAVENDER SHARED   SEDIMENTATION RATE - Abnormal; Notable for the following components:    Sed Rate 52 (*)     All other components within normal limits    Narrative:     ONE LAVENDER SHARED   URINALYSIS, REFLEX TO URINE CULTURE - Abnormal; Notable for the following components:    Occult Blood UA 1+ (*)     All other components within normal limits    Narrative:     Preferred Collection Type->Urine, Clean Catch  WHITE CAP TUBE    C-REACTIVE PROTEIN    Narrative:     ONE LAVENDER SHARED   TSH    Narrative:     ONE LAVENDER SHARED   URINALYSIS MICROSCOPIC    Narrative:     Preferred Collection Type->Urine, Clean Catch  WHITE CAP TUBE    ISTAT PROCEDURE   ISTAT CHEM8          Imaging Results          MRI Brain W WO Contrast (Final result)     Abnormal  Result time 03/20/19 22:02:14    Final result by Gunner Vásquez MD (03/20/19 22:02:14)                 Impression:      No evidence of intracranial neoplasm, acute infarct, or acute intraparenchymal hemorrhage.  Recommend continued follow-up and further evaluation as clinically indicated.    Nonspecific punctate enhancing focus in the left posterior frontal calvarium.  No definitive diffusion weighted abnormality corresponding to this region.  Short-term follow-up is suggested for possible metastatic disease.    Mild generalized cerebral volume loss and chronic microvascular ischemia.    Two punctate foci of gradient susceptibility in the right temporal and left frontal lobes.    This report was flagged in Epic as  abnormal.    Electronically signed by resident: Ryan Sargent  Date:    03/20/2019  Time:    20:54    Electronically signed by: uGnner Vásquez MD  Date:    03/20/2019  Time:    22:02             Narrative:    EXAMINATION:  MRI BRAIN W WO CONTRAST    CLINICAL HISTORY:  Headache, new, immunocompromised or cancer;    TECHNIQUE:  Multiplanar multisequence MR imaging of the brain was performed before and after the administration of 10 mL Gadavist  intravenous contrast.    COMPARISON:  No comparison is available.    FINDINGS:  Intracranial compartment:    The brain parenchyma demonstrates mild generalized cerebral volume loss and mild scattered punctate foci of increased T2/FLAIR signal intensity likely representing sequela of chronic microvascular ischemia.  Punctate focus of gradient susceptibility is present in the lateral right temporal lobe and high posteromedial left frontal lobe.  No mass lesion, acute hemorrhage, edema or acute infarct.  No abnormal intracranial enhancement.    Ventricles and sulci are normal in size for age without evidence of hydrocephalus.No extra-axial blood or fluid collections.Normal vascular flow voids are preserved.    Skull/extracranial contents (limited evaluation): Punctate focus of enhancement approximating the inner table of the left posterior frontal calvarium.  Remaining bone marrow signal intensity is normal.  There is patchy moderate mucosal thickening throughout the ethmoidal air cells.                               X-Ray Hip 2 View Left (Final result)  Result time 03/20/19 19:36:56    Final result by Chester Hernandez MD (03/20/19 19:36:56)                 Impression:      No acute bony abnormality.      Electronically signed by: Chester Hernandez MD  Date:    03/20/2019  Time:    19:36             Narrative:    EXAMINATION:  XR HIP 2 VIEW LEFT    CLINICAL HISTORY:  Pain in unspecified hip.    TECHNIQUE:  AP view of the pelvis and frog leg lateral view of the left hip were  "performed.    COMPARISON:  None.    FINDINGS:  No evidence of acute fracture or dislocation.  Presumed calcified fibroid overlies the left hemipelvis.  No radiopaque foreign body.                               X-Ray Lumbar Spine Ap And Lateral (Final result)  Result time 03/20/19 19:35:19    Final result by Chester Hernandez MD (03/20/19 19:35:19)                 Impression:      Rotation limited examination as discussed above.  No obvious acute abnormality.      Electronically signed by: Chester Hernandez MD  Date:    03/20/2019  Time:    19:35             Narrative:    EXAMINATION:  XR LUMBAR SPINE AP AND LATERAL    CLINICAL HISTORY:  Low back pain, <6wks, no red flags, no prior management;Lumbar radiculopathy, cancer or infection suspected;    TECHNIQUE:  AP, lateral and spot images were performed of the lumbar spine.    COMPARISON:  06/07/2018.    FINDINGS:  Suboptimal positioning limits evaluation.  Bones are demineralized.  There is a safety pin overlying the posterior lumbar spine on the lateral view.  Vertebral body heights are relatively well maintained.  No obvious fracture seen.  Degenerative changes appear most pronounced at L3-L4 but are not well characterized secondary to significant patient rotation.  Multiple surgical clips overlie the upper abdomen presumed calcified fibroids overlie the pelvis.                               X-Ray Chest PA And Lateral (Final result)  Result time 03/20/19 19:22:49    Final result by Chester Hernandez MD (03/20/19 19:22:49)                 Impression:      Stable mild cardiomegaly and enlargement of the hilar contours.      Electronically signed by: Chester Hernandez MD  Date:    03/20/2019  Time:    19:22             Narrative:    EXAMINATION:  XR CHEST PA AND LATERAL    CLINICAL HISTORY:  Provided history is "  Shortness of breath".    TECHNIQUE:  Frontal and lateral views of the chest were performed.    COMPARISON:  11/05/2018.    FINDINGS:  Cardiac silhouette is " enlarged but stable.  Bilateral hilar prominence likely secondary to pulmonary arterial enlargement is stable.  Mild central vascular prominence.  Tortuous thoracic aorta with atherosclerotic calcifications.  No large focal consolidation.  No sizable pleural effusion.  No pneumothorax.  No detrimental change.  Numerous surgical clips overlie the upper abdomen.                                 Medical Decision Making:   History:   Old Medical Records: I decided to obtain old medical records.  Old Records Summarized: records from clinic visits, records from previous admission(s) and records from another hospital.  Initial Assessment:   Emergent evaluation of a 72 yo female patient presenting to the ER with chief complaint of low back pain and headache.  Pt states symptoms began approximately 3 weeks ago.  Patient states she saw her endocrinologist today and due to her history.  The patient has a history of Pheochromocytoma it was sent to the ED for evaluation for possible metastasis.  On exam patient A&O x3. Pupils equal round reactive 3-2 mm.  No JVD noted.  Cap refill less than 3 sec.  Breath sounds clear bilaterally.  Abdomen soft and nontender.  Patient does endorse chronic constipation.  Patient states she had a normal bowel movement this morning.  Patient denies any numbness or tingling of groin.  No bowel or bladder incontinence.  Tenderness to palpation of her lumbar spine.  Positive straight leg test.  Strength 5/5 in all extremities.  No numbness or tingling to bilateral lower extremities.  I will get labs, imaging, medicate and reassess.  Differential Diagnosis:   Differential diagnoses include but are not limited to muscle strain, discitis, disk herniation/pathology, radiculopathy, neuropathic pain, fx, sprain,  cauda equina syndrome, abscess, migraine headache, tension headache, viral syndrome, intracranial tumor/bleed/mass, SAH, hypertensive headache, meningitis, sinusitis, temporal arteritis.  .  Independently Interpreted Test(s):   I have ordered and independently interpreted X-rays - see prior notes.  Clinical Tests:   Lab Tests: Reviewed and Ordered  The following lab test(s) were unremarkable: CBC, CMP and Urinalysis       <> Summary of Lab: The CBC unremarkable. No leukocytosis noted.  H&H stable at 11.9 and 36.9.  CMP unremarkable.  Sed rate elevated at 52.  CRP unremarkable at 3.2.  TSH WNL.    Radiological Study: Ordered and Reviewed  ED Management:  I will get labs, imaging, medicate and reassess.  I discussed the care of this patient with my Supervising Physician.      Discussed with case with Radiology.  Reviewed endocrinology no and recommendations for imaging.  Radiology recommends plain scans for spine and possible MRI of head.  Discussed case with Hospital Medicine who agrees with this plan.      On left hip x-ray no acute fracture or dislocation seen.  Lumbar spine x-ray shows no acute fracture or dislocated.  Chest x-ray note shows no acute infectious process or pleural effusion.    Pt signed out to Dr. Foster pending MRI of head.            Other:   I have discussed this case with another health care provider.       <> Summary of the Discussion: Internal medicine and radiology              Attending Attestation:     Physician Attestation Statement for NP/PA:   I have conducted a face to face encounter with this patient in addition to the NP/PA, due to Medical Complexity    Other NP/PA Attestation Additions:    History of Present Illness: 74 y/o female w/ pheochromocytoma here with back pain and headaches. Sent by endocrinology.    Physical Exam: No neuro deficits. Normal strength and sensation.  Bilateral paraspinal muscular tenderness to palpation.   Medical Decision Making: Vitals normal. Afebrile. Well appearing. No red flag signs or symptoms of back pain; has improvement of pain after gabapentin and Toradol here.  NP discussed with Internal Medicine and Radiology.     XR's reveiwed;  no acute fx or dislocation on my read.  MR brain w/o acute, emergent findings.  PVR 60 mL; normal.  Very low suspicion for cauda equina or surgical emergency. Given this appears to be acute on chronic bilateral lower back pain, will treat accordingly now.  BP mildly elevated here but does not appear to be in pheo storm.  Will placed on scheduled NSAIDs and gabapentin.  Advised to f/u w/ PCP in few days for outpatient imaging as requested by endocrinology. Patient is agreeable and excited for discharge as she was not desiring admission.   Stable for discharge at this time. Return precautions discussed.                     Clinical Impression:       ICD-10-CM ICD-9-CM   1. Lumbar strain, initial encounter S39.012A 847.2   2. Hip pain M25.559 719.45   3. Recurrent low back pain M54.5 724.2         Disposition:   Disposition: Discharged  Condition: Stable                        Gwen Lindsay NP  03/20/19 2009       Noah Foster MD  03/20/19 [id number]

## 2019-03-20 NOTE — PROGRESS NOTES
"Subjective:      Chief Complaint: No chief complaint on file.    HPI: Hailey Sawant is a 73 y.o. female who is here for a follow-up evaluation for malignant, metastatic pheochromocytoma     Diagnosed with bilateral pheochromocytoma in 9/2009.   Underwent bilateral adrenalectomy in 11/2009.  Pathology showed R pheochromocytoma and L adrenal cortical adenoma  Normets remained slightly elevated until 2015, when they began to trend up significantly.  Follow-up imaging was concerning for metastatic disease in the right shoulder, right kidney, liver, and recurrent disease in the right adrenal bed.  Pt was treated with I-131 labeled MIBG at [facility] on 3/16/2016 and on 7/27/2016, 1130//2016. She received 200 mCi of I 131 x 3. Thyroid was blocked with SSKI.  Had surgery in Oct 2017 with Dr. Castillo to remove residual right pheochromocytoma in the adrenal and right upper pole of the kidney  In 2018, DOTATATE-PET showed uptake in R jugular chain (carotid body), right infraglenoid bone, Left inferior acetabulum and R adrenal bed vs. liver recurrence.  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    About 3 weeks ago, she had the sudden onset of severe lower back pain radiating down the left leg. She went from being able to walk upright to now having a stooped posture and only able to walk a few steps due to pain. The pain in the left leg is described as "throbbing like a tooth ache". If she moves positions suddenly, the pain is worse. She reports some mild urinary incontinence when she bears down, but denies large volume incontinence or inability to sense when she is voiding. No bowel incontinence. No sensory loss. Denies weakness in the legs, restriction in movement is more related to severe pain. She additionally began to have frontal headaches around this time. She denies ever having headaches in the past. She denies vision changes or nausea, but does report " occasional episodes of what sounds like vertigo.     ROS is positive for chronic leg swelling, shortness of breath worse with lying down and atypical chest pain, which has been evaluated in the past by cardiology. She has some residual R neck pain after the surgery, but this seems to be improving.    Current meds: hydrocortisone 20mg qam, 10 mg at 5PM, Fludrocortisone 100 mcg daily.  For bp she is currently on losartan 50mg and nifedical 60mg daily  Previously on alpha blocker (doxazosin)  And taking now since recent surgery for right carotid paragangiloma     Denies palpitations, flushing, severe bp lability     Checks BP daily at home with readings averagin-130's systolic/60-80's diastolic - she had one reading of 60/40 written down, but says she repeated it after and it was normal.  She takes losartan 50, furosemide 40, and nifedipine 90 mg for blood pressure.  Doxazosin is listed on one of her med lists, but not on the other, so it's unclear if she is taking it. Prescribed in 2018 by Dr. Burton.     Initial Labs:  Metanephrines, 24H Ur 30 - 350 ug/day 368 (H)   Normetanephrine, 24H Ur ug/day 5828 (HH)        Labs after surgery:         Ref. Range 2010 12:22 2011 14:44 2011 10:35 2012 08:50 7/15/2013 09:18 2013 12:16 2015 09:00 2015 10:08 3/8/2016 12:50   Normetanephrine, Free < OR = 148 pg/mL 1.07 (H) 248 (H) 172 (H) 174 (H) 185 (H) 235 (H) 511 (H) 1151 (H) 3274 (H)      Ref. Range 2016 07:11 2016 13:03 2016 13:55 2017 09:38 2017 12:48 2018 14:41 2018 14:04 2018 11:52   Normetanephrine, Free < OR = 148 pg/mL 3110 (H) 2495 (H) 796 (H) 1350 (H) 333 (H) 292 (H) 327 (H) 318 (H)      Repeat normets pending.    DM diagnosed in . On metformin 500mg BID. Denies complications. a1c 5.4 from 10/2017  BG has ranged from 63-110s     History of Vit D deficiency. Taking 5000 IU daily.    Reviewed past medical, family, social  history and updated as appropriate.    Review of Systems   Constitutional: Negative for unexpected weight change.   Eyes: Negative for photophobia and visual disturbance.   Respiratory: Positive for cough (dry). Negative for shortness of breath.    Cardiovascular: Positive for leg swelling. Negative for chest pain.   Gastrointestinal: Negative for abdominal pain.   Genitourinary: Negative for difficulty urinating and urgency.        No urge incontinence. Possibly stress incontinence   Musculoskeletal: Positive for back pain and gait problem. Negative for arthralgias.   Skin: Negative for wound.   Neurological: Positive for dizziness (occasional (more like vertigo)) and headaches. Negative for weakness.   Hematological: Does not bruise/bleed easily.   Psychiatric/Behavioral: Negative for sleep disturbance.     Objective:     Vitals:    03/20/19 1338   BP: 112/78   Pulse: 84   Resp: 16       Physical Exam   Constitutional: She is oriented to person, place, and time. She appears well-developed and well-nourished. No distress.   Appears to be in some pain   HENT:   Right Ear: External ear normal.   Left Ear: External ear normal.   Nose: Nose normal.   Hearing grossly normal  Dentition grossly normal   Eyes: Conjunctivae and EOM are normal. Pupils are equal, round, and reactive to light. Right eye exhibits no discharge. Left eye exhibits no discharge.   Neck: No tracheal deviation present. No thyromegaly present.   Cardiovascular: Normal rate.   No murmur heard.  Pulmonary/Chest: Effort normal and breath sounds normal.   Musculoskeletal: She exhibits no edema or deformity.   +Pain on palpation of left hip  +Point tenderness over L1-L2 area. Minimal paraspinal muscle tenderness to palpation   Neurological: She is alert and oriented to person, place, and time. Coordination normal.   Able to walk without weakness.  Ambulation causes severe pain in the left hip and lower back. She is able to arise from the wheelchair with  assistance and take a few steps. She is stooped forward and shuffles her feet to ambulate.   Skin: No rash noted.   No subcutaneous nodules noted.   Psychiatric: She has a normal mood and affect. Her behavior is normal.   Alert and oriented to person, place, and situation.   Nursing note and vitals reviewed.      Wt Readings from Last 10 Encounters:   03/20/19 1531 93 kg (205 lb)   03/20/19 1338 93.4 kg (205 lb 14.6 oz)   02/20/19 0959 93 kg (205 lb)   12/04/18 1117 91 kg (200 lb 9.9 oz)   11/26/18 1149 91.1 kg (200 lb 13.4 oz)   11/16/18 0807 93 kg (205 lb)   11/15/18 0719 93 kg (205 lb)   11/08/18 0853 91.7 kg (202 lb 2.6 oz)   11/05/18 0853 93.6 kg (206 lb 4.8 oz)   11/05/18 1045 93.3 kg (205 lb 11 oz)   10/25/18 1054 91.8 kg (202 lb 6.1 oz)       Lab Results   Component Value Date    HGBA1C 5.6 11/05/2018     No results found for: CHOL, HDL, LDLCALC, TRIG, CHOLHDL  Lab Results   Component Value Date     11/17/2018    K 3.6 11/17/2018     11/17/2018    CO2 25 11/17/2018     (H) 11/17/2018    BUN 7 (L) 11/17/2018    CREATININE 0.7 11/17/2018    CALCIUM 8.7 11/17/2018    PROT 7.2 07/17/2018    ALBUMIN 3.6 07/17/2018    BILITOT 0.7 07/17/2018    ALKPHOS 108 07/17/2018    AST 10 07/17/2018    ALT 6 (L) 07/17/2018    ANIONGAP 6 (L) 11/17/2018    ESTGFRAFRICA >60.0 11/17/2018    EGFRNONAA >60.0 11/17/2018    TSH 1.504 11/30/2016      No results found for: MICALBCREAT    Assessment/Plan:     Pheochromocytoma, Metastatic, recurrent  Repeat serum mets/normets are pending.    Blood pressure well-controlled and no classic symptoms of pheochromocytoma.    Planning possible microwave ablation of the residual tumor in the R adrenal gland per onc note.     Carotid body paraganglioma  S/p resection    She seems to be recovering well from this standpoint.    Acute low back pain with left-sided sciatica  Patient reports a previous history of back pain, but this pain is different and much more severe to the point  that she can barely walk.    Given her history of metastatic pheo, we would be concerned for pathologic fracture of the lumbar spine and/or impending fracture of the left hip related to metastatic disease.    We discussed outpatient management vs. getting more immediate evaluation and pain relief through the hospital, and she opted for the latter. We offered direct admission vs. going through the ED as options. I told her and her family she will most likely need to wait for a few hours with either option and they are okay with going through the ED.    She should have an MRI of the Lumbar spine, left hip and MRI brain with contrast given new onset of headaches with history of malignancy, in addition to pain control. I called report to the ED before sending her down. Our service can be consulted in the hospital for any endocrine needs.    Adrenal insufficiency, primary post-surgical  Continue current steroid regimen for now HC 20/10 and fludro 100 daily.    If she requires surgery, she should have stress steroids before and after surgery. Recommend consult endocrinology inpatient if this is the case.    Controlled type 2 diabetes mellitus without complication  We will further address this issue in the future after the acute issues have resolved.    Her A1c is excellent on metformin, however.    I discussed the case with Dr. Burton and he is in agreement with the plan.    We will arrange follow-up pending the results of testing.

## 2019-03-20 NOTE — ED TRIAGE NOTES
Lower back and  leg pain for 3 weeks. Got worse these past couple of days so came to the ED. Also had a fall 2 weeks ago.

## 2019-03-20 NOTE — ASSESSMENT & PLAN NOTE
Patient reports a previous history of back pain, but this pain is different and much more severe to the point that she can barely walk.    Given her history of metastatic pheo, we would be concerned for pathologic fracture of the lumbar spine and/or impending fracture of the left hip related to metastatic disease.    We discussed outpatient management vs. getting more immediate evaluation and pain relief through the hospital, and she opted for the latter. We offered direct admission vs. going through the ED as options. I told her and her family she will most likely need to wait for a few hours with either option and they are okay with going through the ED.    She should have an MRI of the Lumbar spine, left hip and MRI brain with contrast given new onset of headaches with history of malignancy, in addition to pain control. I called report to the ED before sending her down. Our service can be consulted in the hospital for any endocrine needs.

## 2019-03-20 NOTE — TELEPHONE ENCOUNTER
----- Message from Deborah Cox sent at 3/20/2019  8:54 AM CDT -----  Contact: Self [phone]  PT states she is experiencing a sharp, throbbing pain in her back. She states has spoken with her PCP, which she states advised her to contact Dr. Burton. The PT is requesting [phone].

## 2019-03-20 NOTE — ASSESSMENT & PLAN NOTE
Repeat serum mets/normets are pending.    Blood pressure well-controlled and no classic symptoms of pheochromocytoma.    Planning possible microwave ablation of the residual tumor in the R adrenal gland per onc note.

## 2019-03-21 NOTE — ED NOTES
LOC: The patient is awake, alert, and oriented to place, time, situation. Affect is appropriate.  Speech is appropriate and clear.     APPEARANCE: Patient resting comfortably in no acute distress.  Patient is clean and well groomed.    SKIN: The skin is warm and dry; color consistent with ethnicity.  Patient has normal skin turgor and moist mucus membranes.  Skin intact; no breakdown or bruising noted.     MUSCULOSKELETAL: Patient moving upper and lower extremities without difficulty.  Denies weakness. Reports lower back and bilateral leg pain. Also had a recent fall.    RESPIRATORY: Airway is open and patent. Respirations spontaneous, even, easy, and non-labored.  Patient has a normal effort and rate.  No accessory muscle use noted. Denies cough.     CARDIAC:  Normal rhythm and rate noted.  No peripheral edema noted. No complaints of chest pain.      ABDOMEN: Soft and non tender to palpation.  No distention noted.     NEUROLOGIC: Eyes open spontaneously.  Behavior appropriate to situation.  Follows commands; facial expression symmetrical.  Purposeful motor response noted; normal sensation in all extremities.

## 2019-03-21 NOTE — ED NOTES
Pt sleeping quietly on stretcher.  Pt remains on continuous cardiac and pulse ox monitoring with non-invasive blood pressure to cycle every 30 minutes.  VS stable; NSR noted. No acute distressed or discomfort observed . Bed locked in lowest position; side rails up and locked x 2; call light, bedside table, and personal belongings within reach. Room assessed for safety measures and cleanliness; no action needed at this time. Will continue to monitor.

## 2019-03-27 NOTE — PROGRESS NOTES
I have personally taken the history and examined this patient and agree with the resident's or fellow's note as stated above   Needs xrays and possible admission.

## 2019-04-22 ENCOUNTER — TELEPHONE (OUTPATIENT)
Dept: ENDOCRINOLOGY | Facility: CLINIC | Age: 74
End: 2019-04-22

## 2019-04-22 NOTE — TELEPHONE ENCOUNTER
----- Message from Peggy Yung sent at 4/22/2019 10:09 AM CDT -----  Contact: self [phone]  Pt is requesting a call back from the nurse to get recommendations on a colonoscopy.    Pt may be reached at [phone].    Thank you.  LC

## 2019-04-22 NOTE — TELEPHONE ENCOUNTER
Spoke to patient whom stated that she thinks its time for her to get a colonoscopy informed pt to contact PCP regarding this matter

## 2019-05-23 ENCOUNTER — OFFICE VISIT (OUTPATIENT)
Dept: CARDIOLOGY | Facility: CLINIC | Age: 74
End: 2019-05-23
Attending: INTERNAL MEDICINE
Payer: MEDICARE

## 2019-05-23 VITALS
HEART RATE: 61 BPM | DIASTOLIC BLOOD PRESSURE: 75 MMHG | HEIGHT: 63 IN | SYSTOLIC BLOOD PRESSURE: 133 MMHG | WEIGHT: 213 LBS | BODY MASS INDEX: 37.74 KG/M2

## 2019-05-23 DIAGNOSIS — D44.6 CAROTID BODY PARAGANGLIOMA: ICD-10-CM

## 2019-05-23 DIAGNOSIS — E11.9 CONTROLLED TYPE 2 DIABETES MELLITUS WITHOUT COMPLICATION, WITHOUT LONG-TERM CURRENT USE OF INSULIN: ICD-10-CM

## 2019-05-23 DIAGNOSIS — C74.90 MALIGNANT PHEOCHROMOCYTOMA, UNSPECIFIED LATERALITY: ICD-10-CM

## 2019-05-23 DIAGNOSIS — I10 ESSENTIAL HYPERTENSION: ICD-10-CM

## 2019-05-23 DIAGNOSIS — I49.5 SICK SINUS SYNDROME: Primary | ICD-10-CM

## 2019-05-23 DIAGNOSIS — R07.89 ATYPICAL CHEST PAIN: ICD-10-CM

## 2019-05-23 PROCEDURE — 99214 PR OFFICE/OUTPT VISIT, EST, LEVL IV, 30-39 MIN: ICD-10-PCS | Mod: S$GLB,,, | Performed by: INTERNAL MEDICINE

## 2019-05-23 PROCEDURE — 93000 PR ELECTROCARDIOGRAM, COMPLETE: ICD-10-PCS | Mod: S$GLB,,, | Performed by: INTERNAL MEDICINE

## 2019-05-23 PROCEDURE — 93000 ELECTROCARDIOGRAM COMPLETE: CPT | Mod: S$GLB,,, | Performed by: INTERNAL MEDICINE

## 2019-05-23 PROCEDURE — 99214 OFFICE O/P EST MOD 30 MIN: CPT | Mod: S$GLB,,, | Performed by: INTERNAL MEDICINE

## 2019-05-25 NOTE — PROGRESS NOTES
Subjective:    Patient ID:  Hailey Sawant is a 73 y.o. female     HPI here for follow-up of hypertension, atypical chest pains, history of normal coronary angiography in July 2018, sick sinus syndrome, diabetes mellitus, history of malignant pheochromocytoma, history of adrenalectomy, carotid body paraganglioma.    My only complaints pains in my left hip and in my left knee.  I do not get around as much because of the pain.    Current Outpatient Medications   Medication Sig    aspirin 81 MG Chew Take 1 tablet (81 mg total) by mouth once daily. Resume on Sunday, 11/18    cholecalciferol, vitamin D3, (VITAMIN D3) 5,000 unit Tab Take 5,000 Units by mouth once daily.    doxazosin (CARDURA) 1 MG tablet Take 1 tablet (1 mg total) by mouth every evening.    furosemide (LASIX) 40 MG tablet Take 20 mg by mouth twice a week.     gabapentin (NEURONTIN) 300 MG capsule Take 1 capsule (300 mg total) by mouth 3 (three) times daily.    hydrocortisone (CORTEF) 20 MG Tab TAKE 1 TABLET EVERY MORNING AND TAKE 1/2 TABLET EVERY EVENING  AT  5PM    losartan (COZAAR) 50 MG tablet Take 50 mg by mouth once daily.    meloxicam (MOBIC) 15 MG tablet Take 15 mg by mouth once daily.    metFORMIN (GLUCOPHAGE-XR) 500 MG 24 hr tablet TAKE 1 TABLET TWICE DAILY WITH MEALS    NIFEdipine (PROCARDIA-XL) 90 MG (OSM) 24 hr tablet Take 90 mg by mouth once daily.     TRUE METRIX AIR GLUCOSE METER kit CHECK BLOOD SUGAR ONE TIME DAILY    TRUE METRIX GLUCOSE TEST STRIP Strp CHECK BLOOD SUGAR ONE TIME DAILY    TRUEPLUS LANCETS 28 gauge Misc TEST ONE TIME DAILY     No current facility-administered medications for this visit.            Review of Systems   Constitution: Negative for chills, decreased appetite, fever, weight gain and weight loss.   HENT: Negative for congestion, hearing loss and sore throat.    Eyes: Negative for blurred vision, double vision and visual disturbance.   Cardiovascular: Negative for chest pain, claudication, dyspnea on  "exertion, leg swelling, palpitations and syncope.   Respiratory: Negative for cough, hemoptysis, shortness of breath, sputum production and wheezing.    Endocrine: Negative for cold intolerance and heat intolerance.   Hematologic/Lymphatic: Negative for bleeding problem. Does not bruise/bleed easily.   Skin: Negative for color change, dry skin, flushing and itching.   Musculoskeletal: Positive for arthritis and joint pain. Negative for back pain and myalgias.   Gastrointestinal: Negative for abdominal pain, anorexia, constipation, diarrhea, dysphagia, nausea and vomiting.        No bleeding per rectum   Genitourinary: Negative for dysuria, flank pain, frequency, hematuria and nocturia.   Neurological: Negative for dizziness, headaches, light-headedness, loss of balance, seizures and tremors.   Psychiatric/Behavioral: Negative for altered mental status and depression.         Vitals:    05/23/19 1041   BP: 133/75   Pulse: 61   Weight: 96.6 kg (213 lb)   Height: 5' 3" (1.6 m)     Objective:    Physical Exam   Constitutional: She is oriented to person, place, and time. She appears well-developed and well-nourished.   HENT:   Head: Normocephalic and atraumatic.   Nose: Nose normal.   Mouth/Throat: Oropharynx is clear and moist.   Eyes: Pupils are equal, round, and reactive to light. Conjunctivae and EOM are normal.   Neck: Neck supple. No tracheal deviation present. No thyromegaly present.   Cardiovascular: Normal rate, regular rhythm and intact distal pulses. Exam reveals no gallop and no friction rub.   No murmur heard.  Pulmonary/Chest: No respiratory distress. She has no wheezes. She has no rales. She exhibits no tenderness.   Abdominal: Soft. Bowel sounds are normal. She exhibits no distension and no mass. There is no tenderness. There is no rebound and no guarding.   Musculoskeletal: Normal range of motion.   Lymphadenopathy:     She has no cervical adenopathy.   Neurological: She is alert and oriented to person, " place, and time.   Skin: Skin is warm and dry.   Psychiatric: Her behavior is normal.         Assessment:       1. Sick sinus syndrome    2. Essential hypertension    3. Malignant pheochromocytoma, unspecified laterality    4. Controlled type 2 diabetes mellitus without complication, without long-term current use of insulin    5. Atypical chest pain    6. Carotid body paraganglioma         Plan:       Cardiac wise stable.  Continue the present pharmacological regimen.

## 2019-07-02 RX ORDER — CALCIUM CITRATE/VITAMIN D3 200MG-6.25
TABLET ORAL
Qty: 100 STRIP | Refills: 3 | Status: SHIPPED | OUTPATIENT
Start: 2019-07-02 | End: 2020-05-21

## 2019-07-08 ENCOUNTER — OFFICE VISIT (OUTPATIENT)
Dept: OPTOMETRY | Facility: CLINIC | Age: 74
End: 2019-07-08
Payer: MEDICARE

## 2019-07-08 DIAGNOSIS — H25.13 NUCLEAR SCLEROSIS, BILATERAL: ICD-10-CM

## 2019-07-08 DIAGNOSIS — H52.03 HYPEROPIA WITH PRESBYOPIA, BILATERAL: ICD-10-CM

## 2019-07-08 DIAGNOSIS — H52.4 HYPEROPIA WITH PRESBYOPIA, BILATERAL: ICD-10-CM

## 2019-07-08 DIAGNOSIS — E11.9 TYPE 2 DIABETES MELLITUS WITHOUT OPHTHALMIC MANIFESTATIONS: Primary | ICD-10-CM

## 2019-07-08 PROCEDURE — 92014 PR EYE EXAM, EST PATIENT,COMPREHESV: ICD-10-PCS | Mod: S$GLB,,, | Performed by: OPTOMETRIST

## 2019-07-08 PROCEDURE — 99999 PR PBB SHADOW E&M-EST. PATIENT-LVL II: ICD-10-PCS | Mod: PBBFAC,,, | Performed by: OPTOMETRIST

## 2019-07-08 PROCEDURE — 92014 COMPRE OPH EXAM EST PT 1/>: CPT | Mod: S$GLB,,, | Performed by: OPTOMETRIST

## 2019-07-08 PROCEDURE — 99999 PR PBB SHADOW E&M-EST. PATIENT-LVL II: CPT | Mod: PBBFAC,,, | Performed by: OPTOMETRIST

## 2019-07-08 NOTE — PROGRESS NOTES
HPI     Last eye exam was 6/21/18 with   Patient here for annual diabetic eye exam    Pt states her eyes are red a lot, and itchy.  Pt states when she is reading for a while her near va gets blurry, and   states her distance va is about the same.    (-)flashes  (+)floaters  (-)Photophobia  (-) Glare  (-)Headache  (+) OTC Drops: Visine, and some other rbands but does not know names BID  (-) eye drops  (-) Eye Surgeries    Hemoglobin A1C       Date                     Value               Ref Range             Status                11/05/2018               5.6                 4.0 - 5.6 %           Final                      Last edited by Peg Key on 7/8/2019 10:15 AM. (History)            Assessment /Plan     For exam results, see Encounter Report.    Type 2 diabetes mellitus without ophthalmic manifestations    Nuclear sclerosis, bilateral    Hyperopia with presbyopia, bilateral            1.  No retinopathy--monitor yearly.  BS control.  Eye health normal OU.  2.  Educated on cataracts and affects on vision.  Patient happy with vision.  Monitor.  3.   Continue w/ current rx.  Recommend artificial tears at least 2x/day OU.

## 2019-07-29 ENCOUNTER — TELEPHONE (OUTPATIENT)
Dept: ENDOCRINOLOGY | Facility: CLINIC | Age: 74
End: 2019-07-29

## 2019-07-29 NOTE — TELEPHONE ENCOUNTER
Pt states that's that she went to see a doctor regarding her hip and he suggesting her to have a hip replacement, patient wanted to know would  advise this to be done  I informed pt of  care home informed her that I would send the message to the doctor on call and get back with her when he response

## 2019-07-29 NOTE — TELEPHONE ENCOUNTER
"Spoke to patient per  message regarding the pt have hip surgery. Per  "from an endocrine standpoint why she couldn't have the surgery. It would be up to the surgeon as to whether or now he felt comfortable doing the operation." patient verbalized understanding was very thankfully     "

## 2019-08-01 ENCOUNTER — OFFICE VISIT (OUTPATIENT)
Dept: CARDIOLOGY | Facility: CLINIC | Age: 74
End: 2019-08-01
Attending: INTERNAL MEDICINE
Payer: MEDICARE

## 2019-08-01 VITALS
HEART RATE: 57 BPM | HEIGHT: 63 IN | WEIGHT: 208 LBS | SYSTOLIC BLOOD PRESSURE: 143 MMHG | BODY MASS INDEX: 36.86 KG/M2 | DIASTOLIC BLOOD PRESSURE: 69 MMHG

## 2019-08-01 DIAGNOSIS — E11.9 CONTROLLED TYPE 2 DIABETES MELLITUS WITHOUT COMPLICATION, WITHOUT LONG-TERM CURRENT USE OF INSULIN: ICD-10-CM

## 2019-08-01 DIAGNOSIS — C74.90 MALIGNANT PHEOCHROMOCYTOMA, UNSPECIFIED LATERALITY: ICD-10-CM

## 2019-08-01 DIAGNOSIS — R07.89 ATYPICAL CHEST PAIN: ICD-10-CM

## 2019-08-01 DIAGNOSIS — I10 ESSENTIAL HYPERTENSION: Primary | ICD-10-CM

## 2019-08-01 PROCEDURE — 99214 OFFICE O/P EST MOD 30 MIN: CPT | Mod: S$GLB,,, | Performed by: INTERNAL MEDICINE

## 2019-08-01 PROCEDURE — 3078F PR MOST RECENT DIASTOLIC BLOOD PRESSURE < 80 MM HG: ICD-10-PCS | Mod: CPTII,S$GLB,, | Performed by: INTERNAL MEDICINE

## 2019-08-01 PROCEDURE — 3288F FALL RISK ASSESSMENT DOCD: CPT | Mod: CPTII,S$GLB,, | Performed by: INTERNAL MEDICINE

## 2019-08-01 PROCEDURE — 1100F PR PT FALLS ASSESS DOC 2+ FALLS/FALL W/INJURY/YR: ICD-10-PCS | Mod: CPTII,S$GLB,, | Performed by: INTERNAL MEDICINE

## 2019-08-01 PROCEDURE — 1100F PTFALLS ASSESS-DOCD GE2>/YR: CPT | Mod: CPTII,S$GLB,, | Performed by: INTERNAL MEDICINE

## 2019-08-01 PROCEDURE — 3044F PR MOST RECENT HEMOGLOBIN A1C LEVEL <7.0%: ICD-10-PCS | Mod: CPTII,S$GLB,, | Performed by: INTERNAL MEDICINE

## 2019-08-01 PROCEDURE — 3078F DIAST BP <80 MM HG: CPT | Mod: CPTII,S$GLB,, | Performed by: INTERNAL MEDICINE

## 2019-08-01 PROCEDURE — 3288F PR FALLS RISK ASSESSMENT DOCUMENTED: ICD-10-PCS | Mod: CPTII,S$GLB,, | Performed by: INTERNAL MEDICINE

## 2019-08-01 PROCEDURE — 99214 PR OFFICE/OUTPT VISIT, EST, LEVL IV, 30-39 MIN: ICD-10-PCS | Mod: S$GLB,,, | Performed by: INTERNAL MEDICINE

## 2019-08-01 PROCEDURE — 3077F PR MOST RECENT SYSTOLIC BLOOD PRESSURE >= 140 MM HG: ICD-10-PCS | Mod: CPTII,S$GLB,, | Performed by: INTERNAL MEDICINE

## 2019-08-01 PROCEDURE — 3044F HG A1C LEVEL LT 7.0%: CPT | Mod: CPTII,S$GLB,, | Performed by: INTERNAL MEDICINE

## 2019-08-01 PROCEDURE — 3077F SYST BP >= 140 MM HG: CPT | Mod: CPTII,S$GLB,, | Performed by: INTERNAL MEDICINE

## 2019-08-01 NOTE — PROGRESS NOTES
Subjective:    Patient ID:  Hailey Sawant is a 74 y.o. female     HPI   HPI here for follow-up of hypertension, atypical chest pains, history of normal coronary angiography in July 2018, sick sinus syndrome, diabetes mellitus, history of malignant pheochromocytoma, history of adrenalectomy, carotid body paraganglioma.     I have gained some weight.  I still get some pains in my chest from time to time but they are never upon physical exertion.  I have had right hip pain but not enough to consider surgery.    Current Outpatient Medications   Medication Sig    aspirin 81 MG Chew Take 1 tablet (81 mg total) by mouth once daily. Resume on Sunday, 11/18    cholecalciferol, vitamin D3, (VITAMIN D3) 5,000 unit Tab Take 5,000 Units by mouth once daily.    doxazosin (CARDURA) 1 MG tablet Take 1 tablet (1 mg total) by mouth every evening.    furosemide (LASIX) 40 MG tablet Take 20 mg by mouth twice a week.     gabapentin (NEURONTIN) 300 MG capsule Take 1 capsule (300 mg total) by mouth 3 (three) times daily.    hydrocortisone (CORTEF) 20 MG Tab TAKE 1 TABLET EVERY MORNING AND TAKE 1/2 TABLET EVERY EVENING  AT  5PM    losartan (COZAAR) 50 MG tablet Take 50 mg by mouth once daily.    meloxicam (MOBIC) 15 MG tablet Take 15 mg by mouth once daily.    metFORMIN (GLUCOPHAGE-XR) 500 MG 24 hr tablet TAKE 1 TABLET TWICE DAILY WITH MEALS    NIFEdipine (PROCARDIA-XL) 90 MG (OSM) 24 hr tablet Take 90 mg by mouth once daily.     TRUE METRIX AIR GLUCOSE METER kit CHECK BLOOD SUGAR ONE TIME DAILY    TRUE METRIX GLUCOSE TEST STRIP Strp CHECK BLOOD SUGAR ONE TIME DAILY    TRUEPLUS LANCETS 28 gauge Misc TEST ONE TIME DAILY     No current facility-administered medications for this visit.          Review of Systems   Constitution: Negative for chills, decreased appetite, fever, weight gain and weight loss.   HENT: Negative for congestion, hearing loss and sore throat.    Eyes: Negative for blurred vision, double vision and visual  "disturbance.   Cardiovascular: Negative for chest pain, claudication, dyspnea on exertion, leg swelling, palpitations and syncope.   Respiratory: Negative for cough, hemoptysis, shortness of breath, sputum production and wheezing.    Endocrine: Negative for cold intolerance and heat intolerance.   Hematologic/Lymphatic: Negative for bleeding problem. Does not bruise/bleed easily.   Skin: Negative for color change, dry skin, flushing and itching.   Musculoskeletal: Positive for arthritis. Negative for back pain, joint pain and myalgias.   Gastrointestinal: Negative for abdominal pain, anorexia, constipation, diarrhea, dysphagia, nausea and vomiting.        No bleeding per rectum   Genitourinary: Negative for dysuria, flank pain, frequency, hematuria and nocturia.   Neurological: Negative for dizziness, headaches, light-headedness, loss of balance, seizures and tremors.   Psychiatric/Behavioral: Negative for altered mental status and depression.         Vitals:    08/01/19 1143   BP: (!) 143/69   Pulse: (!) 57   Weight: 94.3 kg (208 lb)   Height: 5' 3" (1.6 m)     Objective:    Physical Exam   Constitutional: She is oriented to person, place, and time. She appears well-developed and well-nourished.   HENT:   Head: Normocephalic and atraumatic.   Nose: Nose normal.   Mouth/Throat: Oropharynx is clear and moist.   Eyes: Pupils are equal, round, and reactive to light. Conjunctivae and EOM are normal.   Neck: Neck supple. No tracheal deviation present. No thyromegaly present.   Cardiovascular: Normal rate, regular rhythm and intact distal pulses. Exam reveals no gallop and no friction rub.   No murmur heard.  Pulmonary/Chest: No respiratory distress. She has no wheezes. She has no rales. She exhibits no tenderness.   Abdominal: Soft. Bowel sounds are normal. She exhibits no distension and no mass. There is no tenderness. There is no rebound and no guarding.   Musculoskeletal: Normal range of motion.   Lymphadenopathy:     " She has no cervical adenopathy.   Neurological: She is alert and oriented to person, place, and time.   Skin: Skin is warm and dry.   Psychiatric: Her behavior is normal.         Assessment:       1. Essential hypertension    2. Atypical chest pain    3. Controlled type 2 diabetes mellitus without complication, without long-term current use of insulin    4. Malignant pheochromocytoma, unspecified laterality         Plan:       Reassured.  Urged diet and exercise for weight reduction.  Continue present pharmacological regimen.

## 2019-08-23 ENCOUNTER — TELEPHONE (OUTPATIENT)
Dept: ENDOCRINOLOGY | Facility: CLINIC | Age: 74
End: 2019-08-23

## 2019-08-23 RX ORDER — DOXAZOSIN 1 MG/1
TABLET ORAL
Qty: 90 TABLET | Refills: 5 | Status: SHIPPED | OUTPATIENT
Start: 2019-08-23 | End: 2020-10-13

## 2019-12-26 ENCOUNTER — OFFICE VISIT (OUTPATIENT)
Dept: CARDIOLOGY | Facility: CLINIC | Age: 74
End: 2019-12-26
Attending: INTERNAL MEDICINE
Payer: MEDICARE

## 2019-12-26 VITALS
BODY MASS INDEX: 36.32 KG/M2 | DIASTOLIC BLOOD PRESSURE: 78 MMHG | HEIGHT: 63 IN | SYSTOLIC BLOOD PRESSURE: 180 MMHG | HEART RATE: 61 BPM | WEIGHT: 205 LBS

## 2019-12-26 DIAGNOSIS — R07.89 ATYPICAL CHEST PAIN: ICD-10-CM

## 2019-12-26 DIAGNOSIS — I10 ESSENTIAL HYPERTENSION: Primary | ICD-10-CM

## 2019-12-26 DIAGNOSIS — C74.90 MALIGNANT PHEOCHROMOCYTOMA, UNSPECIFIED LATERALITY: ICD-10-CM

## 2019-12-26 DIAGNOSIS — E27.1 ADRENAL INSUFFICIENCY, PRIMARY: ICD-10-CM

## 2019-12-26 DIAGNOSIS — E11.9 CONTROLLED TYPE 2 DIABETES MELLITUS WITHOUT COMPLICATION, WITHOUT LONG-TERM CURRENT USE OF INSULIN: ICD-10-CM

## 2019-12-26 DIAGNOSIS — E89.6 H/O TOTAL ADRENALECTOMY: ICD-10-CM

## 2019-12-26 DIAGNOSIS — R06.02 SOB (SHORTNESS OF BREATH): ICD-10-CM

## 2019-12-26 PROCEDURE — 3288F PR FALLS RISK ASSESSMENT DOCUMENTED: ICD-10-PCS | Mod: CPTII,S$GLB,, | Performed by: INTERNAL MEDICINE

## 2019-12-26 PROCEDURE — 3077F PR MOST RECENT SYSTOLIC BLOOD PRESSURE >= 140 MM HG: ICD-10-PCS | Mod: CPTII,S$GLB,, | Performed by: INTERNAL MEDICINE

## 2019-12-26 PROCEDURE — 3077F SYST BP >= 140 MM HG: CPT | Mod: CPTII,S$GLB,, | Performed by: INTERNAL MEDICINE

## 2019-12-26 PROCEDURE — 1159F MED LIST DOCD IN RCRD: CPT | Mod: S$GLB,,, | Performed by: INTERNAL MEDICINE

## 2019-12-26 PROCEDURE — 1159F PR MEDICATION LIST DOCUMENTED IN MEDICAL RECORD: ICD-10-PCS | Mod: S$GLB,,, | Performed by: INTERNAL MEDICINE

## 2019-12-26 PROCEDURE — 3078F PR MOST RECENT DIASTOLIC BLOOD PRESSURE < 80 MM HG: ICD-10-PCS | Mod: CPTII,S$GLB,, | Performed by: INTERNAL MEDICINE

## 2019-12-26 PROCEDURE — 3078F DIAST BP <80 MM HG: CPT | Mod: CPTII,S$GLB,, | Performed by: INTERNAL MEDICINE

## 2019-12-26 PROCEDURE — 3288F FALL RISK ASSESSMENT DOCD: CPT | Mod: CPTII,S$GLB,, | Performed by: INTERNAL MEDICINE

## 2019-12-26 PROCEDURE — 99214 PR OFFICE/OUTPT VISIT, EST, LEVL IV, 30-39 MIN: ICD-10-PCS | Mod: S$GLB,,, | Performed by: INTERNAL MEDICINE

## 2019-12-26 PROCEDURE — 1100F PTFALLS ASSESS-DOCD GE2>/YR: CPT | Mod: CPTII,S$GLB,, | Performed by: INTERNAL MEDICINE

## 2019-12-26 PROCEDURE — 1100F PR PT FALLS ASSESS DOC 2+ FALLS/FALL W/INJURY/YR: ICD-10-PCS | Mod: CPTII,S$GLB,, | Performed by: INTERNAL MEDICINE

## 2019-12-26 PROCEDURE — 99214 OFFICE O/P EST MOD 30 MIN: CPT | Mod: S$GLB,,, | Performed by: INTERNAL MEDICINE

## 2019-12-26 RX ORDER — LOSARTAN POTASSIUM 100 MG/1
100 TABLET ORAL DAILY
Qty: 90 TABLET | Refills: 3 | Status: SHIPPED | OUTPATIENT
Start: 2019-12-26 | End: 2022-11-11

## 2019-12-26 RX ORDER — LOSARTAN POTASSIUM 100 MG/1
100 TABLET ORAL DAILY
COMMUNITY
End: 2019-12-26

## 2019-12-26 NOTE — PROGRESS NOTES
Subjective:    Patient ID:  Hailey Sawant is a 74 y.o. female     HPI  HPI here for follow-up of hypertension, atypical chest pains, history of normal coronary angiography in July 2018, sick sinus syndrome, diabetes mellitus, history of malignant pheochromocytoma, history of adrenalectomy, carotid body paraganglioma.     I get a little short of breath sometimes, and my blood pressure is a little high.    Current Outpatient Medications   Medication Sig    aspirin 81 MG Chew Take 1 tablet (81 mg total) by mouth once daily. Resume on Sunday, 11/18    cholecalciferol, vitamin D3, (VITAMIN D3) 5,000 unit Tab Take 5,000 Units by mouth once daily.    doxazosin (CARDURA) 1 MG tablet TAKE 1 TABLET EVERY EVENING    furosemide (LASIX) 40 MG tablet Take 20 mg by mouth every Monday, Tuesday, Thursday, Saturday.     gabapentin (NEURONTIN) 300 MG capsule Take 1 capsule (300 mg total) by mouth 3 (three) times daily.    hydrocortisone (CORTEF) 20 MG Tab TAKE 1 TABLET EVERY MORNING AND TAKE 1/2 TABLET EVERY EVENING  AT  5PM    losartan (COZAAR) 100 MG tablet Take 1 tablet (100 mg total) by mouth once daily.    meloxicam (MOBIC) 15 MG tablet Take 15 mg by mouth once daily.    metFORMIN (GLUCOPHAGE-XR) 500 MG 24 hr tablet TAKE 1 TABLET TWICE DAILY WITH MEALS    NIFEdipine (PROCARDIA-XL) 90 MG (OSM) 24 hr tablet Take 90 mg by mouth once daily.     TRUE METRIX AIR GLUCOSE METER kit CHECK BLOOD SUGAR ONE TIME DAILY    TRUE METRIX GLUCOSE TEST STRIP Strp CHECK BLOOD SUGAR ONE TIME DAILY    TRUEPLUS LANCETS 28 gauge Misc TEST ONE TIME DAILY     No current facility-administered medications for this visit.        Review of Systems   Constitution: Negative for chills, decreased appetite, fever, weight gain and weight loss.   HENT: Negative for congestion, hearing loss and sore throat.    Eyes: Negative for blurred vision, double vision and visual disturbance.   Cardiovascular: Positive for dyspnea on exertion. Negative for  "chest pain, claudication, leg swelling, palpitations and syncope.   Respiratory: Negative for cough, hemoptysis, shortness of breath, sputum production and wheezing.    Endocrine: Negative for cold intolerance and heat intolerance.   Hematologic/Lymphatic: Negative for bleeding problem. Does not bruise/bleed easily.   Skin: Negative for color change, dry skin, flushing and itching.   Musculoskeletal: Negative for back pain, joint pain and myalgias.   Gastrointestinal: Negative for abdominal pain, anorexia, constipation, diarrhea, dysphagia, nausea and vomiting.        No bleeding per rectum   Genitourinary: Negative for dysuria, flank pain, frequency, hematuria and nocturia.   Neurological: Negative for dizziness, headaches, light-headedness, loss of balance, seizures and tremors.   Psychiatric/Behavioral: Negative for altered mental status and depression.         Vitals:    12/26/19 1154   BP: (!) 180/78   Pulse: 61   Weight: 93 kg (205 lb)   Height: 5' 3" (1.6 m)     Objective:    Physical Exam   Constitutional: She is oriented to person, place, and time. She appears well-developed and well-nourished.   HENT:   Head: Normocephalic and atraumatic.   Nose: Nose normal.   Mouth/Throat: Oropharynx is clear and moist.   Eyes: Pupils are equal, round, and reactive to light. Conjunctivae and EOM are normal.   Neck: Neck supple. No tracheal deviation present. No thyromegaly present.   Cardiovascular: Normal rate and regular rhythm. Exam reveals no gallop and no friction rub.   No murmur heard.  Pulmonary/Chest: No respiratory distress. She has no wheezes. She has no rales. She exhibits no tenderness.   Abdominal: Soft. Bowel sounds are normal. She exhibits no distension and no mass. There is no tenderness. There is no rebound and no guarding.   Musculoskeletal: Normal range of motion.   Lymphadenopathy:     She has no cervical adenopathy.   Neurological: She is alert and oriented to person, place, and time.   Skin: Skin " is warm and dry.   Psychiatric: Her behavior is normal.         Assessment:       1. Essential hypertension    2. Malignant pheochromocytoma, unspecified laterality    3. Controlled type 2 diabetes mellitus without complication, without long-term current use of insulin    4. H/O total adrenalectomy    5. Adrenal insufficiency, primary post-surgical    6. Atypical chest pain         Plan:       Increase furosemide to 4 days a week  Increase losartan to 100 mg daily.  Continue the remaining pharmacological regimen.  Check an echocardiogram

## 2020-01-24 NOTE — DISCHARGE SUMMARY
[facility]  General Surgery  Discharge Summary      Patient Name: Hailey Sawant  MRN: [id number]  Admission Date: 10/2/2017  Hospital Length of Stay: 4 days  Discharge Date and Time:  10/06/2017 1:53 PM  Attending Physician: Sandoval Castillo MD   Discharging Provider: Sara Bassett MD  Primary Care Provider: Rodolfo Burton MD     HPI: Hailey Sawant is a 72 y.o. female with known history of metastatic pheochromocytoma     Procedure(s) (LRB):  ADRENALECTOMY open exploratory (Right)     Hospital Course: Hailey Sawant is a 72 y.o. female who presented for re-resection of right adrenal bed 2/2 recurrence and difficulty with hypertension control. Pt went to OR, tolerated procedure well. She was then transferred to the ICU. She was stable overnight and as such her lines were discontinued and she was stepped down. She progressed well and on 10/6/17 was deemed stable for discharge.     Consults:   Consults         Status Ordering Provider     Inpatient consult to Endocrinology  Once     Provider:  (Not yet assigned)    GUSTABO Perea     Inpatient consult to Social Work  Once     Provider:  (Not yet assigned)    Acknowledged SARA BASSETT          Significant Diagnostic Studies: Labs:   BMP:   Recent Labs  Lab 10/05/17  0400 10/06/17  0408    94    141   K 3.7 3.7    108   CO2 27 26   BUN 10 9   CREATININE 0.8 0.7   CALCIUM 8.2* 8.4*   MG 1.7 1.6    and CBC   Recent Labs  Lab 10/05/17  0400 10/05/17  1335 10/06/17  0408   WBC 7.74 7.26 6.51   HGB 8.6* 9.4* 8.5*   HCT 26.5* 29.6* 25.9*    198 177       Pending Diagnostic Studies:     None        Final Active Diagnoses:    Diagnosis Date Noted POA    PRINCIPAL PROBLEM:  Pheochromocytoma, malignant [C74.90] 01/10/2016 Yes    Controlled type 2 diabetes mellitus without complication [E11.9] 12/20/2016 Yes    Pheochromocytoma [D35.00] 02/08/2016 Yes    H/O total adrenalectomy [E89.6] 02/25/2015 Not  Applicable    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    Specialty:  General Surgery  Why:  For wound re-check, Follow up, For suture removal  Contact information:  [address]  [facility] [id number] [phone]                 Patient Instructions:     Diet general     Shower on day dressing removed (No bath)   Scheduling Instructions: No submerging wound in water for 2 weeks, showering OK (no baths, hot tubs, pools, ocean, bayou, etc.)     Lifting restrictions   Scheduling Instructions: No heavy lifting of anything 10 lbs or greater for six weeks.     Other restrictions (specify):   Scheduling Instructions: No driving while on narcotic medications.     Call MD for:  temperature >100.4     Call MD for:  persistent nausea and vomiting or diarrhea     Call MD for:  severe uncontrolled pain     Call MD for:  redness, tenderness, or signs of infection (pain, swelling, redness, odor or green/yellow discharge around incision site)     Call MD for:  difficulty breathing or increased cough     Call MD for:  severe persistent headache     Call MD for:  worsening rash     Call MD for:  persistent dizziness, light-headedness, or visual disturbances     Call MD for:  increased confusion or weakness     No dressing needed       Medications:  Reconciled Home Medications:   Discharge Medication List as of 10/6/2017 11:49 AM      START taking these medications    Details   bisacodyl (DULCOLAX) 10 mg Supp Place 1 suppository (10 mg total) rectally once daily., Starting Sat 10/7/2017, OTC      oxycodone (ROXICODONE) 5 MG immediate release tablet Take 1 tablet (5 mg total) by mouth every 4 (four) hours as needed., Starting Fri 10/6/2017, Print         CONTINUE  these medications which have NOT CHANGED    Details   doxazosin (CARDURA) 2 MG tablet Take 1 tablet (2 mg total) by mouth once daily., Starting Thu 8/31/2017, Until Fri 8/31/2018, Normal      hydrocortisone (CORTEF) 20 MG Tab TAKE 1 TABLET EVERY MORNING  AND TAKE 1/2 TABLET EVERY EVENING  AT  5PM, Normal      losartan (COZAAR) 50 MG tablet Take 50 mg by mouth once daily., Until Discontinued, Historical Med      metformin (GLUCOPHAGE-XR) 500 MG 24 hr tablet TAKE 1 TABLET TWICE DAILY WITH MEALS, Normal      nifedipine (ADALAT CC) 90 MG TbSR Take 30 mg by mouth once daily., Until Discontinued, Historical Med      aspirin 81 MG Chew Take 81 mg by mouth once daily., Until Discontinued, Historical Med      cholecalciferol, vitamin D3, (VITAMIN D3) 5,000 unit Tab Take 5,000 Units by mouth once daily., Until Discontinued, Historical Med      furosemide (LASIX) 40 MG tablet Take 20 mg by mouth. Monday and Friday, Starting 12/10/2014, Until Discontinued, Historical Med      TRUE METRIX AIR GLUCOSE METER kit CHECK BLOOD SUGAR ONE TIME DAILY, Normal      TRUE METRIX GLUCOSE TEST STRIP Strp CHECK BLOOD SUGAR ONE TIME DAILY, Normal      TRUEPLUS LANCETS 28 gauge Misc TEST ONE TIME DAILY, Normal             Shaan Bassett MD  General Surgery  [facility]   Closure 2 Information: This tab is for additional flaps and grafts, including complex repair and grafts and complex repair and flaps. You can also specify a different location for the additional defect, if the location is the same you do not need to select a new one. We will insert the automated text for the repair you select below just as we do for solitary flaps and grafts. Please note that at this time if you select a location with a different insurance zone you will need to override the ICD10 and CPT if appropriate.

## 2020-02-06 DIAGNOSIS — E27.1 ADRENAL INSUFFICIENCY, PRIMARY: Primary | ICD-10-CM

## 2020-02-07 RX ORDER — HYDROCORTISONE 20 MG/1
TABLET ORAL
Qty: 135 TABLET | Refills: 6 | Status: SHIPPED | OUTPATIENT
Start: 2020-02-07 | End: 2021-05-19

## 2020-02-26 ENCOUNTER — OFFICE VISIT (OUTPATIENT)
Dept: CARDIOLOGY | Facility: CLINIC | Age: 75
End: 2020-02-26
Attending: INTERNAL MEDICINE
Payer: MEDICARE

## 2020-02-26 VITALS
BODY MASS INDEX: 35.97 KG/M2 | DIASTOLIC BLOOD PRESSURE: 63 MMHG | HEIGHT: 63 IN | WEIGHT: 203 LBS | HEART RATE: 55 BPM | SYSTOLIC BLOOD PRESSURE: 138 MMHG

## 2020-02-26 DIAGNOSIS — I10 ESSENTIAL HYPERTENSION: ICD-10-CM

## 2020-02-26 DIAGNOSIS — E11.9 CONTROLLED TYPE 2 DIABETES MELLITUS WITHOUT COMPLICATION, WITHOUT LONG-TERM CURRENT USE OF INSULIN: ICD-10-CM

## 2020-02-26 DIAGNOSIS — E89.6 H/O TOTAL ADRENALECTOMY: ICD-10-CM

## 2020-02-26 DIAGNOSIS — C74.90 MALIGNANT PHEOCHROMOCYTOMA, UNSPECIFIED LATERALITY: ICD-10-CM

## 2020-02-26 DIAGNOSIS — R07.89 ATYPICAL CHEST PAIN: Primary | ICD-10-CM

## 2020-02-26 DIAGNOSIS — D35.01 PHEOCHROMOCYTOMA OF RIGHT ADRENAL GLAND: ICD-10-CM

## 2020-02-26 PROCEDURE — 99214 PR OFFICE/OUTPT VISIT, EST, LEVL IV, 30-39 MIN: ICD-10-PCS | Mod: S$GLB,,, | Performed by: INTERNAL MEDICINE

## 2020-02-26 PROCEDURE — 99214 OFFICE O/P EST MOD 30 MIN: CPT | Mod: S$GLB,,, | Performed by: INTERNAL MEDICINE

## 2020-02-26 RX ORDER — AMOXICILLIN AND CLAVULANATE POTASSIUM 875; 125 MG/1; MG/1
1 TABLET, FILM COATED ORAL 2 TIMES DAILY
Status: ON HOLD | COMMUNITY
End: 2023-03-01

## 2020-02-26 RX ORDER — PROMETHAZINE HYDROCHLORIDE AND CODEINE PHOSPHATE 6.25; 1 MG/5ML; MG/5ML
5 SOLUTION ORAL EVERY 4 HOURS PRN
COMMUNITY

## 2020-02-29 LAB
AORTIC ROOT ANNULUS: 3.09 CM
AORTIC VALVE CUSP SEPERATION: 1.93 CM
AV INDEX (PROSTH): 0.7
AV MEAN GRADIENT: 10 MMHG
AV PEAK GRADIENT: 18 MMHG
AV REGURGITATION PRESSURE HALF TIME: 716 MS
AV VALVE AREA: 2.8 CM2
AV VELOCITY RATIO: 0.66
CV ECHO LV RWT: 0.49 CM
DOP CALC AO PEAK VEL: 2.11 M/S
DOP CALC AO VTI: 45.29 CM
DOP CALC LVOT AREA: 4 CM2
DOP CALC LVOT DIAMETER: 2.26 CM
DOP CALC LVOT PEAK VEL: 1.39 M/S
DOP CALC LVOT STROKE VOLUME: 126.98 CM3
DOP CALCLVOT PEAK VEL VTI: 31.67 CM
E WAVE DECELERATION TIME: 261.2 MSEC
E/A RATIO: 0.53
ECHO EF ESTIMATED: 61 %
ECHO LV POSTERIOR WALL: 1.21 CM (ref 0.6–1.1)
FRACTIONAL SHORTENING: 33 % (ref 28–44)
INTERVENTRICULAR SEPTUM: 1.32 CM (ref 0.6–1.1)
IVC PROX: 1.6 CM
IVRT: 114 MSEC
LEFT ATRIUM SIZE: 4.01 CM
LEFT INTERNAL DIMENSION IN SYSTOLE: 3.3 CM (ref 2.1–4)
LEFT VENTRICLE DIASTOLIC VOLUME: 114.4 ML
LEFT VENTRICLE SYSTOLIC VOLUME: 44.29 ML
LEFT VENTRICULAR INTERNAL DIMENSION IN DIASTOLE: 4.93 CM (ref 3.5–6)
LEFT VENTRICULAR MASS: 246.32 G
LV LATERAL E/E' RATIO: 0.05 M/S
MR PISA EROA: 0.07 CM2
MV A" WAVE DURATION": 198 MSEC
MV PEAK A VEL: 0.87 M/S
MV PEAK E VEL: 0.46 M/S
PISA MRMAX VEL: 5.2 M/S
PISA RADIUS: 0.4 CM
PISA TR MAX VEL: 2.97 M/S
PISA VN NYQUIST: 0.35 M/S
PULM VEIN A" WAVE DURATION": 110 MSEC
PULM VEIN S/D RATIO: 1.96
PV PEAK D VEL: 0.25 M/S
PV PEAK S VEL: 0.49 M/S
PV PEAK VELOCITY: 1.25 CM/S
RA PRESSURE: 3 MMHG
RIGHT VENTRICULAR END-DIASTOLIC DIMENSION: 3.8 CM
TDI LATERAL: 9 M/S
TR MAX PG: 35 MMHG
TRICUSPID ANNULAR PLANE SYSTOLIC EXCURSION: 2.16 CM
TRICUSPID VALVE PEAK A WAVE VELOCITY: 0.79 M/S
TV PEAK E VEL: 0.48 M/S
TV REST PULMONARY ARTERY PRESSURE: 38 MMHG

## 2020-02-29 NOTE — PROGRESS NOTES
Subjective:    Patient ID:  Hailey Sawant is a 74 y.o. female     HPI  HPI here for follow-up of hypertension, atypical chest pains, history of normal coronary angiography in July 2018, sick sinus syndrome, diabetes mellitus, history of malignant pheochromocytoma, history of adrenalectomy, carotid body paraganglioma.     I am feeling well.  I have not had any recent chest pains or shortness of breath.    Current Outpatient Medications   Medication Sig    amoxicillin-clavulanate 875-125mg (AUGMENTIN) 875-125 mg per tablet Take 1 tablet by mouth 2 (two) times daily.    promethazine-codeine 6.25-10 mg/5 ml (PHENERGAN WITH CODEINE) 6.25-10 mg/5 mL syrup Take 5 mLs by mouth every 4 (four) hours as needed for Cough.    aspirin 81 MG Chew Take 1 tablet (81 mg total) by mouth once daily. Resume on Sunday, 11/18    cholecalciferol, vitamin D3, (VITAMIN D3) 5,000 unit Tab Take 5,000 Units by mouth once daily.    doxazosin (CARDURA) 1 MG tablet TAKE 1 TABLET EVERY EVENING    furosemide (LASIX) 40 MG tablet Take 20 mg by mouth every Monday, Tuesday, Thursday, Saturday.     gabapentin (NEURONTIN) 300 MG capsule Take 1 capsule (300 mg total) by mouth 3 (three) times daily.    hydrocortisone (CORTEF) 20 MG Tab TAKE 1 TABLET EVERY MORNING AND TAKE 1/2 TABLET EVERY EVENING  AT  5PM    losartan (COZAAR) 100 MG tablet Take 1 tablet (100 mg total) by mouth once daily.    meloxicam (MOBIC) 15 MG tablet Take 15 mg by mouth once daily.    metFORMIN (GLUCOPHAGE-XR) 500 MG 24 hr tablet TAKE 1 TABLET TWICE DAILY WITH MEALS    NIFEdipine (PROCARDIA-XL) 90 MG (OSM) 24 hr tablet Take 90 mg by mouth once daily.     TRUE METRIX AIR GLUCOSE METER kit CHECK BLOOD SUGAR ONE TIME DAILY    TRUE METRIX GLUCOSE TEST STRIP Strp CHECK BLOOD SUGAR ONE TIME DAILY    TRUEPLUS LANCETS 28 gauge Misc TEST ONE TIME DAILY     No current facility-administered medications for this visit.        Review of Systems   Constitution: Negative for  "chills, decreased appetite, fever, weight gain and weight loss.   HENT: Negative for congestion, hearing loss and sore throat.    Eyes: Negative for blurred vision, double vision and visual disturbance.   Cardiovascular: Negative for chest pain, claudication, dyspnea on exertion, leg swelling, palpitations and syncope.   Respiratory: Negative for cough, hemoptysis, shortness of breath, sputum production and wheezing.    Endocrine: Negative for cold intolerance and heat intolerance.   Hematologic/Lymphatic: Negative for bleeding problem. Does not bruise/bleed easily.   Skin: Negative for color change, dry skin, flushing and itching.   Musculoskeletal: Positive for arthritis and joint pain. Negative for back pain and myalgias.   Gastrointestinal: Negative for abdominal pain, anorexia, constipation, diarrhea, dysphagia, nausea and vomiting.        No bleeding per rectum   Genitourinary: Negative for dysuria, flank pain, frequency, hematuria and nocturia.   Neurological: Negative for dizziness, headaches, light-headedness, loss of balance, seizures and tremors.   Psychiatric/Behavioral: Negative for altered mental status and depression.         Vitals:    02/26/20 1024   BP: 138/63   Pulse: (!) 55   Weight: 92.1 kg (203 lb)   Height: 5' 3" (1.6 m)     Objective:    Physical Exam   Constitutional: She is oriented to person, place, and time. She appears well-developed and well-nourished.   HENT:   Head: Normocephalic and atraumatic.   Nose: Nose normal.   Mouth/Throat: Oropharynx is clear and moist.   Eyes: Pupils are equal, round, and reactive to light. Conjunctivae and EOM are normal.   Neck: Neck supple. No tracheal deviation present. No thyromegaly present.   Cardiovascular: Normal rate, regular rhythm and intact distal pulses. Exam reveals no gallop and no friction rub.   No murmur heard.  Pulmonary/Chest: No respiratory distress. She has no wheezes. She has no rales. She exhibits no tenderness.   Abdominal: Soft. " Bowel sounds are normal. She exhibits no distension and no mass. There is no tenderness. There is no rebound and no guarding.   Musculoskeletal: Normal range of motion.   Lymphadenopathy:     She has no cervical adenopathy.   Neurological: She is alert and oriented to person, place, and time.   Skin: Skin is warm and dry.   Psychiatric: Her behavior is normal.         Assessment:       1. Atypical chest pain    2. Controlled type 2 diabetes mellitus without complication, without long-term current use of insulin    3. Essential hypertension    4. Malignant pheochromocytoma, unspecified laterality    5. Pheochromocytoma of right adrenal gland    6. H/O total adrenalectomy         Plan:       Stable from a cardiovascular standpoint.  Continue present pharmacological regimen.

## 2020-05-21 RX ORDER — CALCIUM CITRATE/VITAMIN D3 200MG-6.25
TABLET ORAL
Qty: 100 STRIP | Refills: 3 | Status: SHIPPED | OUTPATIENT
Start: 2020-05-21

## 2021-03-19 ENCOUNTER — TELEPHONE (OUTPATIENT)
Dept: NEUROLOGY | Facility: HOSPITAL | Age: 76
End: 2021-03-19

## 2021-03-19 DIAGNOSIS — C74.90 MALIGNANT PHEOCHROMOCYTOMA, UNSPECIFIED LATERALITY: Primary | ICD-10-CM

## 2021-03-19 DIAGNOSIS — D35.01 PHEOCHROMOCYTOMA OF RIGHT ADRENAL GLAND: ICD-10-CM

## 2021-04-23 ENCOUNTER — TELEPHONE (OUTPATIENT)
Dept: NEUROLOGY | Facility: HOSPITAL | Age: 76
End: 2021-04-23

## 2021-04-26 ENCOUNTER — HOSPITAL ENCOUNTER (OUTPATIENT)
Dept: RADIOLOGY | Facility: HOSPITAL | Age: 76
Discharge: HOME | End: 2021-04-26
Attending: SURGERY
Payer: MEDICARE

## 2021-04-26 DIAGNOSIS — D35.01 PHEOCHROMOCYTOMA OF RIGHT ADRENAL GLAND: ICD-10-CM

## 2021-04-26 DIAGNOSIS — C74.90 MALIGNANT PHEOCHROMOCYTOMA, UNSPECIFIED LATERALITY: ICD-10-CM

## 2021-04-26 PROCEDURE — 78815 NM PET CU64, SKULL TO MID THIGH: ICD-10-PCS | Mod: 26,PS,, | Performed by: RADIOLOGY

## 2021-04-26 PROCEDURE — 78815 PET IMAGE W/CT SKULL-THIGH: CPT | Mod: TC

## 2021-04-26 PROCEDURE — 78815 PET IMAGE W/CT SKULL-THIGH: CPT | Mod: 26,PS,, | Performed by: RADIOLOGY

## 2022-11-03 ENCOUNTER — TELEPHONE (OUTPATIENT)
Dept: ENDOCRINOLOGY | Facility: CLINIC | Age: 77
End: 2022-11-03
Payer: MEDICARE

## 2022-11-03 NOTE — TELEPHONE ENCOUNTER
Spoke with patient scheduled appointment. Patient approved time and date.                      ----- Message from Kiley Morris sent at 11/3/2022  8:54 AM CDT -----  Dr. Chun Cole would like to refer the patient to Dr. Wilburn for Endo appt. The patients diagnosis is Hypoadrenalism and Diabetes Mellitus. I have scanned the patients referral and records into .     Please review and contact patient to schedule appointment.    Thank you,   [facility]  Sulaiman Laboy

## 2022-11-03 NOTE — TELEPHONE ENCOUNTER
----- Message from Kiley Morris sent at 11/3/2022  8:54 AM CDT -----  Dr. Chun Cole would like to refer the patient to Dr. Wilburn for Endo appt. The patients diagnosis is Hypoadrenalism and Diabetes Mellitus. I have scanned the patients referral and records into .     Please review and contact patient to schedule appointment.    Thank you,   Kileymontse Laboy

## 2022-11-10 PROBLEM — D44.6 CAROTID BODY PARAGANGLIOMA: Status: RESOLVED | Noted: 2018-11-15 | Resolved: 2022-11-10

## 2022-11-10 NOTE — ASSESSMENT & PLAN NOTE
- Continue metformin 500 mg twice daily and Jardiance 10 mg daily    - Update A1c now  - Follow-up with Dr. Cole in [place] for management of diabetes mellitus

## 2022-11-10 NOTE — ASSESSMENT & PLAN NOTE
- Postsurgical adrenal insufficiency since bilateral adrenalectomy in 2009    - Continue hydrocortisone 15mg the morning and 5 mg in the afternoon indefinitely, will send refills today  - Patient not requiring fludrocortisone  - Sent prescription for emergency dexamethasone and discussed sick day rules

## 2022-11-10 NOTE — ASSESSMENT & PLAN NOTE
- Diagnosed in late 2009 with subsequent bilateral adrenalectomy.  Patient has had progression of disease demonstrated most recently on PET scan 04/26/2021    - Will obtain serum metanephrines, chromogranin a, and CMP  - Will order DOTATATE scan    - Follow up with neuroendocrine group in [place] regarding metastatic pheochromocytoma; last seen by Dr. Cohen of oncologic surgery on 04/26/2021  - No need to follow-up with Endocrine group here

## 2022-11-10 NOTE — PROGRESS NOTES
Subjective:      Chief Complaint:  Malignant pheochromocytoma    History of Present Illness  77 y.o. female with primary adrenal insufficiency status post bilateral adrenalectomy in 2009, type 2 diabetes mellitus, hypothyroidism, and known malignant pheochromocytoma presents to re-establish care through referral from her endocrinologist Dr. Chun Cole in [place].    - Last seen by Dr. Wilburn on 03/20/2019    - She was recently referred to Dr. Cole of endocrinology in [place] who is treating her diabetes; she was referred here given her history of metastatic pheochromocytoma  - Recently had what sounds like NSTEMI in July of this year, did not have stents placed per patient.  Had pacemaker placed Friday prior to this visit.  Recovering well.  - Patient has been doing well overall, is monitoring her blood pressure at home with normal readings, not having any new bone pain    Regarding malignant pheochromocytoma:    - Diagnosed with bilateral pheochromocytoma 9/2009  - Underwent bilateral adrenalectomy 11/2009  - Pathology at that time showed right pheochromocytoma and left adrenal cortical adenoma  - Normetanephrines remained slightly elevated until 2015, when they began to significantly increase  - Follow-up imaging was concerning for metastatic disease in the right shoulder, right kidney, liver, and recurrent disease in the right adrenal bed  - Underwent treatment with I-131 labeled MIBG at [facility] on 3/16/2016 and on 7/27/2016, 11/30/2016. She received 200 mCi of I 131 x 3. Thyroid was blocked with SSKI.  - Underwent surgery in 10/2017 with Dr. Castillo to remove residual right pheochromocytoma in the adrenal and right upper pole of the kidney  - In 2018, DOTATATE-PET showed uptake in right jugular chain (carotid body), right infraglenoid bone, left inferior acetabulum and right adrenal bed vs. 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 Findings suggestive for progression of disease. Redemonstration of a right infraglenoid bone metastasis. Status post interval resection of prior intensely tracer avid right cervical node.    - Last BMD: None    - Currently taking hydrocortisone 15 mg in the morning and 5 mg in the evening, is not on alpha blockade but currently taking metoprolol succinate    - Patient denies any symptoms of adrenal insufficiency, not having hypotension, no abdominal pain    Lab Results   Component Value Date    LABCORT <1.0 (L) 09/18/2009    LABCORT 6.3 09/17/2009     Lab Results   Component Value Date    ACTH 257 (H) 09/06/2019    ACTH 175 (H) 06/27/2017    ACTH 55 (H) 06/21/2011    DHEASO4 96.8 (H) 09/18/2009     - Denies:  Abdominal discomfort, hypertension, paroxysmal symptoms (syncope, pallor, dizziness), palpitations, diabetes/new hyperglycemia, polyuria/polydipsia, easy bruisability, abnormal stretch marks, muscle weakness, fractures or osteoporosis    Lab Results   Component Value Date    ALDOSTERONE 4.6 09/17/2009    LABRENI 1.2 11/09/2017    LABRENI 1.6 11/25/2009    LABRENI <0.6 09/17/2009    METANEPHRINE 579 (H) 04/26/2021    METANEPHRINE 330 (H) 03/20/2019    METANEPHRINE 318 (H) 12/04/2018     PET scan on 04/26/2021 Findings:  In the chest, there is redemonstration of a right infraglenoid bone metastasis. In the abdomen and pelvis, there are new areas of abnormal radiotracer uptake within the liver.  Two hypoattenuating right hepatic lesions measuring 3.2 cm and 1.3 cm. Superimposed physiologic uptake in the pancreatic uncinate process, liver, spleen, and  tract. In the bones, there are new foci of abnormal radiotracer uptake within the right posterior 12th rib and right iliac bone.  No definitive associated CT abnormality.  Interval resolution of previously identified left inferior acetabular metastasis.  "  Impression: New areas of abnormal radiotracer uptake within the liver, right posterior 12th rib, and right iliac bone.  Findings suggestive for progression of disease. Redemonstration of a right infraglenoid bone metastasis. Status post interval resection of prior intensely tracer avid right cervical node.          Regarding Diabetes Mellitus:    - Current diabetic medications include: Metformin 500 mg twice daily and Jardiance 10 mg daily    Lab Results   Component Value Date    HGBA1C 5.6 11/05/2018    HGBA1C 5.5 07/17/2018    HGBA1C 5.4 10/02/2017     No results found for: EGFRNORACEVR    BP Readings from Last 3 Encounters:   11/11/22 (!) 140/80   04/08/22 130/76   04/26/21 (!) 164/68     Wt Readings from Last 3 Encounters:   11/11/22 85.2 kg (187 lb 15.1 oz)   04/26/21 95.2 kg (209 lb 14.1 oz)   02/26/20 92.1 kg (203 lb)     Diabetes Management Status    - Statin: Not taking  - ACE/ARB: Taking losartan 100 mg daily    Screening or Prevention Patient's value Goal Complete/Controlled?   HgA1C Testing and Control   Lab Results   Component Value Date    HGBA1C 5.6 11/05/2018      Annually/Less than 8% No     Lipid profile Most Recent Lipid Panel Health Maintenance Topic Completion: Not Found Annually No     LDL control No results found for: LDLCALC Annually/Less than 100 mg/dl  No     Nephropathy screening No results found for: LABMICR  Lab Results   Component Value Date    PROTEINUA Trace (A) 09/07/2019    Annually or every 6 months if abnormal No     Blood pressure BP Readings from Last 1 Encounters:   11/11/22 (!) 140/80    Less than 140/90 Yes     Dilated retinal exam Most Recent Eye Exam Date: Not Found Annually Yes     Foot exam   Most Recent Foot Exam Date: Not Found Annually Yes        - I independently reviewed all pertinent outside records and imaging    Objective:   BP (!) 140/80 (BP Location: Right arm, Patient Position: Sitting, BP Method: Large (Manual))   Pulse 64   Ht 5' 3" (1.6 m)   Wt 85.2 kg " (187 lb 15.1 oz)   SpO2 99%   BMI 33.29 kg/m²   Physical Exam  Constitutional:       Appearance: Normal appearance.   Cardiovascular:      Rate and Rhythm: Normal rate and regular rhythm.   Musculoskeletal:      Cervical back: Neck supple. No tenderness.   Lymphadenopathy:      Cervical: No cervical adenopathy.   Neurological:      General: No focal deficit present.      Mental Status: She is alert and oriented to person, place, and time.   Psychiatric:         Mood and Affect: Mood normal.         Behavior: Behavior normal.     BP Readings from Last 1 Encounters:   11/11/22 (!) 140/80      Wt Readings from Last 1 Encounters:   11/11/22 0849 85.2 kg (187 lb 15.1 oz)     Body mass index is 33.29 kg/m².    Lab Review:   Lab Results   Component Value Date    HGBA1C 5.6 11/05/2018     No results found for: CHOL, HDL, LDLCALC, TRIG, CHOLHDL  Lab Results   Component Value Date     04/26/2021    K 4.4 04/26/2021     04/26/2021    CO2 25 04/26/2021     04/26/2021    BUN 16 04/26/2021    CREATININE 0.8 04/26/2021    CALCIUM 9.1 04/26/2021    PROT 7.6 04/26/2021    ALBUMIN 4.0 04/26/2021    BILITOT 0.6 04/26/2021    ALKPHOS 97 04/26/2021    AST 13 04/26/2021    ALT 7 (L) 04/26/2021    ANIONGAP 9 04/26/2021    ESTGFRAFRICA >60 04/26/2021    EGFRNONAA >60 04/26/2021    TSH 1.858 03/20/2019     All pertinent labs reviewed    Assessment and Plan     Pheochromocytoma, malignant  - Diagnosed in late 2009 with subsequent bilateral adrenalectomy.  Patient has had progression of disease demonstrated most recently on PET scan 04/26/2021    - Will obtain serum metanephrines, chromogranin a, and CMP  - Will order DOTATATE scan    - Follow up with neuroendocrine group in [place] regarding metastatic pheochromocytoma; last seen by Dr. Cohen of oncologic surgery on 04/26/2021  - No need to follow-up with Endocrine group here    Adrenal insufficiency, primary post-surgical  - Postsurgical adrenal insufficiency since  bilateral adrenalectomy in 2009    - Continue hydrocortisone 15mg the morning and 5 mg in the afternoon indefinitely, will send refills today  - Patient not requiring fludrocortisone  - Sent prescription for emergency dexamethasone and discussed sick day rules    Controlled type 2 diabetes mellitus without complication  - Continue metformin 500 mg twice daily and Jardiance 10 mg daily    - Update A1c now  - Follow-up with Dr. Cole in [place] for management of diabetes mellitus      Ace Ortiz DO  [facility], 6th Floor  [address], [place], [address]    Office: [phone]  Fax: [phone]    Disclaimer: This note has been generated using voice-recognition software. There may be typographical errors that have been missed during proof-reading.    The above history labs imaging impression and plan were discussed with attending physician who is in agreement and also took part in this patient's care.  I personally reviewed all of the patients available medications, labs, imaging, vitals, allergies, medical history

## 2022-11-11 ENCOUNTER — LAB VISIT (OUTPATIENT)
Dept: LAB | Facility: HOSPITAL | Age: 77
End: 2022-11-11
Payer: MEDICARE

## 2022-11-11 ENCOUNTER — TELEPHONE (OUTPATIENT)
Dept: HEMATOLOGY/ONCOLOGY | Facility: CLINIC | Age: 77
End: 2022-11-11
Payer: MEDICARE

## 2022-11-11 ENCOUNTER — OFFICE VISIT (OUTPATIENT)
Dept: ENDOCRINOLOGY | Facility: CLINIC | Age: 77
End: 2022-11-11
Payer: MEDICARE

## 2022-11-11 VITALS
SYSTOLIC BLOOD PRESSURE: 140 MMHG | WEIGHT: 187.94 LBS | BODY MASS INDEX: 33.3 KG/M2 | HEIGHT: 63 IN | DIASTOLIC BLOOD PRESSURE: 80 MMHG | OXYGEN SATURATION: 99 % | HEART RATE: 64 BPM

## 2022-11-11 DIAGNOSIS — C74.90 MALIGNANT PHEOCHROMOCYTOMA, UNSPECIFIED LATERALITY: ICD-10-CM

## 2022-11-11 DIAGNOSIS — E27.1 ADRENAL INSUFFICIENCY, PRIMARY: ICD-10-CM

## 2022-11-11 DIAGNOSIS — E11.9 CONTROLLED TYPE 2 DIABETES MELLITUS WITHOUT COMPLICATION, WITHOUT LONG-TERM CURRENT USE OF INSULIN: ICD-10-CM

## 2022-11-11 DIAGNOSIS — E27.8 OTHER SPECIFIED DISORDERS OF ADRENAL GLAND: Primary | ICD-10-CM

## 2022-11-11 LAB
ALBUMIN SERPL BCP-MCNC: 3.4 G/DL (ref 3.5–5.2)
ALP SERPL-CCNC: 96 U/L (ref 55–135)
ALT SERPL W/O P-5'-P-CCNC: 5 U/L (ref 10–44)
ANION GAP SERPL CALC-SCNC: 11 MMOL/L (ref 8–16)
AST SERPL-CCNC: 22 U/L (ref 10–40)
BILIRUB SERPL-MCNC: 0.6 MG/DL (ref 0.1–1)
BUN SERPL-MCNC: 19 MG/DL (ref 8–23)
CALCIUM SERPL-MCNC: 9.8 MG/DL (ref 8.7–10.5)
CHLORIDE SERPL-SCNC: 100 MMOL/L (ref 95–110)
CO2 SERPL-SCNC: 25 MMOL/L (ref 23–29)
CREAT SERPL-MCNC: 1 MG/DL (ref 0.5–1.4)
EST. GFR  (NO RACE VARIABLE): 57.7 ML/MIN/1.73 M^2
ESTIMATED AVG GLUCOSE: 117 MG/DL (ref 68–131)
GLUCOSE SERPL-MCNC: 122 MG/DL (ref 70–110)
HBA1C MFR BLD: 5.7 % (ref 4–5.6)
POTASSIUM SERPL-SCNC: 5.1 MMOL/L (ref 3.5–5.1)
PROT SERPL-MCNC: 7.6 G/DL (ref 6–8.4)
SODIUM SERPL-SCNC: 136 MMOL/L (ref 136–145)

## 2022-11-11 PROCEDURE — 83835 ASSAY OF METANEPHRINES: CPT | Performed by: STUDENT IN AN ORGANIZED HEALTH CARE EDUCATION/TRAINING PROGRAM

## 2022-11-11 PROCEDURE — 99204 OFFICE O/P NEW MOD 45 MIN: CPT | Mod: GC,S$GLB,, | Performed by: INTERNAL MEDICINE

## 2022-11-11 PROCEDURE — 1101F PT FALLS ASSESS-DOCD LE1/YR: CPT | Mod: CPTII,S$GLB,, | Performed by: INTERNAL MEDICINE

## 2022-11-11 PROCEDURE — 99204 PR OFFICE/OUTPT VISIT, NEW, LEVL IV, 45-59 MIN: ICD-10-PCS | Mod: GC,S$GLB,, | Performed by: INTERNAL MEDICINE

## 2022-11-11 PROCEDURE — 3288F FALL RISK ASSESSMENT DOCD: CPT | Mod: CPTII,S$GLB,, | Performed by: INTERNAL MEDICINE

## 2022-11-11 PROCEDURE — 3079F DIAST BP 80-89 MM HG: CPT | Mod: CPTII,S$GLB,, | Performed by: INTERNAL MEDICINE

## 2022-11-11 PROCEDURE — 3079F PR MOST RECENT DIASTOLIC BLOOD PRESSURE 80-89 MM HG: ICD-10-PCS | Mod: CPTII,S$GLB,, | Performed by: INTERNAL MEDICINE

## 2022-11-11 PROCEDURE — 80053 COMPREHEN METABOLIC PANEL: CPT | Performed by: STUDENT IN AN ORGANIZED HEALTH CARE EDUCATION/TRAINING PROGRAM

## 2022-11-11 PROCEDURE — 1126F PR PAIN SEVERITY QUANTIFIED, NO PAIN PRESENT: ICD-10-PCS | Mod: CPTII,S$GLB,, | Performed by: INTERNAL MEDICINE

## 2022-11-11 PROCEDURE — 3077F SYST BP >= 140 MM HG: CPT | Mod: CPTII,S$GLB,, | Performed by: INTERNAL MEDICINE

## 2022-11-11 PROCEDURE — 99999 PR PBB SHADOW E&M-EST. PATIENT-LVL V: ICD-10-PCS | Mod: PBBFAC,,, | Performed by: INTERNAL MEDICINE

## 2022-11-11 PROCEDURE — 3077F PR MOST RECENT SYSTOLIC BLOOD PRESSURE >= 140 MM HG: ICD-10-PCS | Mod: CPTII,S$GLB,, | Performed by: INTERNAL MEDICINE

## 2022-11-11 PROCEDURE — 1159F PR MEDICATION LIST DOCUMENTED IN MEDICAL RECORD: ICD-10-PCS | Mod: CPTII,S$GLB,, | Performed by: INTERNAL MEDICINE

## 2022-11-11 PROCEDURE — 83036 HEMOGLOBIN GLYCOSYLATED A1C: CPT | Performed by: STUDENT IN AN ORGANIZED HEALTH CARE EDUCATION/TRAINING PROGRAM

## 2022-11-11 PROCEDURE — 1126F AMNT PAIN NOTED NONE PRSNT: CPT | Mod: CPTII,S$GLB,, | Performed by: INTERNAL MEDICINE

## 2022-11-11 PROCEDURE — 99999 PR PBB SHADOW E&M-EST. PATIENT-LVL V: CPT | Mod: PBBFAC,,, | Performed by: INTERNAL MEDICINE

## 2022-11-11 PROCEDURE — 1159F MED LIST DOCD IN RCRD: CPT | Mod: CPTII,S$GLB,, | Performed by: INTERNAL MEDICINE

## 2022-11-11 PROCEDURE — 1160F RVW MEDS BY RX/DR IN RCRD: CPT | Mod: CPTII,S$GLB,, | Performed by: INTERNAL MEDICINE

## 2022-11-11 PROCEDURE — 3288F PR FALLS RISK ASSESSMENT DOCUMENTED: ICD-10-PCS | Mod: CPTII,S$GLB,, | Performed by: INTERNAL MEDICINE

## 2022-11-11 PROCEDURE — 1101F PR PT FALLS ASSESS DOC 0-1 FALLS W/OUT INJ PAST YR: ICD-10-PCS | Mod: CPTII,S$GLB,, | Performed by: INTERNAL MEDICINE

## 2022-11-11 PROCEDURE — 86316 IMMUNOASSAY TUMOR OTHER: CPT | Performed by: STUDENT IN AN ORGANIZED HEALTH CARE EDUCATION/TRAINING PROGRAM

## 2022-11-11 PROCEDURE — 1160F PR REVIEW ALL MEDS BY PRESCRIBER/CLIN PHARMACIST DOCUMENTED: ICD-10-PCS | Mod: CPTII,S$GLB,, | Performed by: INTERNAL MEDICINE

## 2022-11-11 RX ORDER — METFORMIN HYDROCHLORIDE 500 MG/1
1000 TABLET, EXTENDED RELEASE ORAL 2 TIMES DAILY WITH MEALS
Qty: 180 TABLET | Refills: 3 | Status: SHIPPED | OUTPATIENT
Start: 2022-11-11 | End: 2023-06-26 | Stop reason: SDUPTHER

## 2022-11-11 RX ORDER — CEPHALEXIN 500 MG/1
500 CAPSULE ORAL 4 TIMES DAILY
Status: ON HOLD | COMMUNITY
End: 2023-03-01

## 2022-11-11 RX ORDER — HYDROCORTISONE 5 MG/1
TABLET ORAL
Qty: 360 TABLET | Refills: 3 | Status: SHIPPED | OUTPATIENT
Start: 2022-11-11

## 2022-11-11 RX ORDER — FUROSEMIDE 20 MG/1
20 TABLET ORAL 2 TIMES DAILY PRN
Status: ON HOLD | COMMUNITY
End: 2023-03-01

## 2022-11-11 RX ORDER — METOPROLOL TARTRATE 25 MG/1
25 TABLET, FILM COATED ORAL 2 TIMES DAILY
COMMUNITY

## 2022-11-11 RX ORDER — DEXAMETHASONE SODIUM PHOSPHATE 4 MG/ML
4 INJECTION, SOLUTION INTRA-ARTICULAR; INTRALESIONAL; INTRAMUSCULAR; INTRAVENOUS; SOFT TISSUE DAILY PRN
Qty: 1 ML | Refills: 1 | Status: SHIPPED | OUTPATIENT
Start: 2022-11-11

## 2022-11-11 RX ORDER — LOSARTAN POTASSIUM 25 MG/1
25 TABLET ORAL DAILY
COMMUNITY
End: 2024-03-28

## 2022-11-11 NOTE — PATIENT INSTRUCTIONS
"- Blood work today to check chromogranin a, serum metanephrines, and complete metabolic panel as well as A1c  - Schedule DOTATATE scan to check disease stability  - Continue medications according to current regimen    - Sent prescription for emergency dexamethasone injection in case you are nauseous/vomiting and unable to keep hydrocortisone pills down, please see sick day rules below    - Please follow-up with neuroendocrine team in [place] for pheochromocytoma and with Dr. Cole in [place] for diabetes, no need for follow up with Endocrine here      Adrenal insufficiency self-care instructions and sick day rules     Adrenal insufficiency, which involves a deficiency in steroid hormones that are normally produced by the body, can result in symptoms that include generalized and severe fatigue, malaise, dizziness, and low blood pressure. Should you develop any of these symptoms, this may indicate a dangerous adrenal crisis. Please call us immediately or go to the ER if severe symptoms develop.    Please refer to the following instructions for patients with adrenal insufficiency:    1. Please obtain a medical alert bracelet that says "Adrenal insufficiency. Give stress doses of steroids in case of illness."     2. If you become nauseous and are unable to keep hydrocortisone down, you need to go to an emergency room to get intravenous steroids.    3. If you are ill with a low-grade fever of  F, please double your dose of hydrocortisone. If you have a fever of >100 F, please triple your hydrocortisone dose for 3 days or until fever resolves. This as known as a "stress dose" of steroids to help your body through times of physiologic stress, such as infection.    4. If you are undergoing a planned medical procedure (such as minor surgery, colonoscopy) or a major dental procedure (tooth extraction, root canal etc) you should double your dose the day before and the day of and the day after the procedure. This as known " "as a "stress dose" of steroids to help your body through times of physiologic stress, such as infection.    5. If a major surgery requiring general anesthesia is being planned, please notify your endocrinologist so that we can give instructions to the anesthesia team that will be taking care of you with regards to the amount of hydrocortisone to be given during surgery.    Please call us with any questions and to notify us that you are ill at home or are heading to ER/hospital so that we can help you through the situation and communicate with the physicians taking care of you.    "

## 2022-11-11 NOTE — TELEPHONE ENCOUNTER
----- Message from Jared Hernandez MD sent at 11/11/2022 10:49 AM CST -----  Regarding: RE: Neuroendocrine clinic  Good morning,    Dr. Ema Chowdhury and I are seeing the neuroendocrine patients. Dr. Chowdhury does more of the gastrointestinal neuroendocrine tumors, and I am seeing the lung carcinoids mostly. We'll get her back into clinic.    Mira, schedule an appointment with Dr. Chowdhury in December 2022. It can be at [facility].    Thanks!  Jared  ----- Message -----  From: Dominick Wilburn MD  Sent: 11/11/2022   9:27 AM CST  To: Jared Hernandez MD  Subject: Neuroendocrine clinic                            Abram Coleman,    We saw this patient today with a history of metastatic pheo who was previously followed in neuroendocrine clinic but has been lost to follow-up. Are you seeing patients there? We're going to get some tumor markers and a repeat DOTATATE scan for restaging.     Thanks,  -Felix

## 2022-11-12 LAB — CGA SERPL-MCNC: 229 NG/ML

## 2022-11-25 LAB
METANEPH FREE SERPL-MCNC: <25 PG/ML
METANEPHS SERPL-MCNC: 1209 PG/ML
NORMETANEPH FREE SERPL-MCNC: 1209 PG/ML

## 2022-11-27 NOTE — PROGRESS NOTES
CC:  Follow up for metastatic pheochromocytoma    HPI:  Ms. Sawant is a 74 yo woman with DM, HTN, CAD s/p MI and pacemaker in June 2022, carotid paraganglioma removed in 11/2018 who presents today for further management of metastatic pheochromocytoma. Last seen Dr West in 2018 and Dr Cohen in early 2021. Does not follow oncologist locally. It is my first time seeing her today. Her history dates to 2009 when she was being worked up for abdominal pain and was found to have cholelithiasis. However, during this workup she was found to have bilateral adrenal masses.  She was also noted to be hypertensive.  She had a 6.5 cm mass on the right with some evidence of necrosis and also a mass just under 3 cm on the left.  Biochemical workup was consistent with pheochromocytoma.  In 11/2009 she underwent a bilateral adrenalectomy.  Pathology had revealed a right adrenal gland pheochromocytoma and a left adrenal gland adrenal cortical adenoma.  In 02/2016 she was noted to have recurrent disease to the bone and underwent I 131 MIBG therapy in 03/2016, 07/2016 and 11/2016.  In 10/2016 she underwent a right partial nephrectomy due to recurrent disease.  Pathology did confirm findings consistent with recurrent pheochromocytoma.  In 11/2018 she underwent removal of the right carotid body tumor with pathology showing paraganglioma. She presents today for follow up. She knows she has cancer but does not know it is stage IV. She is not sure if Dr Cohen called her after tumor board or not. She feels well. Had MI in June 2022, treated at [facility], had pacemaker placed. Medically treated. Feeling well today.     Oncologic History:       Oncologic History Pheochromocytoma, right adrenal, diagnosed 11/2009  Recurrent disease to bone diagnosed 02/2016    Oncologic Treatment Bilateral adrenalectomy 11/2009  Right open partial nephrectomy 10/2017 (Dr. Castillo)  [id number] 03/2016, 07/2016, 11/2016  Removal of right carotid  body tumor 2018 (Dr. Ca)    Pathology 2009:  Pheochromocytoma, right         ECO    Past Medical History:   Diagnosis Date    Carotid paraganglioma 2018    Diabetes mellitus     Hypertension     Lumbar spondylosis 2018    Malignant pheochromocytoma     Pheochromocytoma     Pheochromocytoma, malignant 01/10/2016    Pheochromocytoma, malignant     Sacroiliac joint pain 2018    Secondary neuroendocrine tumor of bone 2018    Secondary neuroendocrine tumor of distant lymph nodes 2018    Thyroid disease         Past Surgical History:   Procedure Laterality Date    ADRENALECTOMY      ANGIOGRAPHY N/A 11/15/2018    Procedure: ANGIOGRAM;  Surgeon: Justino Diagnostic Provider;  Location: [facility] OR [address] [place] [address];  Service: Radiology;  Laterality: N/A;    APPENDECTOMY      DISSECTION OF NECK Right 2018    Procedure: DISSECTION, NECK to remove right carotid body tumor;  Surgeon: Noah Ca MD;  Location: [facility] OR [address] [place] [address];  Service: ENT;  Laterality: Right;    INJECTION OF ANESTHETIC AGENT INTO SACROILIAC JOINT Right 2018    Procedure: BLOCK, SACROILIAC JOINT;  Surgeon: Sydney Reynoso MD;  Location: [facility] PAIN MGT;  Service: Pain Management;  Laterality: Right;  Right SI Joint Injection    [id number]    NEEDS CONSENT    LEFT HEART CATHETERIZATION Left 2018    Procedure: CATHETERIZATION, HEART, LEFT;  Surgeon: Moustapha Vasquez MD;  Location: [facility] CATH LAB;  Service: Cardiovascular;  Laterality: Left;    MIBG Therapy  3/2016, 2016, 2016    ELIJAH - Dr. Martines       Family History   Problem Relation Age of Onset    Heart disease Mother     Diabetes Mother     Heart disease Father     Cancer Brother     Lung cancer Brother     Cancer Brother     Melanoma Neg Hx        Social History     Socioeconomic History    Marital status: Single   Tobacco Use    Smoking status: Never    Smokeless tobacco: Never   Substance and Sexual Activity    Alcohol use:  No    Drug use: No   Other Topics Concern    Are you pregnant or think you may be? No    Breast-feeding No       Review of Systems   Constitutional:  Negative for appetite change, chills, fatigue, fever and unexpected weight change.   HENT:  Negative for mouth sores, nosebleeds, tinnitus, trouble swallowing and voice change.    Eyes:  Negative for pain, redness and visual disturbance.   Respiratory:  Negative for cough, shortness of breath and wheezing.    Cardiovascular:  Negative for chest pain, palpitations and leg swelling.   Gastrointestinal:  Negative for abdominal distention, abdominal pain, blood in stool, constipation, diarrhea, nausea and vomiting.   Endocrine: Negative for polydipsia, polyphagia and polyuria.   Genitourinary:  Negative for flank pain, frequency and hematuria.   Musculoskeletal:  Positive for back pain and gait problem. Negative for arthralgias, joint swelling, myalgias, neck pain and neck stiffness.   Skin:  Negative for color change, pallor, rash and wound.   Neurological:  Negative for tremors, seizures, syncope, speech difficulty, weakness, light-headedness, numbness and headaches.   Hematological:  Negative for adenopathy. Does not bruise/bleed easily.   Psychiatric/Behavioral:  Negative for confusion, dysphoric mood and self-injury. The patient is not nervous/anxious.    All other systems reviewed and are negative.    Objective:  Physical Exam  Vitals reviewed.   Constitutional:       General: She is not in acute distress.     Appearance: She is well-developed. She is not diaphoretic.   HENT:      Head: Normocephalic and atraumatic.      Mouth/Throat:      Pharynx: No oropharyngeal exudate.   Eyes:      General: No scleral icterus.     Conjunctiva/sclera: Conjunctivae normal.      Pupils: Pupils are equal, round, and reactive to light.   Neck:      Thyroid: No thyromegaly.      Vascular: No JVD.   Cardiovascular:      Rate and Rhythm: Normal rate and regular rhythm.      Heart  sounds: Normal heart sounds. No murmur heard.    No friction rub. No gallop.   Pulmonary:      Effort: Pulmonary effort is normal. No respiratory distress.      Breath sounds: Normal breath sounds. No wheezing or rales.   Abdominal:      General: Bowel sounds are normal. There is no distension.      Palpations: Abdomen is soft. There is no mass.      Tenderness: There is no abdominal tenderness. There is no rebound.      Hernia: No hernia is present.   Musculoskeletal:         General: No tenderness or deformity. Normal range of motion.      Cervical back: Normal range of motion and neck supple.   Lymphadenopathy:      Cervical: No cervical adenopathy.      Upper Body:      Right upper body: No supraclavicular adenopathy.      Left upper body: No supraclavicular adenopathy.   Skin:     General: Skin is warm and dry.      Coloration: Skin is not pale.      Findings: No erythema or rash.   Neurological:      Mental Status: She is alert and oriented to person, place, and time.      Cranial Nerves: No cranial nerve deficit.      Motor: No abnormal muscle tone.   Psychiatric:         Behavior: Behavior normal.         Thought Content: Thought content normal.         Judgment: Judgment normal.       Labs:  11/11/22: metanephrine 1209    Imaging Data:  Copper PET 4/26/2021:  New areas of abnormal radiotracer uptake within the liver, right posterior 12th rib, and right iliac bone.  Findings suggestive for progression of disease.    Redemonstration of a right infraglenoid bone metastasis.     Status post interval resection of prior intensely tracer avid right cervical node.     This report was flagged in Epic as abnormal.       Assessment and plan:    1. Malignant pheochromocytoma, unspecified laterality    2. Secondary neuroendocrine tumor of distant lymph nodes    3. Secondary neuroendocrine tumor of bone    4. Secondary malignant neoplasm of liver      1-4  - Ms Jese is a 78 yo woman with R adrenal pheochromocytoma s/p  bilateral adrenectomy in 11/2009, recurrent disease to the bone in 2016 s/p MIBG in 2016.   - long discussion with Ms Sawant and her sister, Claudia. Reviewed the diagnosis and prognosis of stage IV pheochromocytoma. Reviewed her cancer history and scan results in detail. Discussed her PET scan last year already showed progression. Discussed we need to treat cancer to prolong her life. Will get CBC, CMP, catecholamines today. Schedule first available copper PET scan. Discuss at tumor board and return after that to discuss treatment options. Can do PRRT if avid on copper PET. Also need lanreotide but need to time it with PRRT  - c/w cardura      Their multiple questions were answered in the clinic. Encouraged to call should issues arise.   I spent 70 minutes reviewing the chart, interpreting laboratory result and imaging data, coordinating patient's care, with at least 50% of the time on face-to-face counseling.     Route Chart for Scheduling    Med Onc Chart Routing  Urgent    Follow up with physician 2 weeks. please schedule patient for first available copper PET scan and let patient and me know.  see me in clinic one week after copper PET   Follow up with KAYLEN    Infusion scheduling note    Injection scheduling note    Labs    Imaging PET scan   first available copper PET   Pharmacy appointment    Other referrals

## 2022-11-28 ENCOUNTER — OFFICE VISIT (OUTPATIENT)
Dept: HEMATOLOGY/ONCOLOGY | Facility: CLINIC | Age: 77
End: 2022-11-28
Payer: MEDICARE

## 2022-11-28 ENCOUNTER — LAB VISIT (OUTPATIENT)
Dept: LAB | Facility: HOSPITAL | Age: 77
End: 2022-11-28
Attending: INTERNAL MEDICINE
Payer: MEDICARE

## 2022-11-28 VITALS
OXYGEN SATURATION: 98 % | RESPIRATION RATE: 18 BRPM | HEART RATE: 71 BPM | SYSTOLIC BLOOD PRESSURE: 120 MMHG | HEIGHT: 63 IN | TEMPERATURE: 98 F | BODY MASS INDEX: 33.14 KG/M2 | WEIGHT: 187.06 LBS | DIASTOLIC BLOOD PRESSURE: 59 MMHG

## 2022-11-28 DIAGNOSIS — C7B.8 SECONDARY NEUROENDOCRINE TUMOR OF BONE: ICD-10-CM

## 2022-11-28 DIAGNOSIS — C78.7 SECONDARY MALIGNANT NEOPLASM OF LIVER: ICD-10-CM

## 2022-11-28 DIAGNOSIS — C74.90 MALIGNANT PHEOCHROMOCYTOMA, UNSPECIFIED LATERALITY: Primary | ICD-10-CM

## 2022-11-28 DIAGNOSIS — C74.90 MALIGNANT PHEOCHROMOCYTOMA, UNSPECIFIED LATERALITY: ICD-10-CM

## 2022-11-28 DIAGNOSIS — C7B.8 SECONDARY NEUROENDOCRINE TUMOR OF DISTANT LYMPH NODES: ICD-10-CM

## 2022-11-28 LAB
ALBUMIN SERPL BCP-MCNC: 3.4 G/DL (ref 3.5–5.2)
ALP SERPL-CCNC: 101 U/L (ref 55–135)
ALT SERPL W/O P-5'-P-CCNC: 6 U/L (ref 10–44)
ANION GAP SERPL CALC-SCNC: 8 MMOL/L (ref 8–16)
AST SERPL-CCNC: 14 U/L (ref 10–40)
BASOPHILS # BLD AUTO: 0.03 K/UL (ref 0–0.2)
BASOPHILS NFR BLD: 0.4 % (ref 0–1.9)
BILIRUB SERPL-MCNC: 0.4 MG/DL (ref 0.1–1)
BUN SERPL-MCNC: 19 MG/DL (ref 8–23)
CALCIUM SERPL-MCNC: 9.7 MG/DL (ref 8.7–10.5)
CHLORIDE SERPL-SCNC: 104 MMOL/L (ref 95–110)
CO2 SERPL-SCNC: 26 MMOL/L (ref 23–29)
CREAT SERPL-MCNC: 0.9 MG/DL (ref 0.5–1.4)
DIFFERENTIAL METHOD: ABNORMAL
EOSINOPHIL # BLD AUTO: 0.2 K/UL (ref 0–0.5)
EOSINOPHIL NFR BLD: 2.7 % (ref 0–8)
ERYTHROCYTE [DISTWIDTH] IN BLOOD BY AUTOMATED COUNT: 14.8 % (ref 11.5–14.5)
EST. GFR  (NO RACE VARIABLE): >60 ML/MIN/1.73 M^2
GLUCOSE SERPL-MCNC: 93 MG/DL (ref 70–110)
HCT VFR BLD AUTO: 40.3 % (ref 37–48.5)
HGB BLD-MCNC: 12.6 G/DL (ref 12–16)
IMM GRANULOCYTES # BLD AUTO: 0.03 K/UL (ref 0–0.04)
IMM GRANULOCYTES NFR BLD AUTO: 0.4 % (ref 0–0.5)
LYMPHOCYTES # BLD AUTO: 2.6 K/UL (ref 1–4.8)
LYMPHOCYTES NFR BLD: 38.2 % (ref 18–48)
MCH RBC QN AUTO: 29 PG (ref 27–31)
MCHC RBC AUTO-ENTMCNC: 31.3 G/DL (ref 32–36)
MCV RBC AUTO: 93 FL (ref 82–98)
MONOCYTES # BLD AUTO: 0.3 K/UL (ref 0.3–1)
MONOCYTES NFR BLD: 5 % (ref 4–15)
NEUTROPHILS # BLD AUTO: 3.6 K/UL (ref 1.8–7.7)
NEUTROPHILS NFR BLD: 53.3 % (ref 38–73)
NRBC BLD-RTO: 0 /100 WBC
PLATELET # BLD AUTO: 243 K/UL (ref 150–450)
PMV BLD AUTO: 9 FL (ref 9.2–12.9)
POTASSIUM SERPL-SCNC: 4.7 MMOL/L (ref 3.5–5.1)
PROT SERPL-MCNC: 7.1 G/DL (ref 6–8.4)
RBC # BLD AUTO: 4.34 M/UL (ref 4–5.4)
SODIUM SERPL-SCNC: 138 MMOL/L (ref 136–145)
WBC # BLD AUTO: 6.76 K/UL (ref 3.9–12.7)

## 2022-11-28 PROCEDURE — 1101F PT FALLS ASSESS-DOCD LE1/YR: CPT | Mod: CPTII,S$GLB,, | Performed by: INTERNAL MEDICINE

## 2022-11-28 PROCEDURE — 1160F PR REVIEW ALL MEDS BY PRESCRIBER/CLIN PHARMACIST DOCUMENTED: ICD-10-PCS | Mod: CPTII,S$GLB,, | Performed by: INTERNAL MEDICINE

## 2022-11-28 PROCEDURE — 1159F PR MEDICATION LIST DOCUMENTED IN MEDICAL RECORD: ICD-10-PCS | Mod: CPTII,S$GLB,, | Performed by: INTERNAL MEDICINE

## 2022-11-28 PROCEDURE — 3074F SYST BP LT 130 MM HG: CPT | Mod: CPTII,S$GLB,, | Performed by: INTERNAL MEDICINE

## 2022-11-28 PROCEDURE — 3078F DIAST BP <80 MM HG: CPT | Mod: CPTII,S$GLB,, | Performed by: INTERNAL MEDICINE

## 2022-11-28 PROCEDURE — 3074F PR MOST RECENT SYSTOLIC BLOOD PRESSURE < 130 MM HG: ICD-10-PCS | Mod: CPTII,S$GLB,, | Performed by: INTERNAL MEDICINE

## 2022-11-28 PROCEDURE — 99205 PR OFFICE/OUTPT VISIT, NEW, LEVL V, 60-74 MIN: ICD-10-PCS | Mod: S$GLB,,, | Performed by: INTERNAL MEDICINE

## 2022-11-28 PROCEDURE — 99999 PR PBB SHADOW E&M-EST. PATIENT-LVL V: ICD-10-PCS | Mod: PBBFAC,,, | Performed by: INTERNAL MEDICINE

## 2022-11-28 PROCEDURE — 1101F PR PT FALLS ASSESS DOC 0-1 FALLS W/OUT INJ PAST YR: ICD-10-PCS | Mod: CPTII,S$GLB,, | Performed by: INTERNAL MEDICINE

## 2022-11-28 PROCEDURE — 82384 ASSAY THREE CATECHOLAMINES: CPT | Performed by: INTERNAL MEDICINE

## 2022-11-28 PROCEDURE — 1126F AMNT PAIN NOTED NONE PRSNT: CPT | Mod: CPTII,S$GLB,, | Performed by: INTERNAL MEDICINE

## 2022-11-28 PROCEDURE — 1159F MED LIST DOCD IN RCRD: CPT | Mod: CPTII,S$GLB,, | Performed by: INTERNAL MEDICINE

## 2022-11-28 PROCEDURE — 3078F PR MOST RECENT DIASTOLIC BLOOD PRESSURE < 80 MM HG: ICD-10-PCS | Mod: CPTII,S$GLB,, | Performed by: INTERNAL MEDICINE

## 2022-11-28 PROCEDURE — 99999 PR PBB SHADOW E&M-EST. PATIENT-LVL V: CPT | Mod: PBBFAC,,, | Performed by: INTERNAL MEDICINE

## 2022-11-28 PROCEDURE — 1160F RVW MEDS BY RX/DR IN RCRD: CPT | Mod: CPTII,S$GLB,, | Performed by: INTERNAL MEDICINE

## 2022-11-28 PROCEDURE — 3288F FALL RISK ASSESSMENT DOCD: CPT | Mod: CPTII,S$GLB,, | Performed by: INTERNAL MEDICINE

## 2022-11-28 PROCEDURE — 85025 COMPLETE CBC W/AUTO DIFF WBC: CPT | Performed by: INTERNAL MEDICINE

## 2022-11-28 PROCEDURE — 99205 OFFICE O/P NEW HI 60 MIN: CPT | Mod: S$GLB,,, | Performed by: INTERNAL MEDICINE

## 2022-11-28 PROCEDURE — 80053 COMPREHEN METABOLIC PANEL: CPT | Performed by: INTERNAL MEDICINE

## 2022-11-28 PROCEDURE — 1126F PR PAIN SEVERITY QUANTIFIED, NO PAIN PRESENT: ICD-10-PCS | Mod: CPTII,S$GLB,, | Performed by: INTERNAL MEDICINE

## 2022-11-28 PROCEDURE — 3288F PR FALLS RISK ASSESSMENT DOCUMENTED: ICD-10-PCS | Mod: CPTII,S$GLB,, | Performed by: INTERNAL MEDICINE

## 2022-11-29 ENCOUNTER — TELEPHONE (OUTPATIENT)
Dept: HEMATOLOGY/ONCOLOGY | Facility: CLINIC | Age: 77
End: 2022-11-29
Payer: MEDICARE

## 2022-11-29 NOTE — TELEPHONE ENCOUNTER
----- Message from Ema Chowdhury MD sent at 11/28/2022  3:58 PM CST -----  NICKY Ponce,     She has pheo. Had MI treated at [facility] this June and had pacemaker placed. Can you help me get their records? thanks

## 2022-11-30 NOTE — TELEPHONE ENCOUNTER
Spoke to [facility] medical records and faxed request for admission/ER/imaging/cardiology records to us. Fax number [phone]

## 2022-12-07 LAB
CATECHOLS PLAS-MCNC: 1069 PG/ML
DOPAMINE SERPL-MCNC: 13 PG/ML
EPINEPH PLAS-MCNC: 28 PG/ML
NOREPINEPH PLAS-MCNC: 1028 PG/ML

## 2022-12-13 ENCOUNTER — TELEPHONE (OUTPATIENT)
Dept: HEMATOLOGY/ONCOLOGY | Facility: CLINIC | Age: 77
End: 2022-12-13
Payer: MEDICARE

## 2022-12-13 DIAGNOSIS — C74.90 MALIGNANT PHEOCHROMOCYTOMA, UNSPECIFIED LATERALITY: Primary | ICD-10-CM

## 2022-12-14 DIAGNOSIS — C7B.8 SECONDARY NEUROENDOCRINE TUMOR OF BONE: ICD-10-CM

## 2022-12-14 DIAGNOSIS — C74.90 MALIGNANT PHEOCHROMOCYTOMA, UNSPECIFIED LATERALITY: Primary | ICD-10-CM

## 2022-12-19 ENCOUNTER — TELEPHONE (OUTPATIENT)
Dept: HEMATOLOGY/ONCOLOGY | Facility: CLINIC | Age: 77
End: 2022-12-19
Payer: MEDICARE

## 2023-01-05 ENCOUNTER — HOSPITAL ENCOUNTER (OUTPATIENT)
Dept: RADIOLOGY | Facility: HOSPITAL | Age: 78
Discharge: HOME | End: 2023-01-05
Attending: INTERNAL MEDICINE
Payer: MEDICARE

## 2023-01-05 DIAGNOSIS — C7B.8 SECONDARY NEUROENDOCRINE TUMOR OF BONE: ICD-10-CM

## 2023-01-05 DIAGNOSIS — C74.90 MALIGNANT PHEOCHROMOCYTOMA, UNSPECIFIED LATERALITY: ICD-10-CM

## 2023-01-05 PROCEDURE — A9698 NON-RAD CONTRAST MATERIALNOC: HCPCS | Performed by: INTERNAL MEDICINE

## 2023-01-05 PROCEDURE — 25500020 PHARM REV CODE 255: Performed by: INTERNAL MEDICINE

## 2023-01-05 PROCEDURE — 78815 PET IMAGE W/CT SKULL-THIGH: CPT | Mod: 26,PS,, | Performed by: RADIOLOGY

## 2023-01-05 PROCEDURE — A9587 GALLIUM GA-68: HCPCS

## 2023-01-05 PROCEDURE — 78815 NM PET 68GA DOTATATE, VERTEX TO THIGH: ICD-10-PCS | Mod: 26,PS,, | Performed by: RADIOLOGY

## 2023-01-05 RX ADMIN — BARIUM SULFATE 450 ML: 20 SUSPENSION ORAL at 10:01

## 2023-01-06 ENCOUNTER — TELEPHONE (OUTPATIENT)
Dept: ENDOCRINOLOGY | Facility: CLINIC | Age: 78
End: 2023-01-06
Payer: MEDICARE

## 2023-01-06 ENCOUNTER — OFFICE VISIT (OUTPATIENT)
Dept: HEMATOLOGY/ONCOLOGY | Facility: CLINIC | Age: 78
End: 2023-01-06
Payer: MEDICARE

## 2023-01-06 VITALS
DIASTOLIC BLOOD PRESSURE: 66 MMHG | TEMPERATURE: 98 F | SYSTOLIC BLOOD PRESSURE: 119 MMHG | HEART RATE: 72 BPM | RESPIRATION RATE: 18 BRPM | WEIGHT: 190.81 LBS | OXYGEN SATURATION: 98 % | BODY MASS INDEX: 33.81 KG/M2 | HEIGHT: 63 IN

## 2023-01-06 DIAGNOSIS — I10 ESSENTIAL HYPERTENSION: ICD-10-CM

## 2023-01-06 DIAGNOSIS — C78.7 SECONDARY MALIGNANT NEOPLASM OF LIVER: ICD-10-CM

## 2023-01-06 DIAGNOSIS — C74.91 MALIGNANT PHEOCHROMOCYTOMA OF RIGHT ADRENAL GLAND: Primary | ICD-10-CM

## 2023-01-06 DIAGNOSIS — C7B.8 SECONDARY NEUROENDOCRINE TUMOR OF BONE: ICD-10-CM

## 2023-01-06 DIAGNOSIS — E11.9 CONTROLLED TYPE 2 DIABETES MELLITUS WITHOUT COMPLICATION, WITHOUT LONG-TERM CURRENT USE OF INSULIN: ICD-10-CM

## 2023-01-06 DIAGNOSIS — D84.81 IMMUNODEFICIENCY SECONDARY TO NEOPLASM: ICD-10-CM

## 2023-01-06 DIAGNOSIS — D49.9 IMMUNODEFICIENCY SECONDARY TO NEOPLASM: ICD-10-CM

## 2023-01-06 DIAGNOSIS — C74.90 MALIGNANT PHEOCHROMOCYTOMA, UNSPECIFIED LATERALITY: ICD-10-CM

## 2023-01-06 DIAGNOSIS — C7B.8 SECONDARY NEUROENDOCRINE TUMOR OF DISTANT LYMPH NODES: ICD-10-CM

## 2023-01-06 DIAGNOSIS — I49.5 SICK SINUS SYNDROME: ICD-10-CM

## 2023-01-06 PROCEDURE — 3078F DIAST BP <80 MM HG: CPT | Mod: CPTII,S$GLB,, | Performed by: INTERNAL MEDICINE

## 2023-01-06 PROCEDURE — 99999 PR PBB SHADOW E&M-EST. PATIENT-LVL V: ICD-10-PCS | Mod: PBBFAC,,, | Performed by: INTERNAL MEDICINE

## 2023-01-06 PROCEDURE — 3288F PR FALLS RISK ASSESSMENT DOCUMENTED: ICD-10-PCS | Mod: CPTII,S$GLB,, | Performed by: INTERNAL MEDICINE

## 2023-01-06 PROCEDURE — 1159F MED LIST DOCD IN RCRD: CPT | Mod: CPTII,S$GLB,, | Performed by: INTERNAL MEDICINE

## 2023-01-06 PROCEDURE — 1125F AMNT PAIN NOTED PAIN PRSNT: CPT | Mod: CPTII,S$GLB,, | Performed by: INTERNAL MEDICINE

## 2023-01-06 PROCEDURE — 3078F PR MOST RECENT DIASTOLIC BLOOD PRESSURE < 80 MM HG: ICD-10-PCS | Mod: CPTII,S$GLB,, | Performed by: INTERNAL MEDICINE

## 2023-01-06 PROCEDURE — 3288F FALL RISK ASSESSMENT DOCD: CPT | Mod: CPTII,S$GLB,, | Performed by: INTERNAL MEDICINE

## 2023-01-06 PROCEDURE — 3074F SYST BP LT 130 MM HG: CPT | Mod: CPTII,S$GLB,, | Performed by: INTERNAL MEDICINE

## 2023-01-06 PROCEDURE — 99215 PR OFFICE/OUTPT VISIT, EST, LEVL V, 40-54 MIN: ICD-10-PCS | Mod: S$GLB,,, | Performed by: INTERNAL MEDICINE

## 2023-01-06 PROCEDURE — 3074F PR MOST RECENT SYSTOLIC BLOOD PRESSURE < 130 MM HG: ICD-10-PCS | Mod: CPTII,S$GLB,, | Performed by: INTERNAL MEDICINE

## 2023-01-06 PROCEDURE — 1125F PR PAIN SEVERITY QUANTIFIED, PAIN PRESENT: ICD-10-PCS | Mod: CPTII,S$GLB,, | Performed by: INTERNAL MEDICINE

## 2023-01-06 PROCEDURE — 99999 PR PBB SHADOW E&M-EST. PATIENT-LVL V: CPT | Mod: PBBFAC,,, | Performed by: INTERNAL MEDICINE

## 2023-01-06 PROCEDURE — 1101F PT FALLS ASSESS-DOCD LE1/YR: CPT | Mod: CPTII,S$GLB,, | Performed by: INTERNAL MEDICINE

## 2023-01-06 PROCEDURE — 99215 OFFICE O/P EST HI 40 MIN: CPT | Mod: S$GLB,,, | Performed by: INTERNAL MEDICINE

## 2023-01-06 PROCEDURE — 1160F PR REVIEW ALL MEDS BY PRESCRIBER/CLIN PHARMACIST DOCUMENTED: ICD-10-PCS | Mod: CPTII,S$GLB,, | Performed by: INTERNAL MEDICINE

## 2023-01-06 PROCEDURE — 1160F RVW MEDS BY RX/DR IN RCRD: CPT | Mod: CPTII,S$GLB,, | Performed by: INTERNAL MEDICINE

## 2023-01-06 PROCEDURE — 1159F PR MEDICATION LIST DOCUMENTED IN MEDICAL RECORD: ICD-10-PCS | Mod: CPTII,S$GLB,, | Performed by: INTERNAL MEDICINE

## 2023-01-06 PROCEDURE — 1101F PR PT FALLS ASSESS DOC 0-1 FALLS W/OUT INJ PAST YR: ICD-10-PCS | Mod: CPTII,S$GLB,, | Performed by: INTERNAL MEDICINE

## 2023-01-06 RX ORDER — DOXAZOSIN 1 MG/1
1 TABLET ORAL NIGHTLY
Qty: 90 TABLET | Refills: 5 | Status: SHIPPED | OUTPATIENT
Start: 2023-01-06

## 2023-01-06 RX ORDER — DIPHENHYDRAMINE HYDROCHLORIDE 50 MG/ML
50 INJECTION INTRAMUSCULAR; INTRAVENOUS
Status: CANCELLED | OUTPATIENT
Start: 2023-02-22

## 2023-01-06 RX ORDER — LANREOTIDE ACETATE 120 MG/.5ML
120 INJECTION SUBCUTANEOUS ONCE
Status: CANCELLED | OUTPATIENT
Start: 2023-01-13 | End: 2023-01-13

## 2023-01-06 RX ORDER — SODIUM CHLORIDE 9 MG/ML
INJECTION, SOLUTION INTRAVENOUS ONCE
Status: CANCELLED | OUTPATIENT
Start: 2023-02-22

## 2023-01-06 RX ORDER — ACETAMINOPHEN 325 MG/1
650 TABLET ORAL
Status: CANCELLED | OUTPATIENT
Start: 2023-02-22

## 2023-01-06 RX ORDER — ARGININE 100 %
1000 CRYSTALS MISCELLANEOUS
Status: CANCELLED | OUTPATIENT
Start: 2023-02-22

## 2023-01-06 RX ORDER — SODIUM CHLORIDE 0.9 % (FLUSH) 0.9 %
10 SYRINGE (ML) INJECTION
Status: CANCELLED | OUTPATIENT
Start: 2023-02-22

## 2023-01-06 NOTE — TELEPHONE ENCOUNTER
Can you call to find out if she's still taking the Cardura (doxazosin)? If not, please tell her to resume in preparation for the upcoming treatment with Dr. Chowdhury. I sent in a refill in case she's out.    Thanks!

## 2023-01-06 NOTE — PROGRESS NOTES
PROGRESS NOTE    Subjective:       Patient ID: Hailey Sawant is a 77 y.o. female.    Chief Complaint: metastatic pheochromocytoma    Diagnosis:  Metastatic pheochromocytoma of the right adrenal gland, with mets to bones, liver    Oncologic History:  1. Ms. Sawant is a 72 yo woman with DM, HTN, CAD s/p MI and pacemaker in June 2022, carotid paraganglioma removed in 11/2018 who initially saw me on 11/28/2022 for further management of metastatic pheochromocytoma. Last seen Dr West in 2018 and Dr Cohen in early 2021. Does not follow oncologist locally. It is my first time seeing her today. Her history dates to 2009 when she was being worked up for abdominal pain and was found to have cholelithiasis. However, during this workup she was found to have bilateral adrenal masses.  She was also noted to be hypertensive.  She had a 6.5 cm mass on the right with some evidence of necrosis and also a mass just under 3 cm on the left.  Biochemical workup was consistent with pheochromocytoma.  In 11/2009 she underwent a bilateral adrenalectomy.  Pathology had revealed a right adrenal gland pheochromocytoma and a left adrenal gland adrenal cortical adenoma.  In 02/2016 she was noted to have recurrent disease to the bone and underwent I 131 MIBG therapy in 03/2016, 07/2016 and 11/2016.  In 10/2016 she underwent a right partial nephrectomy due to recurrent disease.  Pathology did confirm findings consistent with recurrent pheochromocytoma.  In 11/2018 she underwent removal of the right carotid body tumor with pathology showing paraganglioma. She presents today for follow up. She knows she has cancer but does not know it is stage IV. She is not sure if Dr Cohen called her after tumor board or not. She feels well. Had MI in June 2022, treated at [facility], had pacemaker placed. Medically treated. Feeling well today. We discussed restaging dotatate scan.   2.  "Dotatate scan declined by insurance several times. Finally had gallium PET on 23. It showed "New foci of abnormal radiotracer uptake at the cervical spine, sacral, right ilium and right acetabular joint. Interval enlargement of hepatic section 7 hypodense mass.      Oncologic History Pheochromocytoma, right adrenal, diagnosed 2009  Recurrent disease to bone diagnosed 2016    Oncologic Treatment Bilateral adrenalectomy 2009  Right open partial nephrectomy 10/2017 (Dr. Castillo)  [id number] 2016, 2016, 2016  Removal of right carotid body tumor 2018 (Dr. Ca)    Pathology 2009:  Pheochromocytoma, right        Interval History:   Ms Sawant returns to discuss PET scan results. Feeling well. No complaints. Dotatate scan declined by insurance several times. Finally had gallium PET on 23. It showed "New foci of abnormal radiotracer uptake at the cervical spine, sacral, right ilium and right acetabular joint. Interval enlargement of hepatic section 7 hypodense mass.    ECO    ROS:   A ten-point system review is obtained and negative except for what was stated in the Interval History.     Physical Examination:   Vital signs reviewed.   General: well hydrated, well developed, in no acute distress  HEENT: normocephalic, PERRLA, EOMI, anicteric sclerae, oropharynx clear  Neck: supple, no JVD, thyromegaly, cervical or supraclavicular lymphadenopathy  Lungs: clear breath sounds bilaterally, no wheezing, rales, or rhonchi  Heart: RRR, no M/R/G  Abdomen: soft, no tenderness, non-distended, no hepatosplenomegaly, mass, or hernia. BS present  Extremities: no clubbing, cyanosis, or edema  Skin: no rash, ulcer, or open wounds  Neuro: alert and oriented x 4, no focal neuro deficit  Psych: pleasant and appropriate mood and affect    Objective:     Laboratory Data:  Labs reviewed.     Imaging Data:  Gallium PET 23:  New foci of abnormal radiotracer uptake at the cervical spine, sacral, right ilium " and right acetabular joint.     Interval enlargement of hepatic section 7 hypodense mass.     Based upon progression of disease and tracer avidity of the patient's tumor burden, the patient would be an appropriate candidate for PRRT if clinically indicated.       Assessment and Plan:     1. Malignant pheochromocytoma of right adrenal gland    2. Secondary neuroendocrine tumor of distant lymph nodes    3. Secondary neuroendocrine tumor of bone    4. Secondary malignant neoplasm of liver    5. Immunodeficiency secondary to neoplasm    6. Essential hypertension    7. Controlled type 2 diabetes mellitus without complication, without long-term current use of insulin    8. Sick sinus syndrome      1-5  - Ms Sawant is a 78 yo woman with R adrenal pheochromocytoma s/p bilateral adrenectomy in 11/2009, recurrent disease to the bone in 2016 s/p MIBG in 2016.   - long discussion with Ms Sawant and her family. Reviewed the gallium PET scan results. Discussed cancer in the liver has progressed. Also developed new lesions in the bones.   - discussed treatment with lanreotide every 4 weeks, zometa every 3 months (preferred due to insurance) and PRRT  - We discussed the schedule and side effects of lanreotide. Side effects include but are not limited to diarrhea, injection site reaction, gallbladder problems, abdominal pain, gas, anemia, nausea, weight loss, blood sugar changes. Handouts provided to patient. Patient understands and agrees with starting lanreotide.   - discussed side effects of zometa including hypocalcemia and osteonecrosis of the jaw. Discussed OTC vit D and calcium, dental clearance before starting zometa. Handouts provided to patient  - discussed PRRT every 8 weeks for 4 doses. Discussed with patient re the schedule, regimen, and side effects of PRRT. Side effects include but are not limited to bone marrow suppression and cytopenias that may increase the risk of infection, bleeding and symptomatic anemia, hair  loss, damage to any body organs including lungs, kidney, liver, heart, carcinoid crisis, small bowel obstruction, and blood cancer such as leukemia or myelodysplastic syndrome.   - Consented the patient to the treatment plan and the patient was educated on the planned duration of the treatment and schedule of the treatment administration.  - discussed with Dr Wilburn to make sure patient is on adequate medications  - discussed with Dr Melton. May need to admit patient after PRRT to monitor for catecholamine crisis    6.  - BP controlled  - c/w cardura and metoprolol    7.  - BS controlled  - c/w current medication    8.  - f/u with cardiology      Follow-up:     Return in 2 weeks to start lanreotide  See me before PRRT. May need to admit  Knows to call in the interval if any problems arise.    Electronically signed by Ema Henriquez for Scheduling    Med Onc Chart Routing  Urgent    Follow up with physician Other. verify with ONDINA mancera zometa and lanreotide. hold zometa (need dental clearance). schedule for lanreotide in 2 weeks (one dose only). get CBC, CMP, catecholamines, metanephrines when she returns for lanreotide. [facility] team will schedule her return for PRRT   Follow up with KAYLEN    Infusion scheduling note    Injection scheduling note lanreotide in 2 weeks, along with labs   Labs CBC and CMP   Lab interval:  catecholamines, metanephrines, when she returns for lanreotide   Imaging    Pharmacy appointment    Other referrals          Therapy Plan Information  LANREOTIDE  5. Medications  lanreotide injection 120 mg  120 mg, Subcutaneous, Every visit    LUTATHERA SUPPORT  IV Fluids  0.9%  NaCl infusion  Intravenous, Every 8 weeks  Pre-Medications  ondansetron-dexAMETHasone 16-12 mg in sodium chloride 0.9% 50 mL IVPB 16 mg  16 mg, Intravenous, Every 8 weeks  amino acid (25 g L-LYSINE, 25 g L-ARGININE) in sodium chloride 0.9% 1000 mL infusion  1,000 mL, Intravenous, Every 8 weeks  promethazine (PHENERGAN) 12.5  mg in dextrose 5 % 50 mL IVPB  12.5 mg, Intravenous, Every 8 weeks  acetaminophen tablet 650 mg  650 mg, Oral, Every 8 weeks  Flushes  sodium chloride 0.9% flush 10 mL  10 mL, Intravenous, Every 8 weeks  PRN Medications  diphenhydrAMINE injection 50 mg  50 mg, Intravenous, Every 8 weeks  hydrocortisone sodium succinate injection 100 mg  100 mg, Intravenous, Every 8 weeks

## 2023-01-09 ENCOUNTER — TELEPHONE (OUTPATIENT)
Dept: HEMATOLOGY/ONCOLOGY | Facility: CLINIC | Age: 78
End: 2023-01-09
Payer: MEDICARE

## 2023-01-11 NOTE — PROGRESS NOTES
[facility]      NEUROENDOCRINE MULTIDISCIPLINARY TUMOR BOARD  _____________________________________________________________________    PRESENTER:   Ema Chowdhury MD    REASON FOR PRESENTATION:  Treatment Plan and Scan Review    ATTENDEES:   Surgery:   MD Shaan Jasmine MD Jacob Dowden, MD William Kethman, MD  Gastroenterology - Not Present  Interventional Radiology - Chet Henson MD  Nuclear Medicine - Dominick Melton MD  Nutrition - Tracie Weil-Cavalier, RDN  Oncology - MD Jared Loya MD  Palliative Medicine - Not Present  Pathology -  Clau Gamez MD  Research- Not Present    PATIENT STATUS:  Established Patient    PATIENT SUMMARY:  Past Medical History:   Diagnosis Date    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    Diabetes mellitus     Hypertension     Lumbar spondylosis 6/8/2018    Malignant pheochromocytoma     Pheochromocytoma     Pheochromocytoma, malignant 01/10/2016    Pheochromocytoma, malignant     Sacroiliac joint pain 7/11/2018    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/17/2018    Thyroid disease      Past Surgical History:   Procedure Laterality Date    ADRENALECTOMY      ANGIOGRAPHY N/A 11/15/2018    Procedure: ANGIOGRAM;  Surgeon: Justino Diagnostic Provider;  Location: [facility] OR [address] [place] [address];  Service: Radiology;  Laterality: N/A;    APPENDECTOMY      DISSECTION OF NECK Right 11/16/2018    Procedure: DISSECTION, NECK to remove right carotid body tumor;  Surgeon: Noah Ca MD;  Location: [facility] OR [address] [place] [address];  Service: ENT;  Laterality: Right;    INJECTION OF ANESTHETIC AGENT INTO SACROILIAC JOINT Right 6/26/2018    Procedure: BLOCK, SACROILIAC JOINT;  Surgeon: Sydney Reynoso MD;  Location: [facility] PAIN MGT;  Service: Pain Management;  Laterality: Right;  Right SI Joint Injection    [id number]    NEEDS CONSENT    LEFT HEART CATHETERIZATION Left 7/18/2018    Procedure: CATHETERIZATION, HEART, LEFT;   Surgeon: Moustapha Vasquez MD;  Location: [facility] [place] CATH LAB;  Service: Cardiovascular;  Laterality: Left;    MIBG Therapy  3/2016, 7/2016, 12/2016    [facility] [place]     TUMOR MARKERS:  Recent Labs   Lab 04/26/21  1017   Pancreastatin 66     Recent Labs   Lab 11/11/22  0959   Chromogranin A 229 H     Catecholamine, Plasma   Date/Time Value Ref Range Status   11/28/2022 04:18 PM 1069 pg/mL Final   04/26/2021 10:17 AM 2420 (H) pg/mL Final   03/20/2019 12:56 PM 1403 (H) pg/mL Final     Comment:     Adult Reference Ranges for Catecholamines, Plasma  Epinephrine           Supine:  <50 pg/mL  Upright: <95 pg/mL  Norepinephrine        Supine:  112-658 pg/mL  Upright: 217-1109 pg/mL  Dopamine              Supine:  <10 pg/mL  Upright: <20 pg/mL  Total (N+E+D)         Supine:  123-671 pg/mL  Upright: 242-1125 pg/mL  Test Performed at:  "[facility]" [address]  [address]     JANA De León MD, PhD, ELOY   _________________________________________________________________    DISCUSSION:  77 F with R adrenal pheochromocytoma s/p bilateral adrenectomy in 11/2009, recurrent disease to the bone in 2016 s/p MIBG x3 in 2016. Path: Pathology had revealed a right adrenal gland pheochromocytoma and a left adrenal gland adrenal cortical adenoma. Surgical: Right open partial nephrectomy 10/2017 (Dr. Castillo). 11/2018 she underwent removal of the right carotid body tumor with pathology showing paraganglioma- Dr. Ca. Presented to  10/1/2018.      AM-PET/CT- Dr. Melton.  DL- Progression of hepatic and osseous metastatic dz, PRRT candidate    BOARD RECOMMENDATIONS:   Return to clinic   Future Appointments   Date Time Provider Department Center   1/23/2023  7:10 AM LAB, HEMON CANCER BLDG [facility] LAB HI Rahul Figueroamarques   1/23/2023  8:30 AM Ema Chowdhury MD [facility] HEMONC2 Rahul Murcia   1/23/2023  9:30 AM INJECTION, [facility] INFUSION [facility] CHEMO Rahul Murcia     Start:  SSA - Lanreotide 120 mg every  28 days  Start Bone Mets Protocol - Zometa/Xgeva   Consults/Referrals:   offer PRRT

## 2023-01-12 ENCOUNTER — TUMOR BOARD CONFERENCE (OUTPATIENT)
Dept: NEUROLOGY | Facility: CLINIC | Age: 78
End: 2023-01-12
Payer: MEDICARE

## 2023-01-17 ENCOUNTER — DOCUMENTATION ONLY (OUTPATIENT)
Dept: HEMATOLOGY/ONCOLOGY | Facility: CLINIC | Age: 78
End: 2023-01-17
Payer: MEDICARE

## 2023-01-23 ENCOUNTER — OFFICE VISIT (OUTPATIENT)
Dept: HEMATOLOGY/ONCOLOGY | Facility: CLINIC | Age: 78
End: 2023-01-23
Payer: MEDICARE

## 2023-01-23 ENCOUNTER — INFUSION (OUTPATIENT)
Dept: INFUSION THERAPY | Facility: HOSPITAL | Age: 78
End: 2023-01-23
Payer: MEDICARE

## 2023-01-23 VITALS
DIASTOLIC BLOOD PRESSURE: 55 MMHG | OXYGEN SATURATION: 98 % | RESPIRATION RATE: 18 BRPM | HEIGHT: 63 IN | BODY MASS INDEX: 34.2 KG/M2 | WEIGHT: 193 LBS | TEMPERATURE: 98 F | HEART RATE: 71 BPM | SYSTOLIC BLOOD PRESSURE: 110 MMHG

## 2023-01-23 DIAGNOSIS — E11.9 CONTROLLED TYPE 2 DIABETES MELLITUS WITHOUT COMPLICATION, WITHOUT LONG-TERM CURRENT USE OF INSULIN: ICD-10-CM

## 2023-01-23 DIAGNOSIS — C7B.8 SECONDARY NEUROENDOCRINE TUMOR OF DISTANT LYMPH NODES: ICD-10-CM

## 2023-01-23 DIAGNOSIS — D84.81 IMMUNODEFICIENCY SECONDARY TO NEOPLASM: ICD-10-CM

## 2023-01-23 DIAGNOSIS — E27.1 ADRENAL INSUFFICIENCY, PRIMARY: ICD-10-CM

## 2023-01-23 DIAGNOSIS — C74.90 MALIGNANT PHEOCHROMOCYTOMA, UNSPECIFIED LATERALITY: Primary | ICD-10-CM

## 2023-01-23 DIAGNOSIS — C79.51 SECONDARY MALIGNANT NEOPLASM OF BONE: ICD-10-CM

## 2023-01-23 DIAGNOSIS — D49.9 IMMUNODEFICIENCY SECONDARY TO NEOPLASM: ICD-10-CM

## 2023-01-23 DIAGNOSIS — I49.5 SICK SINUS SYNDROME: ICD-10-CM

## 2023-01-23 DIAGNOSIS — I10 ESSENTIAL HYPERTENSION: ICD-10-CM

## 2023-01-23 DIAGNOSIS — C74.91 MALIGNANT PHEOCHROMOCYTOMA OF RIGHT ADRENAL GLAND: Primary | ICD-10-CM

## 2023-01-23 PROCEDURE — 1101F PT FALLS ASSESS-DOCD LE1/YR: CPT | Mod: CPTII,S$GLB,, | Performed by: INTERNAL MEDICINE

## 2023-01-23 PROCEDURE — 3288F FALL RISK ASSESSMENT DOCD: CPT | Mod: CPTII,S$GLB,, | Performed by: INTERNAL MEDICINE

## 2023-01-23 PROCEDURE — 99215 OFFICE O/P EST HI 40 MIN: CPT | Mod: S$GLB,,, | Performed by: INTERNAL MEDICINE

## 2023-01-23 PROCEDURE — 1101F PR PT FALLS ASSESS DOC 0-1 FALLS W/OUT INJ PAST YR: ICD-10-PCS | Mod: CPTII,S$GLB,, | Performed by: INTERNAL MEDICINE

## 2023-01-23 PROCEDURE — 1125F AMNT PAIN NOTED PAIN PRSNT: CPT | Mod: CPTII,S$GLB,, | Performed by: INTERNAL MEDICINE

## 2023-01-23 PROCEDURE — 3078F PR MOST RECENT DIASTOLIC BLOOD PRESSURE < 80 MM HG: ICD-10-PCS | Mod: CPTII,S$GLB,, | Performed by: INTERNAL MEDICINE

## 2023-01-23 PROCEDURE — 3078F DIAST BP <80 MM HG: CPT | Mod: CPTII,S$GLB,, | Performed by: INTERNAL MEDICINE

## 2023-01-23 PROCEDURE — 1159F MED LIST DOCD IN RCRD: CPT | Mod: CPTII,S$GLB,, | Performed by: INTERNAL MEDICINE

## 2023-01-23 PROCEDURE — 63600175 PHARM REV CODE 636 W HCPCS: Mod: JG | Performed by: INTERNAL MEDICINE

## 2023-01-23 PROCEDURE — 3074F SYST BP LT 130 MM HG: CPT | Mod: CPTII,S$GLB,, | Performed by: INTERNAL MEDICINE

## 2023-01-23 PROCEDURE — 1160F RVW MEDS BY RX/DR IN RCRD: CPT | Mod: CPTII,S$GLB,, | Performed by: INTERNAL MEDICINE

## 2023-01-23 PROCEDURE — 99999 PR PBB SHADOW E&M-EST. PATIENT-LVL IV: CPT | Mod: PBBFAC,,, | Performed by: INTERNAL MEDICINE

## 2023-01-23 PROCEDURE — 1160F PR REVIEW ALL MEDS BY PRESCRIBER/CLIN PHARMACIST DOCUMENTED: ICD-10-PCS | Mod: CPTII,S$GLB,, | Performed by: INTERNAL MEDICINE

## 2023-01-23 PROCEDURE — 99999 PR PBB SHADOW E&M-EST. PATIENT-LVL IV: ICD-10-PCS | Mod: PBBFAC,,, | Performed by: INTERNAL MEDICINE

## 2023-01-23 PROCEDURE — 3074F PR MOST RECENT SYSTOLIC BLOOD PRESSURE < 130 MM HG: ICD-10-PCS | Mod: CPTII,S$GLB,, | Performed by: INTERNAL MEDICINE

## 2023-01-23 PROCEDURE — 96372 THER/PROPH/DIAG INJ SC/IM: CPT

## 2023-01-23 PROCEDURE — 1159F PR MEDICATION LIST DOCUMENTED IN MEDICAL RECORD: ICD-10-PCS | Mod: CPTII,S$GLB,, | Performed by: INTERNAL MEDICINE

## 2023-01-23 PROCEDURE — 3288F PR FALLS RISK ASSESSMENT DOCUMENTED: ICD-10-PCS | Mod: CPTII,S$GLB,, | Performed by: INTERNAL MEDICINE

## 2023-01-23 PROCEDURE — 99215 PR OFFICE/OUTPT VISIT, EST, LEVL V, 40-54 MIN: ICD-10-PCS | Mod: S$GLB,,, | Performed by: INTERNAL MEDICINE

## 2023-01-23 PROCEDURE — 1125F PR PAIN SEVERITY QUANTIFIED, PAIN PRESENT: ICD-10-PCS | Mod: CPTII,S$GLB,, | Performed by: INTERNAL MEDICINE

## 2023-01-23 RX ORDER — HEPARIN 100 UNIT/ML
500 SYRINGE INTRAVENOUS
Status: CANCELLED | OUTPATIENT
Start: 2023-02-20

## 2023-01-23 RX ORDER — LANREOTIDE ACETATE 120 MG/.5ML
120 INJECTION SUBCUTANEOUS ONCE
Status: CANCELLED | OUTPATIENT
Start: 2023-01-23 | End: 2023-01-23

## 2023-01-23 RX ORDER — LANREOTIDE ACETATE 120 MG/.5ML
120 INJECTION SUBCUTANEOUS ONCE
Status: COMPLETED | OUTPATIENT
Start: 2023-01-23 | End: 2023-01-23

## 2023-01-23 RX ORDER — SODIUM CHLORIDE 0.9 % (FLUSH) 0.9 %
10 SYRINGE (ML) INJECTION
Status: CANCELLED | OUTPATIENT
Start: 2023-02-20

## 2023-01-23 RX ADMIN — LANREOTIDE ACETATE 120 MG: 120 INJECTION SUBCUTANEOUS at 09:01

## 2023-01-23 NOTE — PROGRESS NOTES
PROGRESS NOTE    Subjective:       Patient ID: Hailey Sawant is a 77 y.o. female.    Chief Complaint: metastatic pheochromocytoma    Diagnosis:  Metastatic pheochromocytoma of the right adrenal gland, with mets to bones, liver    Oncologic History:  1. Ms. Sawant is a 74 yo woman with DM, HTN, CAD s/p MI, A fib and sick sinus syndrome s/p pacemaker in June 2022 (on eliquis since), carotid paraganglioma removed in 11/2018 who initially saw me on 11/28/2022 for further management of metastatic pheochromocytoma. Last seen Dr West in 2018 and Dr Cohen in early 2021. Does not follow oncologist locally. It is my first time seeing her today. Her history dates to 2009 when she was being worked up for abdominal pain and was found to have cholelithiasis. However, during this workup she was found to have bilateral adrenal masses.  She was also noted to be hypertensive.  She had a 6.5 cm mass on the right with some evidence of necrosis and also a mass just under 3 cm on the left.  Biochemical workup was consistent with pheochromocytoma.  In 11/2009 she underwent a bilateral adrenalectomy.  Pathology had revealed a right adrenal gland pheochromocytoma and a left adrenal gland adrenal cortical adenoma.  In 02/2016 she was noted to have recurrent disease to the bone and underwent I 131 MIBG therapy in 03/2016, 07/2016 and 11/2016.  In 10/2016 she underwent a right partial nephrectomy due to recurrent disease.  Pathology did confirm findings consistent with recurrent pheochromocytoma.  In 11/2018 she underwent removal of the right carotid body tumor with pathology showing paraganglioma. She presents today for follow up. She knows she has cancer but does not know it is stage IV. She is not sure if Dr Cohen called her after tumor board or not. She feels well. Had MI in June 2022, treated at [facility], had pacemaker placed. Medically treated. Feeling well  "today. We discussed restaging dotatate scan.   2. Dotatate scan declined by insurance several times. Finally had gallium PET on 23. It showed "New foci of abnormal radiotracer uptake at the cervical spine, sacral, right ilium and right acetabular joint. Interval enlargement of hepatic section 7 hypodense mass. Reviewed at tumor board. Recc PRRT. However unable to get insurance approval for PRRT and admission afterwards to monitor for catecholamine crisis.   3. Lanreotide to be started on 23      Oncologic History Pheochromocytoma, right adrenal, diagnosed 2009  Recurrent disease to bone diagnosed 2016    Oncologic Treatment Bilateral adrenalectomy 2009  Right open partial nephrectomy 10/2017 (Dr. Castillo)  [id number] 2016, 2016, 2016  Removal of right carotid body tumor 2018 (Dr. Ca)    Pathology 2009:  Pheochromocytoma, right        Interval History:   Ms Sawant returns for follow up and to start lanreotide. Feeling well. BP has been well controlled. Followed by Dr Loya at [facility] in [place].     ECO    ROS:   A ten-point system review is obtained and negative except for what was stated in the Interval History.     Physical Examination:   Vital signs reviewed.   General: well hydrated, well developed, in no acute distress  HEENT: normocephalic, PERRLA, EOMI, anicteric sclerae, oropharynx clear  Neck: supple, no JVD, thyromegaly, cervical or supraclavicular lymphadenopathy  Lungs: clear breath sounds bilaterally, no wheezing, rales, or rhonchi  Heart: RRR, no M/R/G  Abdomen: soft, no tenderness, non-distended, no hepatosplenomegaly, mass, or hernia. BS present  Extremities: no clubbing, cyanosis, or edema  Skin: no rash, ulcer, or open wounds  Neuro: alert and oriented x 4, no focal neuro deficit  Psych: pleasant and appropriate mood and affect    Objective:     Laboratory Data:  Labs reviewed.     Imaging Data:  Gallium PET 23:  New foci of " abnormal radiotracer uptake at the cervical spine, sacral, right ilium and right acetabular joint.     Interval enlargement of hepatic section 7 hypodense mass.     Based upon progression of disease and tracer avidity of the patient's tumor burden, the patient would be an appropriate candidate for PRRT if clinically indicated.       Assessment and Plan:     1. Malignant pheochromocytoma of right adrenal gland    2. Secondary malignant neoplasm of bone    3. Secondary neuroendocrine tumor of distant lymph nodes    4. Immunodeficiency secondary to neoplasm    5. Sick sinus syndrome    6. Essential hypertension    7. Controlled type 2 diabetes mellitus without complication, without long-term current use of insulin    8. Adrenal insufficiency, primary post-surgical      1-4  - Ms Sawant is a 78 yo woman with R adrenal pheochromocytoma s/p bilateral adrenectomy in 11/2009, recurrent disease to the bone in 2016 s/p MIBG in 2016. At our prior visits, we discussed interim progression of cancer in the liver and new lesions in the bones. Discussed lanreotide every 4 weeks, zometa every 3 months and PRRT. However unable to get insurance approval for PRRT and admission afterwards to monitor for catecholamine crisis.   - Today I had a long discussion with Ms Sawant and her sister. Will start lanreotide today. She got dental clearance. Once zometa is approved we will start that every 3 months. We are still waiting for insurance approval or PRRT and admission afterwards to monitor for catecholamine crisis. Discussed with patient that given her tumor burden, elevated catacholamine and metanephrine levels, cardiac comorbidities, it is safest to admit her after PRRT to monitor for catecholamine crisis  - I personally reviewed all the medications she is taking. Not taking Toprol but taking cardura every evening. Emphasized to patient re the importance of taking Toprol.  - lanreotide today and every 4 weeks  - once zometa approved will  give it to her  - pending PRRT/admission approval by insurance company  - return in 4 weeks  - messaged Dr Wilburn re the need for Toprol from pheo perspective    5.  - s/p pacemaker. On eliquis for Afib. Sees Dr Loya at [facility] in [place]  - asked navigator to get cardiac records    6.  - BP controlled  - c/w current medication    7.  - BS controlled  - c/w current medication    8.  - on cortef  - f/u with Dr Wilburn    Follow-up:     Return in 4 weeks  Knows to call in the interval if any problems arise.    Electronically signed by Ema Turner Quemulus for Scheduling    Med Onc Chart Routing      Follow up with physician 4 weeks. verify with ONDINA mancera zometa. see me in 4 weeks with CBC, CMP, catecholamine, metanephrine, see me then get zometa and lanreotide. please schedule lanreotide every 4 weeks and mail out all appointment letters   Follow up with KAYLEN    Infusion scheduling note zometa every 3 months   Injection scheduling note lanreotide every 4 weeks   Labs CBC and CMP   Lab interval: every 4 weeks  catecholamine, metanephrine   Imaging    Pharmacy appointment    Other referrals          Therapy Plan Information  LANREOTIDE  5. Medications  lanreotide injection 120 mg  120 mg, Subcutaneous, Every visit    LUTATHERA SUPPORT  IV Fluids  0.9%  NaCl infusion  Intravenous, Every 8 weeks  Pre-Medications  ondansetron-dexAMETHasone 16-12 mg in sodium chloride 0.9% 50 mL IVPB 16 mg  16 mg, Intravenous, Every 8 weeks  amino acid (25 g L-LYSINE, 25 g L-ARGININE) in sodium chloride 0.9% 1000 mL infusion  1,000 mL, Intravenous, Every 8 weeks  promethazine (PHENERGAN) 12.5 mg in dextrose 5 % 50 mL IVPB  12.5 mg, Intravenous, Every 8 weeks  acetaminophen tablet 650 mg  650 mg, Oral, Every 8 weeks  Flushes  sodium chloride 0.9% flush 10 mL  10 mL, Intravenous, Every 8 weeks  PRN Medications  diphenhydrAMINE injection 50 mg  50 mg, Intravenous, Every 8 weeks  hydrocortisone sodium succinate injection 100 mg  100 mg,  Intravenous, Every 8 weeks    ZOLEDRONIC ACID (ZOMETA) IV  Medications  zoledronic acid (ZOMETA) 4 mg in sodium chloride 0.9% 100 mL IVPB  4 mg, Intravenous, Every 4 weeks  Flushes  sodium chloride 0.9% 250 mL flush bag  Intravenous, Every 4 weeks  sodium chloride 0.9% flush 10 mL  10 mL, Intravenous, Every 4 weeks  heparin, porcine (PF) 100 unit/mL injection flush 500 Units  500 Units, Intravenous, Every 4 weeks  alteplase injection 2 mg  2 mg, Intra-Catheter, Every 4 weeks

## 2023-01-25 ENCOUNTER — TELEPHONE (OUTPATIENT)
Dept: HEMATOLOGY/ONCOLOGY | Facility: CLINIC | Age: 78
End: 2023-01-25
Payer: MEDICARE

## 2023-01-25 NOTE — TELEPHONE ENCOUNTER
Faxed request for medical records to [facility] for patient info

## 2023-01-25 NOTE — TELEPHONE ENCOUNTER
----- Message from Ema Chowdhury MD sent at 1/23/2023  8:44 AM CST -----  Richard Ponce     Ms Sawant had a pacemaker placed at [facility] in [place]. Her cardiologist is also there. Can you help me get me the cardiologist's note? thanks

## 2023-02-03 ENCOUNTER — TELEPHONE (OUTPATIENT)
Dept: HEMATOLOGY/ONCOLOGY | Facility: CLINIC | Age: 78
End: 2023-02-03
Payer: MEDICARE

## 2023-02-03 NOTE — TELEPHONE ENCOUNTER
----- Message from Ema Chowdhury MD sent at 2/2/2023  3:21 PM CST -----  Rosario, she is from [place]. Probably need [facility] the night of 2/28 and 3/2  ----- Message -----  From: Lara Tilley RN  Sent: 2/2/2023   2:47 PM CST  To: Rosario Glover RN, Ema Chowdhury MD    Thank you    Rosario-who is the best contact to schedule the patient for lanreotide on 3/2?   ----- Message -----  From: Ema Chowdhury MD  Sent: 2/2/2023   2:22 PM CST  To: Kristi Wyatt RN, Rosario Glover RN, #    Yes. Can you cancel the lanreotide injection on 2/20 but keep the other appointments? thanks  ----- Message -----  From: Lara Tilley RN  Sent: 2/2/2023   9:12 AM CST  To: Kristi Wyatt RN, Rosario Glover RN, #    Just an update, Jaye and Kristi are looking into who in Admissions can create the encounter for the direct admission.     Also, I saw the patient is scheduled for her lanreotide injection on 2/20. Do you want to hold that dose since she is scheduled to receive PRRT on 3/1?     Thanks  Lara

## 2023-02-03 NOTE — TELEPHONE ENCOUNTER
"Attempted to reach patient today to discuss [facility] reservation options for upcoming appts. LVM on all available numbers, phone number listed as "other" is not in service. Will continue to try and reach patient   "

## 2023-02-08 ENCOUNTER — TELEPHONE (OUTPATIENT)
Dept: HEMATOLOGY/ONCOLOGY | Facility: CLINIC | Age: 78
End: 2023-02-08
Payer: MEDICARE

## 2023-02-08 NOTE — TELEPHONE ENCOUNTER
Called patient regarding [facility] accommodations. No answer, left voicemail with my callback number. Spoke to patients' sister Claudia, who did verify they're interested in utilizing this resource, but wants to speak with the patient before giving firm dates. She took down my number and will call when she gets home and is with Ms. Hart

## 2023-02-09 ENCOUNTER — TELEPHONE (OUTPATIENT)
Dept: HEMATOLOGY/ONCOLOGY | Facility: CLINIC | Age: 78
End: 2023-02-09
Payer: MEDICARE

## 2023-02-09 NOTE — TELEPHONE ENCOUNTER
----- Message from Jakob Alexander sent at 2/9/2023  9:59 AM CST -----       Type: Patient Returning Call    Who Called: Patient   Who Left Message for Patient: Ally   Does the patient know what this is regarding?: Missed call from staff yesterday.  Would the patient rather a call back or a response via [facility]? Call  Best Call Back Number: [phone]  Additional Information:  Please assist, thank you!

## 2023-02-10 ENCOUNTER — TELEPHONE (OUTPATIENT)
Dept: HEMATOLOGY/ONCOLOGY | Facility: CLINIC | Age: 78
End: 2023-02-10
Payer: MEDICARE

## 2023-02-10 NOTE — TELEPHONE ENCOUNTER
Spoke with patient regarding [facility] reservation for her PRRT treatment. Patient will be inpatient for a few days but will have her sister with her and would like [facility] from 2/28 through 3/4. Told her I would submit this today and mail her directions, since she does not have an email. Reservation request sent to [facility] and uploaded into media for reference. Patient verbalized understanding of all information

## 2023-02-20 ENCOUNTER — INFUSION (OUTPATIENT)
Dept: INFUSION THERAPY | Facility: HOSPITAL | Age: 78
End: 2023-02-20
Payer: MEDICARE

## 2023-02-20 ENCOUNTER — OFFICE VISIT (OUTPATIENT)
Dept: HEMATOLOGY/ONCOLOGY | Facility: CLINIC | Age: 78
End: 2023-02-20
Payer: MEDICARE

## 2023-02-20 VITALS
TEMPERATURE: 98 F | SYSTOLIC BLOOD PRESSURE: 137 MMHG | HEART RATE: 69 BPM | RESPIRATION RATE: 18 BRPM | WEIGHT: 195.13 LBS | DIASTOLIC BLOOD PRESSURE: 71 MMHG | OXYGEN SATURATION: 98 % | HEIGHT: 63 IN | BODY MASS INDEX: 34.57 KG/M2

## 2023-02-20 VITALS
BODY MASS INDEX: 34.57 KG/M2 | HEART RATE: 69 BPM | SYSTOLIC BLOOD PRESSURE: 137 MMHG | HEIGHT: 63 IN | TEMPERATURE: 98 F | WEIGHT: 195.13 LBS | DIASTOLIC BLOOD PRESSURE: 71 MMHG | RESPIRATION RATE: 18 BRPM

## 2023-02-20 DIAGNOSIS — C79.51 SECONDARY MALIGNANT NEOPLASM OF BONE: Primary | ICD-10-CM

## 2023-02-20 DIAGNOSIS — C7B.8 SECONDARY NEUROENDOCRINE TUMOR OF BONE: ICD-10-CM

## 2023-02-20 DIAGNOSIS — C74.91 MALIGNANT PHEOCHROMOCYTOMA OF RIGHT ADRENAL GLAND: ICD-10-CM

## 2023-02-20 DIAGNOSIS — C74.91 MALIGNANT PHEOCHROMOCYTOMA OF RIGHT ADRENAL GLAND: Primary | ICD-10-CM

## 2023-02-20 DIAGNOSIS — E11.9 CONTROLLED TYPE 2 DIABETES MELLITUS WITHOUT COMPLICATION, WITHOUT LONG-TERM CURRENT USE OF INSULIN: ICD-10-CM

## 2023-02-20 DIAGNOSIS — C7B.8 SECONDARY NEUROENDOCRINE TUMOR OF DISTANT LYMPH NODES: ICD-10-CM

## 2023-02-20 DIAGNOSIS — I49.5 SICK SINUS SYNDROME: ICD-10-CM

## 2023-02-20 DIAGNOSIS — I50.42 CHRONIC COMBINED SYSTOLIC AND DIASTOLIC CONGESTIVE HEART FAILURE: ICD-10-CM

## 2023-02-20 DIAGNOSIS — I10 ESSENTIAL HYPERTENSION: ICD-10-CM

## 2023-02-20 DIAGNOSIS — D84.81 IMMUNODEFICIENCY SECONDARY TO NEOPLASM: ICD-10-CM

## 2023-02-20 DIAGNOSIS — E27.40 ADRENAL INSUFFICIENCY: ICD-10-CM

## 2023-02-20 DIAGNOSIS — D49.9 IMMUNODEFICIENCY SECONDARY TO NEOPLASM: ICD-10-CM

## 2023-02-20 PROCEDURE — 99215 PR OFFICE/OUTPT VISIT, EST, LEVL V, 40-54 MIN: ICD-10-PCS | Mod: S$GLB,,, | Performed by: INTERNAL MEDICINE

## 2023-02-20 PROCEDURE — 3288F PR FALLS RISK ASSESSMENT DOCUMENTED: ICD-10-PCS | Mod: CPTII,S$GLB,, | Performed by: INTERNAL MEDICINE

## 2023-02-20 PROCEDURE — 3075F SYST BP GE 130 - 139MM HG: CPT | Mod: CPTII,S$GLB,, | Performed by: INTERNAL MEDICINE

## 2023-02-20 PROCEDURE — 99215 OFFICE O/P EST HI 40 MIN: CPT | Mod: S$GLB,,, | Performed by: INTERNAL MEDICINE

## 2023-02-20 PROCEDURE — 3078F PR MOST RECENT DIASTOLIC BLOOD PRESSURE < 80 MM HG: ICD-10-PCS | Mod: CPTII,S$GLB,, | Performed by: INTERNAL MEDICINE

## 2023-02-20 PROCEDURE — 3288F FALL RISK ASSESSMENT DOCD: CPT | Mod: CPTII,S$GLB,, | Performed by: INTERNAL MEDICINE

## 2023-02-20 PROCEDURE — 1160F RVW MEDS BY RX/DR IN RCRD: CPT | Mod: CPTII,S$GLB,, | Performed by: INTERNAL MEDICINE

## 2023-02-20 PROCEDURE — 1101F PT FALLS ASSESS-DOCD LE1/YR: CPT | Mod: CPTII,S$GLB,, | Performed by: INTERNAL MEDICINE

## 2023-02-20 PROCEDURE — 63600175 PHARM REV CODE 636 W HCPCS: Performed by: INTERNAL MEDICINE

## 2023-02-20 PROCEDURE — 1126F AMNT PAIN NOTED NONE PRSNT: CPT | Mod: CPTII,S$GLB,, | Performed by: INTERNAL MEDICINE

## 2023-02-20 PROCEDURE — 1126F PR PAIN SEVERITY QUANTIFIED, NO PAIN PRESENT: ICD-10-PCS | Mod: CPTII,S$GLB,, | Performed by: INTERNAL MEDICINE

## 2023-02-20 PROCEDURE — 1101F PR PT FALLS ASSESS DOC 0-1 FALLS W/OUT INJ PAST YR: ICD-10-PCS | Mod: CPTII,S$GLB,, | Performed by: INTERNAL MEDICINE

## 2023-02-20 PROCEDURE — 96413 CHEMO IV INFUSION 1 HR: CPT

## 2023-02-20 PROCEDURE — 1159F MED LIST DOCD IN RCRD: CPT | Mod: CPTII,S$GLB,, | Performed by: INTERNAL MEDICINE

## 2023-02-20 PROCEDURE — 3075F PR MOST RECENT SYSTOLIC BLOOD PRESS GE 130-139MM HG: ICD-10-PCS | Mod: CPTII,S$GLB,, | Performed by: INTERNAL MEDICINE

## 2023-02-20 PROCEDURE — 1160F PR REVIEW ALL MEDS BY PRESCRIBER/CLIN PHARMACIST DOCUMENTED: ICD-10-PCS | Mod: CPTII,S$GLB,, | Performed by: INTERNAL MEDICINE

## 2023-02-20 PROCEDURE — 99999 PR PBB SHADOW E&M-EST. PATIENT-LVL IV: ICD-10-PCS | Mod: PBBFAC,,, | Performed by: INTERNAL MEDICINE

## 2023-02-20 PROCEDURE — 3078F DIAST BP <80 MM HG: CPT | Mod: CPTII,S$GLB,, | Performed by: INTERNAL MEDICINE

## 2023-02-20 PROCEDURE — 1159F PR MEDICATION LIST DOCUMENTED IN MEDICAL RECORD: ICD-10-PCS | Mod: CPTII,S$GLB,, | Performed by: INTERNAL MEDICINE

## 2023-02-20 PROCEDURE — 99999 PR PBB SHADOW E&M-EST. PATIENT-LVL IV: CPT | Mod: PBBFAC,,, | Performed by: INTERNAL MEDICINE

## 2023-02-20 RX ORDER — HEPARIN 100 UNIT/ML
500 SYRINGE INTRAVENOUS
Status: CANCELLED | OUTPATIENT
Start: 2023-03-20

## 2023-02-20 RX ORDER — SODIUM CHLORIDE 0.9 % (FLUSH) 0.9 %
10 SYRINGE (ML) INJECTION
Status: CANCELLED | OUTPATIENT
Start: 2023-03-20

## 2023-02-20 RX ORDER — ZOLEDRONIC ACID 0.04 MG/ML
4 INJECTION, SOLUTION INTRAVENOUS
Status: CANCELLED | OUTPATIENT
Start: 2023-03-20

## 2023-02-20 RX ORDER — ZOLEDRONIC ACID 0.04 MG/ML
4 INJECTION, SOLUTION INTRAVENOUS
Status: COMPLETED | OUTPATIENT
Start: 2023-02-20 | End: 2023-02-20

## 2023-02-20 RX ORDER — HEPARIN 100 UNIT/ML
500 SYRINGE INTRAVENOUS
Status: DISCONTINUED | OUTPATIENT
Start: 2023-02-20 | End: 2023-02-20 | Stop reason: HOSPADM

## 2023-02-20 RX ORDER — SODIUM CHLORIDE 0.9 % (FLUSH) 0.9 %
10 SYRINGE (ML) INJECTION
Status: DISCONTINUED | OUTPATIENT
Start: 2023-02-20 | End: 2023-02-20 | Stop reason: HOSPADM

## 2023-02-20 RX ADMIN — ZOLEDRONIC ACID 4 MG: 0.04 INJECTION, SOLUTION INTRAVENOUS at 01:02

## 2023-02-20 NOTE — PLAN OF CARE
Pt arrived to unit from MD appt via WC with brothers.  Reviewed tx plan and pt without questions.  Pt tolerated tx without any difficulties.  Removed PIV and pt left unit via WC without any questions, comments or concerns.

## 2023-02-20 NOTE — PROGRESS NOTES
PROGRESS NOTE    Subjective:       Patient ID: Hailey Sawant is a 77 y.o. female.    Chief Complaint: metastatic pheochromocytoma    Diagnosis:  Metastatic pheochromocytoma of the right adrenal gland, with mets to bones, liver    Oncologic History:  1. Ms. Sawant is a 72 yo woman with DM, HTN, CAD s/p MI, A fib and sick sinus syndrome s/p pacemaker in June 2022 (on eliquis since), carotid paraganglioma removed in 11/2018 who initially saw me on 11/28/2022 for further management of metastatic pheochromocytoma. Last seen Dr West in 2018 and Dr Cohen in early 2021. Does not follow oncologist locally. It is my first time seeing her today. Her history dates to 2009 when she was being worked up for abdominal pain and was found to have cholelithiasis. However, during this workup she was found to have bilateral adrenal masses.  She was also noted to be hypertensive.  She had a 6.5 cm mass on the right with some evidence of necrosis and also a mass just under 3 cm on the left.  Biochemical workup was consistent with pheochromocytoma.  In 11/2009 she underwent a bilateral adrenalectomy.  Pathology had revealed a right adrenal gland pheochromocytoma and a left adrenal gland adrenal cortical adenoma.  In 02/2016 she was noted to have recurrent disease to the bone and underwent I 131 MIBG therapy in 03/2016, 07/2016 and 11/2016.  In 10/2016 she underwent a right partial nephrectomy due to recurrent disease.  Pathology did confirm findings consistent with recurrent pheochromocytoma.  In 11/2018 she underwent removal of the right carotid body tumor with pathology showing paraganglioma. She presents today for follow up. She knows she has cancer but does not know it is stage IV. She is not sure if Dr Cohen called her after tumor board or not. She feels well. Had MI in June 2022, treated at [facility], had pacemaker placed. Medically treated. Feeling well  "today. We discussed restaging dotatate scan.   2. Dotatate scan declined by insurance several times. Finally had gallium PET on 23. It showed "New foci of abnormal radiotracer uptake at the cervical spine, sacral, right ilium and right acetabular joint. Interval enlargement of hepatic section 7 hypodense mass. Reviewed at tumor board. Recc PRRT. However unable to get insurance approval for PRRT and admission afterwards to monitor for catecholamine crisis.   3. Lanreotide started on 23      Oncologic History Pheochromocytoma, right adrenal, diagnosed 2009  Recurrent disease to bone diagnosed 2016    Oncologic Treatment Bilateral adrenalectomy 2009  Right open partial nephrectomy 10/2017 (Dr. Castillo)  [id number] 2016, 2016, 2016  Removal of right carotid body tumor 2018 (Dr. Ca)    Pathology 2009:  Pheochromocytoma, right        Interval History:   Ms Sawant returns for follow up. Feeling well. BP has been well controlled. Followed by Dr Loya at [facility] in [place].     ECO    ROS:   A ten-point system review is obtained and negative except for what was stated in the Interval History.     Physical Examination:   Vital signs reviewed.   General: well hydrated, well developed, in no acute distress  HEENT: normocephalic, PERRLA, EOMI, anicteric sclerae, oropharynx clear  Neck: supple, no JVD, thyromegaly, cervical or supraclavicular lymphadenopathy  Lungs: clear breath sounds bilaterally, no wheezing, rales, or rhonchi  Heart: RRR, no M/R/G  Abdomen: soft, no tenderness, non-distended, no hepatosplenomegaly, mass, or hernia. BS present  Extremities: no clubbing, cyanosis, or edema  Skin: no rash, ulcer, or open wounds  Neuro: alert and oriented x 4, no focal neuro deficit  Psych: pleasant and appropriate mood and affect    Objective:     Laboratory Data:  Labs reviewed.     Imaging Data:  Gallium PET 23:  New foci of abnormal radiotracer uptake at the " cervical spine, sacral, right ilium and right acetabular joint.     Interval enlargement of hepatic section 7 hypodense mass.     Based upon progression of disease and tracer avidity of the patient's tumor burden, the patient would be an appropriate candidate for PRRT if clinically indicated.       Assessment and Plan:     1. Malignant pheochromocytoma of right adrenal gland    2. Secondary neuroendocrine tumor of distant lymph nodes    3. Secondary neuroendocrine tumor of bone    4. Immunodeficiency secondary to neoplasm    5. Controlled type 2 diabetes mellitus without complication, without long-term current use of insulin    6. Sick sinus syndrome    7. Chronic combined systolic and diastolic congestive heart failure    8. Adrenal insufficiency    9. Essential hypertension      1-4  - Ms Sawant is a 78 yo woman with R adrenal pheochromocytoma s/p bilateral adrenectomy in 11/2009, recurrent disease to the bone in 2016 s/p MIBG in 2016. At our prior visits, we discussed interim progression of cancer in the liver and new lesions in the bones. Discussed lanreotide every 4 weeks, zometa every 3 months and PRRT. Lanreotide started 1/23/2023  - PRRT and admission to monitor for catecholamine crisis approved by insurance. During admission, she needs BP monitoring every 2 hours. If BP/HR remains normal the next day (3/2), she can be discharged. We will schedule her to return to clinic on 3/3 to receive lanreotide  - Discussed with patient re the schedule, regimen, and side effects of PRRT. Side effects include but are not limited to bone marrow suppression and cytopenias that may increase the risk of infection, bleeding and symptomatic anemia, hair loss, damage to any body organs including lungs, kidney, liver, heart, carcinoid crisis, small bowel obstruction, and blood cancer such as leukemia or myelodysplastic syndrome.   Patient understands and agrees with the plan.   - Consented the patient to the treatment plan and  the patient was educated on the planned duration of the treatment and schedule of the treatment administration.  - zometa approved and scheduled for today  - PRRT #1 on 3/1, hospitalization after that. Hopefully her vital signs remain stable and she can be discharged on 3/2. We will give lanreotide on 3/3.   - see me in 3 months  - Discussed with Dr Wilburn re management of her pheo    5.  - BS controlled  - c/w current medication    6.7  - s/p pacemaker. On eliquis for Afib. Sees Dr Loya at [facility] in [place]    8.  - on cortef  - f/u with Dr Wilburn    9.  - BP controlled  - c/w current medication    Follow-up:     Return in 4 weeks  Knows to call in the interval if any problems arise.    I spent 50 minutes reviewing the chart, interpreting laboratory result and imaging data, coordinating patient's care, with at least 50% of the time on face-to-face counseling.       Electronically signed by Ema Henriquez for Scheduling    Med Onc Chart Routing      Follow up with physician 1 month. see me on 3/29  with CBC, CMP, catecholamine, metanephrine levels   Follow up with KAYLEN    Infusion scheduling note    Injection scheduling note    Labs CBC and CMP   Lab interval:  catecholamine, metanephrine   Imaging    Pharmacy appointment    Other referrals            Therapy Plan Information  LANREOTIDE  5. Medications  lanreotide injection 120 mg  120 mg, Subcutaneous, Every visit    LUTATHERA SUPPORT  IV Fluids  0.9%  NaCl infusion  Intravenous, Every 8 weeks  Pre-Medications  ondansetron-dexAMETHasone 16-12 mg in sodium chloride 0.9% 50 mL IVPB 16 mg  16 mg, Intravenous, Every 8 weeks  amino acid (25 g L-LYSINE, 25 g L-ARGININE) in sodium chloride 0.9% 1000 mL infusion  1,000 mL, Intravenous, Every 8 weeks  promethazine (PHENERGAN) 12.5 mg in dextrose 5 % 50 mL IVPB  12.5 mg, Intravenous, Every 8 weeks  acetaminophen tablet 650 mg  650 mg, Oral, Every 8 weeks  Flushes  sodium chloride 0.9% flush 10 mL  10 mL,  Intravenous, Every 8 weeks  PRN Medications  diphenhydrAMINE injection 50 mg  50 mg, Intravenous, Every 8 weeks  hydrocortisone sodium succinate injection 100 mg  100 mg, Intravenous, Every 8 weeks    ZOLEDRONIC ACID (ZOMETA) IV  Medications  zoledronic acid (ZOMETA) 4 mg in sodium chloride 0.9% 100 mL IVPB  4 mg, Intravenous, Every 4 weeks  Flushes  sodium chloride 0.9% 250 mL flush bag  Intravenous, Every 4 weeks  sodium chloride 0.9% flush 10 mL  10 mL, Intravenous, Every 4 weeks  heparin, porcine (PF) 100 unit/mL injection flush 500 Units  500 Units, Intravenous, Every 4 weeks  alteplase injection 2 mg  2 mg, Intra-Catheter, Every 4 weeks

## 2023-02-23 ENCOUNTER — TELEPHONE (OUTPATIENT)
Dept: HEMATOLOGY/ONCOLOGY | Facility: CLINIC | Age: 78
End: 2023-02-23
Payer: MEDICARE

## 2023-02-23 NOTE — TELEPHONE ENCOUNTER
Called patient's sister to go over patient's PRRT appointments for next week. Patient does not want to come a day early due to a recent death in her family. Reviewed arrival time for PRRT and overnight stay. Sister wants to stay with patient inpatient on night of 3/1. Also reviewed need to check into [facility] on 3/2 after d/c so they can get lanreotide on 3/3. Re-submitted [facility] with check in on 3/2 and check out 3/4 just in case her inpatient stay is longer then expected. Verbalized understanding of all appts and directions given. No questions or concerns at this time.   
[id number]:7-10 Days|| ||00\01||False;

## 2023-03-01 ENCOUNTER — HOSPITAL ENCOUNTER (OUTPATIENT)
Dept: RADIOLOGY | Facility: HOSPITAL | Age: 78
Discharge: HOME | DRG: 644 | End: 2023-03-01
Attending: INTERNAL MEDICINE
Payer: MEDICARE

## 2023-03-01 ENCOUNTER — HOSPITAL ENCOUNTER (INPATIENT)
Facility: HOSPITAL | Age: 78
LOS: 1 days | Discharge: HOME | DRG: 644 | End: 2023-03-02
Attending: HOSPITALIST | Admitting: HOSPITALIST
Payer: MEDICARE

## 2023-03-01 ENCOUNTER — INFUSION (OUTPATIENT)
Dept: INFUSION THERAPY | Facility: HOSPITAL | Age: 78
DRG: 644 | End: 2023-03-01
Attending: INTERNAL MEDICINE
Payer: MEDICARE

## 2023-03-01 VITALS
DIASTOLIC BLOOD PRESSURE: 59 MMHG | BODY MASS INDEX: 34.57 KG/M2 | HEIGHT: 63 IN | OXYGEN SATURATION: 97 % | RESPIRATION RATE: 20 BRPM | HEART RATE: 71 BPM | WEIGHT: 195.13 LBS | SYSTOLIC BLOOD PRESSURE: 115 MMHG | TEMPERATURE: 98 F

## 2023-03-01 DIAGNOSIS — D35.00 PHEOCHROMOCYTOMA: ICD-10-CM

## 2023-03-01 DIAGNOSIS — C74.90 MALIGNANT PHEOCHROMOCYTOMA, UNSPECIFIED LATERALITY: Primary | ICD-10-CM

## 2023-03-01 DIAGNOSIS — C74.91 MALIGNANT PHEOCHROMOCYTOMA OF RIGHT ADRENAL GLAND: ICD-10-CM

## 2023-03-01 PROBLEM — I48.0 PAF (PAROXYSMAL ATRIAL FIBRILLATION): Status: ACTIVE | Noted: 2022-08-08

## 2023-03-01 PROBLEM — I11.0 BENIGN HYPERTENSIVE HEART DISEASE WITH HEART FAILURE: Status: ACTIVE | Noted: 2021-01-06

## 2023-03-01 LAB — POCT GLUCOSE: 176 MG/DL (ref 70–110)

## 2023-03-01 PROCEDURE — 96367 TX/PROPH/DG ADDL SEQ IV INF: CPT

## 2023-03-01 PROCEDURE — 25000003 PHARM REV CODE 250: Performed by: HOSPITALIST

## 2023-03-01 PROCEDURE — 96366 THER/PROPH/DIAG IV INF ADDON: CPT

## 2023-03-01 PROCEDURE — 11000001 HC ACUTE MED/SURG PRIVATE ROOM

## 2023-03-01 PROCEDURE — 79101 NUCLEAR RX IV ADMIN: CPT | Mod: 26,,, | Performed by: RADIOLOGY

## 2023-03-01 PROCEDURE — 79101 NM THERAPY BY IV ADMINISTRATION LU177: ICD-10-PCS | Mod: 26,,, | Performed by: RADIOLOGY

## 2023-03-01 PROCEDURE — 25000003 PHARM REV CODE 250: Performed by: INTERNAL MEDICINE

## 2023-03-01 PROCEDURE — A9513 LUTETIUM LU 177 DOTATAT THER: HCPCS | Mod: JG

## 2023-03-01 PROCEDURE — A4216 STERILE WATER/SALINE, 10 ML: HCPCS | Performed by: HOSPITALIST

## 2023-03-01 PROCEDURE — 63600175 PHARM REV CODE 636 W HCPCS: Performed by: INTERNAL MEDICINE

## 2023-03-01 PROCEDURE — 96365 THER/PROPH/DIAG IV INF INIT: CPT | Mod: 59

## 2023-03-01 RX ORDER — SODIUM CHLORIDE 9 MG/ML
INJECTION, SOLUTION INTRAVENOUS CONTINUOUS
Status: DISCONTINUED | OUTPATIENT
Start: 2023-03-01 | End: 2023-03-01

## 2023-03-01 RX ORDER — LOSARTAN POTASSIUM 25 MG/1
25 TABLET ORAL DAILY
Status: DISCONTINUED | OUTPATIENT
Start: 2023-03-02 | End: 2023-03-02 | Stop reason: HOSPADM

## 2023-03-01 RX ORDER — HYDROCORTISONE 5 MG/1
15 TABLET ORAL DAILY
Status: DISCONTINUED | OUTPATIENT
Start: 2023-03-01 | End: 2023-03-02 | Stop reason: HOSPADM

## 2023-03-01 RX ORDER — ONDANSETRON 2 MG/ML
4 INJECTION INTRAMUSCULAR; INTRAVENOUS EVERY 8 HOURS PRN
Status: DISCONTINUED | OUTPATIENT
Start: 2023-03-01 | End: 2023-03-02 | Stop reason: HOSPADM

## 2023-03-01 RX ORDER — NIFEDIPINE 30 MG/1
90 TABLET, EXTENDED RELEASE ORAL DAILY
Status: DISCONTINUED | OUTPATIENT
Start: 2023-03-02 | End: 2023-03-02 | Stop reason: HOSPADM

## 2023-03-01 RX ORDER — POLYETHYLENE GLYCOL 3350 17 G/17G
17 POWDER, FOR SOLUTION ORAL DAILY
Status: DISCONTINUED | OUTPATIENT
Start: 2023-03-01 | End: 2023-03-02 | Stop reason: HOSPADM

## 2023-03-01 RX ORDER — SODIUM CHLORIDE 0.9 % (FLUSH) 0.9 %
10 SYRINGE (ML) INJECTION
Status: DISCONTINUED | OUTPATIENT
Start: 2023-03-01 | End: 2023-03-01 | Stop reason: HOSPADM

## 2023-03-01 RX ORDER — HYDROCORTISONE 5 MG/1
5 TABLET ORAL
Status: DISCONTINUED | OUTPATIENT
Start: 2023-03-01 | End: 2023-03-02 | Stop reason: HOSPADM

## 2023-03-01 RX ORDER — SODIUM CHLORIDE 9 MG/ML
INJECTION, SOLUTION INTRAVENOUS ONCE
Status: CANCELLED | OUTPATIENT
Start: 2023-04-26

## 2023-03-01 RX ORDER — PROCHLORPERAZINE EDISYLATE 5 MG/ML
5 INJECTION INTRAMUSCULAR; INTRAVENOUS EVERY 6 HOURS PRN
Status: DISCONTINUED | OUTPATIENT
Start: 2023-03-01 | End: 2023-03-02 | Stop reason: HOSPADM

## 2023-03-01 RX ORDER — DIPHENHYDRAMINE HYDROCHLORIDE 50 MG/ML
50 INJECTION INTRAMUSCULAR; INTRAVENOUS
Status: CANCELLED | OUTPATIENT
Start: 2023-04-26

## 2023-03-01 RX ORDER — ACETAMINOPHEN 325 MG/1
650 TABLET ORAL
Status: DISCONTINUED | OUTPATIENT
Start: 2023-03-01 | End: 2023-03-01

## 2023-03-01 RX ORDER — TALC
6 POWDER (GRAM) TOPICAL NIGHTLY PRN
Status: DISCONTINUED | OUTPATIENT
Start: 2023-03-01 | End: 2023-03-02 | Stop reason: HOSPADM

## 2023-03-01 RX ORDER — SODIUM CHLORIDE 0.9 % (FLUSH) 0.9 %
10 SYRINGE (ML) INJECTION
Status: CANCELLED | OUTPATIENT
Start: 2023-04-26

## 2023-03-01 RX ORDER — SODIUM CHLORIDE 0.9 % (FLUSH) 0.9 %
10 SYRINGE (ML) INJECTION EVERY 8 HOURS
Status: DISCONTINUED | OUTPATIENT
Start: 2023-03-01 | End: 2023-03-02 | Stop reason: HOSPADM

## 2023-03-01 RX ORDER — METOPROLOL TARTRATE 25 MG/1
25 TABLET, FILM COATED ORAL 2 TIMES DAILY
Status: DISCONTINUED | OUTPATIENT
Start: 2023-03-01 | End: 2023-03-02 | Stop reason: HOSPADM

## 2023-03-01 RX ORDER — INSULIN ASPART 100 [IU]/ML
0-5 INJECTION, SOLUTION INTRAVENOUS; SUBCUTANEOUS
Status: DISCONTINUED | OUTPATIENT
Start: 2023-03-01 | End: 2023-03-02 | Stop reason: HOSPADM

## 2023-03-01 RX ORDER — ONDANSETRON 4 MG/1
8 TABLET, ORALLY DISINTEGRATING ORAL EVERY 8 HOURS PRN
Status: DISCONTINUED | OUTPATIENT
Start: 2023-03-01 | End: 2023-03-02 | Stop reason: HOSPADM

## 2023-03-01 RX ORDER — GLUCAGON 1 MG
1 KIT INJECTION
Status: DISCONTINUED | OUTPATIENT
Start: 2023-03-01 | End: 2023-03-02 | Stop reason: HOSPADM

## 2023-03-01 RX ORDER — DEXTROSE 40 %
30 GEL (GRAM) ORAL
Status: DISCONTINUED | OUTPATIENT
Start: 2023-03-01 | End: 2023-03-02 | Stop reason: HOSPADM

## 2023-03-01 RX ORDER — NALOXONE HCL 0.4 MG/ML
0.02 VIAL (ML) INJECTION
Status: DISCONTINUED | OUTPATIENT
Start: 2023-03-01 | End: 2023-03-02 | Stop reason: HOSPADM

## 2023-03-01 RX ORDER — DEXTROSE 40 %
15 GEL (GRAM) ORAL
Status: DISCONTINUED | OUTPATIENT
Start: 2023-03-01 | End: 2023-03-02 | Stop reason: HOSPADM

## 2023-03-01 RX ORDER — CAMPHOR/EUCALYPTUS/MENTHOL
LIQUID (ML) MISCELLANEOUS DAILY
Status: DISCONTINUED | OUTPATIENT
Start: 2023-03-01 | End: 2023-03-01 | Stop reason: RX

## 2023-03-01 RX ORDER — ACETAMINOPHEN 325 MG/1
650 TABLET ORAL
Status: CANCELLED | OUTPATIENT
Start: 2023-04-26

## 2023-03-01 RX ORDER — SODIUM CHLORIDE 0.9 % (FLUSH) 0.9 %
10 SYRINGE (ML) INJECTION
Status: DISCONTINUED | OUTPATIENT
Start: 2023-03-01 | End: 2023-03-02 | Stop reason: HOSPADM

## 2023-03-01 RX ORDER — ACETAMINOPHEN 500 MG
1000 TABLET ORAL EVERY 8 HOURS PRN
Status: DISCONTINUED | OUTPATIENT
Start: 2023-03-01 | End: 2023-03-02 | Stop reason: HOSPADM

## 2023-03-01 RX ORDER — DOXAZOSIN 1 MG/1
1 TABLET ORAL NIGHTLY
Status: DISCONTINUED | OUTPATIENT
Start: 2023-03-01 | End: 2023-03-02 | Stop reason: HOSPADM

## 2023-03-01 RX ORDER — ONDANSETRON 4 MG/1
8 TABLET, ORALLY DISINTEGRATING ORAL EVERY 6 HOURS PRN
Status: DISCONTINUED | OUTPATIENT
Start: 2023-03-01 | End: 2023-03-02 | Stop reason: HOSPADM

## 2023-03-01 RX ORDER — DIPHENHYDRAMINE HYDROCHLORIDE 50 MG/ML
50 INJECTION INTRAMUSCULAR; INTRAVENOUS
Status: DISCONTINUED | OUTPATIENT
Start: 2023-03-01 | End: 2023-03-01 | Stop reason: HOSPADM

## 2023-03-01 RX ORDER — ACETAMINOPHEN 325 MG/1
650 TABLET ORAL EVERY 4 HOURS PRN
Status: DISCONTINUED | OUTPATIENT
Start: 2023-03-01 | End: 2023-03-02 | Stop reason: HOSPADM

## 2023-03-01 RX ORDER — CHOLECALCIFEROL (VITAMIN D3) 25 MCG
5000 TABLET ORAL DAILY
Status: DISCONTINUED | OUTPATIENT
Start: 2023-03-01 | End: 2023-03-02 | Stop reason: HOSPADM

## 2023-03-01 RX ORDER — SODIUM CHLORIDE 9 MG/ML
INJECTION, SOLUTION INTRAVENOUS ONCE
Status: COMPLETED | OUTPATIENT
Start: 2023-03-01 | End: 2023-03-01

## 2023-03-01 RX ADMIN — SODIUM CHLORIDE: 9 INJECTION, SOLUTION INTRAVENOUS at 07:03

## 2023-03-01 RX ADMIN — DEXAMETHASONE SODIUM PHOSPHATE 16 MG: 10 INJECTION, SOLUTION INTRAMUSCULAR; INTRAVENOUS at 07:03

## 2023-03-01 RX ADMIN — Medication 10 ML: at 11:03

## 2023-03-01 RX ADMIN — Medication 1000 ML: at 08:03

## 2023-03-01 RX ADMIN — DOXAZOSIN 1 MG: 1 TABLET ORAL at 09:03

## 2023-03-01 RX ADMIN — METOPROLOL TARTRATE 25 MG: 25 TABLET, FILM COATED ORAL at 09:03

## 2023-03-01 RX ADMIN — APIXABAN 5 MG: 5 TABLET, FILM COATED ORAL at 09:03

## 2023-03-01 RX ADMIN — CHOLECALCIFEROL TAB 25 MCG (1000 UNIT) 5000 UNITS: 25 TAB at 02:03

## 2023-03-01 RX ADMIN — HYDROCORTISONE 5 MG: 5 TABLET ORAL at 05:03

## 2023-03-01 RX ADMIN — POTASSIUM BICARBONATE 20 MEQ: 391 TABLET, EFFERVESCENT ORAL at 09:03

## 2023-03-01 RX ADMIN — Medication 10 ML: at 02:03

## 2023-03-01 NOTE — PROGRESS NOTES
0945-Lutathera completed by nuclear med tech. VSS, see flowsheet. Patient states no needs or concerns at this time. PIV x2 clean, dry, intact, no redness, no swelling. Aminos continue to infuse. Will continue to monitor.

## 2023-03-01 NOTE — ASSESSMENT & PLAN NOTE
- s/p first session of PRRT today (Lutathera)  - will monitor overnight for signs of hypertensive crisis; vitals q2 hours  - continue cardura and other BP meds

## 2023-03-01 NOTE — HPI
Ms. Sawant is a 76yo woman with metastatic pheochromocytoma, DM2, HTN, CAD s/p MI, A fib, and sick sinus syndrome s/p pacemaker who presents as direct admission following PRRT. Reports that she is feeling well right now without any acute complaints. Denies nausea, vomiting, abdominal pain, diarrhea, headache.

## 2023-03-01 NOTE — PROGRESS NOTES
Amino acids complete. VSS, see flowsheet. Patient tolerated treatment and has no complaints at this time. Patient denies any symptoms. PIV x2 flushed; PIV x2 clean, dry, intact, no redness, no swelling. Patient to be admitted to room 501 for observation per Dr. Chowdhury. SBAR report called to Ebony.     1300- Patient transported to room 501 without complications. Care handed off to Ebony. All questions and concerns addressed. Family updated and waiting in the unit waiting area.

## 2023-03-01 NOTE — PLAN OF CARE
Patient is awake and alert.  Denies pain, nausea or vomiting.  Resting quietly in room.  Instructed to call for any assistance and patient verbalized understanding.  Safety maintained.  Will continue to monitor.

## 2023-03-01 NOTE — ASSESSMENT & PLAN NOTE
Patient's FSGs are controlled on current medication regimen.  Last A1c reviewed-   Lab Results   Component Value Date    HGBA1C 5.7 (H) 11/11/2022     Most recent fingerstick glucose reviewed-   Recent Labs   Lab 03/01/23  1615   POCTGLUCOSE 176*     Current correctional scale  Low  Maintain anti-hyperglycemic dose as follows-   Antihyperglycemics (From admission, onward)    Start     Stop Route Frequency Ordered    03/01/23 1330  insulin aspart U-100 pen 0-5 Units         -- SubQ Before meals & nightly PRN 03/01/23 1331        Hold Oral hypoglycemics while patient is in the hospital.

## 2023-03-01 NOTE — PROGRESS NOTES
VN cued into room to complete admit assessment. VIP model introduced; VN working alongside bedside treatment team.  Plan of care reviewed with patient. Patient informed of fall risk, fall precautions, call light within reach, side rails x2 elevated. Patient notified to ask staff for assistance. Patient verbalized complete understanding. Time allowed for questions. Will continue to monitor and intervene as needed.            03/01/23 1404   Admission   Initial VN Admission Questions Complete   Communication Issues? None   Shift   Virtual Nurse - Patient Verbalized Approval Of Camera Use   Safety/Activity   Patient Rounds bed in low position;call light in patient/parent reach;clutter free environment maintained;visualized patient   Safety Promotion/Fall Prevention side rails raised x 2   Positioning   Body Position supine   Head of Bed (HOB) Positioning HOB elevated

## 2023-03-01 NOTE — H&P
[facility] Medicine  History & Physical    Patient Name: Hailey Sawant  MRN: [id number]  Patient Class: IP- Inpatient  Admission Date: 3/1/2023  Attending Physician: Nicolas Mims MD  Primary Care Provider: Omer Hubbard MD         Patient information was obtained from patient, past medical records and ER records.     Subjective:     Principal Problem:Pheochromocytoma, malignant    Chief Complaint:   Chief Complaint   Patient presents with    s/p PRRT        HPI: Ms. Sawant is a 76yo woman with metastatic pheochromocytoma, DM2, HTN, CAD s/p MI, A fib, and sick sinus syndrome s/p pacemaker who presents as direct admission following PRRT. Reports that she is feeling well right now without any acute complaints. Denies nausea, vomiting, abdominal pain, diarrhea, headache.      Past Medical History:   Diagnosis Date    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    Diabetes mellitus     Hypertension     Lumbar spondylosis 6/8/2018    Malignant pheochromocytoma     Pheochromocytoma     Pheochromocytoma, malignant 01/10/2016    Pheochromocytoma, malignant     Sacroiliac joint pain 7/11/2018    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    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/17/2018    Thyroid disease        Past Surgical History:   Procedure Laterality Date    ADRENALECTOMY      ANGIOGRAPHY N/A 11/15/2018    Procedure: ANGIOGRAM;  Surgeon: Justino Diagnostic Provider;  Location: [facility] OR [address] [place] [address];  Service: Radiology;  Laterality: N/A;    APPENDECTOMY      DISSECTION OF NECK Right 11/16/2018    Procedure: DISSECTION, NECK to remove right carotid body tumor;  Surgeon: Noah Ca MD;  Location: [facility] OR [address] [place] [address];  Service: ENT;  Laterality: Right;    INJECTION OF ANESTHETIC AGENT INTO SACROILIAC JOINT Right 6/26/2018    Procedure: BLOCK, SACROILIAC JOINT;  Surgeon: Sydney Reynoso MD;  Location: [facility] PAIN MGT;  Service: Pain Management;  Laterality:  Right;  Right SI Joint Injection    [id number]    NEEDS CONSENT    LEFT HEART CATHETERIZATION Left 7/18/2018    Procedure: CATHETERIZATION, HEART, LEFT;  Surgeon: Moustapha Vasquez MD;  Location: [facility] CATH LAB;  Service: Cardiovascular;  Laterality: Left;    MIBG Therapy  3/2016, 7/2016, 12/2016    [facility] - Dr. Martines       Review of patient's allergies indicates:   Allergen Reactions    Jardiance [empagliflozin]        No current facility-administered medications on file prior to encounter.     Current Outpatient Medications on File Prior to Encounter   Medication Sig    doxazosin (CARDURA) 1 MG tablet Take 1 tablet (1 mg total) by mouth every evening.    empagliflozin (JARDIANCE) 10 mg tablet Take 10 mg by mouth once daily.    hydrocortisone (CORTEF) 5 MG Tab TAKE 3 TABLETS BY MOUTH EVERY MORNING AND TAKE 1 TABLET EVERY EVENING AT 5 PM:::FOLLOW-UP NEEDED FOR ADDITIONAL REFILLS    losartan (COZAAR) 25 MG tablet Take 25 mg by mouth once daily.    metFORMIN (GLUCOPHAGE-XR) 500 MG ER 24hr tablet Take 2 tablets (1,000 mg total) by mouth 2 (two) times daily with meals.    metoprolol tartrate (LOPRESSOR) 25 MG tablet Take 25 mg by mouth 2 (two) times daily.    NIFEdipine (PROCARDIA-XL) 90 MG (OSM) 24 hr tablet Take 90 mg by mouth once daily.     potassium bicarbonate disintegrating tablet Take 20 mEq by mouth 2 (two) times daily.    promethazine-codeine 6.25-10 mg/5 ml (PHENERGAN WITH CODEINE) 6.25-10 mg/5 mL syrup Take 5 mLs by mouth every 4 (four) hours as needed for Cough.    BD ALCOHOL SWABS PadM     cholecalciferol, vitamin D3, 125 mcg (5,000 unit) Tab Take 5,000 Units by mouth once daily.    dexAMETHasone (DECADRON) 4 mg/mL injection Inject 1 mL (4 mg total) into the muscle daily as needed (Signs and symtpoms of severe adrenal insufficiency). 3 ml syringe with 18 g needle  to draw  X 10 21 g 1 inch needle to inject x 10    meloxicam (MOBIC) 15 MG tablet Take 15 mg by mouth daily as needed.    TRUE  METRIX AIR GLUCOSE METER kit CHECK BLOOD SUGAR ONE TIME DAILY    TRUE METRIX GLUCOSE TEST STRIP Strp CHECK BLOOD SUGAR EVERY DAY    TRUEPLUS LANCETS 28 gauge Misc TEST ONE TIME DAILY    [DISCONTINUED] amoxicillin-clavulanate 875-125mg (AUGMENTIN) 875-125 mg per tablet Take 1 tablet by mouth 2 (two) times daily.    [DISCONTINUED] aspirin 81 MG Chew Take 1 tablet (81 mg total) by mouth once daily. Resume on Sunday, 11/18 (Patient not taking: Reported on 11/11/2022)    [DISCONTINUED] cephALEXin (KEFLEX) 500 MG capsule Take 500 mg by mouth 4 (four) times daily.    [DISCONTINUED] furosemide (LASIX) 20 MG tablet Take 20 mg by mouth 2 (two) times daily as needed.    [DISCONTINUED] torsemide (DEMADEX) 20 MG Tab Take 20 mg by mouth once daily.     Family History       Problem Relation (Age of Onset)    Cancer Brother, Brother    Diabetes Mother    Heart disease Mother, Father    Lung cancer Brother          Tobacco Use    Smoking status: Never    Smokeless tobacco: Never   Substance and Sexual Activity    Alcohol use: No    Drug use: No    Sexual activity: Not on file     Review of Systems   Constitutional:  Negative for chills, diaphoresis and fever.   HENT:  Negative for congestion and sore throat.    Eyes:  Negative for discharge and visual disturbance.   Respiratory:  Negative for cough and shortness of breath.    Cardiovascular:  Negative for chest pain and leg swelling.   Gastrointestinal:  Negative for abdominal pain, nausea and vomiting.   Genitourinary:  Negative for difficulty urinating.   Musculoskeletal:  Negative for arthralgias and joint swelling.   Skin:  Negative for rash and wound.   Allergic/Immunologic: Negative for immunocompromised state.   Neurological:  Negative for light-headedness and headaches.   Psychiatric/Behavioral:  Negative for agitation and confusion.    Objective:     Vital Signs (Most Recent):  Temp: 98.1 °F (36.7 °C) (03/01/23 1613)  Pulse: 73 (03/01/23 1622)  Resp: 20  (03/01/23 1613)  BP: (!) 116/56 (03/01/23 1613)  SpO2: 98 % (03/01/23 1613)   Vital Signs (24h Range):  Temp:  [97.3 °F (36.3 °C)-98.5 °F (36.9 °C)] 98.1 °F (36.7 °C)  Pulse:  [63-75] 73  Resp:  [16-20] 20  SpO2:  [95 %-98 %] 98 %  BP: (110-146)/(53-70) 116/56     Weight: 86 kg (189 lb 9.5 oz)  Body mass index is 33.59 kg/m².    Physical Exam  Vitals reviewed.   Constitutional:       General: She is not in acute distress.     Appearance: She is well-developed. She is not diaphoretic.   HENT:      Head: Normocephalic and atraumatic.      Nose: Nose normal.   Eyes:      General: No scleral icterus.     Pupils: Pupils are equal, round, and reactive to light.   Neck:      Vascular: No JVD.      Trachea: No tracheal deviation.   Cardiovascular:      Rate and Rhythm: Normal rate and regular rhythm.      Heart sounds: Normal heart sounds.   Pulmonary:      Effort: Pulmonary effort is normal. No respiratory distress.      Breath sounds: Normal breath sounds.   Abdominal:      General: There is no distension.      Palpations: Abdomen is soft.      Tenderness: There is no abdominal tenderness.   Musculoskeletal:         General: No deformity.      Cervical back: Normal range of motion.   Skin:     General: Skin is warm and dry.      Findings: No rash.   Neurological:      Mental Status: She is alert and oriented to person, place, and time.   Psychiatric:         Behavior: Behavior normal.         CRANIAL NERVES     CN III, IV, VI   Pupils are equal, round, and reactive to light.     Significant Labs: All pertinent labs within the past 24 hours have been reviewed.    Significant Imaging: I have reviewed all pertinent imaging results/findings within the past 24 hours.    Assessment/Plan:     * Pheochromocytoma, malignant  - s/p first session of PRRT today (Lutathera)  - will monitor overnight for signs of hypertensive crisis; vitals q2 hours  - continue cardura and other BP meds      Sick sinus syndrome  - s/p  PPM      Controlled type 2 diabetes mellitus without complication  Patient's FSGs are controlled on current medication regimen.  Last A1c reviewed-   Lab Results   Component Value Date    HGBA1C 5.7 (H) 11/11/2022     Most recent fingerstick glucose reviewed-   Recent Labs   Lab 03/01/23  1615   POCTGLUCOSE 176*     Current correctional scale  Low  Maintain anti-hyperglycemic dose as follows-   Antihyperglycemics (From admission, onward)    Start     Stop Route Frequency Ordered    03/01/23 1330  insulin aspart U-100 pen 0-5 Units         -- SubQ Before meals & nightly PRN 03/01/23 1331        Hold Oral hypoglycemics while patient is in the hospital.    H/O total adrenalectomy  - continue home hydrocortisone 15mg am/5mg qhs      Essential hypertension  - continue home metoprolol tartrate 25mg bid, nifedipine 90mg, and doxazosin 1mg qhs  - currently controlled  - VS q2      Adrenal insufficiency, primary post-surgical  - see above  - hydrocortisone 15/5      VTE Risk Mitigation (From admission, onward)         Ordered     apixaban tablet 5 mg  2 times daily         03/01/23 1434     IP VTE HIGH RISK PATIENT  Once         03/01/23 1331     Place sequential compression device  Until discontinued         03/01/23 1331     Reason for No Pharmacological VTE Prophylaxis  Once        Question:  Reasons:  Answer:  Already adequately anticoagulated on oral Anticoagulants    03/01/23 1331                   Nicolas Mims MD  Department of Hospital Medicine   [facility]

## 2023-03-01 NOTE — ASSESSMENT & PLAN NOTE
- continue home metoprolol tartrate 25mg bid, nifedipine 90mg, and doxazosin 1mg qhs  - currently controlled  - VS q2

## 2023-03-01 NOTE — PROGRESS NOTES
0855- Patient assisted to the bathroom without difficulty. Aminos continue to infuse without difficulty. PIV x2 clean, dry, intact, no swelling, no redness. Will continue to monitor.  0905- Lutathera started per [facility]. Aminos continue to infuse. VSS, see flowsheet. PIV x2 clean dry, intact, no redness, no swelling. Will continue to monitor.   Bedside, Verbal and Written shift change report given to ELIZABETH Alcala RN (oncoming nurse) by KIRIT Cabello RN (offgoing nurse). Report included the following information SBAR, Kardex, Procedure Summary, Intake/Output, MAR, Recent Results and Med Rec Status.

## 2023-03-01 NOTE — NURSING
Patient transferred to [facility] from [facility].  Patient is awake and alert.  Fluids offered.  Oriented to room and environment.  Denies pain, nausea or vomiting.  Resting in bed at this time.  Safety is maintained with bed low, wheels locked and side rails up.  Call light within reach.  Bed alarm on.  Will continue to monitor.  Dr. Mims notified of patient on floor.

## 2023-03-02 VITALS
HEIGHT: 63 IN | RESPIRATION RATE: 16 BRPM | TEMPERATURE: 98 F | BODY MASS INDEX: 33.6 KG/M2 | WEIGHT: 189.63 LBS | OXYGEN SATURATION: 95 % | DIASTOLIC BLOOD PRESSURE: 88 MMHG | SYSTOLIC BLOOD PRESSURE: 118 MMHG | HEART RATE: 72 BPM

## 2023-03-02 LAB
ALBUMIN SERPL BCP-MCNC: 2.8 G/DL (ref 3.5–5.2)
ALP SERPL-CCNC: 75 U/L (ref 55–135)
ALT SERPL W/O P-5'-P-CCNC: 5 U/L (ref 10–44)
ANION GAP SERPL CALC-SCNC: 7 MMOL/L (ref 8–16)
ANION GAP SERPL CALC-SCNC: 8 MMOL/L (ref 8–16)
AST SERPL-CCNC: 9 U/L (ref 10–40)
BASOPHILS # BLD AUTO: 0.03 K/UL (ref 0–0.2)
BASOPHILS NFR BLD: 0.2 % (ref 0–1.9)
BILIRUB SERPL-MCNC: 0.2 MG/DL (ref 0.1–1)
BUN SERPL-MCNC: 17 MG/DL (ref 8–23)
BUN SERPL-MCNC: 18 MG/DL (ref 8–23)
CALCIUM SERPL-MCNC: 8.7 MG/DL (ref 8.7–10.5)
CALCIUM SERPL-MCNC: 8.9 MG/DL (ref 8.7–10.5)
CHLORIDE SERPL-SCNC: 110 MMOL/L (ref 95–110)
CHLORIDE SERPL-SCNC: 111 MMOL/L (ref 95–110)
CO2 SERPL-SCNC: 17 MMOL/L (ref 23–29)
CO2 SERPL-SCNC: 18 MMOL/L (ref 23–29)
CREAT SERPL-MCNC: 0.8 MG/DL (ref 0.5–1.4)
CREAT SERPL-MCNC: 0.9 MG/DL (ref 0.5–1.4)
DIFFERENTIAL METHOD: ABNORMAL
EOSINOPHIL # BLD AUTO: 0 K/UL (ref 0–0.5)
EOSINOPHIL NFR BLD: 0.1 % (ref 0–8)
ERYTHROCYTE [DISTWIDTH] IN BLOOD BY AUTOMATED COUNT: 14.9 % (ref 11.5–14.5)
EST. GFR  (NO RACE VARIABLE): >60 ML/MIN/1.73 M^2
EST. GFR  (NO RACE VARIABLE): >60 ML/MIN/1.73 M^2
GLUCOSE SERPL-MCNC: 126 MG/DL (ref 70–110)
GLUCOSE SERPL-MCNC: 164 MG/DL (ref 70–110)
HCT VFR BLD AUTO: 32.9 % (ref 37–48.5)
HGB BLD-MCNC: 10.5 G/DL (ref 12–16)
IMM GRANULOCYTES # BLD AUTO: 0.1 K/UL (ref 0–0.04)
IMM GRANULOCYTES NFR BLD AUTO: 0.8 % (ref 0–0.5)
LYMPHOCYTES # BLD AUTO: 1.5 K/UL (ref 1–4.8)
LYMPHOCYTES NFR BLD: 11.9 % (ref 18–48)
MCH RBC QN AUTO: 28.7 PG (ref 27–31)
MCHC RBC AUTO-ENTMCNC: 31.9 G/DL (ref 32–36)
MCV RBC AUTO: 90 FL (ref 82–98)
MONOCYTES # BLD AUTO: 0.8 K/UL (ref 0.3–1)
MONOCYTES NFR BLD: 6.2 % (ref 4–15)
NEUTROPHILS # BLD AUTO: 10 K/UL (ref 1.8–7.7)
NEUTROPHILS NFR BLD: 80.8 % (ref 38–73)
NRBC BLD-RTO: 0 /100 WBC
PLATELET # BLD AUTO: 307 K/UL (ref 150–450)
PMV BLD AUTO: 9.5 FL (ref 9.2–12.9)
POCT GLUCOSE: 141 MG/DL (ref 70–110)
POCT GLUCOSE: 154 MG/DL (ref 70–110)
POTASSIUM SERPL-SCNC: 4.6 MMOL/L (ref 3.5–5.1)
POTASSIUM SERPL-SCNC: 5.7 MMOL/L (ref 3.5–5.1)
PROT SERPL-MCNC: 6.8 G/DL (ref 6–8.4)
RBC # BLD AUTO: 3.66 M/UL (ref 4–5.4)
SODIUM SERPL-SCNC: 135 MMOL/L (ref 136–145)
SODIUM SERPL-SCNC: 136 MMOL/L (ref 136–145)
URATE SERPL-MCNC: 3.8 MG/DL (ref 2.4–5.7)
WBC # BLD AUTO: 12.35 K/UL (ref 3.9–12.7)

## 2023-03-02 PROCEDURE — 84550 ASSAY OF BLOOD/URIC ACID: CPT | Performed by: HOSPITALIST

## 2023-03-02 PROCEDURE — 85025 COMPLETE CBC W/AUTO DIFF WBC: CPT | Performed by: HOSPITALIST

## 2023-03-02 PROCEDURE — 25000003 PHARM REV CODE 250: Performed by: HOSPITALIST

## 2023-03-02 PROCEDURE — G0378 HOSPITAL OBSERVATION PER HR: HCPCS

## 2023-03-02 PROCEDURE — 80053 COMPREHEN METABOLIC PANEL: CPT | Performed by: HOSPITALIST

## 2023-03-02 PROCEDURE — 36415 COLL VENOUS BLD VENIPUNCTURE: CPT | Performed by: HOSPITALIST

## 2023-03-02 PROCEDURE — A4216 STERILE WATER/SALINE, 10 ML: HCPCS | Performed by: HOSPITALIST

## 2023-03-02 PROCEDURE — 80048 BASIC METABOLIC PNL TOTAL CA: CPT | Mod: XB | Performed by: HOSPITALIST

## 2023-03-02 RX ADMIN — Medication 10 ML: at 06:03

## 2023-03-02 RX ADMIN — LOSARTAN POTASSIUM 25 MG: 25 TABLET, FILM COATED ORAL at 08:03

## 2023-03-02 RX ADMIN — APIXABAN 5 MG: 5 TABLET, FILM COATED ORAL at 08:03

## 2023-03-02 RX ADMIN — METOPROLOL TARTRATE 25 MG: 25 TABLET, FILM COATED ORAL at 08:03

## 2023-03-02 RX ADMIN — HYDROCORTISONE 15 MG: 5 TABLET ORAL at 08:03

## 2023-03-02 RX ADMIN — NIFEDIPINE 90 MG: 30 TABLET, FILM COATED, EXTENDED RELEASE ORAL at 08:03

## 2023-03-02 RX ADMIN — SODIUM ZIRCONIUM CYCLOSILICATE 5 G: 5 POWDER, FOR SUSPENSION ORAL at 08:03

## 2023-03-02 RX ADMIN — CHOLECALCIFEROL TAB 25 MCG (1000 UNIT) 5000 UNITS: 25 TAB at 08:03

## 2023-03-02 NOTE — DISCHARGE SUMMARY
"[facility] Medicine  Discharge Summary      Patient Name: Hailey Sawant  MRN: [id number]  KIMBERLYN: [id number]  Patient Class: OP- Observation  Admission Date: 3/1/2023  Hospital Length of Stay: 1 days  Discharge Date and Time:  03/02/2023 1:32 PM  Attending Physician: Nicolas Mims, *   Discharging Provider: Nicolas Mims MD  Primary Care Provider: Omer Hubbard MD    Primary Care Team: Networked reference to record PCT     HPI:   Ms. Sawant is a 78yo woman with metastatic pheochromocytoma, DM2, HTN, CAD s/p MI, A fib, and sick sinus syndrome s/p pacemaker who presents as direct admission following PRRT. Reports that she is feeling well right now without any acute complaints. Denies nausea, vomiting, abdominal pain, diarrhea, headache.      * No surgery found *      Hospital Course:   Ms. Sawant presented for observation following PRRT. Blood pressure remained well controlled and she was asymptomatic throughout her stay. Mild increase in potassium, now resolved. Stable for discharge; will receive lanreotide as outpatient.    /88 (Patient Position: Lying)   Pulse 72   Temp 97.7 °F (36.5 °C) (Oral)   Resp 16   Ht 5' 3" (1.6 m)   Wt 86 kg (189 lb 9.5 oz)   SpO2 95%   BMI 33.59 kg/m²   Physical Exam  Vitals reviewed.   Constitutional:       General: She is not in acute distress.     Appearance: She is well-developed. She is not diaphoretic.   HENT:      Head: Normocephalic and atraumatic.      Nose: Nose normal.   Eyes:      General: No scleral icterus.     Pupils: Pupils are equal, round, and reactive to light.   Neck:      Vascular: No JVD.      Trachea: No tracheal deviation.   Cardiovascular:      Rate and Rhythm: Normal rate and regular rhythm.      Heart sounds: Normal heart sounds.   Pulmonary:      Effort: Pulmonary effort is normal. No respiratory distress.      Breath sounds: Normal breath sounds.   Abdominal:      General: There is no distension.      " Palpations: Abdomen is soft.      Tenderness: There is no abdominal tenderness.   Musculoskeletal:         General: No deformity.      Cervical back: Normal range of motion.   Skin:     General: Skin is warm and dry.      Findings: No rash.   Neurological:      Mental Status: She is alert and oriented to person, place, and time.   Psychiatric:         Behavior: Behavior normal.            CRANIAL NERVES      CN III, IV, VI   Pupils are equal, round, and reactive to light.       Goals of Care Treatment Preferences:  Code Status: Full Code      Consults:     Cardiac/Vascular  Sick sinus syndrome  - s/p PPM      Essential hypertension  - continue home metoprolol tartrate 25mg bid, nifedipine 90mg, and doxazosin 1mg qhs  - currently controlled  - VS q2      Oncology  * Pheochromocytoma, malignant  - s/p first session of PRRT today (Lutathera)  - will monitor overnight for signs of hypertensive crisis; vitals q2 hours  - continue cardura and other BP meds      Endocrine  Controlled type 2 diabetes mellitus without complication  Patient's FSGs are controlled on current medication regimen.  Last A1c reviewed-   Lab Results   Component Value Date    HGBA1C 5.7 (H) 11/11/2022     Most recent fingerstick glucose reviewed-   Recent Labs   Lab 03/01/23  1615   POCTGLUCOSE 176*     Current correctional scale  Low  Maintain anti-hyperglycemic dose as follows-   Antihyperglycemics (From admission, onward)    Start     Stop Route Frequency Ordered    03/01/23 1330  insulin aspart U-100 pen 0-5 Units         -- SubQ Before meals & nightly PRN 03/01/23 1331        Hold Oral hypoglycemics while patient is in the hospital.    H/O total adrenalectomy  - continue home hydrocortisone 15mg am/5mg qhs      Adrenal insufficiency, primary post-surgical  - see above  - hydrocortisone 15/5      Final Active Diagnoses:    Diagnosis Date Noted POA    PRINCIPAL PROBLEM:  Pheochromocytoma, malignant [C74.90] 01/10/2016 Yes    Sick sinus syndrome  [I49.5] 08/20/2018 Yes    Controlled type 2 diabetes mellitus without complication [E11.9] 12/20/2016 Yes    H/O total adrenalectomy [E89.6] 02/25/2015 Not Applicable    Essential hypertension [I10] 07/22/2013 Yes    Adrenal insufficiency, primary post-surgical [E27.1] 07/22/2013 Yes      Problems Resolved During this Admission:       Discharged Condition: good    Disposition: Home or Self Care    Follow Up:    Patient Instructions:      Diet Adult Regular     Notify your health care provider if you experience any of the following:  temperature >100.4     Notify your health care provider if you experience any of the following:  persistent nausea and vomiting or diarrhea     Notify your health care provider if you experience any of the following:  severe uncontrolled pain     Notify your health care provider if you experience any of the following:  difficulty breathing or increased cough     Notify your health care provider if you experience any of the following:  severe persistent headache     Notify your health care provider if you experience any of the following:  persistent dizziness, light-headedness, or visual disturbances     Notify your health care provider if you experience any of the following:  increased confusion or weakness     Activity as tolerated       Significant Diagnostic Studies: see above    Pending Diagnostic Studies:     None         Medications:  Reconciled Home Medications:      Medication List      START taking these medications    apixaban 5 mg Tab  Commonly known as: ELIQUIS  Take 1 tablet (5 mg total) by mouth 2 (two) times daily.        CONTINUE taking these medications    BD ALCOHOL SWABS Padm  Generic drug: alcohol swabs     cholecalciferol (vitamin D3) 125 mcg (5,000 unit) Tab  Take 5,000 Units by mouth once daily.     dexAMETHasone 4 mg/mL injection  Commonly known as: DECADRON  Inject 1 mL (4 mg total) into the muscle daily as needed (Signs and symtpoms of severe adrenal  insufficiency). 3 ml syringe with 18 g needle  to draw  X 10 21 g 1 inch needle to inject x 10     doxazosin 1 MG tablet  Commonly known as: CARDURA  Take 1 tablet (1 mg total) by mouth every evening.     empagliflozin 10 mg tablet  Commonly known as: JARDIANCE  Take 10 mg by mouth once daily.     hydrocortisone 5 MG Tab  Commonly known as: CORTEF  TAKE 3 TABLETS BY MOUTH EVERY MORNING AND TAKE 1 TABLET EVERY EVENING AT 5 PM:::FOLLOW-UP NEEDED FOR ADDITIONAL REFILLS     losartan 25 MG tablet  Commonly known as: COZAAR  Take 25 mg by mouth once daily.     meloxicam 15 MG tablet  Commonly known as: MOBIC  Take 15 mg by mouth daily as needed.     metFORMIN 500 MG ER 24hr tablet  Commonly known as: GLUCOPHAGE-XR  Take 2 tablets (1,000 mg total) by mouth 2 (two) times daily with meals.     metoprolol tartrate 25 MG tablet  Commonly known as: LOPRESSOR  Take 25 mg by mouth 2 (two) times daily.     NIFEdipine 90 MG (OSM) 24 hr tablet  Commonly known as: PROCARDIA-XL  Take 90 mg by mouth once daily.     promethazine-codeine 6.25-10 mg/5 ml 6.25-10 mg/5 mL syrup  Commonly known as: PHENERGAN with CODEINE  Take 5 mLs by mouth every 4 (four) hours as needed for Cough.     TRUE METRIX AIR GLUCOSE METER kit  Generic drug: blood-glucose meter  CHECK BLOOD SUGAR ONE TIME DAILY     TRUE METRIX GLUCOSE TEST STRIP Strp  Generic drug: blood sugar diagnostic  CHECK BLOOD SUGAR EVERY DAY     TRUEPLUS LANCETS 28 gauge Misc  Generic drug: lancets  TEST ONE TIME DAILY        STOP taking these medications    potassium bicarbonate disintegrating tablet            Indwelling Lines/Drains at time of discharge:   Lines/Drains/Airways     None                 Time spent on the discharge of patient: 25 minutes         Nicolas Mims MD  Department of Hospital Medicine  [facility]

## 2023-03-02 NOTE — PLAN OF CARE
Problem: Adult Inpatient Plan of Care  Goal: Plan of Care Review  3/2/2023 0453 by Antonina Elizabeth RN  Outcome: Ongoing, Progressing     Problem: Fall Injury Risk  Goal: Absence of Fall and Fall-Related Injury  3/2/2023 0453 by Antonina Elizabeth RN  Outcome: Ongoing, Progressing  3/2/2023 0452 by Antonina Elizabeth RN  Outcome: Ongoing, Progressing     Problem: Hypertension Comorbidity  Goal: Blood Pressure in Desired Range  3/2/2023 0453 by Antonina Elizabeth RN  Outcome: Ongoing, Progressing  3/2/2023 0452 by Antonina Elizabeth RN  Outcome: Ongoing, Progressing

## 2023-03-02 NOTE — CODE 44
"MEDICARE CONDITION 44    (Reference: Transmittal 200 of the Medicare Manual)    A Medicare "Inpatient Admission" may be changed to an "Outpatient" (includes  Outpatient Observation) status, if the following conditions exist:  The change in patient status from inpatient to outpatient is made prior to        discharge or release, while the patient is still a patient in the hospital.   The hospital has not submitted a claim for the inpatient admission.  A physician concurs with the Utilization Committee decision.   Physician concurrence with the Utilization Committee's decision is documented         in the patient's records.         IMPLEMENTATION OF CONDITION CODE 44    Inpatient status has been reviewed prior to discharge. Change to Outpatient Observation status, in accordance with Condition Code 44.     By entering this in the medical record, I verify that I have reviewed and concur with the findings of the Utilization Review Committee and the Utilization Review Physician, and that the identified Patient does not meet medical necessity for Inpatient status. This patient is appropriate for the downgrade in status because per Payor Guidelines and Policy they have determined Observation to be the correct level of care. Outpatient Observation is the identified level of care appropriate for the Patient, and all of the above conditions for this status change have been met.       Nicolas Mims MD  Hospital Medicine    "

## 2023-03-02 NOTE — HOSPITAL COURSE
"Ms. Sawant presented for observation following PRRT. Blood pressure remained well controlled and she was asymptomatic throughout her stay. Mild increase in potassium, now resolved. Stable for discharge; will receive lanreotide as outpatient.    /88 (Patient Position: Lying)   Pulse 72   Temp 97.7 °F (36.5 °C) (Oral)   Resp 16   Ht 5' 3" (1.6 m)   Wt 86 kg (189 lb 9.5 oz)   SpO2 95%   BMI 33.59 kg/m²   Physical Exam  Vitals reviewed.   Constitutional:       General: She is not in acute distress.     Appearance: She is well-developed. She is not diaphoretic.   HENT:      Head: Normocephalic and atraumatic.      Nose: Nose normal.   Eyes:      General: No scleral icterus.     Pupils: Pupils are equal, round, and reactive to light.   Neck:      Vascular: No JVD.      Trachea: No tracheal deviation.   Cardiovascular:      Rate and Rhythm: Normal rate and regular rhythm.      Heart sounds: Normal heart sounds.   Pulmonary:      Effort: Pulmonary effort is normal. No respiratory distress.      Breath sounds: Normal breath sounds.   Abdominal:      General: There is no distension.      Palpations: Abdomen is soft.      Tenderness: There is no abdominal tenderness.   Musculoskeletal:         General: No deformity.      Cervical back: Normal range of motion.   Skin:     General: Skin is warm and dry.      Findings: No rash.   Neurological:      Mental Status: She is alert and oriented to person, place, and time.   Psychiatric:         Behavior: Behavior normal.            CRANIAL NERVES      CN III, IV, VI   Pupils are equal, round, and reactive to light.  "

## 2023-03-02 NOTE — NURSING
Medications given per MAR. Patient on telemetry at a paced rhythm. Blood glucose checked as ordered. No problems or complaints overnight. Safety maintained. Isolation maintained, call light in reach, bed alarm in use.

## 2023-03-02 NOTE — PROGRESS NOTES
Discharge orders noted. Additional clinical references attached. Patient's discharge instructions given by bedside RN and reviewed via this VN.  Education provided on new medication, diagnosis, and follow-up appointments.  Teach back method used. Patient verbalized understanding. All questions answered. Patient awaiting family for pickup. Bedside nurse made aware and will request transportation to [facility] when ready.     03/02/23 1226   AVS Confirmation   Discharge instructions and AVS given to and reviewed with patient and/or significant other. Yes

## 2023-03-03 ENCOUNTER — INFUSION (OUTPATIENT)
Dept: INFUSION THERAPY | Facility: HOSPITAL | Age: 78
End: 2023-03-03
Attending: INTERNAL MEDICINE
Payer: MEDICARE

## 2023-03-03 VITALS
DIASTOLIC BLOOD PRESSURE: 74 MMHG | HEART RATE: 71 BPM | RESPIRATION RATE: 18 BRPM | BODY MASS INDEX: 33.6 KG/M2 | HEIGHT: 63 IN | WEIGHT: 189.63 LBS | OXYGEN SATURATION: 99 % | SYSTOLIC BLOOD PRESSURE: 115 MMHG

## 2023-03-03 DIAGNOSIS — C74.90 MALIGNANT PHEOCHROMOCYTOMA, UNSPECIFIED LATERALITY: Primary | ICD-10-CM

## 2023-03-03 PROCEDURE — 63600175 PHARM REV CODE 636 W HCPCS: Mod: JZ,JG | Performed by: INTERNAL MEDICINE

## 2023-03-03 PROCEDURE — 96372 THER/PROPH/DIAG INJ SC/IM: CPT

## 2023-03-03 RX ORDER — LANREOTIDE ACETATE 120 MG/.5ML
120 INJECTION SUBCUTANEOUS ONCE
Status: COMPLETED | OUTPATIENT
Start: 2023-03-03 | End: 2023-03-03

## 2023-03-03 RX ORDER — LANREOTIDE ACETATE 120 MG/.5ML
120 INJECTION SUBCUTANEOUS ONCE
Status: CANCELLED | OUTPATIENT
Start: 2023-03-03 | End: 2023-03-03

## 2023-03-03 RX ADMIN — LANREOTIDE ACETATE 120 MG: 120 INJECTION SUBCUTANEOUS at 02:03

## 2023-03-03 NOTE — NURSING
Patient tolerated Lanreotide injection well, no complaints at this time. VSS. Next appointment scheduled and pt d/c in no acute distress.

## 2023-03-17 ENCOUNTER — TELEPHONE (OUTPATIENT)
Dept: INFUSION THERAPY | Facility: HOSPITAL | Age: 78
End: 2023-03-17
Payer: MEDICARE

## 2023-03-17 DIAGNOSIS — C74.90 MALIGNANT PHEOCHROMOCYTOMA, UNSPECIFIED LATERALITY: Primary | ICD-10-CM

## 2023-03-29 NOTE — PROGRESS NOTES
PROGRESS NOTE    Subjective:       Patient ID: Hailey Sawant is a 77 y.o. female.    Chief Complaint: metastatic pheochromocytoma    Diagnosis:  Metastatic pheochromocytoma of the right adrenal gland, with mets to bones, liver    Oncologic History:  1. Ms. Sawant is a 74 yo woman with DM, HTN, CAD s/p MI, A fib and sick sinus syndrome s/p pacemaker in June 2022 (on eliquis since), carotid paraganglioma removed in 11/2018 who initially saw me on 11/28/2022 for further management of metastatic pheochromocytoma. Last seen Dr West in 2018 and Dr Cohen in early 2021. Does not follow oncologist locally. It is my first time seeing her today. Her history dates to 2009 when she was being worked up for abdominal pain and was found to have cholelithiasis. However, during this workup she was found to have bilateral adrenal masses.  She was also noted to be hypertensive.  She had a 6.5 cm mass on the right with some evidence of necrosis and also a mass just under 3 cm on the left.  Biochemical workup was consistent with pheochromocytoma.  In 11/2009 she underwent a bilateral adrenalectomy.  Pathology had revealed a right adrenal gland pheochromocytoma and a left adrenal gland adrenal cortical adenoma.  In 02/2016 she was noted to have recurrent disease to the bone and underwent I 131 MIBG therapy in 03/2016, 07/2016 and 11/2016.  In 10/2016 she underwent a right partial nephrectomy due to recurrent disease.  Pathology did confirm findings consistent with recurrent pheochromocytoma.  In 11/2018 she underwent removal of the right carotid body tumor with pathology showing paraganglioma. She presents today for follow up. She knows she has cancer but does not know it is stage IV. She is not sure if Dr Cohen called her after tumor board or not. She feels well. Had MI in June 2022, treated at [facility], had pacemaker placed. Medically treated. Feeling well  "today. We discussed restaging dotatate scan.   2. Dotatate scan declined by insurance several times. Finally had gallium PET on 23. It showed "New foci of abnormal radiotracer uptake at the cervical spine, sacral, right ilium and right acetabular joint. Interval enlargement of hepatic section 7 hypodense mass. Reviewed at tumor board. Recc PRRT. However unable to get insurance approval for PRRT and admission afterwards to monitor for catecholamine crisis.   3. Lanreotide started on 23. Zometa started 23.   4. PRRT started on 3/1/23.     Interval History:   Ms Sawant returns for follow up. Feeling well. Tolerated PRRT well with no side effects. BP has been well controlled. She noted swelling of her lower front neck for a few weeks. Had thyroid issue a few years ago. Followed by Dr Loya at [facility] in [place].     ECO    ROS:   A ten-point system review is obtained and negative except for what was stated in the Interval History.     Physical Examination:   Vital signs reviewed.   General: well hydrated, well developed, in no acute distress  HEENT: normocephalic, PERRLA, EOMI, anicteric sclerae, oropharynx clear  Neck: supple, no JVD, +thyromegaly, soft, cervical or supraclavicular lymphadenopathy  Lungs: clear breath sounds bilaterally, no wheezing, rales, or rhonchi  Heart: RRR, no M/R/G  Abdomen: soft, no tenderness, non-distended, no hepatosplenomegaly, mass, or hernia. BS present  Extremities: no clubbing, cyanosis, or edema  Skin: no rash, ulcer, or open wounds  Neuro: alert and oriented x 4, no focal neuro deficit  Psych: pleasant and appropriate mood and affect    Objective:     Laboratory Data:  Labs reviewed.     Imaging Data:  Gallium PET 23:  New foci of abnormal radiotracer uptake at the cervical spine, sacral, right ilium and right acetabular joint.     Interval enlargement of hepatic section 7 hypodense mass.     Based upon progression of disease and tracer " avidity of the patient's tumor burden, the patient would be an appropriate candidate for PRRT if clinically indicated.       Assessment and Plan:     1. Malignant pheochromocytoma of right adrenal gland    2. Secondary neuroendocrine tumor of distant lymph nodes    3. Secondary neuroendocrine tumor of bone    4. Immunodeficiency secondary to chemotherapy    5. Immunodeficiency secondary to neoplasm    6. Adrenal insufficiency, primary post-surgical    7. Essential hypertension    8. Controlled type 2 diabetes mellitus without complication, without long-term current use of insulin    9. PAF (paroxysmal atrial fibrillation)    10. Benign hypertensive heart disease with heart failure    11. Neck swelling    12. Fatigue, unspecified type        1-5  - Ms Sawant is a 78 yo woman with R adrenal pheochromocytoma s/p bilateral adrenectomy in 11/2009, recurrent disease to the bone in 2016 s/p MIBG in 2016. At our prior visits, we discussed interim progression of cancer in the liver and new lesions in the bones. Discussed lanreotide every 4 weeks, zometa every 3 months and PRRT. Lanreotide started 1/23/2023. Zometa started 2/20/23.   - PRRT #1 on 3/1/23.   - doing well. Lanreotide today  - return in 4 weeks prior to cycle 2 PRRT. She did well with cycle 1. Will give the rest outpatient. Will get [facility] for patient.   - discussed taking cardura every day and monitor blood pressure daily     6.  - on cortef  - f/u with Dr Wilburn    7.  - BP controlled  - c/w current medication    8.  - BS controlled  - c/w current medication    9.10  - s/p pacemaker. On eliquis for Afib. Sees Dr Loay at [facility] in [place]    11.  - feels like goiter on exam  - CT neck  - check TSH    Follow-up:     Return in 4 weeks  Knows to call in the interval if any problems arise.    Nurse will schedule follow up.     Electronically signed by Ema Chowdhury

## 2023-03-30 ENCOUNTER — OFFICE VISIT (OUTPATIENT)
Dept: HEMATOLOGY/ONCOLOGY | Facility: CLINIC | Age: 78
End: 2023-03-30
Payer: MEDICARE

## 2023-03-30 ENCOUNTER — INFUSION (OUTPATIENT)
Dept: INFUSION THERAPY | Facility: HOSPITAL | Age: 78
End: 2023-03-30
Attending: INTERNAL MEDICINE
Payer: MEDICARE

## 2023-03-30 VITALS
DIASTOLIC BLOOD PRESSURE: 80 MMHG | WEIGHT: 196 LBS | OXYGEN SATURATION: 92 % | BODY MASS INDEX: 34.73 KG/M2 | SYSTOLIC BLOOD PRESSURE: 146 MMHG | HEIGHT: 63 IN | HEIGHT: 63 IN | BODY MASS INDEX: 34.73 KG/M2 | WEIGHT: 196 LBS | RESPIRATION RATE: 16 BRPM | HEART RATE: 73 BPM

## 2023-03-30 DIAGNOSIS — I11.0 BENIGN HYPERTENSIVE HEART DISEASE WITH HEART FAILURE: ICD-10-CM

## 2023-03-30 DIAGNOSIS — D84.821 IMMUNODEFICIENCY SECONDARY TO CHEMOTHERAPY: ICD-10-CM

## 2023-03-30 DIAGNOSIS — C7B.8 SECONDARY NEUROENDOCRINE TUMOR OF BONE: ICD-10-CM

## 2023-03-30 DIAGNOSIS — D84.81 IMMUNODEFICIENCY SECONDARY TO NEOPLASM: ICD-10-CM

## 2023-03-30 DIAGNOSIS — R53.83 FATIGUE, UNSPECIFIED TYPE: ICD-10-CM

## 2023-03-30 DIAGNOSIS — C74.91 MALIGNANT PHEOCHROMOCYTOMA OF RIGHT ADRENAL GLAND: Primary | ICD-10-CM

## 2023-03-30 DIAGNOSIS — I10 ESSENTIAL HYPERTENSION: ICD-10-CM

## 2023-03-30 DIAGNOSIS — E11.9 CONTROLLED TYPE 2 DIABETES MELLITUS WITHOUT COMPLICATION, WITHOUT LONG-TERM CURRENT USE OF INSULIN: ICD-10-CM

## 2023-03-30 DIAGNOSIS — E27.1 ADRENAL INSUFFICIENCY, PRIMARY: ICD-10-CM

## 2023-03-30 DIAGNOSIS — C7B.8 SECONDARY NEUROENDOCRINE TUMOR OF DISTANT LYMPH NODES: ICD-10-CM

## 2023-03-30 DIAGNOSIS — I48.0 PAF (PAROXYSMAL ATRIAL FIBRILLATION): ICD-10-CM

## 2023-03-30 DIAGNOSIS — D49.9 IMMUNODEFICIENCY SECONDARY TO NEOPLASM: ICD-10-CM

## 2023-03-30 DIAGNOSIS — T45.1X5A IMMUNODEFICIENCY SECONDARY TO CHEMOTHERAPY: ICD-10-CM

## 2023-03-30 DIAGNOSIS — C74.90 MALIGNANT PHEOCHROMOCYTOMA, UNSPECIFIED LATERALITY: Primary | ICD-10-CM

## 2023-03-30 DIAGNOSIS — Z79.899 IMMUNODEFICIENCY SECONDARY TO CHEMOTHERAPY: ICD-10-CM

## 2023-03-30 DIAGNOSIS — R22.1 NECK SWELLING: ICD-10-CM

## 2023-03-30 PROCEDURE — 96372 THER/PROPH/DIAG INJ SC/IM: CPT

## 2023-03-30 PROCEDURE — 1159F MED LIST DOCD IN RCRD: CPT | Mod: CPTII,S$GLB,, | Performed by: INTERNAL MEDICINE

## 2023-03-30 PROCEDURE — 1160F RVW MEDS BY RX/DR IN RCRD: CPT | Mod: CPTII,S$GLB,, | Performed by: INTERNAL MEDICINE

## 2023-03-30 PROCEDURE — 99999 PR PBB SHADOW E&M-EST. PATIENT-LVL III: ICD-10-PCS | Mod: PBBFAC,,, | Performed by: INTERNAL MEDICINE

## 2023-03-30 PROCEDURE — 1111F DSCHRG MED/CURRENT MED MERGE: CPT | Mod: CPTII,S$GLB,, | Performed by: INTERNAL MEDICINE

## 2023-03-30 PROCEDURE — 99215 OFFICE O/P EST HI 40 MIN: CPT | Mod: S$GLB,,, | Performed by: INTERNAL MEDICINE

## 2023-03-30 PROCEDURE — 1126F AMNT PAIN NOTED NONE PRSNT: CPT | Mod: CPTII,S$GLB,, | Performed by: INTERNAL MEDICINE

## 2023-03-30 PROCEDURE — 3077F SYST BP >= 140 MM HG: CPT | Mod: CPTII,S$GLB,, | Performed by: INTERNAL MEDICINE

## 2023-03-30 PROCEDURE — 1160F PR REVIEW ALL MEDS BY PRESCRIBER/CLIN PHARMACIST DOCUMENTED: ICD-10-PCS | Mod: CPTII,S$GLB,, | Performed by: INTERNAL MEDICINE

## 2023-03-30 PROCEDURE — 1101F PR PT FALLS ASSESS DOC 0-1 FALLS W/OUT INJ PAST YR: ICD-10-PCS | Mod: CPTII,S$GLB,, | Performed by: INTERNAL MEDICINE

## 2023-03-30 PROCEDURE — 1126F PR PAIN SEVERITY QUANTIFIED, NO PAIN PRESENT: ICD-10-PCS | Mod: CPTII,S$GLB,, | Performed by: INTERNAL MEDICINE

## 2023-03-30 PROCEDURE — 3079F DIAST BP 80-89 MM HG: CPT | Mod: CPTII,S$GLB,, | Performed by: INTERNAL MEDICINE

## 2023-03-30 PROCEDURE — 99215 PR OFFICE/OUTPT VISIT, EST, LEVL V, 40-54 MIN: ICD-10-PCS | Mod: S$GLB,,, | Performed by: INTERNAL MEDICINE

## 2023-03-30 PROCEDURE — 3288F FALL RISK ASSESSMENT DOCD: CPT | Mod: CPTII,S$GLB,, | Performed by: INTERNAL MEDICINE

## 2023-03-30 PROCEDURE — 1101F PT FALLS ASSESS-DOCD LE1/YR: CPT | Mod: CPTII,S$GLB,, | Performed by: INTERNAL MEDICINE

## 2023-03-30 PROCEDURE — 1111F PR DISCHARGE MEDS RECONCILED W/ CURRENT OUTPATIENT MED LIST: ICD-10-PCS | Mod: CPTII,S$GLB,, | Performed by: INTERNAL MEDICINE

## 2023-03-30 PROCEDURE — 3077F PR MOST RECENT SYSTOLIC BLOOD PRESSURE >= 140 MM HG: ICD-10-PCS | Mod: CPTII,S$GLB,, | Performed by: INTERNAL MEDICINE

## 2023-03-30 PROCEDURE — 99999 PR PBB SHADOW E&M-EST. PATIENT-LVL III: CPT | Mod: PBBFAC,,, | Performed by: INTERNAL MEDICINE

## 2023-03-30 PROCEDURE — 3079F PR MOST RECENT DIASTOLIC BLOOD PRESSURE 80-89 MM HG: ICD-10-PCS | Mod: CPTII,S$GLB,, | Performed by: INTERNAL MEDICINE

## 2023-03-30 PROCEDURE — 1159F PR MEDICATION LIST DOCUMENTED IN MEDICAL RECORD: ICD-10-PCS | Mod: CPTII,S$GLB,, | Performed by: INTERNAL MEDICINE

## 2023-03-30 PROCEDURE — 3288F PR FALLS RISK ASSESSMENT DOCUMENTED: ICD-10-PCS | Mod: CPTII,S$GLB,, | Performed by: INTERNAL MEDICINE

## 2023-03-30 PROCEDURE — 63600175 PHARM REV CODE 636 W HCPCS: Mod: JZ,JG | Performed by: INTERNAL MEDICINE

## 2023-03-30 RX ORDER — LOSARTAN POTASSIUM 50 MG/1
TABLET ORAL
COMMUNITY
Start: 2022-10-05 | End: 2024-03-28

## 2023-03-30 RX ORDER — FUROSEMIDE 20 MG/1
1 TABLET ORAL 2 TIMES DAILY
COMMUNITY
Start: 2022-06-23 | End: 2023-09-14

## 2023-03-30 RX ORDER — LANREOTIDE ACETATE 120 MG/.5ML
120 INJECTION SUBCUTANEOUS ONCE
Status: COMPLETED | OUTPATIENT
Start: 2023-03-30 | End: 2023-03-30

## 2023-03-30 RX ORDER — MUPIROCIN 20 MG/G
OINTMENT TOPICAL
COMMUNITY
Start: 2022-10-20

## 2023-03-30 RX ORDER — HYDROCORTISONE 10 MG/1
TABLET ORAL
COMMUNITY
Start: 2022-08-08

## 2023-03-30 RX ORDER — METOPROLOL SUCCINATE 25 MG/1
TABLET, EXTENDED RELEASE ORAL
COMMUNITY
Start: 2023-03-17

## 2023-03-30 RX ORDER — BROMPHENIRAMINE MALEATE, PSEUDOEPHEDRINE HYDROCHLORIDE, AND DEXTROMETHORPHAN HYDROBROMIDE 2; 30; 10 MG/5ML; MG/5ML; MG/5ML
10 SYRUP ORAL EVERY 4 HOURS
COMMUNITY
Start: 2023-01-08

## 2023-03-30 RX ORDER — ACETAMINOPHEN 500 MG
TABLET ORAL
COMMUNITY

## 2023-03-30 RX ORDER — LANREOTIDE ACETATE 120 MG/.5ML
120 INJECTION SUBCUTANEOUS ONCE
Status: CANCELLED | OUTPATIENT
Start: 2023-03-30 | End: 2023-03-30

## 2023-03-30 RX ORDER — TORSEMIDE 20 MG/1
1 TABLET ORAL DAILY
COMMUNITY
Start: 2022-11-15 | End: 2023-09-14

## 2023-03-30 RX ORDER — FUROSEMIDE 20 MG/1
20 TABLET ORAL
COMMUNITY
Start: 2023-03-15

## 2023-03-30 RX ORDER — POTASSIUM CHLORIDE 20 MEQ/1
TABLET, EXTENDED RELEASE ORAL
COMMUNITY
Start: 2022-12-17 | End: 2024-01-03

## 2023-03-30 RX ORDER — METOPROLOL SUCCINATE 50 MG/1
1 TABLET, EXTENDED RELEASE ORAL 2 TIMES DAILY
COMMUNITY
Start: 2022-06-23

## 2023-03-30 RX ORDER — POTASSIUM CHLORIDE 20 MEQ/1
TABLET, EXTENDED RELEASE ORAL
COMMUNITY
Start: 2022-06-24 | End: 2024-01-03

## 2023-03-30 RX ADMIN — LANREOTIDE ACETATE 120 MG: 120 INJECTION SUBCUTANEOUS at 10:03

## 2023-03-30 NOTE — NURSING
Pt tolerated Lanreotide injection well, no complaints at this time. No adverse reactions noted. Next appointment scheduled and pt d/c in no acute distress.

## 2023-04-06 ENCOUNTER — TELEPHONE (OUTPATIENT)
Dept: HEMATOLOGY/ONCOLOGY | Facility: CLINIC | Age: 78
End: 2023-04-06
Payer: MEDICARE

## 2023-04-06 NOTE — TELEPHONE ENCOUNTER
Called patient re CT neck result. NA. Left VM. No lymphadenopathy. Small nodules in thyroid. I am checking with endocrinology doctor to see if further workup is needed.

## 2023-04-13 DIAGNOSIS — E04.1 THYROID NODULE: Primary | ICD-10-CM

## 2023-04-13 NOTE — PROGRESS NOTES
Called and spoke with sister Claudia. CT neck did not show lymphadenopathy but she has nodules in thyroid. I checked with endocrinologist Dr Harley who recommends getting thyroid US. Will schedule. She understands and will let patient know.

## 2023-04-13 NOTE — Clinical Note
Please schedule thyroid ultrasound at [facility] and let patient know. Please let her know we can't do it locally so has to be at [facility]. Can schedule on the days she comes back to see me if possible. Please call patient with appointments.

## 2023-04-17 ENCOUNTER — PATIENT OUTREACH (OUTPATIENT)
Dept: ADMINISTRATIVE | Facility: OTHER | Age: 78
End: 2023-04-17
Payer: MEDICARE

## 2023-04-17 VITALS — DIASTOLIC BLOOD PRESSURE: 90 MMHG | HEART RATE: 72 BPM | SYSTOLIC BLOOD PRESSURE: 158 MMHG | OXYGEN SATURATION: 97 %

## 2023-04-19 ENCOUNTER — TELEPHONE (OUTPATIENT)
Dept: INFUSION THERAPY | Facility: HOSPITAL | Age: 78
End: 2023-04-19
Payer: MEDICARE

## 2023-04-19 NOTE — TELEPHONE ENCOUNTER
Confirmed upcoming infusion appointments with the patient's sister  [place] from 4/24-4/27 4/25 at 1230p Dr. Chowdhury at [facility]  4/26 at 8a PRRT at [place]  4/27 at 1030a at [place]

## 2023-04-25 ENCOUNTER — OFFICE VISIT (OUTPATIENT)
Dept: HEMATOLOGY/ONCOLOGY | Facility: CLINIC | Age: 78
End: 2023-04-25
Payer: MEDICARE

## 2023-04-25 VITALS
BODY MASS INDEX: 34.47 KG/M2 | DIASTOLIC BLOOD PRESSURE: 65 MMHG | HEIGHT: 63 IN | RESPIRATION RATE: 18 BRPM | WEIGHT: 194.56 LBS | SYSTOLIC BLOOD PRESSURE: 140 MMHG | HEART RATE: 70 BPM | TEMPERATURE: 98 F | OXYGEN SATURATION: 96 %

## 2023-04-25 DIAGNOSIS — C7B.8 SECONDARY NEUROENDOCRINE TUMOR OF DISTANT LYMPH NODES: ICD-10-CM

## 2023-04-25 DIAGNOSIS — I49.5 SICK SINUS SYNDROME: ICD-10-CM

## 2023-04-25 DIAGNOSIS — C74.91 MALIGNANT PHEOCHROMOCYTOMA OF RIGHT ADRENAL GLAND: Primary | ICD-10-CM

## 2023-04-25 DIAGNOSIS — T45.1X5A IMMUNODEFICIENCY SECONDARY TO CHEMOTHERAPY: ICD-10-CM

## 2023-04-25 DIAGNOSIS — M25.552 LEFT HIP PAIN: ICD-10-CM

## 2023-04-25 DIAGNOSIS — E27.1 ADRENAL INSUFFICIENCY, PRIMARY: ICD-10-CM

## 2023-04-25 DIAGNOSIS — Z79.899 IMMUNODEFICIENCY SECONDARY TO CHEMOTHERAPY: ICD-10-CM

## 2023-04-25 DIAGNOSIS — R07.9 CHEST PAIN, UNSPECIFIED TYPE: ICD-10-CM

## 2023-04-25 DIAGNOSIS — D49.9 IMMUNODEFICIENCY SECONDARY TO NEOPLASM: ICD-10-CM

## 2023-04-25 DIAGNOSIS — D84.821 IMMUNODEFICIENCY SECONDARY TO CHEMOTHERAPY: ICD-10-CM

## 2023-04-25 DIAGNOSIS — I48.0 PAF (PAROXYSMAL ATRIAL FIBRILLATION): ICD-10-CM

## 2023-04-25 DIAGNOSIS — I50.42 CHRONIC COMBINED SYSTOLIC AND DIASTOLIC CONGESTIVE HEART FAILURE: ICD-10-CM

## 2023-04-25 DIAGNOSIS — E04.1 THYROID NODULE: ICD-10-CM

## 2023-04-25 DIAGNOSIS — E11.9 CONTROLLED TYPE 2 DIABETES MELLITUS WITHOUT COMPLICATION, WITHOUT LONG-TERM CURRENT USE OF INSULIN: ICD-10-CM

## 2023-04-25 DIAGNOSIS — R06.02 SHORTNESS OF BREATH: ICD-10-CM

## 2023-04-25 DIAGNOSIS — D84.81 IMMUNODEFICIENCY SECONDARY TO NEOPLASM: ICD-10-CM

## 2023-04-25 DIAGNOSIS — C7B.8 SECONDARY NEUROENDOCRINE TUMOR OF BONE: ICD-10-CM

## 2023-04-25 DIAGNOSIS — I10 ESSENTIAL HYPERTENSION: ICD-10-CM

## 2023-04-25 PROCEDURE — 1160F RVW MEDS BY RX/DR IN RCRD: CPT | Mod: CPTII,S$GLB,, | Performed by: INTERNAL MEDICINE

## 2023-04-25 PROCEDURE — 3078F PR MOST RECENT DIASTOLIC BLOOD PRESSURE < 80 MM HG: ICD-10-PCS | Mod: CPTII,S$GLB,, | Performed by: INTERNAL MEDICINE

## 2023-04-25 PROCEDURE — 1160F PR REVIEW ALL MEDS BY PRESCRIBER/CLIN PHARMACIST DOCUMENTED: ICD-10-PCS | Mod: CPTII,S$GLB,, | Performed by: INTERNAL MEDICINE

## 2023-04-25 PROCEDURE — 1101F PT FALLS ASSESS-DOCD LE1/YR: CPT | Mod: CPTII,S$GLB,, | Performed by: INTERNAL MEDICINE

## 2023-04-25 PROCEDURE — 99999 PR PBB SHADOW E&M-EST. PATIENT-LVL III: ICD-10-PCS | Mod: PBBFAC,,, | Performed by: INTERNAL MEDICINE

## 2023-04-25 PROCEDURE — 1159F MED LIST DOCD IN RCRD: CPT | Mod: CPTII,S$GLB,, | Performed by: INTERNAL MEDICINE

## 2023-04-25 PROCEDURE — 1101F PR PT FALLS ASSESS DOC 0-1 FALLS W/OUT INJ PAST YR: ICD-10-PCS | Mod: CPTII,S$GLB,, | Performed by: INTERNAL MEDICINE

## 2023-04-25 PROCEDURE — 1159F PR MEDICATION LIST DOCUMENTED IN MEDICAL RECORD: ICD-10-PCS | Mod: CPTII,S$GLB,, | Performed by: INTERNAL MEDICINE

## 2023-04-25 PROCEDURE — 99999 PR PBB SHADOW E&M-EST. PATIENT-LVL III: CPT | Mod: PBBFAC,,, | Performed by: INTERNAL MEDICINE

## 2023-04-25 PROCEDURE — 3288F PR FALLS RISK ASSESSMENT DOCUMENTED: ICD-10-PCS | Mod: CPTII,S$GLB,, | Performed by: INTERNAL MEDICINE

## 2023-04-25 PROCEDURE — 3077F SYST BP >= 140 MM HG: CPT | Mod: CPTII,S$GLB,, | Performed by: INTERNAL MEDICINE

## 2023-04-25 PROCEDURE — 3078F DIAST BP <80 MM HG: CPT | Mod: CPTII,S$GLB,, | Performed by: INTERNAL MEDICINE

## 2023-04-25 PROCEDURE — 3288F FALL RISK ASSESSMENT DOCD: CPT | Mod: CPTII,S$GLB,, | Performed by: INTERNAL MEDICINE

## 2023-04-25 PROCEDURE — 3077F PR MOST RECENT SYSTOLIC BLOOD PRESSURE >= 140 MM HG: ICD-10-PCS | Mod: CPTII,S$GLB,, | Performed by: INTERNAL MEDICINE

## 2023-04-25 PROCEDURE — 99215 PR OFFICE/OUTPT VISIT, EST, LEVL V, 40-54 MIN: ICD-10-PCS | Mod: S$GLB,,, | Performed by: INTERNAL MEDICINE

## 2023-04-25 PROCEDURE — 99215 OFFICE O/P EST HI 40 MIN: CPT | Mod: S$GLB,,, | Performed by: INTERNAL MEDICINE

## 2023-04-25 NOTE — PROGRESS NOTES
PROGRESS NOTE    Subjective:       Patient ID: Hailey Sawant is a 77 y.o. female.    Chief Complaint: metastatic pheochromocytoma    Diagnosis:  Metastatic pheochromocytoma of the right adrenal gland, with mets to bones, liver    Oncologic History:  1. Ms. Sawant is a 72 yo woman with DM, HTN, CAD s/p MI, A fib and sick sinus syndrome s/p pacemaker in June 2022 (on eliquis since), carotid paraganglioma removed in 11/2018 who initially saw me on 11/28/2022 for further management of metastatic pheochromocytoma. Last seen Dr West in 2018 and Dr oChen in early 2021. Does not follow oncologist locally. It is my first time seeing her today. Her history dates to 2009 when she was being worked up for abdominal pain and was found to have cholelithiasis. However, during this workup she was found to have bilateral adrenal masses.  She was also noted to be hypertensive.  She had a 6.5 cm mass on the right with some evidence of necrosis and also a mass just under 3 cm on the left.  Biochemical workup was consistent with pheochromocytoma.  In 11/2009 she underwent a bilateral adrenalectomy.  Pathology had revealed a right adrenal gland pheochromocytoma and a left adrenal gland adrenal cortical adenoma.  In 02/2016 she was noted to have recurrent disease to the bone and underwent I 131 MIBG therapy in 03/2016, 07/2016 and 11/2016.  In 10/2016 she underwent a right partial nephrectomy due to recurrent disease.  Pathology did confirm findings consistent with recurrent pheochromocytoma.  In 11/2018 she underwent removal of the right carotid body tumor with pathology showing paraganglioma. She presents today for follow up. She knows she has cancer but does not know it is stage IV. She is not sure if Dr Cohen called her after tumor board or not. She feels well. Had MI in June 2022, treated at [facility], had pacemaker placed. Medically treated. Feeling well  "today. We discussed restaging dotatate scan.   2. Dotatate scan declined by insurance several times. Finally had gallium PET on 23. It showed "New foci of abnormal radiotracer uptake at the cervical spine, sacral, right ilium and right acetabular joint. Interval enlargement of hepatic section 7 hypodense mass. Reviewed at tumor board. Recc PRRT. However unable to get insurance approval for PRRT and admission afterwards to monitor for catecholamine crisis.   3. Lanreotide started on 23. Zometa started 23.   4. PRRT started on 3/1/23.     Interval History:   Ms Sawant returns for follow up. Feeling well. Tolerated PRRT well with no side effects. Noted SOB with exertion and left hip pain. Has not been seeing her heart doctor.     ECO    ROS:   A ten-point system review is obtained and negative except for what was stated in the Interval History.     Physical Examination:   Vital signs reviewed.   General: well hydrated, well developed, in no acute distress  HEENT: normocephalic, PERRLA, EOMI, anicteric sclerae, oropharynx clear  Neck: supple, no JVD, +thyromegaly, soft, no cervical or supraclavicular lymphadenopathy  Lungs: clear breath sounds bilaterally, no wheezing, rales, or rhonchi  Heart: RRR, no M/R/G  Abdomen: soft, no tenderness, non-distended, no hepatosplenomegaly, mass, or hernia. BS present  Extremities: no clubbing, cyanosis, or edema  Skin: no rash, ulcer, or open wounds  Neuro: alert and oriented x 4, no focal neuro deficit  Psych: pleasant and appropriate mood and affect    Objective:     Laboratory Data:  Labs reviewed.     Imaging Data:  Gallium PET 23:  New foci of abnormal radiotracer uptake at the cervical spine, sacral, right ilium and right acetabular joint.     Interval enlargement of hepatic section 7 hypodense mass.     Based upon progression of disease and tracer avidity of the patient's tumor burden, the patient would be an appropriate candidate for PRRT if clinically " indicated.     CT neck 4/6/23:  Impression:     1. Status post resection right carotid body tumor.  No recurrent mass identified.  2. 1.5 cm peripherally enhancing nodules lower pole both lobes of the thyroid.    Assessment and Plan:     1. Malignant pheochromocytoma of right adrenal gland    2. Secondary neuroendocrine tumor of distant lymph nodes    3. Secondary neuroendocrine tumor of bone    4. Immunodeficiency secondary to neoplasm    5. Immunodeficiency secondary to chemotherapy    6. Chest pain, unspecified type    7. Shortness of breath    8. Left hip pain    9. Thyroid nodule    10. Essential hypertension    11. PAF (paroxysmal atrial fibrillation)    12. Chronic combined systolic and diastolic congestive heart failure    13. Sick sinus syndrome    14. Controlled type 2 diabetes mellitus without complication, without long-term current use of insulin    15. Adrenal insufficiency, primary post-surgical        1-5  - Ms Sawant is a 78 yo woman with R adrenal pheochromocytoma s/p bilateral adrenectomy in 11/2009, recurrent disease to the bone in 2016 s/p MIBG in 2016. At our prior visits, we discussed interim progression of cancer in the liver and new lesions in the bones. Discussed lanreotide every 4 weeks, zometa every 3 months and PRRT. Lanreotide started 1/23/2023. Zometa started 2/20/23.   - PRRT #1 on 3/1/23.   - doing well. #2 PRRT tomorrow  - return in 4 weeks for follow up. Give her left hip pain, will get restaging copper PET at that time  - discussed taking cardura every day and monitor blood pressure daily     6.7  - get CT PE protocol to r/o PE  - could be from heart. Asked patient to f/u with her cardiologist. Patient understands and agrees with the plan.     8.  - get restaging PET on return    9.  - incidentally noted on CT neck  - get US thyroid    10.  - BP controlled  - c/w current medication    11-13  - s/p pacemaker. On eliquis for Afib. Sees Dr Loya at [facility] in [place]  - given her symptoms,  asked patient to f/u with her cardiologist. Patient understands and agrees with the plan.     14  - BS controlled  - c/w current medication    15.  - on cortef  - f/u with Dr Wilburn      Follow-up:     Return in 4 weeks  Knows to call in the interval if any problems arise.    Electronically signed by Ema Turner Chart for Scheduling    Med Onc Chart Routing  Urgent    Follow up with physician . Please schedule serum creatinine and CT PE protocol at [facility] (after patient returns to [place]) and let patient know. nurse to schedule copper PET on 5/24 and move labs to [facility] on 5/24.   Follow up with KAYLEN    Infusion scheduling note    Injection scheduling note    Labs CBC and CMP   Scheduling:  Preferred lab:  Lab interval:  catecholamines, metanephrines   Imaging Other and PET scan   first available CT PE protocol at [facility] (after patient returns to [place]). copper PET on 5/24   Pharmacy appointment    Other referrals             Therapy Plan Information  LANREOTIDE  5. Medications  lanreotide injection 120 mg  120 mg, Subcutaneous, Every visit    LUTATHERA SUPPORT  IV Fluids  0.9%  NaCl infusion  Intravenous, Every 8 weeks  Pre-Medications  ondansetron-dexAMETHasone 16-12 mg in sodium chloride 0.9% 50 mL IVPB 16 mg  16 mg, Intravenous, Every 8 weeks  arginine-lysine-sterile water (AminoProtect) 25-25 mg/mL intravenous solution 1,000 mL  1,000 mL, Intravenous, Every 8 weeks  promethazine (PHENERGAN) 12.5 mg in dextrose 5 % (D5W) 50 mL IVPB  12.5 mg, Intravenous, Every 8 weeks  acetaminophen tablet 650 mg  650 mg, Oral, Every 8 weeks  Flushes  sodium chloride 0.9% flush 10 mL  10 mL, Intravenous, Every 8 weeks  PRN Medications  diphenhydrAMINE injection 50 mg  50 mg, Intravenous, Every 8 weeks  hydrocortisone sodium succinate injection 100 mg  100 mg, Intravenous, Every 8 weeks    ZOLEDRONIC ACID (ZOMETA) IV  Medications  zoledronic 4 mg/100 mL infusion 4 mg  4 mg, Intravenous, Every 4 weeks  Flushes  sodium chloride  0.9% 250 mL flush bag  Intravenous, Every 4 weeks  sodium chloride 0.9% flush 10 mL  10 mL, Intravenous, Every 4 weeks  heparin, porcine (PF) 100 unit/mL injection flush 500 Units  500 Units, Intravenous, Every 4 weeks  alteplase injection 2 mg  2 mg, Intra-Catheter, Every 4 weeks

## 2023-04-26 ENCOUNTER — INFUSION (OUTPATIENT)
Dept: INFUSION THERAPY | Facility: HOSPITAL | Age: 78
End: 2023-04-26
Attending: INTERNAL MEDICINE
Payer: MEDICARE

## 2023-04-26 ENCOUNTER — HOSPITAL ENCOUNTER (OUTPATIENT)
Dept: RADIOLOGY | Facility: HOSPITAL | Age: 78
Discharge: HOME | End: 2023-04-26
Attending: INTERNAL MEDICINE
Payer: MEDICARE

## 2023-04-26 VITALS
RESPIRATION RATE: 18 BRPM | HEART RATE: 70 BPM | WEIGHT: 194.56 LBS | DIASTOLIC BLOOD PRESSURE: 62 MMHG | SYSTOLIC BLOOD PRESSURE: 129 MMHG | OXYGEN SATURATION: 95 % | BODY MASS INDEX: 34.47 KG/M2 | HEIGHT: 63 IN | TEMPERATURE: 98 F

## 2023-04-26 DIAGNOSIS — C74.90 MALIGNANT PHEOCHROMOCYTOMA, UNSPECIFIED LATERALITY: Primary | ICD-10-CM

## 2023-04-26 DIAGNOSIS — C74.90 MALIGNANT PHEOCHROMOCYTOMA, UNSPECIFIED LATERALITY: ICD-10-CM

## 2023-04-26 PROCEDURE — 79101 NUCLEAR RX IV ADMIN: CPT | Mod: 26,,, | Performed by: RADIOLOGY

## 2023-04-26 PROCEDURE — 96365 THER/PROPH/DIAG IV INF INIT: CPT | Mod: 59

## 2023-04-26 PROCEDURE — 96367 TX/PROPH/DG ADDL SEQ IV INF: CPT

## 2023-04-26 PROCEDURE — 25000003 PHARM REV CODE 250: Performed by: INTERNAL MEDICINE

## 2023-04-26 PROCEDURE — 63600175 PHARM REV CODE 636 W HCPCS: Performed by: INTERNAL MEDICINE

## 2023-04-26 PROCEDURE — A9513 LUTETIUM LU 177 DOTATAT THER: HCPCS | Mod: JG

## 2023-04-26 PROCEDURE — 96366 THER/PROPH/DIAG IV INF ADDON: CPT

## 2023-04-26 PROCEDURE — 79101 NM THERAPY BY IV ADMINISTRATION LU177: ICD-10-PCS | Mod: 26,,, | Performed by: RADIOLOGY

## 2023-04-26 RX ORDER — SODIUM CHLORIDE 0.9 % (FLUSH) 0.9 %
10 SYRINGE (ML) INJECTION
Status: CANCELLED | OUTPATIENT
Start: 2023-06-21

## 2023-04-26 RX ORDER — DIPHENHYDRAMINE HYDROCHLORIDE 50 MG/ML
50 INJECTION INTRAMUSCULAR; INTRAVENOUS
Status: DISCONTINUED | OUTPATIENT
Start: 2023-04-26 | End: 2023-04-26 | Stop reason: HOSPADM

## 2023-04-26 RX ORDER — DIPHENHYDRAMINE HYDROCHLORIDE 50 MG/ML
50 INJECTION INTRAMUSCULAR; INTRAVENOUS
Status: CANCELLED | OUTPATIENT
Start: 2023-06-21

## 2023-04-26 RX ORDER — SODIUM CHLORIDE 9 MG/ML
INJECTION, SOLUTION INTRAVENOUS ONCE
Status: COMPLETED | OUTPATIENT
Start: 2023-04-26 | End: 2023-04-26

## 2023-04-26 RX ORDER — SODIUM CHLORIDE 0.9 % (FLUSH) 0.9 %
10 SYRINGE (ML) INJECTION
Status: DISCONTINUED | OUTPATIENT
Start: 2023-04-26 | End: 2023-04-26 | Stop reason: HOSPADM

## 2023-04-26 RX ORDER — ACETAMINOPHEN 325 MG/1
650 TABLET ORAL
Status: CANCELLED | OUTPATIENT
Start: 2023-06-21

## 2023-04-26 RX ORDER — ACETAMINOPHEN 325 MG/1
650 TABLET ORAL
Status: DISCONTINUED | OUTPATIENT
Start: 2023-04-26 | End: 2023-04-26 | Stop reason: HOSPADM

## 2023-04-26 RX ORDER — SODIUM CHLORIDE 9 MG/ML
INJECTION, SOLUTION INTRAVENOUS ONCE
Status: CANCELLED | OUTPATIENT
Start: 2023-06-21

## 2023-04-26 RX ADMIN — DEXAMETHASONE SODIUM PHOSPHATE 16 MG: 10 INJECTION, SOLUTION INTRAMUSCULAR; INTRAVENOUS at 08:04

## 2023-04-26 RX ADMIN — SODIUM CHLORIDE: 9 INJECTION, SOLUTION INTRAVENOUS at 08:04

## 2023-04-26 RX ADMIN — Medication 1000 ML: at 08:04

## 2023-04-26 NOTE — NURSING
1235 Pt completed and tolerated PRRT #2 well. VSS. No complaints at this time. PIV x 2 removed per policy, catheter intact. Discharge instructions and precautions reviewed. AVS printed and handout given. No questions at this time. Pt assisted out clinic via wheelchair to family with no difficulty.

## 2023-04-26 NOTE — NURSING
09:48 Bailey-177 completed per NM tech. VSS. No complaints at this time. PIV x2 c/d/I patent. Amino acids remain infusing per orders.

## 2023-04-26 NOTE — NURSING
09:10  Bailey-[id number] infusion started per NM tech. VSS, denies pain or discomfort. IVx2 c/d/i patent, no irritation. Amino acids remain infusing per orders.

## 2023-04-27 ENCOUNTER — INFUSION (OUTPATIENT)
Dept: INFUSION THERAPY | Facility: HOSPITAL | Age: 78
End: 2023-04-27
Attending: INTERNAL MEDICINE
Payer: MEDICARE

## 2023-04-27 VITALS — HEIGHT: 63 IN | BODY MASS INDEX: 34.47 KG/M2 | WEIGHT: 194.56 LBS

## 2023-04-27 DIAGNOSIS — C74.90 MALIGNANT PHEOCHROMOCYTOMA, UNSPECIFIED LATERALITY: Primary | ICD-10-CM

## 2023-04-27 PROCEDURE — 96372 THER/PROPH/DIAG INJ SC/IM: CPT

## 2023-04-27 PROCEDURE — 63600175 PHARM REV CODE 636 W HCPCS: Mod: JZ,JG | Performed by: INTERNAL MEDICINE

## 2023-04-27 RX ORDER — LANREOTIDE ACETATE 120 MG/.5ML
120 INJECTION SUBCUTANEOUS ONCE
Status: COMPLETED | OUTPATIENT
Start: 2023-04-27 | End: 2023-04-27

## 2023-04-27 RX ORDER — LANREOTIDE ACETATE 120 MG/.5ML
120 INJECTION SUBCUTANEOUS ONCE
Status: CANCELLED | OUTPATIENT
Start: 2023-04-27 | End: 2023-04-27

## 2023-04-27 RX ADMIN — LANREOTIDE ACETATE 120 MG: 120 INJECTION SUBCUTANEOUS at 10:04

## 2023-04-27 NOTE — NURSING
Patient tolerated Lanreotide injection well, no complaints at this time, just tired today. Next appointment scheduled and pt d/c in no acute distress.

## 2023-05-02 ENCOUNTER — TELEPHONE (OUTPATIENT)
Dept: HEMATOLOGY/ONCOLOGY | Facility: CLINIC | Age: 78
End: 2023-05-02
Payer: MEDICARE

## 2023-05-16 ENCOUNTER — HOSPITAL ENCOUNTER (OUTPATIENT)
Dept: RADIOLOGY | Facility: HOSPITAL | Age: 78
Discharge: HOME | End: 2023-05-16
Attending: INTERNAL MEDICINE
Payer: MEDICARE

## 2023-05-16 DIAGNOSIS — E04.1 THYROID NODULE: ICD-10-CM

## 2023-05-16 PROCEDURE — 76536 US EXAM OF HEAD AND NECK: CPT | Mod: 26,,, | Performed by: STUDENT IN AN ORGANIZED HEALTH CARE EDUCATION/TRAINING PROGRAM

## 2023-05-16 PROCEDURE — 76536 US EXAM OF HEAD AND NECK: CPT | Mod: TC

## 2023-05-16 PROCEDURE — 76536 US THYROID: ICD-10-PCS | Mod: 26,,, | Performed by: STUDENT IN AN ORGANIZED HEALTH CARE EDUCATION/TRAINING PROGRAM

## 2023-05-25 ENCOUNTER — TELEPHONE (OUTPATIENT)
Dept: HEMATOLOGY/ONCOLOGY | Facility: CLINIC | Age: 78
End: 2023-05-25
Payer: MEDICARE

## 2023-05-25 ENCOUNTER — INFUSION (OUTPATIENT)
Dept: INFUSION THERAPY | Facility: HOSPITAL | Age: 78
End: 2023-05-25
Attending: INTERNAL MEDICINE
Payer: MEDICARE

## 2023-05-25 ENCOUNTER — OFFICE VISIT (OUTPATIENT)
Dept: HEMATOLOGY/ONCOLOGY | Facility: CLINIC | Age: 78
End: 2023-05-25
Payer: MEDICARE

## 2023-05-25 ENCOUNTER — HOSPITAL ENCOUNTER (OUTPATIENT)
Dept: RADIOLOGY | Facility: HOSPITAL | Age: 78
Discharge: HOME | End: 2023-05-25
Attending: INTERNAL MEDICINE
Payer: MEDICARE

## 2023-05-25 VITALS
TEMPERATURE: 98 F | RESPIRATION RATE: 18 BRPM | BODY MASS INDEX: 34.3 KG/M2 | HEART RATE: 76 BPM | SYSTOLIC BLOOD PRESSURE: 161 MMHG | OXYGEN SATURATION: 99 % | HEIGHT: 63 IN | WEIGHT: 193.56 LBS | DIASTOLIC BLOOD PRESSURE: 87 MMHG

## 2023-05-25 VITALS
OXYGEN SATURATION: 99 % | HEART RATE: 76 BPM | RESPIRATION RATE: 18 BRPM | DIASTOLIC BLOOD PRESSURE: 82 MMHG | WEIGHT: 193.56 LBS | SYSTOLIC BLOOD PRESSURE: 177 MMHG | BODY MASS INDEX: 34.29 KG/M2

## 2023-05-25 DIAGNOSIS — I50.42 CHRONIC COMBINED SYSTOLIC AND DIASTOLIC CONGESTIVE HEART FAILURE: ICD-10-CM

## 2023-05-25 DIAGNOSIS — E04.1 THYROID NODULE: ICD-10-CM

## 2023-05-25 DIAGNOSIS — D84.81 IMMUNODEFICIENCY SECONDARY TO NEOPLASM: ICD-10-CM

## 2023-05-25 DIAGNOSIS — C74.91 MALIGNANT PHEOCHROMOCYTOMA OF RIGHT ADRENAL GLAND: Primary | ICD-10-CM

## 2023-05-25 DIAGNOSIS — E27.40 ADRENAL INSUFFICIENCY: ICD-10-CM

## 2023-05-25 DIAGNOSIS — E11.9 CONTROLLED TYPE 2 DIABETES MELLITUS WITHOUT COMPLICATION, WITHOUT LONG-TERM CURRENT USE OF INSULIN: ICD-10-CM

## 2023-05-25 DIAGNOSIS — C7B.8 SECONDARY NEUROENDOCRINE TUMOR OF DISTANT LYMPH NODES: ICD-10-CM

## 2023-05-25 DIAGNOSIS — R06.02 SHORTNESS OF BREATH: ICD-10-CM

## 2023-05-25 DIAGNOSIS — Z79.899 IMMUNODEFICIENCY SECONDARY TO CHEMOTHERAPY: ICD-10-CM

## 2023-05-25 DIAGNOSIS — C74.91 MALIGNANT PHEOCHROMOCYTOMA OF RIGHT ADRENAL GLAND: ICD-10-CM

## 2023-05-25 DIAGNOSIS — I48.0 PAF (PAROXYSMAL ATRIAL FIBRILLATION): ICD-10-CM

## 2023-05-25 DIAGNOSIS — C79.51 SECONDARY MALIGNANT NEOPLASM OF BONE: Primary | ICD-10-CM

## 2023-05-25 DIAGNOSIS — I10 ESSENTIAL HYPERTENSION: ICD-10-CM

## 2023-05-25 DIAGNOSIS — I49.5 SICK SINUS SYNDROME: ICD-10-CM

## 2023-05-25 DIAGNOSIS — D49.9 IMMUNODEFICIENCY SECONDARY TO NEOPLASM: ICD-10-CM

## 2023-05-25 DIAGNOSIS — T45.1X5A IMMUNODEFICIENCY SECONDARY TO CHEMOTHERAPY: ICD-10-CM

## 2023-05-25 DIAGNOSIS — D84.821 IMMUNODEFICIENCY SECONDARY TO CHEMOTHERAPY: ICD-10-CM

## 2023-05-25 DIAGNOSIS — C7B.8 SECONDARY NEUROENDOCRINE TUMOR OF BONE: ICD-10-CM

## 2023-05-25 PROCEDURE — 99215 OFFICE O/P EST HI 40 MIN: CPT | Mod: S$GLB,,, | Performed by: INTERNAL MEDICINE

## 2023-05-25 PROCEDURE — A4216 STERILE WATER/SALINE, 10 ML: HCPCS | Performed by: INTERNAL MEDICINE

## 2023-05-25 PROCEDURE — 1125F PR PAIN SEVERITY QUANTIFIED, PAIN PRESENT: ICD-10-PCS | Mod: CPTII,S$GLB,, | Performed by: INTERNAL MEDICINE

## 2023-05-25 PROCEDURE — 78815 PET IMAGE W/CT SKULL-THIGH: CPT | Mod: TC,PS

## 2023-05-25 PROCEDURE — 99999 PR PBB SHADOW E&M-EST. PATIENT-LVL III: CPT | Mod: PBBFAC,,, | Performed by: INTERNAL MEDICINE

## 2023-05-25 PROCEDURE — 3077F SYST BP >= 140 MM HG: CPT | Mod: CPTII,S$GLB,, | Performed by: INTERNAL MEDICINE

## 2023-05-25 PROCEDURE — 3077F PR MOST RECENT SYSTOLIC BLOOD PRESSURE >= 140 MM HG: ICD-10-PCS | Mod: CPTII,S$GLB,, | Performed by: INTERNAL MEDICINE

## 2023-05-25 PROCEDURE — 25000003 PHARM REV CODE 250: Performed by: INTERNAL MEDICINE

## 2023-05-25 PROCEDURE — 99999 PR PBB SHADOW E&M-EST. PATIENT-LVL III: ICD-10-PCS | Mod: PBBFAC,,, | Performed by: INTERNAL MEDICINE

## 2023-05-25 PROCEDURE — 3079F PR MOST RECENT DIASTOLIC BLOOD PRESSURE 80-89 MM HG: ICD-10-PCS | Mod: CPTII,S$GLB,, | Performed by: INTERNAL MEDICINE

## 2023-05-25 PROCEDURE — 3288F FALL RISK ASSESSMENT DOCD: CPT | Mod: CPTII,S$GLB,, | Performed by: INTERNAL MEDICINE

## 2023-05-25 PROCEDURE — 78815 PET IMAGE W/CT SKULL-THIGH: CPT | Mod: 26,PS,, | Performed by: RADIOLOGY

## 2023-05-25 PROCEDURE — 1101F PR PT FALLS ASSESS DOC 0-1 FALLS W/OUT INJ PAST YR: ICD-10-PCS | Mod: CPTII,S$GLB,, | Performed by: INTERNAL MEDICINE

## 2023-05-25 PROCEDURE — 3079F DIAST BP 80-89 MM HG: CPT | Mod: CPTII,S$GLB,, | Performed by: INTERNAL MEDICINE

## 2023-05-25 PROCEDURE — 78815 NM PET CU64 DOTATATE, SKULL TO MID THIGH: ICD-10-PCS | Mod: 26,PS,, | Performed by: RADIOLOGY

## 2023-05-25 PROCEDURE — 3288F PR FALLS RISK ASSESSMENT DOCUMENTED: ICD-10-PCS | Mod: CPTII,S$GLB,, | Performed by: INTERNAL MEDICINE

## 2023-05-25 PROCEDURE — 96413 CHEMO IV INFUSION 1 HR: CPT

## 2023-05-25 PROCEDURE — 63600175 PHARM REV CODE 636 W HCPCS: Performed by: INTERNAL MEDICINE

## 2023-05-25 PROCEDURE — 1101F PT FALLS ASSESS-DOCD LE1/YR: CPT | Mod: CPTII,S$GLB,, | Performed by: INTERNAL MEDICINE

## 2023-05-25 PROCEDURE — 1125F AMNT PAIN NOTED PAIN PRSNT: CPT | Mod: CPTII,S$GLB,, | Performed by: INTERNAL MEDICINE

## 2023-05-25 PROCEDURE — 99215 PR OFFICE/OUTPT VISIT, EST, LEVL V, 40-54 MIN: ICD-10-PCS | Mod: S$GLB,,, | Performed by: INTERNAL MEDICINE

## 2023-05-25 RX ORDER — SODIUM CHLORIDE 0.9 % (FLUSH) 0.9 %
10 SYRINGE (ML) INJECTION
Status: CANCELLED | OUTPATIENT
Start: 2023-06-15

## 2023-05-25 RX ORDER — ZOLEDRONIC ACID 4 MG/100ML
4 SOLUTION INTRAVENOUS
Status: CANCELLED | OUTPATIENT
Start: 2023-06-15

## 2023-05-25 RX ORDER — HEPARIN 100 UNIT/ML
500 SYRINGE INTRAVENOUS
Status: CANCELLED | OUTPATIENT
Start: 2023-06-15

## 2023-05-25 RX ORDER — ZOLEDRONIC ACID 4 MG/100ML
4 SOLUTION INTRAVENOUS
Status: COMPLETED | OUTPATIENT
Start: 2023-05-25 | End: 2023-05-25

## 2023-05-25 RX ORDER — SODIUM CHLORIDE 0.9 % (FLUSH) 0.9 %
10 SYRINGE (ML) INJECTION
Status: DISCONTINUED | OUTPATIENT
Start: 2023-05-25 | End: 2023-05-25 | Stop reason: HOSPADM

## 2023-05-25 RX ADMIN — SODIUM CHLORIDE: 9 INJECTION, SOLUTION INTRAVENOUS at 10:05

## 2023-05-25 RX ADMIN — ZOLEDRONIC ACID 4 MG: 4 SOLUTION INTRAVENOUS at 10:05

## 2023-05-25 RX ADMIN — Medication 10 ML: at 11:05

## 2023-05-25 NOTE — PROGRESS NOTES
PROGRESS NOTE    Subjective:       Patient ID: Hailey Sawant is a 77 y.o. female.    Chief Complaint: metastatic pheochromocytoma    Diagnosis:  Metastatic pheochromocytoma of the right adrenal gland, with mets to bones, liver    Oncologic History:  1. Ms. Sawant is a 72 yo woman with DM, HTN, CAD s/p MI, A fib and sick sinus syndrome s/p pacemaker in June 2022 (on eliquis since), carotid paraganglioma removed in 11/2018 who initially saw me on 11/28/2022 for further management of metastatic pheochromocytoma. Last seen Dr West in 2018 and Dr Cohen in early 2021. Does not follow oncologist locally. It is my first time seeing her today. Her history dates to 2009 when she was being worked up for abdominal pain and was found to have cholelithiasis. However, during this workup she was found to have bilateral adrenal masses.  She was also noted to be hypertensive.  She had a 6.5 cm mass on the right with some evidence of necrosis and also a mass just under 3 cm on the left.  Biochemical workup was consistent with pheochromocytoma.  In 11/2009 she underwent a bilateral adrenalectomy.  Pathology had revealed a right adrenal gland pheochromocytoma and a left adrenal gland adrenal cortical adenoma.  In 02/2016 she was noted to have recurrent disease to the bone and underwent I 131 MIBG therapy in 03/2016, 07/2016 and 11/2016.  In 10/2016 she underwent a right partial nephrectomy due to recurrent disease.  Pathology did confirm findings consistent with recurrent pheochromocytoma.  In 11/2018 she underwent removal of the right carotid body tumor with pathology showing paraganglioma. She presents today for follow up. She knows she has cancer but does not know it is stage IV. She is not sure if Dr Cohen called her after tumor board or not. She feels well. Had MI in June 2022, treated at [facility], had pacemaker placed. Medically treated. Feeling well  "today. We discussed restaging dotatate scan.   2. Dotatate scan declined by insurance several times. Finally had gallium PET on 23. It showed "New foci of abnormal radiotracer uptake at the cervical spine, sacral, right ilium and right acetabular joint. Interval enlargement of hepatic section 7 hypodense mass. Reviewed at tumor board. Recc PRRT. However unable to get insurance approval for PRRT and admission afterwards to monitor for catecholamine crisis.   3. Lanreotide started on 23. Zometa started 23.   4. PRRT started on 3/1/23. #2 on 23    Interval History:   Ms Sawant returns for follow up. Noted diarrhea since lanreotide injection. Still has SOB with exertion and left hip pain. Seen by local doctor for hip pain. Physical therapy was recommended. Has not been seeing her heart doctor. She does have a lung doctor locally.     ECO    ROS:   A ten-point system review is obtained and negative except for what was stated in the Interval History.     Physical Examination:   Vital signs reviewed. Oxygen saturation 99% on room air  General: well hydrated, well developed, in no acute distress  HEENT: normocephalic, PERRLA, EOMI, anicteric sclerae, oropharynx clear  Neck: supple, no JVD, +thyromegaly, soft, no cervical or supraclavicular lymphadenopathy  Lungs: clear breath sounds bilaterally, no wheezing, rales, or rhonchi  Heart: RRR, no M/R/G  Abdomen: soft, no tenderness, non-distended, no hepatosplenomegaly, mass, or hernia. BS present  Extremities: no clubbing, cyanosis, or edema  Skin: no rash, ulcer, or open wounds  Neuro: alert and oriented x 4, no focal neuro deficit  Psych: pleasant and appropriate mood and affect    Objective:     Laboratory Data:  Labs from today pending    Imaging Data:  Gallium PET 23:  New foci of abnormal radiotracer uptake at the cervical spine, sacral, right ilium and right acetabular joint.     Interval enlargement of hepatic section 7 hypodense mass.   "   Based upon progression of disease and tracer avidity of the patient's tumor burden, the patient would be an appropriate candidate for PRRT if clinically indicated.     CT neck 4/6/23:  Impression:     1. Status post resection right carotid body tumor.  No recurrent mass identified.  2. 1.5 cm peripherally enhancing nodules lower pole both lobes of the thyroid.    Thyroid US 5/16/23:  Impression:     Multiple thyroid nodules.  1.7 cm TI-RADS 5 left lobe nodule meets ACR criteria for FNA.     Right lobe nodule meets criteria for surveillance with repeat thyroid ultrasound recommended 1 year.    Assessment and Plan:     1. Malignant pheochromocytoma of right adrenal gland    2. Secondary neuroendocrine tumor of distant lymph nodes    3. Secondary neuroendocrine tumor of bone    4. Immunodeficiency secondary to neoplasm    5. Immunodeficiency secondary to chemotherapy    6. PAF (paroxysmal atrial fibrillation)    7. Chronic combined systolic and diastolic congestive heart failure    8. Sick sinus syndrome    9. Controlled type 2 diabetes mellitus without complication, without long-term current use of insulin    10. Essential hypertension    11. Adrenal insufficiency    12. Shortness of breath    13. Thyroid nodule        1-5  - Ms Jese is a 78 yo woman with R adrenal pheochromocytoma s/p bilateral adrenectomy in 11/2009, recurrent disease to the bone in 2016 s/p MIBG in 2016. At our prior visits, we discussed interim progression of cancer in the liver and new lesions in the bones. Discussed lanreotide every 4 weeks, zometa every 3 months and PRRT. Lanreotide started 1/23/2023. Zometa started 2/20/23.   - PRRT #1 on 3/1/23. #2 on 4/26/23  - got restaging PET for hip pain this morning. Results pending. I will call sister Claudia re result when available  - return in one month for #3 PRRT  - discussed taking cardura every day and monitor blood pressure daily   - getting zometa today    6-8  - has not seen cardiologist  yet. Asked patient to f/u with her cardiologist at [facility]    9.  - monitor BS    10.  - BP elevated. Did not take BP medication this morning. She took medications in the clinic  - monitor BP    11.  - f/u with Dr Wilburn    12.  - asked patient to f/u with cardiology and local pulmonologist. Patient understands and agrees with the plan.     13.  - messaged Dr Wilburn re next step      Follow-up:     Return in June for #3 PRRT  Knows to call in the interval if any problems arise.    Electronically signed by Ema Chowdhury

## 2023-05-25 NOTE — TELEPHONE ENCOUNTER
Called and spoke with sister, Claudia. PET scan is good. Stable disease. C/w PRRT. She understands and agrees with the plan.

## 2023-05-25 NOTE — NURSING
Pt tolerated Zometa infusion well.  No adverse reaction noted. No complaints at this time. Hold Lanreotide injection today per Dr. Chowdhury. Next appointments scheduled and reviewed with pt. Calendar printed and given to pt. IV flushed with NS and D/C per protocol. Patient left clinic in no acute distress via wheelchair with family members.

## 2023-05-26 ENCOUNTER — TELEPHONE (OUTPATIENT)
Dept: ENDOCRINOLOGY | Facility: CLINIC | Age: 78
End: 2023-05-26
Payer: MEDICARE

## 2023-05-26 DIAGNOSIS — E04.2 MULTIPLE THYROID NODULES: Primary | ICD-10-CM

## 2023-06-06 ENCOUNTER — TELEPHONE (OUTPATIENT)
Dept: INFUSION THERAPY | Facility: HOSPITAL | Age: 78
End: 2023-06-06
Payer: MEDICARE

## 2023-06-06 NOTE — TELEPHONE ENCOUNTER
Spoke with the patient and pt's sister. State patient will be out of town 7/12-7/20. Requesting to reschedule all appts on 7/20. Confirmed following rescheduled appts  7/21 at 800a labs at [facility]  7/21 at 9a office visit with Dr. Chowdhury at [facility]  7/21 at 1130a injection at [facility]. (Explained to patient that auth for injection is at [facility]) pt verbalizes understanding.

## 2023-06-09 ENCOUNTER — TELEPHONE (OUTPATIENT)
Dept: INFUSION THERAPY | Facility: HOSPITAL | Age: 78
End: 2023-06-09
Payer: MEDICARE

## 2023-06-16 ENCOUNTER — OFFICE VISIT (OUTPATIENT)
Dept: HEMATOLOGY/ONCOLOGY | Facility: CLINIC | Age: 78
End: 2023-06-16
Payer: MEDICARE

## 2023-06-16 VITALS
OXYGEN SATURATION: 97 % | BODY MASS INDEX: 33.84 KG/M2 | SYSTOLIC BLOOD PRESSURE: 140 MMHG | HEIGHT: 63 IN | TEMPERATURE: 98 F | RESPIRATION RATE: 18 BRPM | DIASTOLIC BLOOD PRESSURE: 76 MMHG | WEIGHT: 191 LBS | HEART RATE: 72 BPM

## 2023-06-16 DIAGNOSIS — D84.81 IMMUNODEFICIENCY SECONDARY TO NEOPLASM: ICD-10-CM

## 2023-06-16 DIAGNOSIS — C7B.8 SECONDARY NEUROENDOCRINE TUMOR OF DISTANT LYMPH NODES: ICD-10-CM

## 2023-06-16 DIAGNOSIS — C74.91 MALIGNANT PHEOCHROMOCYTOMA OF RIGHT ADRENAL GLAND: Primary | ICD-10-CM

## 2023-06-16 DIAGNOSIS — I50.42 CHRONIC COMBINED SYSTOLIC AND DIASTOLIC CONGESTIVE HEART FAILURE: ICD-10-CM

## 2023-06-16 DIAGNOSIS — I10 ESSENTIAL HYPERTENSION: ICD-10-CM

## 2023-06-16 DIAGNOSIS — E27.1 ADRENAL INSUFFICIENCY, PRIMARY: ICD-10-CM

## 2023-06-16 DIAGNOSIS — D49.9 IMMUNODEFICIENCY SECONDARY TO NEOPLASM: ICD-10-CM

## 2023-06-16 DIAGNOSIS — D84.821 IMMUNODEFICIENCY DUE TO DRUGS: ICD-10-CM

## 2023-06-16 DIAGNOSIS — R51.9 NONINTRACTABLE HEADACHE, UNSPECIFIED CHRONICITY PATTERN, UNSPECIFIED HEADACHE TYPE: ICD-10-CM

## 2023-06-16 DIAGNOSIS — R42 DIZZINESS: ICD-10-CM

## 2023-06-16 DIAGNOSIS — I49.5 SICK SINUS SYNDROME: ICD-10-CM

## 2023-06-16 DIAGNOSIS — Z79.899 IMMUNODEFICIENCY DUE TO DRUGS: ICD-10-CM

## 2023-06-16 DIAGNOSIS — E11.9 CONTROLLED TYPE 2 DIABETES MELLITUS WITHOUT COMPLICATION, WITHOUT LONG-TERM CURRENT USE OF INSULIN: ICD-10-CM

## 2023-06-16 DIAGNOSIS — E89.6 H/O TOTAL ADRENALECTOMY: ICD-10-CM

## 2023-06-16 DIAGNOSIS — C7B.8 SECONDARY NEUROENDOCRINE TUMOR OF BONE: ICD-10-CM

## 2023-06-16 DIAGNOSIS — I48.0 PAF (PAROXYSMAL ATRIAL FIBRILLATION): ICD-10-CM

## 2023-06-16 PROCEDURE — 99215 OFFICE O/P EST HI 40 MIN: CPT | Mod: S$GLB,,, | Performed by: INTERNAL MEDICINE

## 2023-06-16 PROCEDURE — 3288F PR FALLS RISK ASSESSMENT DOCUMENTED: ICD-10-PCS | Mod: CPTII,S$GLB,, | Performed by: INTERNAL MEDICINE

## 2023-06-16 PROCEDURE — 3077F PR MOST RECENT SYSTOLIC BLOOD PRESSURE >= 140 MM HG: ICD-10-PCS | Mod: CPTII,S$GLB,, | Performed by: INTERNAL MEDICINE

## 2023-06-16 PROCEDURE — 1160F PR REVIEW ALL MEDS BY PRESCRIBER/CLIN PHARMACIST DOCUMENTED: ICD-10-PCS | Mod: CPTII,S$GLB,, | Performed by: INTERNAL MEDICINE

## 2023-06-16 PROCEDURE — 99999 PR PBB SHADOW E&M-EST. PATIENT-LVL III: ICD-10-PCS | Mod: PBBFAC,,, | Performed by: INTERNAL MEDICINE

## 2023-06-16 PROCEDURE — 1125F PR PAIN SEVERITY QUANTIFIED, PAIN PRESENT: ICD-10-PCS | Mod: CPTII,S$GLB,, | Performed by: INTERNAL MEDICINE

## 2023-06-16 PROCEDURE — 3078F PR MOST RECENT DIASTOLIC BLOOD PRESSURE < 80 MM HG: ICD-10-PCS | Mod: CPTII,S$GLB,, | Performed by: INTERNAL MEDICINE

## 2023-06-16 PROCEDURE — 3288F FALL RISK ASSESSMENT DOCD: CPT | Mod: CPTII,S$GLB,, | Performed by: INTERNAL MEDICINE

## 2023-06-16 PROCEDURE — 1125F AMNT PAIN NOTED PAIN PRSNT: CPT | Mod: CPTII,S$GLB,, | Performed by: INTERNAL MEDICINE

## 2023-06-16 PROCEDURE — 1100F PTFALLS ASSESS-DOCD GE2>/YR: CPT | Mod: CPTII,S$GLB,, | Performed by: INTERNAL MEDICINE

## 2023-06-16 PROCEDURE — 1159F MED LIST DOCD IN RCRD: CPT | Mod: CPTII,S$GLB,, | Performed by: INTERNAL MEDICINE

## 2023-06-16 PROCEDURE — 1100F PR PT FALLS ASSESS DOC 2+ FALLS/FALL W/INJURY/YR: ICD-10-PCS | Mod: CPTII,S$GLB,, | Performed by: INTERNAL MEDICINE

## 2023-06-16 PROCEDURE — 1159F PR MEDICATION LIST DOCUMENTED IN MEDICAL RECORD: ICD-10-PCS | Mod: CPTII,S$GLB,, | Performed by: INTERNAL MEDICINE

## 2023-06-16 PROCEDURE — 99999 PR PBB SHADOW E&M-EST. PATIENT-LVL III: CPT | Mod: PBBFAC,,, | Performed by: INTERNAL MEDICINE

## 2023-06-16 PROCEDURE — 3078F DIAST BP <80 MM HG: CPT | Mod: CPTII,S$GLB,, | Performed by: INTERNAL MEDICINE

## 2023-06-16 PROCEDURE — 1160F RVW MEDS BY RX/DR IN RCRD: CPT | Mod: CPTII,S$GLB,, | Performed by: INTERNAL MEDICINE

## 2023-06-16 PROCEDURE — 99215 PR OFFICE/OUTPT VISIT, EST, LEVL V, 40-54 MIN: ICD-10-PCS | Mod: S$GLB,,, | Performed by: INTERNAL MEDICINE

## 2023-06-16 PROCEDURE — 3077F SYST BP >= 140 MM HG: CPT | Mod: CPTII,S$GLB,, | Performed by: INTERNAL MEDICINE

## 2023-06-16 NOTE — PROGRESS NOTES
PROGRESS NOTE    Subjective:       Patient ID: Hailey Sawant is a 77 y.o. female.    Chief Complaint: metastatic pheochromocytoma    Diagnosis:  Metastatic pheochromocytoma of the right adrenal gland, with mets to bones, liver    Oncologic History:  1. Ms. Sawant is a 72 yo woman with DM, HTN, CAD s/p MI, A fib and sick sinus syndrome s/p pacemaker in June 2022 (on eliquis since), carotid paraganglioma removed in 11/2018 who initially saw me on 11/28/2022 for further management of metastatic pheochromocytoma. Last seen Dr West in 2018 and Dr Cohen in early 2021. Does not follow oncologist locally. It is my first time seeing her today. Her history dates to 2009 when she was being worked up for abdominal pain and was found to have cholelithiasis. However, during this workup she was found to have bilateral adrenal masses.  She was also noted to be hypertensive.  She had a 6.5 cm mass on the right with some evidence of necrosis and also a mass just under 3 cm on the left.  Biochemical workup was consistent with pheochromocytoma.  In 11/2009 she underwent a bilateral adrenalectomy.  Pathology had revealed a right adrenal gland pheochromocytoma and a left adrenal gland adrenal cortical adenoma.  In 02/2016 she was noted to have recurrent disease to the bone and underwent I 131 MIBG therapy in 03/2016, 07/2016 and 11/2016.  In 10/2016 she underwent a right partial nephrectomy due to recurrent disease.  Pathology did confirm findings consistent with recurrent pheochromocytoma.  In 11/2018 she underwent removal of the right carotid body tumor with pathology showing paraganglioma. She presents today for follow up. She knows she has cancer but does not know it is stage IV. She is not sure if Dr Cohen called her after tumor board or not. She feels well. Had MI in June 2022, treated at [facility], had pacemaker placed. Medically treated. Feeling well  "today. We discussed restaging dotatate scan.   2. Dotatate scan declined by insurance several times. Finally had gallium PET on 23. It showed "New foci of abnormal radiotracer uptake at the cervical spine, sacral, right ilium and right acetabular joint. Interval enlargement of hepatic section 7 hypodense mass. Reviewed at tumor board. [facility] PRRT. However unable to get insurance approval for PRRT and admission afterwards to monitor for catecholamine crisis.   3. Lanreotide started on 23. Zometa started 23. Last dose on 23  4. PRRT started on 3/1/23. #2 on 23, # 3 to be given on     Interval History:   Ms Sawant returns for follow up. Scheduled for #3PRRT next Wed. Feeling okay. Sometimes feels dizzy and fell. Has headache in the morning. Scheduled to see cardiology on . Will be out of town from  through .     ECO    ROS:   A ten-point system review is obtained and negative except for what was stated in the Interval History.     Physical Examination:   Vital signs reviewed.   General: well hydrated, well developed, in no acute distress, in a wheelchair  HEENT: normocephalic, PERRLA, EOMI, anicteric sclerae  Neck: supple, no JVD, +thyromegaly, soft, no cervical or supraclavicular lymphadenopathy  Lungs: clear breath sounds bilaterally, no wheezing, rales, or rhonchi  Heart: RRR, no M/R/G  Abdomen: soft, no tenderness, non-distended, no hepatosplenomegaly, mass, or hernia. BS present  Extremities: no clubbing, cyanosis, or edema  Skin: no rash, ulcer, or open wounds  Neuro: alert and oriented x 4, no focal neuro deficit  Psych: pleasant and appropriate mood and affect    Objective:     Laboratory Data:  Labs reviewed. CBC, CMP unremarkable    Imaging Data:  Gallium PET 23:  New foci of abnormal radiotracer uptake at the cervical spine, sacral, right ilium and right acetabular joint.     Interval enlargement of hepatic section 7 hypodense mass.     Based upon progression " of disease and tracer avidity of the patient's tumor burden, the patient would be an appropriate candidate for PRRT if clinically indicated.     CT neck 4/6/23:  Impression:     1. Status post resection right carotid body tumor.  No recurrent mass identified.  2. 1.5 cm peripherally enhancing nodules lower pole both lobes of the thyroid.    Thyroid US 5/16/23:  Impression:     Multiple thyroid nodules.  1.7 cm TI-RADS 5 left lobe nodule meets ACR criteria for FNA.     Right lobe nodule meets criteria for surveillance with repeat thyroid ultrasound recommended 1 year.      Copper PET 5/25/23:  Impression:     1. Right hepatic lobe lesions and multifocal primarily sclerotic osseous lesions stable in size and number with decreased tracer-avidity.  No new lesions identified.  2. Postsurgical change prior bilateral adrenalectomy.  No tracer uptake within the surgical bed to suggest localized recurrent disease.  3. Other stable incidental findings above.  Assessment and Plan:     1. Malignant pheochromocytoma of right adrenal gland    2. Secondary neuroendocrine tumor of distant lymph nodes    3. Secondary neuroendocrine tumor of bone    4. Immunodeficiency secondary to neoplasm    5. Immunodeficiency due to drugs    6. Dizziness    7. Nonintractable headache, unspecified chronicity pattern, unspecified headache type    8. Adrenal insufficiency, primary post-surgical    9. H/O total adrenalectomy    10. Essential hypertension    11. Controlled type 2 diabetes mellitus without complication, without long-term current use of insulin    12. Sick sinus syndrome    13. Chronic combined systolic and diastolic congestive heart failure    14. PAF (paroxysmal atrial fibrillation)      1-5  - Ms Jese is a 76 yo woman with R adrenal pheochromocytoma s/p bilateral adrenectomy in 11/2009, recurrent disease to the bone in 2016 s/p MIBG in 2016. At our prior visits, we discussed interim progression of cancer in the liver and new  lesions in the bones. Discussed lanreotide every 4 weeks, zometa every 3 months and PRRT. Lanreotide started 1/23/2023. Zometa started 2/20/23.   - PRRT #1 on 3/1/23. #2 on 4/26/23  - doing well. Reviewed PET result. Stable disease  - c/w PRRT. #3 next Wed  - return on July 21  - next zometa due in August 6.7  - will get MRI brain     8.9  - f/u with Dr Wilburn    10.  - BP controlled  - c/w current medication    11.  - BS controlled  - c/w current medication    12-14  - seeing cardiology on June 27    Follow-up:     Return in one month  Knows to call in the interval if any problems arise.    Electronically signed by Ema Henriquez for Scheduling    Med Onc Chart Routing      Follow up with physician . Keep scheduled appt   Follow up with KAYLEN    Infusion scheduling note    Injection scheduling note    Labs    Imaging    Pharmacy appointment    Other referrals            Therapy Plan Information  LANREOTIDE  5. Medications  lanreotide injection 120 mg  120 mg, Subcutaneous, Every visit    LUTATHERA SUPPORT  IV Fluids  0.9%  NaCl infusion  Intravenous, Every 8 weeks  Pre-Medications  ondansetron-dexAMETHasone 16-12 mg in sodium chloride 0.9% 50 mL IVPB 16 mg  16 mg, Intravenous, Every 8 weeks  arginine-lysine-sterile water (AminoProtect) 25-25 mg/mL intravenous solution 1,000 mL  1,000 mL, Intravenous, Every 8 weeks  promethazine (PHENERGAN) 12.5 mg in dextrose 5 % (D5W) 50 mL IVPB  12.5 mg, Intravenous, Every 8 weeks  acetaminophen tablet 650 mg  650 mg, Oral, Every 8 weeks  Flushes  sodium chloride 0.9% flush 10 mL  10 mL, Intravenous, Every 8 weeks  PRN Medications  diphenhydrAMINE injection 50 mg  50 mg, Intravenous, Every 8 weeks  hydrocortisone sodium succinate injection 100 mg  100 mg, Intravenous, Every 8 weeks    ZOLEDRONIC ACID (ZOMETA) IV  Medications  zoledronic acid-mannitoL-water 4 mg/100 mL IVPB (premix) 4 mg  4 mg, Intravenous, Every 4 weeks  Flushes  sodium chloride 0.9% 250 mL flush  bag  Intravenous, Every 4 weeks  sodium chloride 0.9% flush 10 mL  10 mL, Intravenous, Every 4 weeks  heparin, porcine (PF) 100 unit/mL injection flush 500 Units  500 Units, Intravenous, Every 4 weeks  alteplase injection 2 mg  2 mg, Intra-Catheter, Every 4 weeks

## 2023-06-19 ENCOUNTER — HOSPITAL ENCOUNTER (OUTPATIENT)
Dept: ENDOCRINOLOGY | Facility: CLINIC | Age: 78
Discharge: HOME | End: 2023-06-19
Attending: INTERNAL MEDICINE
Payer: MEDICARE

## 2023-06-19 DIAGNOSIS — E04.2 MULTIPLE THYROID NODULES: Primary | ICD-10-CM

## 2023-06-19 PROCEDURE — 88173 CYTOPATH EVAL FNA REPORT: CPT | Mod: 26,,, | Performed by: PATHOLOGY

## 2023-06-19 PROCEDURE — 10005 FNA BX W/US GDN 1ST LES: CPT | Mod: S$GLB,,, | Performed by: INTERNAL MEDICINE

## 2023-06-19 PROCEDURE — 10006 US FINE NEEDLE ASPIRATION BIOPSY , ADDL LESION: ICD-10-PCS | Mod: S$GLB,,, | Performed by: INTERNAL MEDICINE

## 2023-06-19 PROCEDURE — 88173 CYTOPATH EVAL FNA REPORT: CPT | Mod: 59 | Performed by: PATHOLOGY

## 2023-06-19 PROCEDURE — 10005 US FINE NEEDLE ASPIRATION BIOPSY, FIRST LESION: ICD-10-PCS | Mod: S$GLB,,, | Performed by: INTERNAL MEDICINE

## 2023-06-19 PROCEDURE — 10006 FNA BX W/US GDN EA ADDL: CPT | Mod: S$GLB,,, | Performed by: INTERNAL MEDICINE

## 2023-06-19 PROCEDURE — 88173 PR  INTERPRETATION OF FNA SMEAR: ICD-10-PCS | Mod: 26,,, | Performed by: PATHOLOGY

## 2023-06-20 ENCOUNTER — TELEPHONE (OUTPATIENT)
Dept: ENDOCRINOLOGY | Facility: CLINIC | Age: 78
End: 2023-06-20
Payer: MEDICARE

## 2023-06-20 DIAGNOSIS — E04.2 MULTIPLE THYROID NODULES: ICD-10-CM

## 2023-06-20 DIAGNOSIS — C74.90 MALIGNANT PHEOCHROMOCYTOMA, UNSPECIFIED LATERALITY: Primary | ICD-10-CM

## 2023-06-20 LAB
FINAL PATHOLOGIC DIAGNOSIS: ABNORMAL
FINAL PATHOLOGIC DIAGNOSIS: ABNORMAL
Lab: ABNORMAL
Lab: ABNORMAL

## 2023-06-21 ENCOUNTER — HOSPITAL ENCOUNTER (OUTPATIENT)
Dept: RADIOLOGY | Facility: HOSPITAL | Age: 78
Discharge: HOME | End: 2023-06-21
Attending: INTERNAL MEDICINE
Payer: MEDICARE

## 2023-06-21 ENCOUNTER — INFUSION (OUTPATIENT)
Dept: INFUSION THERAPY | Facility: HOSPITAL | Age: 78
End: 2023-06-21
Attending: INTERNAL MEDICINE
Payer: MEDICARE

## 2023-06-21 VITALS
RESPIRATION RATE: 16 BRPM | OXYGEN SATURATION: 97 % | DIASTOLIC BLOOD PRESSURE: 76 MMHG | WEIGHT: 191 LBS | SYSTOLIC BLOOD PRESSURE: 133 MMHG | HEIGHT: 63 IN | BODY MASS INDEX: 33.84 KG/M2 | TEMPERATURE: 98 F | HEART RATE: 72 BPM

## 2023-06-21 DIAGNOSIS — C74.90 MALIGNANT PHEOCHROMOCYTOMA, UNSPECIFIED LATERALITY: ICD-10-CM

## 2023-06-21 DIAGNOSIS — C74.90 MALIGNANT PHEOCHROMOCYTOMA, UNSPECIFIED LATERALITY: Primary | ICD-10-CM

## 2023-06-21 PROCEDURE — 63600175 PHARM REV CODE 636 W HCPCS: Performed by: INTERNAL MEDICINE

## 2023-06-21 PROCEDURE — 96365 THER/PROPH/DIAG IV INF INIT: CPT | Mod: 59

## 2023-06-21 PROCEDURE — 96367 TX/PROPH/DG ADDL SEQ IV INF: CPT

## 2023-06-21 PROCEDURE — 96366 THER/PROPH/DIAG IV INF ADDON: CPT

## 2023-06-21 PROCEDURE — 79101 NUCLEAR RX IV ADMIN: CPT | Mod: 26,,, | Performed by: STUDENT IN AN ORGANIZED HEALTH CARE EDUCATION/TRAINING PROGRAM

## 2023-06-21 PROCEDURE — 25000003 PHARM REV CODE 250: Performed by: INTERNAL MEDICINE

## 2023-06-21 PROCEDURE — 79101 NM THERAPY BY IV ADMINISTRATION LU177: ICD-10-PCS | Mod: 26,,, | Performed by: STUDENT IN AN ORGANIZED HEALTH CARE EDUCATION/TRAINING PROGRAM

## 2023-06-21 PROCEDURE — A9513 LUTETIUM LU 177 DOTATAT THER: HCPCS | Mod: JG

## 2023-06-21 RX ORDER — SODIUM CHLORIDE 9 MG/ML
INJECTION, SOLUTION INTRAVENOUS ONCE
Status: CANCELLED | OUTPATIENT
Start: 2023-08-16

## 2023-06-21 RX ORDER — DIPHENHYDRAMINE HYDROCHLORIDE 50 MG/ML
50 INJECTION INTRAMUSCULAR; INTRAVENOUS
Status: DISCONTINUED | OUTPATIENT
Start: 2023-06-21 | End: 2023-06-21 | Stop reason: HOSPADM

## 2023-06-21 RX ORDER — SODIUM CHLORIDE 9 MG/ML
INJECTION, SOLUTION INTRAVENOUS ONCE
Status: COMPLETED | OUTPATIENT
Start: 2023-06-21 | End: 2023-06-21

## 2023-06-21 RX ORDER — DIPHENHYDRAMINE HYDROCHLORIDE 50 MG/ML
50 INJECTION INTRAMUSCULAR; INTRAVENOUS
Status: CANCELLED | OUTPATIENT
Start: 2023-08-16

## 2023-06-21 RX ORDER — SODIUM CHLORIDE 0.9 % (FLUSH) 0.9 %
10 SYRINGE (ML) INJECTION
Status: DISCONTINUED | OUTPATIENT
Start: 2023-06-21 | End: 2023-06-21 | Stop reason: HOSPADM

## 2023-06-21 RX ORDER — SODIUM CHLORIDE 0.9 % (FLUSH) 0.9 %
10 SYRINGE (ML) INJECTION
Status: CANCELLED | OUTPATIENT
Start: 2023-08-16

## 2023-06-21 RX ORDER — ACETAMINOPHEN 325 MG/1
650 TABLET ORAL
Status: DISCONTINUED | OUTPATIENT
Start: 2023-06-21 | End: 2023-06-21 | Stop reason: HOSPADM

## 2023-06-21 RX ORDER — ACETAMINOPHEN 325 MG/1
650 TABLET ORAL
Status: CANCELLED | OUTPATIENT
Start: 2023-08-16

## 2023-06-21 RX ADMIN — DEXAMETHASONE SODIUM PHOSPHATE 16 MG: 4 INJECTION INTRA-ARTICULAR; INTRALESIONAL; INTRAMUSCULAR; INTRAVENOUS; SOFT TISSUE at 07:06

## 2023-06-21 RX ADMIN — Medication 1000 ML: at 08:06

## 2023-06-21 RX ADMIN — SODIUM CHLORIDE: 9 INJECTION, SOLUTION INTRAVENOUS at 07:06

## 2023-06-21 NOTE — NURSING
09:38 [id number] completed per NM tech. VSS. No complaints at this time. Pt resting in recliner. PIV x2 c/d/I patent, no irritation. Amino acids remain infusing per orders.

## 2023-06-21 NOTE — NURSING
12:16 Pt completed and tolerated PRRT 3 of 4. VSS. No complaints at this time. PIV x 2 removed per policy, catheter intact. Discharge instructions and precautions reviewed. AVS printed and handout given. No questions at this time. Pt d/c out clinic via wheelchair with no difficulty, family member present

## 2023-06-21 NOTE — NURSING
0900 [id number] started per NM tech. VSS. No complaints at this time. Amino acids remain infusing per MD orders.

## 2023-06-22 ENCOUNTER — INFUSION (OUTPATIENT)
Dept: INFUSION THERAPY | Facility: HOSPITAL | Age: 78
End: 2023-06-22
Attending: INTERNAL MEDICINE
Payer: MEDICARE

## 2023-06-22 VITALS — HEIGHT: 63 IN | WEIGHT: 191 LBS | BODY MASS INDEX: 33.84 KG/M2

## 2023-06-22 DIAGNOSIS — C74.90 MALIGNANT PHEOCHROMOCYTOMA, UNSPECIFIED LATERALITY: Primary | ICD-10-CM

## 2023-06-22 PROCEDURE — 63600175 PHARM REV CODE 636 W HCPCS: Mod: JZ,JG | Performed by: INTERNAL MEDICINE

## 2023-06-22 PROCEDURE — 96372 THER/PROPH/DIAG INJ SC/IM: CPT

## 2023-06-22 RX ORDER — LANREOTIDE ACETATE 120 MG/.5ML
120 INJECTION SUBCUTANEOUS ONCE
Status: COMPLETED | OUTPATIENT
Start: 2023-06-22 | End: 2023-06-22

## 2023-06-22 RX ORDER — LANREOTIDE ACETATE 120 MG/.5ML
120 INJECTION SUBCUTANEOUS ONCE
Status: CANCELLED | OUTPATIENT
Start: 2023-06-22 | End: 2023-06-22

## 2023-06-22 RX ADMIN — LANREOTIDE ACETATE 120 MG: 120 INJECTION SUBCUTANEOUS at 10:06

## 2023-06-22 NOTE — NURSING
Patient tolerated Lanreotide injection well, no complaints at this time. Next appointment scheduled and reviewed. Pt d/c in no acute distress.

## 2023-06-26 DIAGNOSIS — E11.9 CONTROLLED TYPE 2 DIABETES MELLITUS WITHOUT COMPLICATION, WITHOUT LONG-TERM CURRENT USE OF INSULIN: Primary | ICD-10-CM

## 2023-06-26 RX ORDER — METFORMIN HYDROCHLORIDE 500 MG/1
1000 TABLET, EXTENDED RELEASE ORAL 2 TIMES DAILY WITH MEALS
Qty: 180 TABLET | Refills: 3 | Status: SHIPPED | OUTPATIENT
Start: 2023-06-26

## 2023-07-21 ENCOUNTER — LAB VISIT (OUTPATIENT)
Dept: LAB | Facility: HOSPITAL | Age: 78
End: 2023-07-21
Attending: PHYSICIAN ASSISTANT
Payer: MEDICARE

## 2023-07-21 ENCOUNTER — OFFICE VISIT (OUTPATIENT)
Dept: HEMATOLOGY/ONCOLOGY | Facility: CLINIC | Age: 78
End: 2023-07-21
Payer: MEDICARE

## 2023-07-21 VITALS
WEIGHT: 190.94 LBS | TEMPERATURE: 98 F | HEART RATE: 71 BPM | RESPIRATION RATE: 18 BRPM | OXYGEN SATURATION: 96 % | HEIGHT: 63 IN | DIASTOLIC BLOOD PRESSURE: 74 MMHG | SYSTOLIC BLOOD PRESSURE: 132 MMHG | BODY MASS INDEX: 33.83 KG/M2

## 2023-07-21 DIAGNOSIS — I49.5 SICK SINUS SYNDROME: ICD-10-CM

## 2023-07-21 DIAGNOSIS — D49.9 IMMUNODEFICIENCY SECONDARY TO NEOPLASM: ICD-10-CM

## 2023-07-21 DIAGNOSIS — I10 ESSENTIAL HYPERTENSION: ICD-10-CM

## 2023-07-21 DIAGNOSIS — E89.6 H/O TOTAL ADRENALECTOMY: ICD-10-CM

## 2023-07-21 DIAGNOSIS — E11.9 CONTROLLED TYPE 2 DIABETES MELLITUS WITHOUT COMPLICATION, WITHOUT LONG-TERM CURRENT USE OF INSULIN: ICD-10-CM

## 2023-07-21 DIAGNOSIS — D84.81 IMMUNODEFICIENCY SECONDARY TO NEOPLASM: ICD-10-CM

## 2023-07-21 DIAGNOSIS — E27.1 ADRENAL INSUFFICIENCY, PRIMARY: ICD-10-CM

## 2023-07-21 DIAGNOSIS — C7B.8 SECONDARY NEUROENDOCRINE TUMOR OF BONE: ICD-10-CM

## 2023-07-21 DIAGNOSIS — C74.90 MALIGNANT PHEOCHROMOCYTOMA, UNSPECIFIED LATERALITY: ICD-10-CM

## 2023-07-21 DIAGNOSIS — C74.91 MALIGNANT PHEOCHROMOCYTOMA OF RIGHT ADRENAL GLAND: Primary | ICD-10-CM

## 2023-07-21 DIAGNOSIS — I50.42 CHRONIC COMBINED SYSTOLIC AND DIASTOLIC CONGESTIVE HEART FAILURE: ICD-10-CM

## 2023-07-21 DIAGNOSIS — C74.91 MALIGNANT PHEOCHROMOCYTOMA OF RIGHT ADRENAL GLAND: ICD-10-CM

## 2023-07-21 DIAGNOSIS — C7B.8 SECONDARY NEUROENDOCRINE TUMOR OF DISTANT LYMPH NODES: ICD-10-CM

## 2023-07-21 DIAGNOSIS — Z79.899 IMMUNODEFICIENCY DUE TO DRUGS: ICD-10-CM

## 2023-07-21 DIAGNOSIS — D84.821 IMMUNODEFICIENCY DUE TO DRUGS: ICD-10-CM

## 2023-07-21 DIAGNOSIS — G89.3 CANCER-RELATED PAIN: ICD-10-CM

## 2023-07-21 DIAGNOSIS — I48.0 PAF (PAROXYSMAL ATRIAL FIBRILLATION): ICD-10-CM

## 2023-07-21 LAB
ALBUMIN SERPL BCP-MCNC: 3.6 G/DL (ref 3.5–5.2)
ALP SERPL-CCNC: 53 U/L (ref 55–135)
ALT SERPL W/O P-5'-P-CCNC: 6 U/L (ref 10–44)
ANION GAP SERPL CALC-SCNC: 10 MMOL/L (ref 8–16)
AST SERPL-CCNC: 11 U/L (ref 10–40)
BASOPHILS # BLD AUTO: 0.02 K/UL (ref 0–0.2)
BASOPHILS NFR BLD: 0.5 % (ref 0–1.9)
BILIRUB SERPL-MCNC: 0.6 MG/DL (ref 0.1–1)
BUN SERPL-MCNC: 17 MG/DL (ref 8–23)
CALCIUM SERPL-MCNC: 8.9 MG/DL (ref 8.7–10.5)
CHLORIDE SERPL-SCNC: 106 MMOL/L (ref 95–110)
CO2 SERPL-SCNC: 25 MMOL/L (ref 23–29)
CREAT SERPL-MCNC: 0.9 MG/DL (ref 0.5–1.4)
DIFFERENTIAL METHOD: ABNORMAL
EOSINOPHIL # BLD AUTO: 0.3 K/UL (ref 0–0.5)
EOSINOPHIL NFR BLD: 7 % (ref 0–8)
ERYTHROCYTE [DISTWIDTH] IN BLOOD BY AUTOMATED COUNT: 15.4 % (ref 11.5–14.5)
EST. GFR  (NO RACE VARIABLE): >60 ML/MIN/1.73 M^2
GLUCOSE SERPL-MCNC: 106 MG/DL (ref 70–110)
HCT VFR BLD AUTO: 37.2 % (ref 37–48.5)
HGB BLD-MCNC: 11.9 G/DL (ref 12–16)
IMM GRANULOCYTES # BLD AUTO: 0.02 K/UL (ref 0–0.04)
IMM GRANULOCYTES NFR BLD AUTO: 0.5 % (ref 0–0.5)
LYMPHOCYTES # BLD AUTO: 1.1 K/UL (ref 1–4.8)
LYMPHOCYTES NFR BLD: 28.4 % (ref 18–48)
MCH RBC QN AUTO: 29.5 PG (ref 27–31)
MCHC RBC AUTO-ENTMCNC: 32 G/DL (ref 32–36)
MCV RBC AUTO: 92 FL (ref 82–98)
MONOCYTES # BLD AUTO: 0.5 K/UL (ref 0.3–1)
MONOCYTES NFR BLD: 12.3 % (ref 4–15)
NEUTROPHILS # BLD AUTO: 1.9 K/UL (ref 1.8–7.7)
NEUTROPHILS NFR BLD: 51.3 % (ref 38–73)
NRBC BLD-RTO: 0 /100 WBC
PLATELET # BLD AUTO: 160 K/UL (ref 150–450)
PMV BLD AUTO: 9.5 FL (ref 9.2–12.9)
POTASSIUM SERPL-SCNC: 3.3 MMOL/L (ref 3.5–5.1)
PROT SERPL-MCNC: 6.6 G/DL (ref 6–8.4)
RBC # BLD AUTO: 4.03 M/UL (ref 4–5.4)
SODIUM SERPL-SCNC: 141 MMOL/L (ref 136–145)
WBC # BLD AUTO: 3.73 K/UL (ref 3.9–12.7)

## 2023-07-21 PROCEDURE — 1160F PR REVIEW ALL MEDS BY PRESCRIBER/CLIN PHARMACIST DOCUMENTED: ICD-10-PCS | Mod: CPTII,S$GLB,, | Performed by: INTERNAL MEDICINE

## 2023-07-21 PROCEDURE — 99999 PR PBB SHADOW E&M-EST. PATIENT-LVL III: ICD-10-PCS | Mod: PBBFAC,,, | Performed by: INTERNAL MEDICINE

## 2023-07-21 PROCEDURE — 1125F AMNT PAIN NOTED PAIN PRSNT: CPT | Mod: CPTII,S$GLB,, | Performed by: INTERNAL MEDICINE

## 2023-07-21 PROCEDURE — 1101F PT FALLS ASSESS-DOCD LE1/YR: CPT | Mod: CPTII,S$GLB,, | Performed by: INTERNAL MEDICINE

## 2023-07-21 PROCEDURE — 3288F FALL RISK ASSESSMENT DOCD: CPT | Mod: CPTII,S$GLB,, | Performed by: INTERNAL MEDICINE

## 2023-07-21 PROCEDURE — 1160F RVW MEDS BY RX/DR IN RCRD: CPT | Mod: CPTII,S$GLB,, | Performed by: INTERNAL MEDICINE

## 2023-07-21 PROCEDURE — 3078F DIAST BP <80 MM HG: CPT | Mod: CPTII,S$GLB,, | Performed by: INTERNAL MEDICINE

## 2023-07-21 PROCEDURE — 83835 ASSAY OF METANEPHRINES: CPT | Performed by: INTERNAL MEDICINE

## 2023-07-21 PROCEDURE — 1125F PR PAIN SEVERITY QUANTIFIED, PAIN PRESENT: ICD-10-PCS | Mod: CPTII,S$GLB,, | Performed by: INTERNAL MEDICINE

## 2023-07-21 PROCEDURE — 82308 ASSAY OF CALCITONIN: CPT | Performed by: INTERNAL MEDICINE

## 2023-07-21 PROCEDURE — 99215 PR OFFICE/OUTPT VISIT, EST, LEVL V, 40-54 MIN: ICD-10-PCS | Mod: S$GLB,,, | Performed by: INTERNAL MEDICINE

## 2023-07-21 PROCEDURE — 3078F PR MOST RECENT DIASTOLIC BLOOD PRESSURE < 80 MM HG: ICD-10-PCS | Mod: CPTII,S$GLB,, | Performed by: INTERNAL MEDICINE

## 2023-07-21 PROCEDURE — 3288F PR FALLS RISK ASSESSMENT DOCUMENTED: ICD-10-PCS | Mod: CPTII,S$GLB,, | Performed by: INTERNAL MEDICINE

## 2023-07-21 PROCEDURE — 99215 OFFICE O/P EST HI 40 MIN: CPT | Mod: S$GLB,,, | Performed by: INTERNAL MEDICINE

## 2023-07-21 PROCEDURE — 99999 PR PBB SHADOW E&M-EST. PATIENT-LVL III: CPT | Mod: PBBFAC,,, | Performed by: INTERNAL MEDICINE

## 2023-07-21 PROCEDURE — 80053 COMPREHEN METABOLIC PANEL: CPT | Performed by: INTERNAL MEDICINE

## 2023-07-21 PROCEDURE — 3075F SYST BP GE 130 - 139MM HG: CPT | Mod: CPTII,S$GLB,, | Performed by: INTERNAL MEDICINE

## 2023-07-21 PROCEDURE — 82384 ASSAY THREE CATECHOLAMINES: CPT | Performed by: INTERNAL MEDICINE

## 2023-07-21 PROCEDURE — 3075F PR MOST RECENT SYSTOLIC BLOOD PRESS GE 130-139MM HG: ICD-10-PCS | Mod: CPTII,S$GLB,, | Performed by: INTERNAL MEDICINE

## 2023-07-21 PROCEDURE — 1159F MED LIST DOCD IN RCRD: CPT | Mod: CPTII,S$GLB,, | Performed by: INTERNAL MEDICINE

## 2023-07-21 PROCEDURE — 85025 COMPLETE CBC W/AUTO DIFF WBC: CPT | Performed by: INTERNAL MEDICINE

## 2023-07-21 PROCEDURE — 1101F PR PT FALLS ASSESS DOC 0-1 FALLS W/OUT INJ PAST YR: ICD-10-PCS | Mod: CPTII,S$GLB,, | Performed by: INTERNAL MEDICINE

## 2023-07-21 PROCEDURE — 1159F PR MEDICATION LIST DOCUMENTED IN MEDICAL RECORD: ICD-10-PCS | Mod: CPTII,S$GLB,, | Performed by: INTERNAL MEDICINE

## 2023-07-21 RX ORDER — TRAMADOL HYDROCHLORIDE 50 MG/1
50 TABLET ORAL EVERY 6 HOURS PRN
Qty: 90 TABLET | Refills: 0 | Status: SHIPPED | OUTPATIENT
Start: 2023-07-21

## 2023-07-21 NOTE — PROGRESS NOTES
PROGRESS NOTE    Subjective:       Patient ID: Hailey Sawant is a 78 y.o. female.    Chief Complaint: metastatic pheochromocytoma    Diagnosis:  Metastatic pheochromocytoma of the right adrenal gland, with mets to bones, liver    Oncologic History:  1. Ms. Sawant is a 72 yo woman with DM, HTN, CAD s/p MI, A fib and sick sinus syndrome s/p pacemaker in June 2022 (on eliquis since), carotid paraganglioma removed in 11/2018 who initially saw me on 11/28/2022 for further management of metastatic pheochromocytoma. Last seen Dr West in 2018 and Dr Cohen in early 2021. Does not follow oncologist locally. It is my first time seeing her today. Her history dates to 2009 when she was being worked up for abdominal pain and was found to have cholelithiasis. However, during this workup she was found to have bilateral adrenal masses.  She was also noted to be hypertensive.  She had a 6.5 cm mass on the right with some evidence of necrosis and also a mass just under 3 cm on the left.  Biochemical workup was consistent with pheochromocytoma.  In 11/2009 she underwent a bilateral adrenalectomy.  Pathology had revealed a right adrenal gland pheochromocytoma and a left adrenal gland adrenal cortical adenoma.  In 02/2016 she was noted to have recurrent disease to the bone and underwent I 131 MIBG therapy in 03/2016, 07/2016 and 11/2016.  In 10/2016 she underwent a right partial nephrectomy due to recurrent disease.  Pathology did confirm findings consistent with recurrent pheochromocytoma.  In 11/2018 she underwent removal of the right carotid body tumor with pathology showing paraganglioma. She presents today for follow up. She knows she has cancer but does not know it is stage IV. She is not sure if Dr Cohen called her after tumor board or not. She feels well. Had MI in June 2022, treated at [facility], had pacemaker placed. Medically treated. Feeling well  "today. We discussed restaging dotatate scan.   2. Dotatate scan declined by insurance several times. Finally had gallium PET on 23. It showed "New foci of abnormal radiotracer uptake at the cervical spine, sacral, right ilium and right acetabular joint. Interval enlargement of hepatic section 7 hypodense mass. Reviewed at tumor board. Recc PRRT. However unable to get insurance approval for PRRT and admission afterwards to monitor for catecholamine crisis.   3. Lanreotide started on 23. Zometa started 23. Last dose on 23  4. PRRT started on 3/1/23. #2 on 23, # 3 on 23    Interval History:   Ms Sawant returns for follow up. Had #3 PRRT on . She has right hip pain and fatigue. Saw cardiology in . Echo was done. Was told her heart is weaker.     ECO    ROS:   A ten-point system review is obtained and negative except for what was stated in the Interval History.     Physical Examination:   Vital signs reviewed.   General: well hydrated, well developed, in no acute distress, in a wheelchair  HEENT: normocephalic, PERRLA, EOMI, anicteric sclerae  Neck: supple, no JVD, +thyromegaly, soft, no cervical or supraclavicular lymphadenopathy  Lungs: clear breath sounds bilaterally, no wheezing, rales, or rhonchi  Heart: RRR, no M/R/G  Abdomen: soft, no tenderness, non-distended, no hepatosplenomegaly, mass, or hernia. BS present  Extremities: no clubbing, cyanosis, or edema  Skin: no rash, ulcer, or open wounds  Neuro: alert and oriented x 4, no focal neuro deficit  Psych: pleasant and appropriate mood and affect    Objective:     Laboratory Data:  Labs reviewed. CBC unremarkable.     Imaging Data:  Gallium PET 23:  New foci of abnormal radiotracer uptake at the cervical spine, sacral, right ilium and right acetabular joint.     Interval enlargement of hepatic section 7 hypodense mass.     Based upon progression of disease and tracer avidity of the patient's tumor burden, the patient " would be an appropriate candidate for PRRT if clinically indicated.     CT neck 4/6/23:  Impression:     1. Status post resection right carotid body tumor.  No recurrent mass identified.  2. 1.5 cm peripherally enhancing nodules lower pole both lobes of the thyroid.    Thyroid US 5/16/23:  Impression:     Multiple thyroid nodules.  1.7 cm TI-RADS 5 left lobe nodule meets ACR criteria for FNA.     Right lobe nodule meets criteria for surveillance with repeat thyroid ultrasound recommended 1 year.      Copper PET 5/25/23:  Impression:     1. Right hepatic lobe lesions and multifocal primarily sclerotic osseous lesions stable in size and number with decreased tracer-avidity.  No new lesions identified.  2. Postsurgical change prior bilateral adrenalectomy.  No tracer uptake within the surgical bed to suggest localized recurrent disease.  3. Other stable incidental findings above.  Assessment and Plan:     1. Malignant pheochromocytoma of right adrenal gland    2. Secondary neuroendocrine tumor of distant lymph nodes    3. Secondary neuroendocrine tumor of bone    4. Immunodeficiency secondary to neoplasm    5. Immunodeficiency due to drugs    6. Cancer-related pain    7. Adrenal insufficiency, primary post-surgical    8. H/O total adrenalectomy    9. Essential hypertension    10. Controlled type 2 diabetes mellitus without complication, without long-term current use of insulin    11. Sick sinus syndrome    12. Chronic combined systolic and diastolic congestive heart failure    13. PAF (paroxysmal atrial fibrillation)      1-5  - Ms Jese is a 76 yo woman with R adrenal pheochromocytoma s/p bilateral adrenectomy in 11/2009, recurrent disease to the bone in 2016 s/p MIBG in 2016. At our prior visits, we discussed interim progression of cancer in the liver and new lesions in the bones. Discussed lanreotide every 4 weeks, zometa every 3 months and PRRT. Lanreotide started 1/23/2023. Zometa started 2/20/23.   - PRRT #1 on  3/1/23. #2 on 4/26/23, #3 on 6/21/23  - interim PET scan showed stable disease with decreased tracer uptake. Again discussed with patient and sister  - c/w PRRT. Last does in one month  - next zometa due in August  - discussed with patient that after PRRT, we will continue with lanreotide every 4 weeks    6.  - prescribed tramadol prn. Discussed miralax for constipation    7.8  - f/u with Dr Wilburn    9.  - BP controlled  - c/w current medication    10.  - BS controlled  - c/w current medication    11-13  - f/u with Dr Loya at [facility]  - will get recent echo records    Follow-up:     Return in one month  Knows to call in the interval if any problems arise.    Electronically signed by Ema Henriquez for Scheduling    Med Onc Chart Routing      Follow up with physician . Yara patient. Lara to schedule appts.   Follow up with KAYLEN    Infusion scheduling note    Injection scheduling note    Labs    Imaging    Pharmacy appointment    Other referrals          Therapy Plan Information  LANREOTIDE  5. Medications  lanreotide injection 120 mg  120 mg, Subcutaneous, Every visit    LUTATHERA SUPPORT  IV Fluids  0.9%  NaCl infusion  Intravenous, Every 8 weeks  Pre-Medications  ondansetron-dexAMETHasone 16-12 mg in sodium chloride 0.9% 50 mL IVPB 16 mg  16 mg, Intravenous, Every 8 weeks  arginine-lysine-sterile water (AminoProtect) 25-25 mg/mL intravenous solution 1,000 mL  1,000 mL, Intravenous, Every 8 weeks  promethazine (PHENERGAN) 12.5 mg in dextrose 5 % (D5W) 50 mL IVPB  12.5 mg, Intravenous, Every 8 weeks  acetaminophen tablet 650 mg  650 mg, Oral, Every 8 weeks  Flushes  sodium chloride 0.9% flush 10 mL  10 mL, Intravenous, Every 8 weeks  PRN Medications  diphenhydrAMINE injection 50 mg  50 mg, Intravenous, Every 8 weeks  hydrocortisone sodium succinate injection 100 mg  100 mg, Intravenous, Every 8 weeks    ZOLEDRONIC ACID (ZOMETA) IV  Medications  zoledronic acid-mannitoL-water 4 mg/100 mL IVPB (premix)  4 mg  4 mg, Intravenous, Every 4 weeks  Flushes  sodium chloride 0.9% 250 mL flush bag  Intravenous, Every 4 weeks  sodium chloride 0.9% flush 10 mL  10 mL, Intravenous, Every 4 weeks  heparin, porcine (PF) 100 unit/mL injection flush 500 Units  500 Units, Intravenous, Every 4 weeks  alteplase injection 2 mg  2 mg, Intra-Catheter, Every 4 weeks

## 2023-07-22 LAB — CALCIT SERPL-MCNC: <5 PG/ML

## 2023-07-24 ENCOUNTER — TELEPHONE (OUTPATIENT)
Dept: INFUSION THERAPY | Facility: HOSPITAL | Age: 78
End: 2023-07-24
Payer: MEDICARE

## 2023-07-24 NOTE — TELEPHONE ENCOUNTER
Spoke with Blanche at [facility]. Confirmed reservations for 8/15-8/17. Request form uploaded to media for reference

## 2023-07-29 LAB
METANEPH FREE SERPL-MCNC: <25 PG/ML
METANEPHS SERPL-MCNC: 990 PG/ML
NORMETANEPH FREE SERPL-MCNC: 990 PG/ML

## 2023-08-09 LAB
CATECHOLS PLAS-MCNC: 1359 PG/ML
DOPAMINE SERPL-MCNC: 11 PG/ML
EPINEPH PLAS-MCNC: <20 PG/ML
NOREPINEPH PLAS-MCNC: 1348 PG/ML

## 2023-08-15 ENCOUNTER — OFFICE VISIT (OUTPATIENT)
Dept: HEMATOLOGY/ONCOLOGY | Facility: CLINIC | Age: 78
End: 2023-08-15
Payer: MEDICARE

## 2023-08-15 VITALS
HEIGHT: 63 IN | HEART RATE: 75 BPM | DIASTOLIC BLOOD PRESSURE: 80 MMHG | SYSTOLIC BLOOD PRESSURE: 147 MMHG | RESPIRATION RATE: 18 BRPM | WEIGHT: 189.63 LBS | BODY MASS INDEX: 33.6 KG/M2 | OXYGEN SATURATION: 98 %

## 2023-08-15 DIAGNOSIS — I50.42 CHRONIC COMBINED SYSTOLIC AND DIASTOLIC CONGESTIVE HEART FAILURE: ICD-10-CM

## 2023-08-15 DIAGNOSIS — D84.81 IMMUNODEFICIENCY SECONDARY TO NEOPLASM: ICD-10-CM

## 2023-08-15 DIAGNOSIS — I49.5 SICK SINUS SYNDROME: ICD-10-CM

## 2023-08-15 DIAGNOSIS — E27.1 ADRENAL INSUFFICIENCY, PRIMARY: ICD-10-CM

## 2023-08-15 DIAGNOSIS — R06.02 SHORTNESS OF BREATH: ICD-10-CM

## 2023-08-15 DIAGNOSIS — C7B.8 SECONDARY NEUROENDOCRINE TUMOR OF DISTANT LYMPH NODES: ICD-10-CM

## 2023-08-15 DIAGNOSIS — Z79.899 IMMUNODEFICIENCY DUE TO DRUGS: ICD-10-CM

## 2023-08-15 DIAGNOSIS — E89.6 H/O TOTAL ADRENALECTOMY: ICD-10-CM

## 2023-08-15 DIAGNOSIS — I10 ESSENTIAL HYPERTENSION: ICD-10-CM

## 2023-08-15 DIAGNOSIS — C79.51 SECONDARY MALIGNANT NEOPLASM OF BONE: ICD-10-CM

## 2023-08-15 DIAGNOSIS — E11.9 CONTROLLED TYPE 2 DIABETES MELLITUS WITHOUT COMPLICATION, WITHOUT LONG-TERM CURRENT USE OF INSULIN: ICD-10-CM

## 2023-08-15 DIAGNOSIS — C7B.8 SECONDARY NEUROENDOCRINE TUMOR OF BONE: ICD-10-CM

## 2023-08-15 DIAGNOSIS — D49.9 IMMUNODEFICIENCY SECONDARY TO NEOPLASM: ICD-10-CM

## 2023-08-15 DIAGNOSIS — C74.91 MALIGNANT PHEOCHROMOCYTOMA OF RIGHT ADRENAL GLAND: Primary | ICD-10-CM

## 2023-08-15 DIAGNOSIS — D84.821 IMMUNODEFICIENCY DUE TO DRUGS: ICD-10-CM

## 2023-08-15 PROCEDURE — 3288F FALL RISK ASSESSMENT DOCD: CPT | Mod: CPTII,S$GLB,, | Performed by: INTERNAL MEDICINE

## 2023-08-15 PROCEDURE — 1160F RVW MEDS BY RX/DR IN RCRD: CPT | Mod: CPTII,S$GLB,, | Performed by: INTERNAL MEDICINE

## 2023-08-15 PROCEDURE — 3079F DIAST BP 80-89 MM HG: CPT | Mod: CPTII,S$GLB,, | Performed by: INTERNAL MEDICINE

## 2023-08-15 PROCEDURE — 1126F AMNT PAIN NOTED NONE PRSNT: CPT | Mod: CPTII,S$GLB,, | Performed by: INTERNAL MEDICINE

## 2023-08-15 PROCEDURE — 3077F PR MOST RECENT SYSTOLIC BLOOD PRESSURE >= 140 MM HG: ICD-10-PCS | Mod: CPTII,S$GLB,, | Performed by: INTERNAL MEDICINE

## 2023-08-15 PROCEDURE — 99999 PR PBB SHADOW E&M-EST. PATIENT-LVL V: CPT | Mod: PBBFAC,,, | Performed by: INTERNAL MEDICINE

## 2023-08-15 PROCEDURE — 1101F PT FALLS ASSESS-DOCD LE1/YR: CPT | Mod: CPTII,S$GLB,, | Performed by: INTERNAL MEDICINE

## 2023-08-15 PROCEDURE — 1159F PR MEDICATION LIST DOCUMENTED IN MEDICAL RECORD: ICD-10-PCS | Mod: CPTII,S$GLB,, | Performed by: INTERNAL MEDICINE

## 2023-08-15 PROCEDURE — 99215 PR OFFICE/OUTPT VISIT, EST, LEVL V, 40-54 MIN: ICD-10-PCS | Mod: S$GLB,,, | Performed by: INTERNAL MEDICINE

## 2023-08-15 PROCEDURE — 99215 OFFICE O/P EST HI 40 MIN: CPT | Mod: S$GLB,,, | Performed by: INTERNAL MEDICINE

## 2023-08-15 PROCEDURE — 1160F PR REVIEW ALL MEDS BY PRESCRIBER/CLIN PHARMACIST DOCUMENTED: ICD-10-PCS | Mod: CPTII,S$GLB,, | Performed by: INTERNAL MEDICINE

## 2023-08-15 PROCEDURE — 3079F PR MOST RECENT DIASTOLIC BLOOD PRESSURE 80-89 MM HG: ICD-10-PCS | Mod: CPTII,S$GLB,, | Performed by: INTERNAL MEDICINE

## 2023-08-15 PROCEDURE — 1126F PR PAIN SEVERITY QUANTIFIED, NO PAIN PRESENT: ICD-10-PCS | Mod: CPTII,S$GLB,, | Performed by: INTERNAL MEDICINE

## 2023-08-15 PROCEDURE — 99999 PR PBB SHADOW E&M-EST. PATIENT-LVL V: ICD-10-PCS | Mod: PBBFAC,,, | Performed by: INTERNAL MEDICINE

## 2023-08-15 PROCEDURE — 3077F SYST BP >= 140 MM HG: CPT | Mod: CPTII,S$GLB,, | Performed by: INTERNAL MEDICINE

## 2023-08-15 PROCEDURE — 1159F MED LIST DOCD IN RCRD: CPT | Mod: CPTII,S$GLB,, | Performed by: INTERNAL MEDICINE

## 2023-08-15 PROCEDURE — 3288F PR FALLS RISK ASSESSMENT DOCUMENTED: ICD-10-PCS | Mod: CPTII,S$GLB,, | Performed by: INTERNAL MEDICINE

## 2023-08-15 PROCEDURE — 1101F PR PT FALLS ASSESS DOC 0-1 FALLS W/OUT INJ PAST YR: ICD-10-PCS | Mod: CPTII,S$GLB,, | Performed by: INTERNAL MEDICINE

## 2023-08-15 NOTE — PROGRESS NOTES
PROGRESS NOTE    Subjective:       Patient ID: Hailey Sawant is a 78 y.o. female.    Chief Complaint: metastatic pheochromocytoma    Diagnosis:  Metastatic pheochromocytoma of the right adrenal gland, with mets to bones, liver    Oncologic History:  1. Ms. Sawant is a 74 yo woman with DM, HTN, CAD s/p MI, A fib and sick sinus syndrome s/p pacemaker in June 2022 (on eliquis since), carotid paraganglioma removed in 11/2018 who initially saw me on 11/28/2022 for further management of metastatic pheochromocytoma. Last seen Dr West in 2018 and Dr Cohen in early 2021. Does not follow oncologist locally. It is my first time seeing her today. Her history dates to 2009 when she was being worked up for abdominal pain and was found to have cholelithiasis. However, during this workup she was found to have bilateral adrenal masses.  She was also noted to be hypertensive.  She had a 6.5 cm mass on the right with some evidence of necrosis and also a mass just under 3 cm on the left.  Biochemical workup was consistent with pheochromocytoma.  In 11/2009 she underwent a bilateral adrenalectomy.  Pathology had revealed a right adrenal gland pheochromocytoma and a left adrenal gland adrenal cortical adenoma.  In 02/2016 she was noted to have recurrent disease to the bone and underwent I 131 MIBG therapy in 03/2016, 07/2016 and 11/2016.  In 10/2016 she underwent a right partial nephrectomy due to recurrent disease.  Pathology did confirm findings consistent with recurrent pheochromocytoma.  In 11/2018 she underwent removal of the right carotid body tumor with pathology showing paraganglioma. She presents today for follow up. She knows she has cancer but does not know it is stage IV. She is not sure if Dr Cohen called her after tumor board or not. She feels well. Had MI in June 2022, treated at [facility], had pacemaker placed. Medically treated. Feeling well  "today. We discussed restaging dotatate scan.   2. Dotatate scan declined by insurance several times. Finally had gallium PET on 23. It showed "New foci of abnormal radiotracer uptake at the cervical spine, sacral, right ilium and right acetabular joint. Interval enlargement of hepatic section 7 hypodense mass. Reviewed at tumor board[facility] However unable to get insurance approval for PRRT and admission afterwards to monitor for catecholamine crisis.   3. Lanreotide started on 23. Zometa started 23. Last dose on 23  4. PRRT started on 3/1/23. #2 on 23, # 3 on 23, #4 to be given on 23    Interval History:   Ms Sawant returns for follow up. Scheduled for #4 PRRT tomorrow. Chronic fatigue, shortness of breath. Saw cardiologist. Echo 23 showed LVEF 50%.    ECO    ROS:   A ten-point system review is obtained and negative except for what was stated in the Interval History.     Physical Examination:   Vital signs reviewed.   General: well hydrated, well developed, in no acute distress, in a wheelchair  HEENT: normocephalic, PERRLA, EOMI, anicteric sclerae  Neck: supple, no JVD, +thyromegaly, soft, no cervical or supraclavicular lymphadenopathy  Lungs: clear breath sounds bilaterally, no wheezing, rales, or rhonchi  Heart: RRR, no M/R/G  Abdomen: soft, no tenderness, non-distended, no hepatosplenomegaly, mass, or hernia. BS present  Extremities: no clubbing, cyanosis, or edema  Skin: no rash, ulcer, or open wounds  Neuro: alert and oriented x 4, no focal neuro deficit  Psych: pleasant and appropriate mood and affect    Objective:     Laboratory Data:  Labs reviewed. CBC, CMP unremarkable.     Imaging Data:  Gallium PET 23:  New foci of abnormal radiotracer uptake at the cervical spine, sacral, right ilium and right acetabular joint.     Interval enlargement of hepatic section 7 hypodense mass.     Based upon progression of disease and tracer avidity of the patient's tumor " burden, the patient would be an appropriate candidate for PRRT if clinically indicated.     CT neck 4/6/23:  Impression:     1. Status post resection right carotid body tumor.  No recurrent mass identified.  2. 1.5 cm peripherally enhancing nodules lower pole both lobes of the thyroid.    Thyroid US 5/16/23:  Impression:     Multiple thyroid nodules.  1.7 cm TI-RADS 5 left lobe nodule meets ACR criteria for FNA.     Right lobe nodule meets criteria for surveillance with repeat thyroid ultrasound recommended 1 year.      Copper PET 5/25/23:  Impression:     1. Right hepatic lobe lesions and multifocal primarily sclerotic osseous lesions stable in size and number with decreased tracer-avidity.  No new lesions identified.  2. Postsurgical change prior bilateral adrenalectomy.  No tracer uptake within the surgical bed to suggest localized recurrent disease.  3. Other stable incidental findings above.  Assessment and Plan:     1. Malignant pheochromocytoma of right adrenal gland    2. Secondary neuroendocrine tumor of distant lymph nodes    3. Secondary neuroendocrine tumor of bone    4. Secondary malignant neoplasm of bone    5. Immunodeficiency secondary to neoplasm    6. Immunodeficiency due to drugs    7. Adrenal insufficiency, primary post-surgical    8. H/O total adrenalectomy    9. Essential hypertension    10. Controlled type 2 diabetes mellitus without complication, without long-term current use of insulin    11. Sick sinus syndrome    12. Chronic combined systolic and diastolic congestive heart failure    13. Shortness of breath      1-5  - Ms Jese is a 78 yo woman with R adrenal pheochromocytoma s/p bilateral adrenectomy in 11/2009, recurrent disease to the bone in 2016 s/p MIBG in 2016. At our prior visits, we discussed interim progression of cancer in the liver and new lesions in the bones. Discussed lanreotide every 4 weeks, zometa every 3 months and PRRT. Lanreotide started 1/23/2023. Zometa started  2/20/23.   - PRRT #1 on 3/1/23. #2 on 4/26/23, #3 on 6/21/23  - interim PET scan showed stable disease with decreased tracer uptake. Again discussed with patient and sister  - c/w PRRT. Last dose tomorrow  - lanreotide and zometa on 8/17  - see me in one month with restaging PET scan. If disease stable, will continue with lanreotide every 4 weeks, zometa every 3 months and PET scan every 6 months    7.8  - f/u with Dr Wilburn    9.  - BP controlled  - c/w current medication    10.  - BS controlled  - c/w current medication    11-12  - f/u with Dr Loya at [facility]    13.  - has appt with pulmonary on 8/26    Follow-up:     Return in one month  Knows to call in the interval if any problems arise.    Electronically signed by Ema Turner Chart for Scheduling    Med Onc Chart Routing      Follow up with physician . Yara patient. Lara to schedule follow up   Follow up with KAYLEN    Infusion scheduling note    Injection scheduling note    Labs    Imaging    Pharmacy appointment    Other referrals        Therapy Plan Information  LANREOTIDE  5. Medications  lanreotide injection 120 mg  120 mg, Subcutaneous, Every visit    LUTATHERA SUPPORT  IV Fluids  0.9%  NaCl infusion  Intravenous, Every 8 weeks  Pre-Medications  ondansetron-dexAMETHasone 16-12 mg in sodium chloride 0.9% 50 mL IVPB 16 mg  16 mg, Intravenous, Every 8 weeks  arginine-lysine-sterile water (AminoProtect) 25-25 mg/mL intravenous solution 1,000 mL  1,000 mL, Intravenous, Every 8 weeks  promethazine (PHENERGAN) 12.5 mg in dextrose 5 % (D5W) 50 mL IVPB  12.5 mg, Intravenous, Every 8 weeks  acetaminophen tablet 650 mg  650 mg, Oral, Every 8 weeks  Flushes  sodium chloride 0.9% flush 10 mL  10 mL, Intravenous, Every 8 weeks  PRN Medications  diphenhydrAMINE injection 50 mg  50 mg, Intravenous, Every 8 weeks  hydrocortisone sodium succinate injection 100 mg  100 mg, Intravenous, Every 8 weeks    ZOLEDRONIC ACID (ZOMETA) IV  Medications  zoledronic  acid-mannitoL-water 4 mg/100 mL IVPB (premix) 4 mg  4 mg, Intravenous, Every 4 weeks  Flushes  sodium chloride 0.9% 250 mL flush bag  Intravenous, Every 4 weeks  sodium chloride 0.9% flush 10 mL  10 mL, Intravenous, Every 4 weeks  heparin, porcine (PF) 100 unit/mL injection flush 500 Units  500 Units, Intravenous, Every 4 weeks  alteplase injection 2 mg  2 mg, Intra-Catheter, Every 4 weeks

## 2023-08-16 ENCOUNTER — INFUSION (OUTPATIENT)
Dept: INFUSION THERAPY | Facility: HOSPITAL | Age: 78
End: 2023-08-16
Attending: INTERNAL MEDICINE
Payer: MEDICARE

## 2023-08-16 ENCOUNTER — HOSPITAL ENCOUNTER (OUTPATIENT)
Dept: RADIOLOGY | Facility: HOSPITAL | Age: 78
Discharge: HOME | End: 2023-08-16
Attending: INTERNAL MEDICINE
Payer: MEDICARE

## 2023-08-16 VITALS
TEMPERATURE: 99 F | SYSTOLIC BLOOD PRESSURE: 131 MMHG | DIASTOLIC BLOOD PRESSURE: 69 MMHG | WEIGHT: 189.63 LBS | HEIGHT: 63 IN | BODY MASS INDEX: 33.6 KG/M2 | OXYGEN SATURATION: 96 % | HEART RATE: 72 BPM | RESPIRATION RATE: 18 BRPM

## 2023-08-16 DIAGNOSIS — C74.90 MALIGNANT PHEOCHROMOCYTOMA, UNSPECIFIED LATERALITY: Primary | ICD-10-CM

## 2023-08-16 DIAGNOSIS — C74.90 MALIGNANT PHEOCHROMOCYTOMA, UNSPECIFIED LATERALITY: ICD-10-CM

## 2023-08-16 PROCEDURE — 96367 TX/PROPH/DG ADDL SEQ IV INF: CPT

## 2023-08-16 PROCEDURE — 96365 THER/PROPH/DIAG IV INF INIT: CPT | Mod: 59

## 2023-08-16 PROCEDURE — 96366 THER/PROPH/DIAG IV INF ADDON: CPT

## 2023-08-16 PROCEDURE — 79101 NM THERAPY BY IV ADMINISTRATION LU177: ICD-10-PCS | Mod: 26,,, | Performed by: STUDENT IN AN ORGANIZED HEALTH CARE EDUCATION/TRAINING PROGRAM

## 2023-08-16 PROCEDURE — A9513 LUTETIUM LU 177 DOTATAT THER: HCPCS | Mod: JG

## 2023-08-16 PROCEDURE — 25000003 PHARM REV CODE 250: Performed by: INTERNAL MEDICINE

## 2023-08-16 PROCEDURE — 79101 NUCLEAR RX IV ADMIN: CPT | Mod: 26,,, | Performed by: STUDENT IN AN ORGANIZED HEALTH CARE EDUCATION/TRAINING PROGRAM

## 2023-08-16 PROCEDURE — 63600175 PHARM REV CODE 636 W HCPCS: Performed by: INTERNAL MEDICINE

## 2023-08-16 RX ORDER — SODIUM CHLORIDE 9 MG/ML
INJECTION, SOLUTION INTRAVENOUS ONCE
Status: COMPLETED | OUTPATIENT
Start: 2023-08-16 | End: 2023-08-16

## 2023-08-16 RX ORDER — DIPHENHYDRAMINE HYDROCHLORIDE 50 MG/ML
50 INJECTION INTRAMUSCULAR; INTRAVENOUS
Status: CANCELLED | OUTPATIENT
Start: 2023-08-16

## 2023-08-16 RX ORDER — SODIUM CHLORIDE 0.9 % (FLUSH) 0.9 %
10 SYRINGE (ML) INJECTION
Status: DISCONTINUED | OUTPATIENT
Start: 2023-08-16 | End: 2023-08-16 | Stop reason: HOSPADM

## 2023-08-16 RX ORDER — SODIUM CHLORIDE 0.9 % (FLUSH) 0.9 %
10 SYRINGE (ML) INJECTION
Status: CANCELLED | OUTPATIENT
Start: 2023-08-16

## 2023-08-16 RX ORDER — ACETAMINOPHEN 325 MG/1
650 TABLET ORAL
Status: DISCONTINUED | OUTPATIENT
Start: 2023-08-16 | End: 2023-08-16 | Stop reason: HOSPADM

## 2023-08-16 RX ORDER — SODIUM CHLORIDE 9 MG/ML
INJECTION, SOLUTION INTRAVENOUS ONCE
Status: CANCELLED | OUTPATIENT
Start: 2023-08-16

## 2023-08-16 RX ORDER — ACETAMINOPHEN 325 MG/1
650 TABLET ORAL
Status: CANCELLED | OUTPATIENT
Start: 2023-08-16

## 2023-08-16 RX ORDER — DIPHENHYDRAMINE HYDROCHLORIDE 50 MG/ML
50 INJECTION INTRAMUSCULAR; INTRAVENOUS
Status: DISCONTINUED | OUTPATIENT
Start: 2023-08-16 | End: 2023-08-16 | Stop reason: HOSPADM

## 2023-08-16 RX ADMIN — DEXAMETHASONE SODIUM PHOSPHATE 16 MG: 10 INJECTION, SOLUTION INTRAMUSCULAR; INTRAVENOUS at 07:08

## 2023-08-16 RX ADMIN — Medication 1000 ML: at 08:08

## 2023-08-16 RX ADMIN — SODIUM CHLORIDE: 0.9 INJECTION, SOLUTION INTRAVENOUS at 07:08

## 2023-08-16 NOTE — NURSING
8:43 am [id number] started per NM tech. VSS. No complaints at this time. Right PIV clean, dry, intact, patent, and no irritation. Left PIV Amino acids remain infusing per MD orders.

## 2023-08-16 NOTE — NURSING
12:15 pm Pt completed and tolerated PRRT. VSS. No complaints at this time. PIV x 2 removed per policy, catheter intact. Discharge instructions and precautions reviewed. AVS printed and handout given. No questions at this time. Pt assisted out clinic via wheelchair without difficulty.

## 2023-08-16 NOTE — NURSING
9:31 am [id number] completed per NM tech, right PIV flushed, clean, dry, intact, patent, and no irritation.  VSS. No complaints at this time.  Amino acids remain infusing per orders left PIV clean, dry, intact, patent, and no irritation.

## 2023-08-17 ENCOUNTER — INFUSION (OUTPATIENT)
Dept: INFUSION THERAPY | Facility: HOSPITAL | Age: 78
End: 2023-08-17
Attending: INTERNAL MEDICINE
Payer: MEDICARE

## 2023-08-17 VITALS
RESPIRATION RATE: 16 BRPM | TEMPERATURE: 99 F | WEIGHT: 189.63 LBS | HEART RATE: 73 BPM | BODY MASS INDEX: 33.6 KG/M2 | HEIGHT: 63 IN | DIASTOLIC BLOOD PRESSURE: 76 MMHG | OXYGEN SATURATION: 96 % | SYSTOLIC BLOOD PRESSURE: 154 MMHG

## 2023-08-17 DIAGNOSIS — C74.91 MALIGNANT PHEOCHROMOCYTOMA OF RIGHT ADRENAL GLAND: ICD-10-CM

## 2023-08-17 DIAGNOSIS — C79.51 SECONDARY MALIGNANT NEOPLASM OF BONE: ICD-10-CM

## 2023-08-17 DIAGNOSIS — C74.90 MALIGNANT PHEOCHROMOCYTOMA, UNSPECIFIED LATERALITY: Primary | ICD-10-CM

## 2023-08-17 PROCEDURE — 96365 THER/PROPH/DIAG IV INF INIT: CPT

## 2023-08-17 PROCEDURE — 63600175 PHARM REV CODE 636 W HCPCS: Mod: JZ,JG | Performed by: INTERNAL MEDICINE

## 2023-08-17 PROCEDURE — 96372 THER/PROPH/DIAG INJ SC/IM: CPT | Mod: 59

## 2023-08-17 PROCEDURE — 25000003 PHARM REV CODE 250: Performed by: INTERNAL MEDICINE

## 2023-08-17 RX ORDER — HEPARIN 100 UNIT/ML
500 SYRINGE INTRAVENOUS
Status: DISCONTINUED | OUTPATIENT
Start: 2023-08-17 | End: 2023-08-17 | Stop reason: HOSPADM

## 2023-08-17 RX ORDER — SODIUM CHLORIDE 0.9 % (FLUSH) 0.9 %
10 SYRINGE (ML) INJECTION
Status: DISCONTINUED | OUTPATIENT
Start: 2023-08-17 | End: 2023-08-17 | Stop reason: HOSPADM

## 2023-08-17 RX ORDER — HEPARIN 100 UNIT/ML
500 SYRINGE INTRAVENOUS
Status: CANCELLED | OUTPATIENT
Start: 2023-09-07

## 2023-08-17 RX ORDER — ZOLEDRONIC ACID 4 MG/100ML
4 SOLUTION INTRAVENOUS
Status: CANCELLED | OUTPATIENT
Start: 2023-09-07

## 2023-08-17 RX ORDER — LANREOTIDE ACETATE 120 MG/.5ML
120 INJECTION SUBCUTANEOUS ONCE
Status: COMPLETED | OUTPATIENT
Start: 2023-08-17 | End: 2023-08-17

## 2023-08-17 RX ORDER — LANREOTIDE ACETATE 120 MG/.5ML
120 INJECTION SUBCUTANEOUS ONCE
Status: CANCELLED | OUTPATIENT
Start: 2023-08-17 | End: 2023-08-17

## 2023-08-17 RX ORDER — SODIUM CHLORIDE 0.9 % (FLUSH) 0.9 %
10 SYRINGE (ML) INJECTION
Status: CANCELLED | OUTPATIENT
Start: 2023-09-07

## 2023-08-17 RX ORDER — ZOLEDRONIC ACID 4 MG/100ML
4 SOLUTION INTRAVENOUS
Status: COMPLETED | OUTPATIENT
Start: 2023-08-17 | End: 2023-08-17

## 2023-08-17 RX ADMIN — ZOLEDRONIC ACID 4 MG: 4 SOLUTION INTRAVENOUS at 10:08

## 2023-08-17 RX ADMIN — LANREOTIDE ACETATE 120 MG: 120 INJECTION SUBCUTANEOUS at 11:08

## 2023-08-17 RX ADMIN — SODIUM CHLORIDE: 9 INJECTION, SOLUTION INTRAVENOUS at 10:08

## 2023-08-17 NOTE — NURSING
Pt. Tolerated Zometa infusion and Lanreotide injection well. No complaints at this time. Next appt. Scheduled and reviewed. Pt. Assisted out of clinic via w/c in no acute distress.

## 2023-09-06 ENCOUNTER — HOSPITAL ENCOUNTER (OUTPATIENT)
Dept: ENDOCRINOLOGY | Facility: CLINIC | Age: 78
Discharge: HOME | End: 2023-09-06
Attending: INTERNAL MEDICINE
Payer: MEDICARE

## 2023-09-06 DIAGNOSIS — E04.2 MULTIPLE THYROID NODULES: ICD-10-CM

## 2023-09-06 PROCEDURE — 88173 CYTOPATH EVAL FNA REPORT: CPT | Mod: 59 | Performed by: PATHOLOGY

## 2023-09-06 PROCEDURE — 10006 US FINE NEEDLE ASPIRATION BIOPSY , ADDL LESION: ICD-10-PCS | Mod: S$GLB,,, | Performed by: INTERNAL MEDICINE

## 2023-09-06 PROCEDURE — 10006 FNA BX W/US GDN EA ADDL: CPT | Mod: S$GLB,,, | Performed by: INTERNAL MEDICINE

## 2023-09-06 PROCEDURE — 10005 US FINE NEEDLE ASPIRATION BIOPSY, FIRST LESION: ICD-10-PCS | Mod: S$GLB,,, | Performed by: INTERNAL MEDICINE

## 2023-09-06 PROCEDURE — 88173 CYTOPATH EVAL FNA REPORT: CPT | Mod: 26,,, | Performed by: PATHOLOGY

## 2023-09-06 PROCEDURE — 88173 PR  INTERPRETATION OF FNA SMEAR: ICD-10-PCS | Mod: 26,,, | Performed by: PATHOLOGY

## 2023-09-06 PROCEDURE — 10005 FNA BX W/US GDN 1ST LES: CPT | Mod: S$GLB,,, | Performed by: INTERNAL MEDICINE

## 2023-09-07 ENCOUNTER — TELEPHONE (OUTPATIENT)
Dept: ENDOCRINOLOGY | Facility: CLINIC | Age: 78
End: 2023-09-07
Payer: MEDICARE

## 2023-09-07 LAB
FINAL PATHOLOGIC DIAGNOSIS: ABNORMAL
FINAL PATHOLOGIC DIAGNOSIS: NORMAL
Lab: ABNORMAL
Lab: NORMAL

## 2023-09-12 ENCOUNTER — CLINICAL SUPPORT (OUTPATIENT)
Dept: ENDOCRINOLOGY | Facility: CLINIC | Age: 78
End: 2023-09-12
Payer: MEDICARE

## 2023-09-12 DIAGNOSIS — E04.1 NONTOXIC UNINODULAR GOITER: Primary | ICD-10-CM

## 2023-09-13 ENCOUNTER — HOSPITAL ENCOUNTER (OUTPATIENT)
Dept: RADIOLOGY | Facility: HOSPITAL | Age: 78
Discharge: HOME | End: 2023-09-13
Attending: INTERNAL MEDICINE
Payer: MEDICARE

## 2023-09-13 DIAGNOSIS — C7B.8 SECONDARY NEUROENDOCRINE TUMOR OF BONE: ICD-10-CM

## 2023-09-13 DIAGNOSIS — C74.91 MALIGNANT PHEOCHROMOCYTOMA OF RIGHT ADRENAL GLAND: ICD-10-CM

## 2023-09-13 DIAGNOSIS — C7B.8 SECONDARY NEUROENDOCRINE TUMOR OF DISTANT LYMPH NODES: ICD-10-CM

## 2023-09-13 PROCEDURE — 78815 PET IMAGE W/CT SKULL-THIGH: CPT | Mod: TC

## 2023-09-13 PROCEDURE — 78815 NM PET CU64 DOTATATE, SKULL TO MID THIGH: ICD-10-PCS | Mod: 26,PS,, | Performed by: STUDENT IN AN ORGANIZED HEALTH CARE EDUCATION/TRAINING PROGRAM

## 2023-09-13 PROCEDURE — 78815 PET IMAGE W/CT SKULL-THIGH: CPT | Mod: 26,PS,, | Performed by: STUDENT IN AN ORGANIZED HEALTH CARE EDUCATION/TRAINING PROGRAM

## 2023-09-14 ENCOUNTER — OFFICE VISIT (OUTPATIENT)
Dept: HEMATOLOGY/ONCOLOGY | Facility: CLINIC | Age: 78
End: 2023-09-14
Payer: MEDICARE

## 2023-09-14 ENCOUNTER — INFUSION (OUTPATIENT)
Dept: INFUSION THERAPY | Facility: HOSPITAL | Age: 78
End: 2023-09-14
Attending: INTERNAL MEDICINE
Payer: MEDICARE

## 2023-09-14 VITALS
DIASTOLIC BLOOD PRESSURE: 81 MMHG | BODY MASS INDEX: 33.55 KG/M2 | HEART RATE: 75 BPM | SYSTOLIC BLOOD PRESSURE: 165 MMHG | OXYGEN SATURATION: 99 % | WEIGHT: 189.38 LBS

## 2023-09-14 VITALS
HEIGHT: 63 IN | SYSTOLIC BLOOD PRESSURE: 156 MMHG | HEART RATE: 70 BPM | BODY MASS INDEX: 33.55 KG/M2 | WEIGHT: 189.38 LBS | DIASTOLIC BLOOD PRESSURE: 88 MMHG

## 2023-09-14 DIAGNOSIS — Z79.899 IMMUNODEFICIENCY DUE TO DRUGS: ICD-10-CM

## 2023-09-14 DIAGNOSIS — D84.821 IMMUNODEFICIENCY DUE TO DRUGS: ICD-10-CM

## 2023-09-14 DIAGNOSIS — C74.91 MALIGNANT PHEOCHROMOCYTOMA OF RIGHT ADRENAL GLAND: Primary | ICD-10-CM

## 2023-09-14 DIAGNOSIS — E87.6 HYPOKALEMIA: ICD-10-CM

## 2023-09-14 DIAGNOSIS — D49.9 IMMUNODEFICIENCY SECONDARY TO NEOPLASM: ICD-10-CM

## 2023-09-14 DIAGNOSIS — C74.90 MALIGNANT PHEOCHROMOCYTOMA, UNSPECIFIED LATERALITY: Primary | ICD-10-CM

## 2023-09-14 DIAGNOSIS — I49.5 SICK SINUS SYNDROME: ICD-10-CM

## 2023-09-14 DIAGNOSIS — I50.42 CHRONIC COMBINED SYSTOLIC AND DIASTOLIC CONGESTIVE HEART FAILURE: ICD-10-CM

## 2023-09-14 DIAGNOSIS — E27.1 ADRENAL INSUFFICIENCY, PRIMARY: ICD-10-CM

## 2023-09-14 DIAGNOSIS — C7B.8 SECONDARY NEUROENDOCRINE TUMOR OF DISTANT LYMPH NODES: ICD-10-CM

## 2023-09-14 DIAGNOSIS — E11.9 CONTROLLED TYPE 2 DIABETES MELLITUS WITHOUT COMPLICATION, WITHOUT LONG-TERM CURRENT USE OF INSULIN: ICD-10-CM

## 2023-09-14 DIAGNOSIS — C7B.8 SECONDARY NEUROENDOCRINE TUMOR OF BONE: ICD-10-CM

## 2023-09-14 DIAGNOSIS — I10 ESSENTIAL HYPERTENSION: ICD-10-CM

## 2023-09-14 DIAGNOSIS — D84.81 IMMUNODEFICIENCY SECONDARY TO NEOPLASM: ICD-10-CM

## 2023-09-14 PROCEDURE — 1101F PT FALLS ASSESS-DOCD LE1/YR: CPT | Mod: CPTII,S$GLB,, | Performed by: INTERNAL MEDICINE

## 2023-09-14 PROCEDURE — 3288F PR FALLS RISK ASSESSMENT DOCUMENTED: ICD-10-PCS | Mod: CPTII,S$GLB,, | Performed by: INTERNAL MEDICINE

## 2023-09-14 PROCEDURE — 1159F MED LIST DOCD IN RCRD: CPT | Mod: CPTII,S$GLB,, | Performed by: INTERNAL MEDICINE

## 2023-09-14 PROCEDURE — 99999 PR PBB SHADOW E&M-EST. PATIENT-LVL II: ICD-10-PCS | Mod: PBBFAC,,, | Performed by: INTERNAL MEDICINE

## 2023-09-14 PROCEDURE — 63600175 PHARM REV CODE 636 W HCPCS: Mod: JZ,JG | Performed by: INTERNAL MEDICINE

## 2023-09-14 PROCEDURE — 99215 PR OFFICE/OUTPT VISIT, EST, LEVL V, 40-54 MIN: ICD-10-PCS | Mod: S$GLB,,, | Performed by: INTERNAL MEDICINE

## 2023-09-14 PROCEDURE — 1160F RVW MEDS BY RX/DR IN RCRD: CPT | Mod: CPTII,S$GLB,, | Performed by: INTERNAL MEDICINE

## 2023-09-14 PROCEDURE — 99215 OFFICE O/P EST HI 40 MIN: CPT | Mod: S$GLB,,, | Performed by: INTERNAL MEDICINE

## 2023-09-14 PROCEDURE — 96372 THER/PROPH/DIAG INJ SC/IM: CPT

## 2023-09-14 PROCEDURE — 99999 PR PBB SHADOW E&M-EST. PATIENT-LVL II: CPT | Mod: PBBFAC,,, | Performed by: INTERNAL MEDICINE

## 2023-09-14 PROCEDURE — 3079F DIAST BP 80-89 MM HG: CPT | Mod: CPTII,S$GLB,, | Performed by: INTERNAL MEDICINE

## 2023-09-14 PROCEDURE — 1159F PR MEDICATION LIST DOCUMENTED IN MEDICAL RECORD: ICD-10-PCS | Mod: CPTII,S$GLB,, | Performed by: INTERNAL MEDICINE

## 2023-09-14 PROCEDURE — 3079F PR MOST RECENT DIASTOLIC BLOOD PRESSURE 80-89 MM HG: ICD-10-PCS | Mod: CPTII,S$GLB,, | Performed by: INTERNAL MEDICINE

## 2023-09-14 PROCEDURE — 1101F PR PT FALLS ASSESS DOC 0-1 FALLS W/OUT INJ PAST YR: ICD-10-PCS | Mod: CPTII,S$GLB,, | Performed by: INTERNAL MEDICINE

## 2023-09-14 PROCEDURE — 1160F PR REVIEW ALL MEDS BY PRESCRIBER/CLIN PHARMACIST DOCUMENTED: ICD-10-PCS | Mod: CPTII,S$GLB,, | Performed by: INTERNAL MEDICINE

## 2023-09-14 PROCEDURE — 3288F FALL RISK ASSESSMENT DOCD: CPT | Mod: CPTII,S$GLB,, | Performed by: INTERNAL MEDICINE

## 2023-09-14 PROCEDURE — 3077F PR MOST RECENT SYSTOLIC BLOOD PRESSURE >= 140 MM HG: ICD-10-PCS | Mod: CPTII,S$GLB,, | Performed by: INTERNAL MEDICINE

## 2023-09-14 PROCEDURE — 3077F SYST BP >= 140 MM HG: CPT | Mod: CPTII,S$GLB,, | Performed by: INTERNAL MEDICINE

## 2023-09-14 RX ORDER — LANREOTIDE ACETATE 120 MG/.5ML
120 INJECTION SUBCUTANEOUS ONCE
Status: CANCELLED | OUTPATIENT
Start: 2023-09-14 | End: 2023-09-14

## 2023-09-14 RX ORDER — LANREOTIDE ACETATE 120 MG/.5ML
120 INJECTION SUBCUTANEOUS ONCE
Status: COMPLETED | OUTPATIENT
Start: 2023-09-14 | End: 2023-09-14

## 2023-09-14 RX ADMIN — LANREOTIDE ACETATE 120 MG: 120 INJECTION SUBCUTANEOUS at 09:09

## 2023-09-14 NOTE — NURSING
Pt tolerated Lanreotide injection well, no complaints at this time. No adverse reactions noted. Next appointment scheduled and pt d/c in no acute distress.     29915 Exp Problem Focused - Mod. Complex

## 2023-09-14 NOTE — PROGRESS NOTES
PROGRESS NOTE    Subjective:       Patient ID: Hailey Sawant is a 78 y.o. female.    Chief Complaint: metastatic pheochromocytoma    Diagnosis:  Metastatic pheochromocytoma of the right adrenal gland, with mets to bones, liver    Oncologic History:  1. Ms. Sawant is a 74 yo woman with DM, HTN, CAD s/p MI, A fib and sick sinus syndrome s/p pacemaker in June 2022 (on eliquis since), carotid paraganglioma removed in 11/2018 who initially saw me on 11/28/2022 for further management of metastatic pheochromocytoma. Last seen Dr West in 2018 and Dr Cohen in early 2021. Does not follow oncologist locally. It is my first time seeing her today. Her history dates to 2009 when she was being worked up for abdominal pain and was found to have cholelithiasis. However, during this workup she was found to have bilateral adrenal masses.  She was also noted to be hypertensive.  She had a 6.5 cm mass on the right with some evidence of necrosis and also a mass just under 3 cm on the left.  Biochemical workup was consistent with pheochromocytoma.  In 11/2009 she underwent a bilateral adrenalectomy.  Pathology had revealed a right adrenal gland pheochromocytoma and a left adrenal gland adrenal cortical adenoma.  In 02/2016 she was noted to have recurrent disease to the bone and underwent I 131 MIBG therapy in 03/2016, 07/2016 and 11/2016.  In 10/2016 she underwent a right partial nephrectomy due to recurrent disease.  Pathology did confirm findings consistent with recurrent pheochromocytoma.  In 11/2018 she underwent removal of the right carotid body tumor with pathology showing paraganglioma. She presents today for follow up. She knows she has cancer but does not know it is stage IV. She is not sure if Dr Cohen called her after tumor board or not. She feels well. Had MI in June 2022, treated at [facility], had pacemaker placed. Medically treated. Feeling well  "today. We discussed restaging dotatate scan.   2. Dotatate scan declined by insurance several times. Finally had gallium PET on 23. It showed "New foci of abnormal radiotracer uptake at the cervical spine, sacral, right ilium and right acetabular joint. Interval enlargement of hepatic section 7 hypodense mass. Reviewed at tumor board. Recc PRRT. However unable to get insurance approval for PRRT and admission afterwards to monitor for catecholamine crisis.   3. Lanreotide started on 23. Zometa started 23. Last dose on 23  4. PRRT started on 3/1/23. #2 on 23, # 3 on 23, #4 on 23    Interval History:   Ms Sawant returns for follow up. Feeling okay.  Chronic fatigue. Mild diarrhea 2-3 times a week.     ECO    ROS:   A ten-point system review is obtained and negative except for what was stated in the Interval History.     Physical Examination:   Vital signs reviewed.   General: well hydrated, well developed, in no acute distress, in a wheelchair  HEENT: normocephalic, PERRLA, EOMI, anicteric sclerae  Neck: supple, no JVD, +thyromegaly, soft, no cervical or supraclavicular lymphadenopathy  Lungs: clear breath sounds bilaterally, no wheezing, rales, or rhonchi  Heart: RRR, no M/R/G  Abdomen: soft, no tenderness, non-distended, no hepatosplenomegaly, mass, or hernia. BS present  Extremities: no clubbing, cyanosis, or edema  Skin: no rash, ulcer, or open wounds  Neuro: alert and oriented x 4, no focal neuro deficit  Psych: pleasant and appropriate mood and affect    Objective:     Laboratory Data:  Labs reviewed. K 3.1    Imaging Data:  Gallium PET 23:  New foci of abnormal radiotracer uptake at the cervical spine, sacral, right ilium and right acetabular joint.     Interval enlargement of hepatic section 7 hypodense mass.     Based upon progression of disease and tracer avidity of the patient's tumor burden, the patient would be an appropriate candidate for PRRT if clinically " indicated.     CT neck 4/6/23:  Impression:     1. Status post resection right carotid body tumor.  No recurrent mass identified.  2. 1.5 cm peripherally enhancing nodules lower pole both lobes of the thyroid.    Thyroid US 5/16/23:  Impression:     Multiple thyroid nodules.  1.7 cm TI-RADS 5 left lobe nodule meets ACR criteria for FNA.     Right lobe nodule meets criteria for surveillance with repeat thyroid ultrasound recommended 1 year.      Copper PET 5/25/23:  Impression:     1. Right hepatic lobe lesions and multifocal primarily sclerotic osseous lesions stable in size and number with decreased tracer-avidity.  No new lesions identified.  2. Postsurgical change prior bilateral adrenalectomy.  No tracer uptake within the surgical bed to suggest localized recurrent disease.  3. Other stable incidental findings above.    Copper PET 9/13/23:  FINDINGS:  Quality of the study: Diaphragmatic/respiratory motion and associated misregistration artifact limits assessment of the liver.     In the head and neck, there is physiologic distribution in the pituitary, salivary, and thyroid glands, and there are no tracer avid lesions suspicious for malignancy.     In the chest, there are no tracer avid lesions suspicious for malignancy.     In the abdomen and pelvis, there is similar appearance of radiotracer avid lesions within the right hepatic lobe noting that evaluation is limited due to motion and misregistration artifact.  Dominant right hepatic lobe lesion demonstrates maximum SUV of 46 (previously 44).  Difficult to measure lesions on CT due to artifact.  No new lesion.  There is physiologic uptake in the pancreatic uncinate process, liver, spleen, adrenal glands and  tract.     In the bones, there is stable number and distribution of radiotracer avid lesions throughout the osseous structures including the right scapula, spine, sacrum, right ilium and ischium, and right femur.  Right scapula maximum SUV of 76  (previously 91).  Right ilium maximum SUV of 100 (previously 118).  Left sacrum maximum SUV of 25 (previously 40).  No new lesion.     Additional findings: Left chest wall cardiac pacer and leads noted.  Cardiomegaly.  Diffuse calcific atherosclerosis.  Postsurgical changes of bilateral adrenalectomy and cholecystectomy.  Calcified uterine fibroid.  Colonic diverticulosis.  Left hip arthroplasty hardware results in beam hardening artifact limiting assessment of the pelvis.     Impression:     Stable distribution of radiotracer avid lesions within the liver and bones.  No new lesion.     Motion artifact limits assessment of the liver.     Assessment and Plan:     1. Malignant pheochromocytoma of right adrenal gland    2. Secondary neuroendocrine tumor of distant lymph nodes    3. Secondary neuroendocrine tumor of bone    4. Immunodeficiency due to drugs    5. Immunodeficiency secondary to neoplasm    6. Adrenal insufficiency, primary post-surgical    7. Essential hypertension    8. Controlled type 2 diabetes mellitus without complication, without long-term current use of insulin    9. Sick sinus syndrome    10. Chronic combined systolic and diastolic congestive heart failure    11. Hypokalemia        1-5  - Ms Jese is a 78 yo woman with R adrenal pheochromocytoma s/p bilateral adrenectomy in 11/2009, recurrent disease to the bone in 2016 s/p MIBG in 2016. At our prior visits, we discussed interim progression of cancer in the liver and new lesions in the bones. Discussed lanreotide every 4 weeks, zometa every 3 months and PRRT. Lanreotide started 1/23/2023. Zometa started 2/20/23.   - PRRT #1 on 3/1/23. #2 on 4/26/23, #3 on 6/21/23, #4 on 8/16/23  - reviewed PET scan report with patient and sister. Limited by artifact but overall stable distribution of disease  - c/w lanreotide every 4 weeks. Will give today.   - last zometa on 8/17/23, next due in Nov 2023  - PET scan in 6 months    6.  - c/w cortef    7.  - BP has  been elevated recently. Reviewed patient's med list. She takes cardura every day but takes long acting Toprol 25 mg once a day and a short acting metoprolol 25 mg bid. Asked patient to call cardiologist's office for close follow up to adjust BP medications.     8.  - BS controlled  - c/w current medication    9.10  - f/u with Dr Loya at [facility]    11.  - 2/2 lasix. She does not take potassium  - asked patient to take two potassium 20 mEq today. She has pills at home    Follow-up:     Return in one month  Knows to call in the interval if any problems arise.    Electronically signed by Ema Chowdhury      Route Chart for Scheduling  Med Onc Route Chart for Scheduling  Therapy Plan Information  LANREOTIDE  5. Medications  lanreotide injection 120 mg  120 mg, Subcutaneous, Every visit    ZOLEDRONIC ACID (ZOMETA) IV  Medications  zoledronic acid-mannitoL-water 4 mg/100 mL IVPB (premix) 4 mg  4 mg, Intravenous, Every 4 weeks  Flushes  sodium chloride 0.9% 250 mL flush bag  Intravenous, Every 4 weeks  sodium chloride 0.9% flush 10 mL  10 mL, Intravenous, Every 4 weeks  heparin, porcine (PF) 100 unit/mL injection flush 500 Units  500 Units, Intravenous, Every 4 weeks  alteplase injection 2 mg  2 mg, Intra-Catheter, Every 4 weeks

## 2023-09-15 ENCOUNTER — TELEPHONE (OUTPATIENT)
Dept: ENDOCRINOLOGY | Facility: CLINIC | Age: 78
End: 2023-09-15
Payer: MEDICARE

## 2023-09-15 NOTE — TELEPHONE ENCOUNTER
----- Message from Gala Brantley sent at 9/15/2023  9:22 AM CDT -----  Regarding: pt advice  Contact: Michelle  [phone]  Michelle is calling from [employer] to verify that clinic has received letter of medical necessity. Reply can be faxed to [phone]. Please call to advise further. Thank you for all you are doing.

## 2023-09-18 ENCOUNTER — TELEPHONE (OUTPATIENT)
Dept: ENDOCRINOLOGY | Facility: CLINIC | Age: 78
End: 2023-09-18
Payer: MEDICARE

## 2023-09-18 NOTE — TELEPHONE ENCOUNTER
----- Message from Felipe Brewster sent at 9/18/2023 12:38 PM CDT -----  Regarding: Advisement  Contact: @[phone]  ELIZ is calling on behalf of the patient to get her office notes and to speak to someone about the patient. Please call and advise @[phone]

## 2023-09-26 NOTE — PROGRESS NOTES
[facility]      NEUROENDOCRINE MULTIDISCIPLINARY TUMOR BOARD  _____________________________________________________________________    PRESENTER:   Ema Chowdhury MD    REASON FOR PRESENTATION:  Scan Review    ATTENDEES:   Gastroenterology - Not Present  Interventional Radiology - Edwin Chandler MD  Nuclear Medicine - Keenan Clifford MD  Nursing - [facility] Nursing: Cecile Sutherland, RN and Kristi Wyatt RN  Oncology - Ema Chowdhury MD and Jared Hernandez MD  Palliative Medicine - Not Present  Pathology -  Clau Gamez MD and Ajay Michael MD  Research - Not Present  Surgery - MD Christiano Jasmine MD Dr. William Morano    PATIENT STATUS:  Established Patient    PATIENT SUMMARY:  Past Medical History:   Diagnosis Date    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    Diabetes mellitus     Hypertension     Lumbar spondylosis 6/8/2018    Malignant pheochromocytoma     Pheochromocytoma     Pheochromocytoma, malignant 01/10/2016    Pheochromocytoma, malignant     Sacroiliac joint pain 7/11/2018    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/17/2018    Thyroid disease        Past Surgical History:   Procedure Laterality Date    ADRENALECTOMY      ANGIOGRAPHY N/A 11/15/2018    Procedure: ANGIOGRAM;  Surgeon: [facility] Diagnostic Provider;  Location: [facility] OR [address] [place] [address];  Service: Radiology;  Laterality: N/A;    APPENDECTOMY      DISSECTION OF NECK Right 11/16/2018    Procedure: DISSECTION, NECK to remove right carotid body tumor;  Surgeon: Noah Ca MD;  Location: [facility] OR [address] [place] [address];  Service: ENT;  Laterality: Right;    INJECTION OF ANESTHETIC AGENT INTO SACROILIAC JOINT Right 6/26/2018    Procedure: BLOCK, SACROILIAC JOINT;  Surgeon: Sydney Reynoso MD;  Location: [facility] PAIN MGT;  Service: Pain Management;  Laterality: Right;  Right SI Joint Injection    [address]    NEEDS CONSENT    LEFT HEART CATHETERIZATION Left 7/18/2018    Procedure:  CATHETERIZATION, HEART, LEFT;  Surgeon: Moustapha Vasquez MD;  Location: [facility] CATH LAB;  Service: Cardiovascular;  Laterality: Left;    MIBG Therapy  3/2016, 7/2016, 12/2016    [facility] - Dr. Martines       TUMOR MARKERS:  5-HIAA, Plasma Ref Range: up to 22 ng/mL      Chromogranin A Ref Range: <93 ng/mL  Recent Labs   Lab 11/11/22  0959   Chromogranin A 229 H     Gastrin Ref Range: <100 pg/mL      Neurokinin A Ref Range: 0 - 40 pg/mL      Pancreastatin Ref Range: 10 - 135 pg/mL  Recent Labs   Lab 04/26/21  1017   Pancreastatin 66     Pancreatic Polypeptide Ref Range: >314 pg/mL      Serotonin Ref Range: <=230 ng/mL          ____________________________________________________________________    DISCUSSION: 76 yo woman with R adrenal pheochromocytoma s/p bilateral adrenectomy in 11/2009, recurrent disease to the bone in 2016 s/p MIBG in 2016. Lost for follow up due to no insurance. Re-established care at the end of 2022. Lanreotide started 1/23/2023. Zometa started 2/20/23. PRRT completed on 8/16/23. Eval PET scan.    INT RAD: Defer to Dr. Clifford    NUC MED: Stable tracer avid disease throughout liver and bone    BOARD RECOMMENDATIONS: stable disease, restaging in 6 months

## 2023-09-28 ENCOUNTER — TUMOR BOARD CONFERENCE (OUTPATIENT)
Dept: HEMATOLOGY/ONCOLOGY | Facility: CLINIC | Age: 78
End: 2023-09-28
Payer: MEDICARE

## 2023-10-03 ENCOUNTER — TELEPHONE (OUTPATIENT)
Dept: ENDOCRINOLOGY | Facility: CLINIC | Age: 78
End: 2023-10-03
Payer: MEDICARE

## 2023-10-03 NOTE — TELEPHONE ENCOUNTER
----- Message from Lucina BRYAN Route sent at 10/3/2023 11:34 AM CDT -----  Regarding: Peer To Peer  Contact: Callie [phone]  Callie Razo is calling in ref to a Peer to Peer with provider. Best Call Back Number: Callie [phone]

## 2023-10-03 NOTE — TELEPHONE ENCOUNTER
Tanvi Ledesma call about Ms. Sawant and she stated that she will have the insurance company call him to do a peer to peer

## 2023-10-11 ENCOUNTER — TELEPHONE (OUTPATIENT)
Dept: HEMATOLOGY/ONCOLOGY | Facility: CLINIC | Age: 78
End: 2023-10-11
Payer: MEDICARE

## 2023-10-12 ENCOUNTER — INFUSION (OUTPATIENT)
Dept: INFUSION THERAPY | Facility: HOSPITAL | Age: 78
End: 2023-10-12
Attending: INTERNAL MEDICINE
Payer: MEDICARE

## 2023-10-12 ENCOUNTER — OFFICE VISIT (OUTPATIENT)
Dept: HEMATOLOGY/ONCOLOGY | Facility: CLINIC | Age: 78
End: 2023-10-12
Payer: MEDICARE

## 2023-10-12 VITALS
DIASTOLIC BLOOD PRESSURE: 66 MMHG | SYSTOLIC BLOOD PRESSURE: 117 MMHG | BODY MASS INDEX: 33.19 KG/M2 | WEIGHT: 187.38 LBS | HEART RATE: 87 BPM | OXYGEN SATURATION: 92 %

## 2023-10-12 VITALS
SYSTOLIC BLOOD PRESSURE: 120 MMHG | WEIGHT: 187.38 LBS | OXYGEN SATURATION: 97 % | DIASTOLIC BLOOD PRESSURE: 78 MMHG | HEART RATE: 87 BPM | HEIGHT: 63 IN | BODY MASS INDEX: 33.2 KG/M2

## 2023-10-12 DIAGNOSIS — I10 ESSENTIAL HYPERTENSION: ICD-10-CM

## 2023-10-12 DIAGNOSIS — D84.821 IMMUNODEFICIENCY DUE TO DRUGS: ICD-10-CM

## 2023-10-12 DIAGNOSIS — C7B.8 SECONDARY NEUROENDOCRINE TUMOR OF BONE: ICD-10-CM

## 2023-10-12 DIAGNOSIS — I49.5 SICK SINUS SYNDROME: ICD-10-CM

## 2023-10-12 DIAGNOSIS — C74.90 MALIGNANT PHEOCHROMOCYTOMA, UNSPECIFIED LATERALITY: Primary | ICD-10-CM

## 2023-10-12 DIAGNOSIS — C7B.8 SECONDARY NEUROENDOCRINE TUMOR OF DISTANT LYMPH NODES: ICD-10-CM

## 2023-10-12 DIAGNOSIS — D49.9 IMMUNODEFICIENCY SECONDARY TO NEOPLASM: ICD-10-CM

## 2023-10-12 DIAGNOSIS — C74.91 MALIGNANT PHEOCHROMOCYTOMA OF RIGHT ADRENAL GLAND: Primary | ICD-10-CM

## 2023-10-12 DIAGNOSIS — E27.1 ADRENAL INSUFFICIENCY, PRIMARY: ICD-10-CM

## 2023-10-12 DIAGNOSIS — I50.42 CHRONIC COMBINED SYSTOLIC AND DIASTOLIC CONGESTIVE HEART FAILURE: ICD-10-CM

## 2023-10-12 DIAGNOSIS — D84.81 IMMUNODEFICIENCY SECONDARY TO NEOPLASM: ICD-10-CM

## 2023-10-12 DIAGNOSIS — Z79.899 IMMUNODEFICIENCY DUE TO DRUGS: ICD-10-CM

## 2023-10-12 DIAGNOSIS — E11.9 CONTROLLED TYPE 2 DIABETES MELLITUS WITHOUT COMPLICATION, WITHOUT LONG-TERM CURRENT USE OF INSULIN: ICD-10-CM

## 2023-10-12 PROCEDURE — 99999 PR PBB SHADOW E&M-EST. PATIENT-LVL II: ICD-10-PCS | Mod: PBBFAC,,, | Performed by: INTERNAL MEDICINE

## 2023-10-12 PROCEDURE — 1126F AMNT PAIN NOTED NONE PRSNT: CPT | Mod: CPTII,S$GLB,, | Performed by: INTERNAL MEDICINE

## 2023-10-12 PROCEDURE — 3074F SYST BP LT 130 MM HG: CPT | Mod: CPTII,S$GLB,, | Performed by: INTERNAL MEDICINE

## 2023-10-12 PROCEDURE — 1159F PR MEDICATION LIST DOCUMENTED IN MEDICAL RECORD: ICD-10-PCS | Mod: CPTII,S$GLB,, | Performed by: INTERNAL MEDICINE

## 2023-10-12 PROCEDURE — 1101F PR PT FALLS ASSESS DOC 0-1 FALLS W/OUT INJ PAST YR: ICD-10-PCS | Mod: CPTII,S$GLB,, | Performed by: INTERNAL MEDICINE

## 2023-10-12 PROCEDURE — 63600175 PHARM REV CODE 636 W HCPCS: Mod: JZ,JG | Performed by: INTERNAL MEDICINE

## 2023-10-12 PROCEDURE — 1160F RVW MEDS BY RX/DR IN RCRD: CPT | Mod: CPTII,S$GLB,, | Performed by: INTERNAL MEDICINE

## 2023-10-12 PROCEDURE — 3078F DIAST BP <80 MM HG: CPT | Mod: CPTII,S$GLB,, | Performed by: INTERNAL MEDICINE

## 2023-10-12 PROCEDURE — 1126F PR PAIN SEVERITY QUANTIFIED, NO PAIN PRESENT: ICD-10-PCS | Mod: CPTII,S$GLB,, | Performed by: INTERNAL MEDICINE

## 2023-10-12 PROCEDURE — 1159F MED LIST DOCD IN RCRD: CPT | Mod: CPTII,S$GLB,, | Performed by: INTERNAL MEDICINE

## 2023-10-12 PROCEDURE — 3288F FALL RISK ASSESSMENT DOCD: CPT | Mod: CPTII,S$GLB,, | Performed by: INTERNAL MEDICINE

## 2023-10-12 PROCEDURE — 1160F PR REVIEW ALL MEDS BY PRESCRIBER/CLIN PHARMACIST DOCUMENTED: ICD-10-PCS | Mod: CPTII,S$GLB,, | Performed by: INTERNAL MEDICINE

## 2023-10-12 PROCEDURE — 1101F PT FALLS ASSESS-DOCD LE1/YR: CPT | Mod: CPTII,S$GLB,, | Performed by: INTERNAL MEDICINE

## 2023-10-12 PROCEDURE — 3078F PR MOST RECENT DIASTOLIC BLOOD PRESSURE < 80 MM HG: ICD-10-PCS | Mod: CPTII,S$GLB,, | Performed by: INTERNAL MEDICINE

## 2023-10-12 PROCEDURE — 99215 OFFICE O/P EST HI 40 MIN: CPT | Mod: S$GLB,,, | Performed by: INTERNAL MEDICINE

## 2023-10-12 PROCEDURE — 99999 PR PBB SHADOW E&M-EST. PATIENT-LVL II: CPT | Mod: PBBFAC,,, | Performed by: INTERNAL MEDICINE

## 2023-10-12 PROCEDURE — 96372 THER/PROPH/DIAG INJ SC/IM: CPT

## 2023-10-12 PROCEDURE — 3288F PR FALLS RISK ASSESSMENT DOCUMENTED: ICD-10-PCS | Mod: CPTII,S$GLB,, | Performed by: INTERNAL MEDICINE

## 2023-10-12 PROCEDURE — 99215 PR OFFICE/OUTPT VISIT, EST, LEVL V, 40-54 MIN: ICD-10-PCS | Mod: S$GLB,,, | Performed by: INTERNAL MEDICINE

## 2023-10-12 PROCEDURE — 3074F PR MOST RECENT SYSTOLIC BLOOD PRESSURE < 130 MM HG: ICD-10-PCS | Mod: CPTII,S$GLB,, | Performed by: INTERNAL MEDICINE

## 2023-10-12 RX ORDER — LANREOTIDE ACETATE 120 MG/.5ML
120 INJECTION SUBCUTANEOUS ONCE
Status: CANCELLED | OUTPATIENT
Start: 2023-10-12 | End: 2023-10-12

## 2023-10-12 RX ORDER — LANREOTIDE ACETATE 120 MG/.5ML
120 INJECTION SUBCUTANEOUS ONCE
Status: COMPLETED | OUTPATIENT
Start: 2023-10-12 | End: 2023-10-12

## 2023-10-12 RX ADMIN — LANREOTIDE ACETATE 120 MG: 120 INJECTION SUBCUTANEOUS at 11:10

## 2023-10-12 NOTE — PROGRESS NOTES
PROGRESS NOTE    Subjective:       Patient ID: Hailey Sawant is a 78 y.o. female.    Chief Complaint: metastatic pheochromocytoma    Diagnosis:  Metastatic pheochromocytoma of the right adrenal gland, with mets to bones, liver    Oncologic History:  1. Ms. Sawant is a 72 yo woman with DM, HTN, CAD s/p MI, A fib and sick sinus syndrome s/p pacemaker in June 2022 (on eliquis since), carotid paraganglioma removed in 11/2018 who initially saw me on 11/28/2022 for further management of metastatic pheochromocytoma. Last seen Dr West in 2018 and Dr Cohen in early 2021. Does not follow oncologist locally. It is my first time seeing her today. Her history dates to 2009 when she was being worked up for abdominal pain and was found to have cholelithiasis. However, during this workup she was found to have bilateral adrenal masses.  She was also noted to be hypertensive.  She had a 6.5 cm mass on the right with some evidence of necrosis and also a mass just under 3 cm on the left.  Biochemical workup was consistent with pheochromocytoma.  In 11/2009 she underwent a bilateral adrenalectomy.  Pathology had revealed a right adrenal gland pheochromocytoma and a left adrenal gland adrenal cortical adenoma.  In 02/2016 she was noted to have recurrent disease to the bone and underwent I 131 MIBG therapy in 03/2016, 07/2016 and 11/2016.  In 10/2016 she underwent a right partial nephrectomy due to recurrent disease.  Pathology did confirm findings consistent with recurrent pheochromocytoma.  In 11/2018 she underwent removal of the right carotid body tumor with pathology showing paraganglioma. She presents today for follow up. She knows she has cancer but does not know it is stage IV. She is not sure if Dr Cohen called her after tumor board or not. She feels well. Had MI in June 2022, treated at [facility], had pacemaker placed. Medically treated. Feeling well  "today. We discussed restaging dotatate scan.   2. Dotatate scan declined by insurance several times. Finally had gallium PET on 23. It showed "New foci of abnormal radiotracer uptake at the cervical spine, sacral, right ilium and right acetabular joint. Interval enlargement of hepatic section 7 hypodense mass. [facility] tumor board[facility] However unable to get insurance approval for PRRT and admission afterwards to monitor for catecholamine crisis.   3. Lanreotide started on 23. Zometa started 23. Last dose on 23  4. PRRT started on 3/1/23. #2 on 23, # 3 on 23, #4 on 23    Interval History:   Ms Sawant returns for follow up. Feeling okay.     ECO    ROS:   A ten-point system review is obtained and negative except for what was stated in the Interval History.     Physical Examination:   Vital signs reviewed.   General: well hydrated, well developed, in no acute distress, using a walker  HEENT: normocephalic, PERRLA, EOMI, anicteric sclerae  Neck: supple, no JVD, +thyromegaly, soft, no cervical or supraclavicular lymphadenopathy  Lungs: clear breath sounds bilaterally, no wheezing, rales, or rhonchi  Heart: RRR, no M/R/G  Abdomen: soft, no tenderness, non-distended, no hepatosplenomegaly, mass, or hernia. BS present  Extremities: no clubbing, cyanosis, or edema  Skin: no rash, ulcer, or open wounds  Neuro: alert and oriented x 4, no focal neuro deficit  Psych: pleasant and appropriate mood and affect    Objective:     Laboratory Data:  Labs reviewed. CBC, CMP unremarkable    Imaging Data:  Gallium PET 23:  New foci of abnormal radiotracer uptake at the cervical spine, sacral, right ilium and right acetabular joint.     Interval enlargement of hepatic section 7 hypodense mass.     Based upon progression of disease and tracer avidity of the patient's tumor burden, the patient would be an appropriate candidate for PRRT if clinically indicated.     CT neck " 4/6/23:  Impression:     1. Status post resection right carotid body tumor.  No recurrent mass identified.  2. 1.5 cm peripherally enhancing nodules lower pole both lobes of the thyroid.    Thyroid US 5/16/23:  Impression:     Multiple thyroid nodules.  1.7 cm TI-RADS 5 left lobe nodule meets ACR criteria for FNA.     Right lobe nodule meets criteria for surveillance with repeat thyroid ultrasound recommended 1 year.      Copper PET 5/25/23:  Impression:     1. Right hepatic lobe lesions and multifocal primarily sclerotic osseous lesions stable in size and number with decreased tracer-avidity.  No new lesions identified.  2. Postsurgical change prior bilateral adrenalectomy.  No tracer uptake within the surgical bed to suggest localized recurrent disease.  3. Other stable incidental findings above.    Copper PET 9/13/23:  FINDINGS:  Quality of the study: Diaphragmatic/respiratory motion and associated misregistration artifact limits assessment of the liver.     In the head and neck, there is physiologic distribution in the pituitary, salivary, and thyroid glands, and there are no tracer avid lesions suspicious for malignancy.     In the chest, there are no tracer avid lesions suspicious for malignancy.     In the abdomen and pelvis, there is similar appearance of radiotracer avid lesions within the right hepatic lobe noting that evaluation is limited due to motion and misregistration artifact.  Dominant right hepatic lobe lesion demonstrates maximum SUV of 46 (previously 44).  Difficult to measure lesions on CT due to artifact.  No new lesion.  There is physiologic uptake in the pancreatic uncinate process, liver, spleen, adrenal glands and  tract.     In the bones, there is stable number and distribution of radiotracer avid lesions throughout the osseous structures including the right scapula, spine, sacrum, right ilium and ischium, and right femur.  Right scapula maximum SUV of 76 (previously 91).  Right ilium  maximum SUV of 100 (previously 118).  Left sacrum maximum SUV of 25 (previously 40).  No new lesion.     Additional findings: Left chest wall cardiac pacer and leads noted.  Cardiomegaly.  Diffuse calcific atherosclerosis.  Postsurgical changes of bilateral adrenalectomy and cholecystectomy.  Calcified uterine fibroid.  Colonic diverticulosis.  Left hip arthroplasty hardware results in beam hardening artifact limiting assessment of the pelvis.     Impression:     Stable distribution of radiotracer avid lesions within the liver and bones.  No new lesion.     Motion artifact limits assessment of the liver.     Assessment and Plan:     1. Malignant pheochromocytoma of right adrenal gland    2. Secondary neuroendocrine tumor of distant lymph nodes    3. Secondary neuroendocrine tumor of bone    4. Immunodeficiency due to drugs    5. Immunodeficiency secondary to neoplasm    6. Adrenal insufficiency, primary post-surgical    7. Essential hypertension    8. Controlled type 2 diabetes mellitus without complication, without long-term current use of insulin    9. Sick sinus syndrome    10. Chronic combined systolic and diastolic congestive heart failure      1-5  - Ms Jese is a 78 yo woman with R adrenal pheochromocytoma s/p bilateral adrenectomy in 11/2009, recurrent disease to the bone in 2016 s/p MIBG in 2016. At our prior visits, we discussed interim progression of cancer in the liver and new lesions in the bones. Discussed lanreotide every 4 weeks, zometa every 3 months and PRRT. Lanreotide started 1/23/2023. Zometa started 2/20/23.   - PRRT #1 on 3/1/23. #2 on 4/26/23, #3 on 6/21/23, #4 on 8/16/23  - last PET scan in Sep 2023 showed stable disease  - c/w lanreotide every 4 weeks. Will give today.   - last zometa on 8/17/23, next due in Nov 2023  - PET scan in March 2024    6.  - c/w cortef  - f/u with endocrinology    7.  - BP controlled  - c/w current medication    8.  - BS controlled  - c/w current  medication    9.10  - f/u with Dr Loya at [facility]    Follow-up:     Return in one month  Knows to call in the interval if any problems arise.    Electronically signed by Ema Chowdhury      Route Chart for Scheduling  Med Onc Route Chart for Scheduling  Therapy Plan Information  LANREOTIDE  5. Medications  lanreotide injection 120 mg  120 mg, Subcutaneous, Every visit    ZOLEDRONIC ACID (ZOMETA) IV  Medications  zoledronic acid-mannitoL-water 4 mg/100 mL IVPB (premix) 4 mg  4 mg, Intravenous, Every 4 weeks  Flushes  sodium chloride 0.9% 250 mL flush bag  Intravenous, Every 4 weeks  sodium chloride 0.9% flush 10 mL  10 mL, Intravenous, Every 4 weeks  heparin, porcine (PF) 100 unit/mL injection flush 500 Units  500 Units, Intravenous, Every 4 weeks  alteplase injection 2 mg  2 mg, Intra-Catheter, Every 4 weeks

## 2023-11-09 ENCOUNTER — INFUSION (OUTPATIENT)
Dept: INFUSION THERAPY | Facility: HOSPITAL | Age: 78
End: 2023-11-09
Attending: INTERNAL MEDICINE
Payer: MEDICARE

## 2023-11-09 ENCOUNTER — OFFICE VISIT (OUTPATIENT)
Dept: HEMATOLOGY/ONCOLOGY | Facility: CLINIC | Age: 78
End: 2023-11-09
Payer: MEDICARE

## 2023-11-09 VITALS
RESPIRATION RATE: 18 BRPM | WEIGHT: 187.81 LBS | OXYGEN SATURATION: 95 % | WEIGHT: 187.81 LBS | BODY MASS INDEX: 33.27 KG/M2 | BODY MASS INDEX: 33.28 KG/M2 | HEART RATE: 76 BPM | SYSTOLIC BLOOD PRESSURE: 143 MMHG | TEMPERATURE: 98 F | DIASTOLIC BLOOD PRESSURE: 84 MMHG | HEART RATE: 76 BPM | HEIGHT: 63 IN | SYSTOLIC BLOOD PRESSURE: 143 MMHG | DIASTOLIC BLOOD PRESSURE: 84 MMHG | OXYGEN SATURATION: 95 %

## 2023-11-09 DIAGNOSIS — C7B.8 SECONDARY NEUROENDOCRINE TUMOR OF BONE: ICD-10-CM

## 2023-11-09 DIAGNOSIS — D49.9 IMMUNODEFICIENCY SECONDARY TO NEOPLASM: ICD-10-CM

## 2023-11-09 DIAGNOSIS — E27.1 ADRENAL INSUFFICIENCY, PRIMARY: ICD-10-CM

## 2023-11-09 DIAGNOSIS — D84.821 IMMUNODEFICIENCY DUE TO DRUGS: ICD-10-CM

## 2023-11-09 DIAGNOSIS — C74.91 MALIGNANT PHEOCHROMOCYTOMA OF RIGHT ADRENAL GLAND: ICD-10-CM

## 2023-11-09 DIAGNOSIS — C79.51 SECONDARY MALIGNANT NEOPLASM OF BONE: ICD-10-CM

## 2023-11-09 DIAGNOSIS — D84.81 IMMUNODEFICIENCY SECONDARY TO NEOPLASM: ICD-10-CM

## 2023-11-09 DIAGNOSIS — Z79.899 IMMUNODEFICIENCY DUE TO DRUGS: ICD-10-CM

## 2023-11-09 DIAGNOSIS — C74.90 MALIGNANT PHEOCHROMOCYTOMA, UNSPECIFIED LATERALITY: Primary | ICD-10-CM

## 2023-11-09 DIAGNOSIS — E11.9 CONTROLLED TYPE 2 DIABETES MELLITUS WITHOUT COMPLICATION, WITHOUT LONG-TERM CURRENT USE OF INSULIN: ICD-10-CM

## 2023-11-09 DIAGNOSIS — I49.5 SICK SINUS SYNDROME: ICD-10-CM

## 2023-11-09 DIAGNOSIS — C7B.8 SECONDARY NEUROENDOCRINE TUMOR OF DISTANT LYMPH NODES: ICD-10-CM

## 2023-11-09 DIAGNOSIS — C74.91 MALIGNANT PHEOCHROMOCYTOMA OF RIGHT ADRENAL GLAND: Primary | ICD-10-CM

## 2023-11-09 DIAGNOSIS — I10 ESSENTIAL HYPERTENSION: ICD-10-CM

## 2023-11-09 DIAGNOSIS — I50.42 CHRONIC COMBINED SYSTOLIC AND DIASTOLIC CONGESTIVE HEART FAILURE: ICD-10-CM

## 2023-11-09 PROCEDURE — 1101F PT FALLS ASSESS-DOCD LE1/YR: CPT | Mod: CPTII,S$GLB,, | Performed by: INTERNAL MEDICINE

## 2023-11-09 PROCEDURE — 99999 PR PBB SHADOW E&M-EST. PATIENT-LVL II: ICD-10-PCS | Mod: PBBFAC,,, | Performed by: INTERNAL MEDICINE

## 2023-11-09 PROCEDURE — 99215 OFFICE O/P EST HI 40 MIN: CPT | Mod: S$GLB,,, | Performed by: INTERNAL MEDICINE

## 2023-11-09 PROCEDURE — 1160F PR REVIEW ALL MEDS BY PRESCRIBER/CLIN PHARMACIST DOCUMENTED: ICD-10-PCS | Mod: CPTII,S$GLB,, | Performed by: INTERNAL MEDICINE

## 2023-11-09 PROCEDURE — 1159F MED LIST DOCD IN RCRD: CPT | Mod: CPTII,S$GLB,, | Performed by: INTERNAL MEDICINE

## 2023-11-09 PROCEDURE — 63600175 PHARM REV CODE 636 W HCPCS: Mod: JZ,JG | Performed by: INTERNAL MEDICINE

## 2023-11-09 PROCEDURE — 99215 PR OFFICE/OUTPT VISIT, EST, LEVL V, 40-54 MIN: ICD-10-PCS | Mod: S$GLB,,, | Performed by: INTERNAL MEDICINE

## 2023-11-09 PROCEDURE — 3079F PR MOST RECENT DIASTOLIC BLOOD PRESSURE 80-89 MM HG: ICD-10-PCS | Mod: CPTII,S$GLB,, | Performed by: INTERNAL MEDICINE

## 2023-11-09 PROCEDURE — 3077F PR MOST RECENT SYSTOLIC BLOOD PRESSURE >= 140 MM HG: ICD-10-PCS | Mod: CPTII,S$GLB,, | Performed by: INTERNAL MEDICINE

## 2023-11-09 PROCEDURE — 96372 THER/PROPH/DIAG INJ SC/IM: CPT

## 2023-11-09 PROCEDURE — A4216 STERILE WATER/SALINE, 10 ML: HCPCS | Performed by: INTERNAL MEDICINE

## 2023-11-09 PROCEDURE — 99999 PR PBB SHADOW E&M-EST. PATIENT-LVL II: CPT | Mod: PBBFAC,,, | Performed by: INTERNAL MEDICINE

## 2023-11-09 PROCEDURE — 1101F PR PT FALLS ASSESS DOC 0-1 FALLS W/OUT INJ PAST YR: ICD-10-PCS | Mod: CPTII,S$GLB,, | Performed by: INTERNAL MEDICINE

## 2023-11-09 PROCEDURE — 3079F DIAST BP 80-89 MM HG: CPT | Mod: CPTII,S$GLB,, | Performed by: INTERNAL MEDICINE

## 2023-11-09 PROCEDURE — 96365 THER/PROPH/DIAG IV INF INIT: CPT

## 2023-11-09 PROCEDURE — 1160F RVW MEDS BY RX/DR IN RCRD: CPT | Mod: CPTII,S$GLB,, | Performed by: INTERNAL MEDICINE

## 2023-11-09 PROCEDURE — 3288F PR FALLS RISK ASSESSMENT DOCUMENTED: ICD-10-PCS | Mod: CPTII,S$GLB,, | Performed by: INTERNAL MEDICINE

## 2023-11-09 PROCEDURE — 1159F PR MEDICATION LIST DOCUMENTED IN MEDICAL RECORD: ICD-10-PCS | Mod: CPTII,S$GLB,, | Performed by: INTERNAL MEDICINE

## 2023-11-09 PROCEDURE — 3077F SYST BP >= 140 MM HG: CPT | Mod: CPTII,S$GLB,, | Performed by: INTERNAL MEDICINE

## 2023-11-09 PROCEDURE — 3288F FALL RISK ASSESSMENT DOCD: CPT | Mod: CPTII,S$GLB,, | Performed by: INTERNAL MEDICINE

## 2023-11-09 PROCEDURE — 25000003 PHARM REV CODE 250: Performed by: INTERNAL MEDICINE

## 2023-11-09 RX ORDER — ZOLEDRONIC ACID 4 MG/100ML
4 SOLUTION INTRAVENOUS
Status: CANCELLED | OUTPATIENT
Start: 2023-11-30

## 2023-11-09 RX ORDER — ZOLEDRONIC ACID 4 MG/100ML
4 SOLUTION INTRAVENOUS
Status: DISCONTINUED | OUTPATIENT
Start: 2023-11-09 | End: 2023-11-09 | Stop reason: RX

## 2023-11-09 RX ORDER — HEPARIN 100 UNIT/ML
500 SYRINGE INTRAVENOUS
Status: CANCELLED | OUTPATIENT
Start: 2023-11-30

## 2023-11-09 RX ORDER — SODIUM CHLORIDE 0.9 % (FLUSH) 0.9 %
10 SYRINGE (ML) INJECTION
Status: CANCELLED | OUTPATIENT
Start: 2023-11-30

## 2023-11-09 RX ORDER — LANREOTIDE ACETATE 120 MG/.5ML
120 INJECTION SUBCUTANEOUS ONCE
Status: CANCELLED | OUTPATIENT
Start: 2023-11-09 | End: 2023-11-09

## 2023-11-09 RX ORDER — SODIUM CHLORIDE 0.9 % (FLUSH) 0.9 %
10 SYRINGE (ML) INJECTION
Status: DISCONTINUED | OUTPATIENT
Start: 2023-11-09 | End: 2023-11-09 | Stop reason: HOSPADM

## 2023-11-09 RX ORDER — LANREOTIDE ACETATE 120 MG/.5ML
120 INJECTION SUBCUTANEOUS ONCE
Status: COMPLETED | OUTPATIENT
Start: 2023-11-09 | End: 2023-11-09

## 2023-11-09 RX ADMIN — SODIUM CHLORIDE: 9 INJECTION, SOLUTION INTRAVENOUS at 11:11

## 2023-11-09 RX ADMIN — ZOLEDRONIC ACID 3.5 MG: 4 INJECTION, SOLUTION, CONCENTRATE INTRAVENOUS at 11:11

## 2023-11-09 RX ADMIN — LANREOTIDE ACETATE 120 MG: 120 INJECTION SUBCUTANEOUS at 12:11

## 2023-11-09 RX ADMIN — Medication 10 ML: at 12:11

## 2023-11-09 NOTE — NURSING
Pt tolerated Zometa IVPB infusion well.  And also received Lanreotide Injection No adverse reaction noted.   IV flushed with NS and D/C per protocol.  Patient left clinic in no acute distress.

## 2023-11-09 NOTE — PROGRESS NOTES
PROGRESS NOTE    Subjective:       Patient ID: Hailey Sawant is a 78 y.o. female.    Chief Complaint: metastatic pheochromocytoma    Diagnosis:  Metastatic pheochromocytoma of the right adrenal gland, with mets to bones, liver    Oncologic History:  1. Ms. Sawant is a 72 yo woman with DM, HTN, CAD s/p MI, A fib and sick sinus syndrome s/p pacemaker in June 2022 (on eliquis since), carotid paraganglioma removed in 11/2018 who initially saw me on 11/28/2022 for further management of metastatic pheochromocytoma. Last seen Dr West in 2018 and Dr Cohen in early 2021. Does not follow oncologist locally. It is my first time seeing her today. Her history dates to 2009 when she was being worked up for abdominal pain and was found to have cholelithiasis. However, during this workup she was found to have bilateral adrenal masses.  She was also noted to be hypertensive.  She had a 6.5 cm mass on the right with some evidence of necrosis and also a mass just under 3 cm on the left.  Biochemical workup was consistent with pheochromocytoma.  In 11/2009 she underwent a bilateral adrenalectomy.  Pathology had revealed a right adrenal gland pheochromocytoma and a left adrenal gland adrenal cortical adenoma.  In 02/2016 she was noted to have recurrent disease to the bone and underwent I 131 MIBG therapy in 03/2016, 07/2016 and 11/2016.  In 10/2016 she underwent a right partial nephrectomy due to recurrent disease.  Pathology did confirm findings consistent with recurrent pheochromocytoma.  In 11/2018 she underwent removal of the right carotid body tumor with pathology showing paraganglioma. She presents today for follow up. She knows she has cancer but does not know it is stage IV. She is not sure if Dr Cohen called her after tumor board or not. She feels well. Had MI in June 2022, treated at [facility], had pacemaker placed. Medically treated. Feeling well  "today. We discussed restaging dotatate scan.   2. Dotatate scan declined by insurance several times. Finally had gallium PET on 23. It showed "New foci of abnormal radiotracer uptake at the cervical spine, sacral, right ilium and right acetabular joint. Interval enlargement of hepatic section 7 hypodense mass. [facility] tumor board[facility] However unable to get insurance approval for PRRT and admission afterwards to monitor for catecholamine crisis.   3. Lanreotide started on 23. Zometa started 23. Last dose on 23  4. PRRT started on 3/1/23. #2 on 23, # 3 on 23, #4 on 23    Interval History:   Ms Saawnt returns for follow up. Feeling good today. No complaints.     ECO    ROS:   A ten-point system review is obtained and negative except for what was stated in the Interval History.     Physical Examination:   Vital signs reviewed.   General: well hydrated, well developed, in no acute distress, using a walker  HEENT: normocephalic, PERRLA, EOMI, anicteric sclerae  Neck: supple, no JVD, +thyromegaly, soft, no cervical or supraclavicular lymphadenopathy  Lungs: clear breath sounds bilaterally, no wheezing, rales, or rhonchi  Heart: RRR, no M/R/G  Abdomen: soft, no tenderness, non-distended, no hepatosplenomegaly, mass, or hernia. BS present  Extremities: no clubbing, cyanosis, or edema  Skin: no rash, ulcer, or open wounds  Neuro: alert and oriented x 4, no focal neuro deficit  Psych: pleasant and appropriate mood and affect    Objective:     Laboratory Data:  Labs reviewed. CBC, CMP unremarkable    Imaging Data:  Gallium PET 23:  New foci of abnormal radiotracer uptake at the cervical spine, sacral, right ilium and right acetabular joint.     Interval enlargement of hepatic section 7 hypodense mass.     Based upon progression of disease and tracer avidity of the patient's tumor burden, the patient would be an appropriate candidate for PRRT if clinically indicated.     CT " neck 4/6/23:  Impression:     1. Status post resection right carotid body tumor.  No recurrent mass identified.  2. 1.5 cm peripherally enhancing nodules lower pole both lobes of the thyroid.    Thyroid US 5/16/23:  Impression:     Multiple thyroid nodules.  1.7 cm TI-RADS 5 left lobe nodule meets ACR criteria for FNA.     Right lobe nodule meets criteria for surveillance with repeat thyroid ultrasound recommended 1 year.      Copper PET 5/25/23:  Impression:     1. Right hepatic lobe lesions and multifocal primarily sclerotic osseous lesions stable in size and number with decreased tracer-avidity.  No new lesions identified.  2. Postsurgical change prior bilateral adrenalectomy.  No tracer uptake within the surgical bed to suggest localized recurrent disease.  3. Other stable incidental findings above.    Copper PET 9/13/23:  FINDINGS:  Quality of the study: Diaphragmatic/respiratory motion and associated misregistration artifact limits assessment of the liver.     In the head and neck, there is physiologic distribution in the pituitary, salivary, and thyroid glands, and there are no tracer avid lesions suspicious for malignancy.     In the chest, there are no tracer avid lesions suspicious for malignancy.     In the abdomen and pelvis, there is similar appearance of radiotracer avid lesions within the right hepatic lobe noting that evaluation is limited due to motion and misregistration artifact.  Dominant right hepatic lobe lesion demonstrates maximum SUV of 46 (previously 44).  Difficult to measure lesions on CT due to artifact.  No new lesion.  There is physiologic uptake in the pancreatic uncinate process, liver, spleen, adrenal glands and  tract.     In the bones, there is stable number and distribution of radiotracer avid lesions throughout the osseous structures including the right scapula, spine, sacrum, right ilium and ischium, and right femur.  Right scapula maximum SUV of 76 (previously 91).  Right  ilium maximum SUV of 100 (previously 118).  Left sacrum maximum SUV of 25 (previously 40).  No new lesion.     Additional findings: Left chest wall cardiac pacer and leads noted.  Cardiomegaly.  Diffuse calcific atherosclerosis.  Postsurgical changes of bilateral adrenalectomy and cholecystectomy.  Calcified uterine fibroid.  Colonic diverticulosis.  Left hip arthroplasty hardware results in beam hardening artifact limiting assessment of the pelvis.     Impression:     Stable distribution of radiotracer avid lesions within the liver and bones.  No new lesion.     Motion artifact limits assessment of the liver.     Assessment and Plan:     1. Malignant pheochromocytoma of right adrenal gland    2. Secondary neuroendocrine tumor of distant lymph nodes    3. Secondary neuroendocrine tumor of bone    4. Secondary malignant neoplasm of bone    5. Immunodeficiency due to drugs    6. Immunodeficiency secondary to neoplasm    7. Adrenal insufficiency, primary post-surgical    8. Essential hypertension    9. Controlled type 2 diabetes mellitus without complication, without long-term current use of insulin    10. Sick sinus syndrome    11. Chronic combined systolic and diastolic congestive heart failure        1-6  - Ms Jese is a 76 yo woman with R adrenal pheochromocytoma s/p bilateral adrenectomy in 11/2009, recurrent disease to the bone in 2016 s/p MIBG in 2016. At our prior visits, we discussed interim progression of cancer in the liver and new lesions in the bones. Discussed lanreotide every 4 weeks, zometa every 3 months and PRRT. Lanreotide started 1/23/2023. Zometa started 2/20/23.   - PRRT #1 on 3/1/23. #2 on 4/26/23, #3 on 6/21/23, #4 on 8/16/23  - last PET scan in Sep 2023 showed stable disease  - c/w lanreotide every 4 weeks. Will give today.   - last zometa on 8/17/23, will give today  - PET scan in March 2024  - RTC in 4 weeks    7.  - c/w cortef  - f/u with endocrinology    8.  - BP controlled  - c/w current  medication    9.  - BS controlled  - c/w current medication    10.11  - f/u with Dr Loya at [facility]    Follow-up:     Return in one month  Knows to call in the interval if any problems arise.    Electronically signed by Ema Chowdhury

## 2023-11-14 ENCOUNTER — TELEPHONE (OUTPATIENT)
Dept: INFUSION THERAPY | Facility: HOSPITAL | Age: 78
End: 2023-11-14
Payer: MEDICARE

## 2023-11-14 NOTE — TELEPHONE ENCOUNTER
The patient called the infusion center stating she has been having headache, dizziness, and weakness since Fri 11/10 after her infusion on Friday. Pt states she has been taking tylenol, drinking fluids, and blood pressure is fine.     Message sent to Dr. Chowdhury.     Dr. Chowdhury advised patient to have labs today, monitor blood pressure, and go to ED if she feels very bad.     I called the patient back. Spoke with sister Claudia. Gave her Dr. Chowdhury's instructions. Pt's sister verbalizes understanding.

## 2023-12-07 ENCOUNTER — OFFICE VISIT (OUTPATIENT)
Dept: HEMATOLOGY/ONCOLOGY | Facility: CLINIC | Age: 78
End: 2023-12-07
Payer: MEDICARE

## 2023-12-07 ENCOUNTER — INFUSION (OUTPATIENT)
Dept: INFUSION THERAPY | Facility: HOSPITAL | Age: 78
End: 2023-12-07
Attending: FAMILY MEDICINE
Payer: MEDICARE

## 2023-12-07 VITALS
SYSTOLIC BLOOD PRESSURE: 153 MMHG | HEART RATE: 76 BPM | WEIGHT: 184.31 LBS | TEMPERATURE: 98 F | DIASTOLIC BLOOD PRESSURE: 72 MMHG | OXYGEN SATURATION: 98 % | BODY MASS INDEX: 32.66 KG/M2 | HEIGHT: 63 IN

## 2023-12-07 VITALS
OXYGEN SATURATION: 98 % | SYSTOLIC BLOOD PRESSURE: 153 MMHG | BODY MASS INDEX: 32.65 KG/M2 | DIASTOLIC BLOOD PRESSURE: 72 MMHG | WEIGHT: 184.31 LBS | HEART RATE: 76 BPM

## 2023-12-07 DIAGNOSIS — Z79.899 IMMUNODEFICIENCY DUE TO DRUGS: ICD-10-CM

## 2023-12-07 DIAGNOSIS — I10 ESSENTIAL HYPERTENSION: ICD-10-CM

## 2023-12-07 DIAGNOSIS — C79.51 SECONDARY MALIGNANT NEOPLASM OF BONE: ICD-10-CM

## 2023-12-07 DIAGNOSIS — C74.90 MALIGNANT PHEOCHROMOCYTOMA, UNSPECIFIED LATERALITY: Primary | ICD-10-CM

## 2023-12-07 DIAGNOSIS — C74.91 MALIGNANT PHEOCHROMOCYTOMA OF RIGHT ADRENAL GLAND: Primary | ICD-10-CM

## 2023-12-07 DIAGNOSIS — D84.821 IMMUNODEFICIENCY DUE TO DRUGS: ICD-10-CM

## 2023-12-07 DIAGNOSIS — E27.1 ADRENAL INSUFFICIENCY, PRIMARY: ICD-10-CM

## 2023-12-07 DIAGNOSIS — C7B.8 SECONDARY NEUROENDOCRINE TUMOR OF BONE: ICD-10-CM

## 2023-12-07 DIAGNOSIS — I50.42 CHRONIC COMBINED SYSTOLIC AND DIASTOLIC CONGESTIVE HEART FAILURE: ICD-10-CM

## 2023-12-07 DIAGNOSIS — I49.5 SICK SINUS SYNDROME: ICD-10-CM

## 2023-12-07 DIAGNOSIS — D84.81 IMMUNODEFICIENCY SECONDARY TO NEOPLASM: ICD-10-CM

## 2023-12-07 DIAGNOSIS — C7B.8 SECONDARY NEUROENDOCRINE TUMOR OF DISTANT LYMPH NODES: ICD-10-CM

## 2023-12-07 DIAGNOSIS — D49.9 IMMUNODEFICIENCY SECONDARY TO NEOPLASM: ICD-10-CM

## 2023-12-07 DIAGNOSIS — E11.9 CONTROLLED TYPE 2 DIABETES MELLITUS WITHOUT COMPLICATION, WITHOUT LONG-TERM CURRENT USE OF INSULIN: ICD-10-CM

## 2023-12-07 PROCEDURE — 1126F AMNT PAIN NOTED NONE PRSNT: CPT | Mod: CPTII,S$GLB,, | Performed by: INTERNAL MEDICINE

## 2023-12-07 PROCEDURE — 96372 THER/PROPH/DIAG INJ SC/IM: CPT

## 2023-12-07 PROCEDURE — 63600175 PHARM REV CODE 636 W HCPCS: Mod: JZ,JG | Performed by: INTERNAL MEDICINE

## 2023-12-07 PROCEDURE — 3078F PR MOST RECENT DIASTOLIC BLOOD PRESSURE < 80 MM HG: ICD-10-PCS | Mod: CPTII,S$GLB,, | Performed by: INTERNAL MEDICINE

## 2023-12-07 PROCEDURE — 3288F PR FALLS RISK ASSESSMENT DOCUMENTED: ICD-10-PCS | Mod: CPTII,S$GLB,, | Performed by: INTERNAL MEDICINE

## 2023-12-07 PROCEDURE — 1126F PR PAIN SEVERITY QUANTIFIED, NO PAIN PRESENT: ICD-10-PCS | Mod: CPTII,S$GLB,, | Performed by: INTERNAL MEDICINE

## 2023-12-07 PROCEDURE — 3288F FALL RISK ASSESSMENT DOCD: CPT | Mod: CPTII,S$GLB,, | Performed by: INTERNAL MEDICINE

## 2023-12-07 PROCEDURE — 1159F PR MEDICATION LIST DOCUMENTED IN MEDICAL RECORD: ICD-10-PCS | Mod: CPTII,S$GLB,, | Performed by: INTERNAL MEDICINE

## 2023-12-07 PROCEDURE — 1159F MED LIST DOCD IN RCRD: CPT | Mod: CPTII,S$GLB,, | Performed by: INTERNAL MEDICINE

## 2023-12-07 PROCEDURE — 1160F PR REVIEW ALL MEDS BY PRESCRIBER/CLIN PHARMACIST DOCUMENTED: ICD-10-PCS | Mod: CPTII,S$GLB,, | Performed by: INTERNAL MEDICINE

## 2023-12-07 PROCEDURE — 99999 PR PBB SHADOW E&M-EST. PATIENT-LVL III: CPT | Mod: PBBFAC,,, | Performed by: INTERNAL MEDICINE

## 2023-12-07 PROCEDURE — 3077F SYST BP >= 140 MM HG: CPT | Mod: CPTII,S$GLB,, | Performed by: INTERNAL MEDICINE

## 2023-12-07 PROCEDURE — 1101F PR PT FALLS ASSESS DOC 0-1 FALLS W/OUT INJ PAST YR: ICD-10-PCS | Mod: CPTII,S$GLB,, | Performed by: INTERNAL MEDICINE

## 2023-12-07 PROCEDURE — 99215 PR OFFICE/OUTPT VISIT, EST, LEVL V, 40-54 MIN: ICD-10-PCS | Mod: S$GLB,,, | Performed by: INTERNAL MEDICINE

## 2023-12-07 PROCEDURE — 99215 OFFICE O/P EST HI 40 MIN: CPT | Mod: S$GLB,,, | Performed by: INTERNAL MEDICINE

## 2023-12-07 PROCEDURE — 3077F PR MOST RECENT SYSTOLIC BLOOD PRESSURE >= 140 MM HG: ICD-10-PCS | Mod: CPTII,S$GLB,, | Performed by: INTERNAL MEDICINE

## 2023-12-07 PROCEDURE — 99999 PR PBB SHADOW E&M-EST. PATIENT-LVL III: ICD-10-PCS | Mod: PBBFAC,,, | Performed by: INTERNAL MEDICINE

## 2023-12-07 PROCEDURE — 1160F RVW MEDS BY RX/DR IN RCRD: CPT | Mod: CPTII,S$GLB,, | Performed by: INTERNAL MEDICINE

## 2023-12-07 PROCEDURE — 1101F PT FALLS ASSESS-DOCD LE1/YR: CPT | Mod: CPTII,S$GLB,, | Performed by: INTERNAL MEDICINE

## 2023-12-07 PROCEDURE — 3078F DIAST BP <80 MM HG: CPT | Mod: CPTII,S$GLB,, | Performed by: INTERNAL MEDICINE

## 2023-12-07 RX ORDER — LANREOTIDE ACETATE 120 MG/.5ML
120 INJECTION SUBCUTANEOUS ONCE
Status: COMPLETED | OUTPATIENT
Start: 2023-12-07 | End: 2023-12-07

## 2023-12-07 RX ORDER — LANREOTIDE ACETATE 120 MG/.5ML
120 INJECTION SUBCUTANEOUS ONCE
Status: CANCELLED | OUTPATIENT
Start: 2023-12-07 | End: 2023-12-07

## 2023-12-07 RX ADMIN — LANREOTIDE ACETATE 120 MG: 120 INJECTION SUBCUTANEOUS at 10:12

## 2023-12-07 NOTE — PROGRESS NOTES
PROGRESS NOTE    Subjective:       Patient ID: Hailey Sawant is a 78 y.o. female.    Chief Complaint: metastatic pheochromocytoma    Diagnosis:  Metastatic pheochromocytoma of the right adrenal gland, with mets to bones, liver    Oncologic History:  1. Ms. Sawant is a 74 yo woman with DM, HTN, CAD s/p MI, A fib and sick sinus syndrome s/p pacemaker in June 2022 (on eliquis since), carotid paraganglioma removed in 11/2018 who initially saw me on 11/28/2022 for further management of metastatic pheochromocytoma. Last seen Dr West in 2018 and Dr Cohen in early 2021. Does not follow oncologist locally. It is my first time seeing her today. Her history dates to 2009 when she was being worked up for abdominal pain and was found to have cholelithiasis. However, during this workup she was found to have bilateral adrenal masses.  She was also noted to be hypertensive.  She had a 6.5 cm mass on the right with some evidence of necrosis and also a mass just under 3 cm on the left.  Biochemical workup was consistent with pheochromocytoma.  In 11/2009 she underwent a bilateral adrenalectomy.  Pathology had revealed a right adrenal gland pheochromocytoma and a left adrenal gland adrenal cortical adenoma.  In 02/2016 she was noted to have recurrent disease to the bone and underwent I 131 MIBG therapy in 03/2016, 07/2016 and 11/2016.  In 10/2016 she underwent a right partial nephrectomy due to recurrent disease.  Pathology did confirm findings consistent with recurrent pheochromocytoma.  In 11/2018 she underwent removal of the right carotid body tumor with pathology showing paraganglioma. She presents today for follow up. She knows she has cancer but does not know it is stage IV. She is not sure if Dr Cohen called her after tumor board or not. She feels well. Had MI in June 2022, treated at [facility], had pacemaker placed. Medically treated. Feeling well  "today. We discussed restaging dotatate scan.   2. Dotatate scan declined by insurance several times. Finally had gallium PET on 23. It showed "New foci of abnormal radiotracer uptake at the cervical spine, sacral, right ilium and right acetabular joint. Interval enlargement of hepatic section 7 hypodense mass. Reviewed at tumor board. Recc PRRT. However unable to get insurance approval for PRRT and admission afterwards to monitor for catecholamine crisis.   3. Lanreotide started on 23. Zometa started 23. Last dose on 23  4. PRRT started on 3/1/23. #2 on 23, # 3 on 23, #4 on 23    Interval History:   Ms Sawant returns for follow up. Feeling good today.     ECO    ROS:   A ten-point system review is obtained and negative except for what was stated in the Interval History.     Physical Examination:   Vital signs reviewed.   General: well hydrated, well developed, in no acute distress, using a walker  HEENT: normocephalic, PERRLA, EOMI, anicteric sclerae  Neck: supple, no JVD, +thyromegaly, soft, no cervical or supraclavicular lymphadenopathy  Lungs: clear breath sounds bilaterally, no wheezing, rales, or rhonchi  Heart: RRR, no M/R/G  Abdomen: soft, no tenderness, non-distended, no hepatosplenomegaly, mass, or hernia. BS present  Extremities: no clubbing, cyanosis, or edema  Skin: no rash, ulcer, or open wounds  Neuro: alert and oriented x 4, no focal neuro deficit  Psych: pleasant and appropriate mood and affect    Objective:     Laboratory Data:  Labs reviewed. CBC, CMP unremarkable    Imaging Data:  Gallium PET 23:  New foci of abnormal radiotracer uptake at the cervical spine, sacral, right ilium and right acetabular joint.     Interval enlargement of hepatic section 7 hypodense mass.     Based upon progression of disease and tracer avidity of the patient's tumor burden, the patient would be an appropriate candidate for PRRT if clinically indicated.     CT neck " 4/6/23:  Impression:     1. Status post resection right carotid body tumor.  No recurrent mass identified.  2. 1.5 cm peripherally enhancing nodules lower pole both lobes of the thyroid.    Thyroid US 5/16/23:  Impression:     Multiple thyroid nodules.  1.7 cm TI-RADS 5 left lobe nodule meets ACR criteria for FNA.     Right lobe nodule meets criteria for surveillance with repeat thyroid ultrasound recommended 1 year.      Copper PET 5/25/23:  Impression:     1. Right hepatic lobe lesions and multifocal primarily sclerotic osseous lesions stable in size and number with decreased tracer-avidity.  No new lesions identified.  2. Postsurgical change prior bilateral adrenalectomy.  No tracer uptake within the surgical bed to suggest localized recurrent disease.  3. Other stable incidental findings above.    Copper PET 9/13/23:  FINDINGS:  Quality of the study: Diaphragmatic/respiratory motion and associated misregistration artifact limits assessment of the liver.     In the head and neck, there is physiologic distribution in the pituitary, salivary, and thyroid glands, and there are no tracer avid lesions suspicious for malignancy.     In the chest, there are no tracer avid lesions suspicious for malignancy.     In the abdomen and pelvis, there is similar appearance of radiotracer avid lesions within the right hepatic lobe noting that evaluation is limited due to motion and misregistration artifact.  Dominant right hepatic lobe lesion demonstrates maximum SUV of 46 (previously 44).  Difficult to measure lesions on CT due to artifact.  No new lesion.  There is physiologic uptake in the pancreatic uncinate process, liver, spleen, adrenal glands and  tract.     In the bones, there is stable number and distribution of radiotracer avid lesions throughout the osseous structures including the right scapula, spine, sacrum, right ilium and ischium, and right femur.  Right scapula maximum SUV of 76 (previously 91).  Right ilium  maximum SUV of 100 (previously 118).  Left sacrum maximum SUV of 25 (previously 40).  No new lesion.     Additional findings: Left chest wall cardiac pacer and leads noted.  Cardiomegaly.  Diffuse calcific atherosclerosis.  Postsurgical changes of bilateral adrenalectomy and cholecystectomy.  Calcified uterine fibroid.  Colonic diverticulosis.  Left hip arthroplasty hardware results in beam hardening artifact limiting assessment of the pelvis.     Impression:     Stable distribution of radiotracer avid lesions within the liver and bones.  No new lesion.     Motion artifact limits assessment of the liver.     Assessment and Plan:     1. Malignant pheochromocytoma of right adrenal gland    2. Secondary neuroendocrine tumor of distant lymph nodes    3. Secondary neuroendocrine tumor of bone    4. Secondary malignant neoplasm of bone    5. Immunodeficiency due to drugs    6. Immunodeficiency secondary to neoplasm    7. Adrenal insufficiency, primary post-surgical    8. Essential hypertension    9. Controlled type 2 diabetes mellitus without complication, without long-term current use of insulin    10. Sick sinus syndrome    11. Chronic combined systolic and diastolic congestive heart failure        1-6  - Ms Jese is a 77 yo woman with R adrenal pheochromocytoma s/p bilateral adrenectomy in 11/2009, recurrent disease to the bone in 2016 s/p MIBG in 2016. At our prior visits, we discussed interim progression of cancer in the liver and new lesions in the bones. Discussed lanreotide every 4 weeks, zometa every 3 months and PRRT. Lanreotide started 1/23/2023. Zometa started 2/20/23.   - PRRT #1 on 3/1/23. #2 on 4/26/23, #3 on 6/21/23, #4 on 8/16/23  - last PET scan in Sep 2023 showed stable disease  - c/w lanreotide every 4 weeks. Will give today.   - last zometa on 11/9/23, next due in March 2024  - PET scan in March 2024  - RTC in 4 weeks    7.  - c/w cortef  - f/u with endocrinology    8.  - did not take BP med this  morning. Per patient BP mostly on the lower end at home  - asked patient to resume BP meds    9.  - BS controlled  - c/w current medication    10.11  - f/u with Dr Loya at [facility]    Follow-up:     Return in one month  Knows to call in the interval if any problems arise.    Electronically signed by Ema Chowdhury

## 2024-01-03 ENCOUNTER — DOCUMENTATION ONLY (OUTPATIENT)
Dept: HEMATOLOGY/ONCOLOGY | Facility: CLINIC | Age: 79
End: 2024-01-03

## 2024-01-03 ENCOUNTER — TELEPHONE (OUTPATIENT)
Dept: HEMATOLOGY/ONCOLOGY | Facility: CLINIC | Age: 79
End: 2024-01-03
Payer: MEDICARE

## 2024-01-03 DIAGNOSIS — E87.6 HYPOKALEMIA: Primary | ICD-10-CM

## 2024-01-03 DIAGNOSIS — E87.6 HYPOKALEMIA: ICD-10-CM

## 2024-01-03 RX ORDER — POTASSIUM CHLORIDE 20 MEQ/1
40 TABLET, EXTENDED RELEASE ORAL 2 TIMES DAILY
Qty: 60 TABLET | Refills: 0 | Status: SHIPPED | OUTPATIENT
Start: 2024-01-03 | End: 2024-01-06

## 2024-01-03 RX ORDER — POTASSIUM CHLORIDE 20 MEQ/1
40 TABLET, EXTENDED RELEASE ORAL 2 TIMES DAILY
Qty: 60 TABLET | Refills: 0 | Status: SHIPPED | OUTPATIENT
Start: 2024-01-03 | End: 2024-01-03

## 2024-01-03 NOTE — TELEPHONE ENCOUNTER
Received Critical lab call re potassium =  2.7.  Notified Dr Chowdhury.    1/3/2023 Secure chat from Dr Chowdhury: thanks. Vanessa, can you let patient know? I am sending potassium to her local pharmacy, pick it up. 40 mEq bid starting today. can you schedule her for CMP at [facility] tomorrow before she sees me? thanks     Called pt,  Pt not available, sister took the call.  Requested sister to have sister  potassium med from her pharmacy and to start taking the pills as directed.  She is to come an hour early tomorrow to repeat blood test before her visit with Dr Chowdhury.

## 2024-01-04 ENCOUNTER — INFUSION (OUTPATIENT)
Dept: INFUSION THERAPY | Facility: HOSPITAL | Age: 79
End: 2024-01-04
Attending: INTERNAL MEDICINE
Payer: MEDICARE

## 2024-01-04 ENCOUNTER — OFFICE VISIT (OUTPATIENT)
Dept: HEMATOLOGY/ONCOLOGY | Facility: CLINIC | Age: 79
End: 2024-01-04
Payer: MEDICARE

## 2024-01-04 VITALS
BODY MASS INDEX: 32.02 KG/M2 | HEART RATE: 62 BPM | DIASTOLIC BLOOD PRESSURE: 76 MMHG | WEIGHT: 180.75 LBS | OXYGEN SATURATION: 98 % | SYSTOLIC BLOOD PRESSURE: 123 MMHG

## 2024-01-04 VITALS
BODY MASS INDEX: 32.03 KG/M2 | HEART RATE: 62 BPM | OXYGEN SATURATION: 98 % | TEMPERATURE: 98 F | DIASTOLIC BLOOD PRESSURE: 76 MMHG | HEIGHT: 63 IN | WEIGHT: 180.75 LBS | SYSTOLIC BLOOD PRESSURE: 123 MMHG | RESPIRATION RATE: 18 BRPM

## 2024-01-04 DIAGNOSIS — C7B.8 SECONDARY NEUROENDOCRINE TUMOR OF BONE: ICD-10-CM

## 2024-01-04 DIAGNOSIS — D84.81 IMMUNODEFICIENCY SECONDARY TO NEOPLASM: ICD-10-CM

## 2024-01-04 DIAGNOSIS — I50.42 CHRONIC COMBINED SYSTOLIC AND DIASTOLIC CONGESTIVE HEART FAILURE: ICD-10-CM

## 2024-01-04 DIAGNOSIS — E87.6 HYPOKALEMIA: ICD-10-CM

## 2024-01-04 DIAGNOSIS — Z79.899 IMMUNODEFICIENCY DUE TO DRUGS: ICD-10-CM

## 2024-01-04 DIAGNOSIS — I49.5 SICK SINUS SYNDROME: ICD-10-CM

## 2024-01-04 DIAGNOSIS — E11.9 CONTROLLED TYPE 2 DIABETES MELLITUS WITHOUT COMPLICATION, WITHOUT LONG-TERM CURRENT USE OF INSULIN: ICD-10-CM

## 2024-01-04 DIAGNOSIS — I10 ESSENTIAL HYPERTENSION: ICD-10-CM

## 2024-01-04 DIAGNOSIS — D84.821 IMMUNODEFICIENCY DUE TO DRUGS: ICD-10-CM

## 2024-01-04 DIAGNOSIS — C74.90 MALIGNANT PHEOCHROMOCYTOMA, UNSPECIFIED LATERALITY: Primary | ICD-10-CM

## 2024-01-04 DIAGNOSIS — D49.9 IMMUNODEFICIENCY SECONDARY TO NEOPLASM: ICD-10-CM

## 2024-01-04 DIAGNOSIS — E27.1 ADRENAL INSUFFICIENCY, PRIMARY: ICD-10-CM

## 2024-01-04 DIAGNOSIS — C7B.8 SECONDARY NEUROENDOCRINE TUMOR OF DISTANT LYMPH NODES: ICD-10-CM

## 2024-01-04 DIAGNOSIS — I48.0 PAF (PAROXYSMAL ATRIAL FIBRILLATION): ICD-10-CM

## 2024-01-04 DIAGNOSIS — C79.51 SECONDARY MALIGNANT NEOPLASM OF BONE: ICD-10-CM

## 2024-01-04 DIAGNOSIS — C74.91 MALIGNANT PHEOCHROMOCYTOMA OF RIGHT ADRENAL GLAND: Primary | ICD-10-CM

## 2024-01-04 PROCEDURE — 96372 THER/PROPH/DIAG INJ SC/IM: CPT

## 2024-01-04 PROCEDURE — 1101F PT FALLS ASSESS-DOCD LE1/YR: CPT | Mod: CPTII,S$GLB,, | Performed by: INTERNAL MEDICINE

## 2024-01-04 PROCEDURE — 99215 OFFICE O/P EST HI 40 MIN: CPT | Mod: S$GLB,,, | Performed by: INTERNAL MEDICINE

## 2024-01-04 PROCEDURE — 1160F RVW MEDS BY RX/DR IN RCRD: CPT | Mod: CPTII,S$GLB,, | Performed by: INTERNAL MEDICINE

## 2024-01-04 PROCEDURE — 1159F MED LIST DOCD IN RCRD: CPT | Mod: CPTII,S$GLB,, | Performed by: INTERNAL MEDICINE

## 2024-01-04 PROCEDURE — 3288F FALL RISK ASSESSMENT DOCD: CPT | Mod: CPTII,S$GLB,, | Performed by: INTERNAL MEDICINE

## 2024-01-04 PROCEDURE — 99999 PR PBB SHADOW E&M-EST. PATIENT-LVL V: CPT | Mod: PBBFAC,,, | Performed by: INTERNAL MEDICINE

## 2024-01-04 PROCEDURE — 63600175 PHARM REV CODE 636 W HCPCS: Mod: JZ,JG | Performed by: INTERNAL MEDICINE

## 2024-01-04 PROCEDURE — 3078F DIAST BP <80 MM HG: CPT | Mod: CPTII,S$GLB,, | Performed by: INTERNAL MEDICINE

## 2024-01-04 PROCEDURE — 3074F SYST BP LT 130 MM HG: CPT | Mod: CPTII,S$GLB,, | Performed by: INTERNAL MEDICINE

## 2024-01-04 PROCEDURE — 1126F AMNT PAIN NOTED NONE PRSNT: CPT | Mod: CPTII,S$GLB,, | Performed by: INTERNAL MEDICINE

## 2024-01-04 RX ORDER — LANREOTIDE ACETATE 120 MG/.5ML
120 INJECTION SUBCUTANEOUS ONCE
Status: COMPLETED | OUTPATIENT
Start: 2024-01-04 | End: 2024-01-04

## 2024-01-04 RX ORDER — LANREOTIDE ACETATE 120 MG/.5ML
120 INJECTION SUBCUTANEOUS ONCE
Status: CANCELLED | OUTPATIENT
Start: 2024-01-04 | End: 2024-01-04

## 2024-01-04 RX ADMIN — LANREOTIDE ACETATE 120 MG: 120 INJECTION SUBCUTANEOUS at 10:01

## 2024-01-04 NOTE — PROGRESS NOTES
PROGRESS NOTE    Subjective:       Patient ID: Hailey Sawant is a 78 y.o. female.    Chief Complaint: metastatic pheochromocytoma    Diagnosis:  Metastatic pheochromocytoma of the right adrenal gland, with mets to bones, liver    Oncologic History:  1. Ms. Sawant is a 74 yo woman with DM, HTN, CAD s/p MI, A fib and sick sinus syndrome s/p pacemaker in June 2022 (on eliquis since), carotid paraganglioma removed in 11/2018 who initially saw me on 11/28/2022 for further management of metastatic pheochromocytoma. Last seen Dr West in 2018 and Dr Cohen in early 2021. Does not follow oncologist locally. It is my first time seeing her today. Her history dates to 2009 when she was being worked up for abdominal pain and was found to have cholelithiasis. However, during this workup she was found to have bilateral adrenal masses.  She was also noted to be hypertensive.  She had a 6.5 cm mass on the right with some evidence of necrosis and also a mass just under 3 cm on the left.  Biochemical workup was consistent with pheochromocytoma.  In 11/2009 she underwent a bilateral adrenalectomy.  Pathology had revealed a right adrenal gland pheochromocytoma and a left adrenal gland adrenal cortical adenoma.  In 02/2016 she was noted to have recurrent disease to the bone and underwent I 131 MIBG therapy in 03/2016, 07/2016 and 11/2016.  In 10/2016 she underwent a right partial nephrectomy due to recurrent disease.  Pathology did confirm findings consistent with recurrent pheochromocytoma.  In 11/2018 she underwent removal of the right carotid body tumor with pathology showing paraganglioma. She presents today for follow up. She knows she has cancer but does not know it is stage IV. She is not sure if Dr Cohen called her after tumor board or not. She feels well. Had MI in June 2022, treated at [facility], had pacemaker placed. Medically treated. Feeling well  "today. We discussed restaging dotatate scan.   2. Dotatate scan declined by insurance several times. Finally had gallium PET on 23. It showed "New foci of abnormal radiotracer uptake at the cervical spine, sacral, right ilium and right acetabular joint. Interval enlargement of hepatic section 7 hypodense mass. Reviewed at tumor board. [facility] PRRT. However unable to get insurance approval for PRRT and admission afterwards to monitor for catecholamine crisis.   3. Lanreotide started on 23. Zometa started 23. Last dose on 23  4. PRRT started on 3/1/23. #2 on 23, # 3 on 23, #4 on 23    Interval History:   Ms Sawant returns for follow up. Started K-Dur 40 mEq yesterday afternoon. Chronic 2-3 diarrhea a week. Chronic fatigue. Denies other complaints. She takes lasix 20 mg daily but does not take potassium routinely.     ECO    ROS:   A ten-point system review is obtained and negative except for what was stated in the Interval History.     Physical Examination:   Vital signs reviewed.   General: well hydrated, well developed, in no acute distress, using a walker  HEENT: normocephalic, PERRLA, EOMI, anicteric sclerae  Neck: supple, no JVD, +thyromegaly, soft, no cervical or supraclavicular lymphadenopathy  Lungs: clear breath sounds bilaterally, no wheezing, rales, or rhonchi  Heart: RRR, no M/R/G  Abdomen: soft, no tenderness, non-distended, no hepatosplenomegaly, mass, or hernia. BS present  Extremities: no clubbing, cyanosis, or edema  Skin: no rash, ulcer, or open wounds  Neuro: alert and oriented x 4, no focal neuro deficit  Psych: pleasant and appropriate mood and affect    Objective:     Laboratory Data:  Labs reviewed. K was 2.7 yesterday. Today's value pending    Imaging Data:  Gallium PET 23:  New foci of abnormal radiotracer uptake at the cervical spine, sacral, right ilium and right acetabular joint.     Interval enlargement of hepatic section 7 hypodense mass.     Based " upon progression of disease and tracer avidity of the patient's tumor burden, the patient would be an appropriate candidate for PRRT if clinically indicated.     CT neck 4/6/23:  Impression:     1. Status post resection right carotid body tumor.  No recurrent mass identified.  2. 1.5 cm peripherally enhancing nodules lower pole both lobes of the thyroid.    Thyroid US 5/16/23:  Impression:     Multiple thyroid nodules.  1.7 cm TI-RADS 5 left lobe nodule meets ACR criteria for FNA.     Right lobe nodule meets criteria for surveillance with repeat thyroid ultrasound recommended 1 year.      Copper PET 5/25/23:  Impression:     1. Right hepatic lobe lesions and multifocal primarily sclerotic osseous lesions stable in size and number with decreased tracer-avidity.  No new lesions identified.  2. Postsurgical change prior bilateral adrenalectomy.  No tracer uptake within the surgical bed to suggest localized recurrent disease.  3. Other stable incidental findings above.    Copper PET 9/13/23:  FINDINGS:  Quality of the study: Diaphragmatic/respiratory motion and associated misregistration artifact limits assessment of the liver.     In the head and neck, there is physiologic distribution in the pituitary, salivary, and thyroid glands, and there are no tracer avid lesions suspicious for malignancy.     In the chest, there are no tracer avid lesions suspicious for malignancy.     In the abdomen and pelvis, there is similar appearance of radiotracer avid lesions within the right hepatic lobe noting that evaluation is limited due to motion and misregistration artifact.  Dominant right hepatic lobe lesion demonstrates maximum SUV of 46 (previously 44).  Difficult to measure lesions on CT due to artifact.  No new lesion.  There is physiologic uptake in the pancreatic uncinate process, liver, spleen, adrenal glands and  tract.     In the bones, there is stable number and distribution of radiotracer avid lesions throughout the  osseous structures including the right scapula, spine, sacrum, right ilium and ischium, and right femur.  Right scapula maximum SUV of 76 (previously 91).  Right ilium maximum SUV of 100 (previously 118).  Left sacrum maximum SUV of 25 (previously 40).  No new lesion.     Additional findings: Left chest wall cardiac pacer and leads noted.  Cardiomegaly.  Diffuse calcific atherosclerosis.  Postsurgical changes of bilateral adrenalectomy and cholecystectomy.  Calcified uterine fibroid.  Colonic diverticulosis.  Left hip arthroplasty hardware results in beam hardening artifact limiting assessment of the pelvis.     Impression:     Stable distribution of radiotracer avid lesions within the liver and bones.  No new lesion.     Motion artifact limits assessment of the liver.     Assessment and Plan:     1. Malignant pheochromocytoma of right adrenal gland    2. Secondary neuroendocrine tumor of distant lymph nodes    3. Secondary neuroendocrine tumor of bone    4. Secondary malignant neoplasm of bone    5. Immunodeficiency due to drugs    6. Immunodeficiency secondary to neoplasm    7. Adrenal insufficiency, primary post-surgical    8. Hypokalemia    9. Essential hypertension    10. Controlled type 2 diabetes mellitus without complication, without long-term current use of insulin    11. Sick sinus syndrome    12. PAF (paroxysmal atrial fibrillation)    13. Chronic combined systolic and diastolic congestive heart failure        1-6  - Ms Jese is a 79 yo woman with R adrenal pheochromocytoma s/p bilateral adrenectomy in 11/2009, recurrent disease to the bone in 2016 s/p MIBG in 2016. At our prior visits, we discussed interim progression of cancer in the liver and new lesions in the bones. Discussed lanreotide every 4 weeks, zometa every 3 months and PRRT. Lanreotide started 1/23/2023. Zometa started 2/20/23.   - PRRT #1 on 3/1/23. #2 on 4/26/23, #3 on 6/21/23, #4 on 8/16/23  - last PET scan in Sep 2023 showed stable  disease  - c/w lanreotide every 4 weeks. Will give today.   - last zometa on 11/9/23, next due in March 2024  - PET scan in March 2024, scheduled  - RTC in 4 weeks    7.  - c/w cortef  - f/u with endocrinology    8.  - diarrhea is mild and not daily. Could be from lasix.   - K-dur 40 mEq bid x 3 days then 20 mEq daily with lasix  - monitor CMP in 2 weeks    9.  - BP controlled  - c/w current medication    10.  - BS controlled  - c/w current medication    11-13  - f/u with Dr Loya at [facility]    Follow-up:     Return in one month  Knows to call in the interval if any problems arise.    Electronically signed by Ema Chowdhury

## 2024-01-04 NOTE — PLAN OF CARE
Patient tolerated Lanreotide injection well, VSS, no complaints at this time. Next appointment scheduled and pt d/c in no acute distress.

## 2024-02-01 ENCOUNTER — INFUSION (OUTPATIENT)
Dept: INFUSION THERAPY | Facility: HOSPITAL | Age: 79
End: 2024-02-01
Attending: INTERNAL MEDICINE
Payer: MEDICARE

## 2024-02-01 ENCOUNTER — OFFICE VISIT (OUTPATIENT)
Dept: HEMATOLOGY/ONCOLOGY | Facility: CLINIC | Age: 79
End: 2024-02-01
Payer: MEDICARE

## 2024-02-01 VITALS
DIASTOLIC BLOOD PRESSURE: 85 MMHG | DIASTOLIC BLOOD PRESSURE: 82 MMHG | BODY MASS INDEX: 31.68 KG/M2 | HEART RATE: 67 BPM | WEIGHT: 178.81 LBS | SYSTOLIC BLOOD PRESSURE: 162 MMHG | BODY MASS INDEX: 31.67 KG/M2 | OXYGEN SATURATION: 97 % | WEIGHT: 178.81 LBS | HEIGHT: 63 IN | TEMPERATURE: 98 F | HEART RATE: 65 BPM | SYSTOLIC BLOOD PRESSURE: 167 MMHG | RESPIRATION RATE: 18 BRPM | OXYGEN SATURATION: 98 %

## 2024-02-01 DIAGNOSIS — C74.90 MALIGNANT PHEOCHROMOCYTOMA, UNSPECIFIED LATERALITY: Primary | ICD-10-CM

## 2024-02-01 DIAGNOSIS — E11.9 CONTROLLED TYPE 2 DIABETES MELLITUS WITHOUT COMPLICATION, WITHOUT LONG-TERM CURRENT USE OF INSULIN: ICD-10-CM

## 2024-02-01 DIAGNOSIS — C74.91 MALIGNANT PHEOCHROMOCYTOMA OF RIGHT ADRENAL GLAND: Primary | ICD-10-CM

## 2024-02-01 DIAGNOSIS — D49.9 IMMUNODEFICIENCY SECONDARY TO NEOPLASM: ICD-10-CM

## 2024-02-01 DIAGNOSIS — I48.0 PAF (PAROXYSMAL ATRIAL FIBRILLATION): ICD-10-CM

## 2024-02-01 DIAGNOSIS — C79.51 SECONDARY MALIGNANT NEOPLASM OF BONE: ICD-10-CM

## 2024-02-01 DIAGNOSIS — C74.91 MALIGNANT PHEOCHROMOCYTOMA OF RIGHT ADRENAL GLAND: ICD-10-CM

## 2024-02-01 DIAGNOSIS — C7B.8 SECONDARY NEUROENDOCRINE TUMOR OF BONE: ICD-10-CM

## 2024-02-01 DIAGNOSIS — I10 ESSENTIAL HYPERTENSION: ICD-10-CM

## 2024-02-01 DIAGNOSIS — D84.81 IMMUNODEFICIENCY SECONDARY TO NEOPLASM: ICD-10-CM

## 2024-02-01 DIAGNOSIS — D84.821 IMMUNODEFICIENCY DUE TO DRUGS: ICD-10-CM

## 2024-02-01 DIAGNOSIS — C7B.8 SECONDARY NEUROENDOCRINE TUMOR OF DISTANT LYMPH NODES: ICD-10-CM

## 2024-02-01 DIAGNOSIS — I49.5 SICK SINUS SYNDROME: ICD-10-CM

## 2024-02-01 DIAGNOSIS — Z79.899 IMMUNODEFICIENCY DUE TO DRUGS: ICD-10-CM

## 2024-02-01 DIAGNOSIS — E27.1 ADRENAL INSUFFICIENCY, PRIMARY: ICD-10-CM

## 2024-02-01 PROCEDURE — 99215 OFFICE O/P EST HI 40 MIN: CPT | Mod: S$GLB,,, | Performed by: INTERNAL MEDICINE

## 2024-02-01 PROCEDURE — 3079F DIAST BP 80-89 MM HG: CPT | Mod: CPTII,S$GLB,, | Performed by: INTERNAL MEDICINE

## 2024-02-01 PROCEDURE — 96365 THER/PROPH/DIAG IV INF INIT: CPT

## 2024-02-01 PROCEDURE — 96372 THER/PROPH/DIAG INJ SC/IM: CPT

## 2024-02-01 PROCEDURE — 1160F RVW MEDS BY RX/DR IN RCRD: CPT | Mod: CPTII,S$GLB,, | Performed by: INTERNAL MEDICINE

## 2024-02-01 PROCEDURE — A4216 STERILE WATER/SALINE, 10 ML: HCPCS | Performed by: INTERNAL MEDICINE

## 2024-02-01 PROCEDURE — 63600175 PHARM REV CODE 636 W HCPCS: Mod: JZ,JG | Performed by: INTERNAL MEDICINE

## 2024-02-01 PROCEDURE — 99999 PR PBB SHADOW E&M-EST. PATIENT-LVL IV: CPT | Mod: PBBFAC,,, | Performed by: INTERNAL MEDICINE

## 2024-02-01 PROCEDURE — G2211 COMPLEX E/M VISIT ADD ON: HCPCS | Mod: S$GLB,,, | Performed by: INTERNAL MEDICINE

## 2024-02-01 PROCEDURE — 1101F PT FALLS ASSESS-DOCD LE1/YR: CPT | Mod: CPTII,S$GLB,, | Performed by: INTERNAL MEDICINE

## 2024-02-01 PROCEDURE — 25000003 PHARM REV CODE 250: Performed by: INTERNAL MEDICINE

## 2024-02-01 PROCEDURE — 1159F MED LIST DOCD IN RCRD: CPT | Mod: CPTII,S$GLB,, | Performed by: INTERNAL MEDICINE

## 2024-02-01 PROCEDURE — 3077F SYST BP >= 140 MM HG: CPT | Mod: CPTII,S$GLB,, | Performed by: INTERNAL MEDICINE

## 2024-02-01 PROCEDURE — 3288F FALL RISK ASSESSMENT DOCD: CPT | Mod: CPTII,S$GLB,, | Performed by: INTERNAL MEDICINE

## 2024-02-01 RX ORDER — SODIUM CHLORIDE 0.9 % (FLUSH) 0.9 %
10 SYRINGE (ML) INJECTION
Status: CANCELLED | OUTPATIENT
Start: 2024-02-01

## 2024-02-01 RX ORDER — LANREOTIDE ACETATE 120 MG/.5ML
120 INJECTION SUBCUTANEOUS ONCE
Status: CANCELLED | OUTPATIENT
Start: 2024-02-01 | End: 2024-02-01

## 2024-02-01 RX ORDER — SODIUM CHLORIDE 0.9 % (FLUSH) 0.9 %
10 SYRINGE (ML) INJECTION
Status: DISCONTINUED | OUTPATIENT
Start: 2024-02-01 | End: 2024-02-01 | Stop reason: HOSPADM

## 2024-02-01 RX ORDER — HEPARIN 100 UNIT/ML
500 SYRINGE INTRAVENOUS
Status: CANCELLED | OUTPATIENT
Start: 2024-02-22

## 2024-02-01 RX ORDER — HEPARIN 100 UNIT/ML
500 SYRINGE INTRAVENOUS
Status: CANCELLED | OUTPATIENT
Start: 2024-02-01

## 2024-02-01 RX ORDER — LANREOTIDE ACETATE 120 MG/.5ML
120 INJECTION SUBCUTANEOUS ONCE
Status: COMPLETED | OUTPATIENT
Start: 2024-02-01 | End: 2024-02-01

## 2024-02-01 RX ORDER — SODIUM CHLORIDE 0.9 % (FLUSH) 0.9 %
10 SYRINGE (ML) INJECTION
Status: CANCELLED | OUTPATIENT
Start: 2024-02-22

## 2024-02-01 RX ADMIN — SODIUM CHLORIDE: 9 INJECTION, SOLUTION INTRAVENOUS at 09:02

## 2024-02-01 RX ADMIN — Medication 10 ML: at 10:02

## 2024-02-01 RX ADMIN — ZOLEDRONIC ACID 3.5 MG: 4 INJECTION, SOLUTION, CONCENTRATE INTRAVENOUS at 10:02

## 2024-02-01 RX ADMIN — LANREOTIDE ACETATE 120 MG: 120 INJECTION SUBCUTANEOUS at 10:02

## 2024-02-01 NOTE — PLAN OF CARE
Pt tolerated Zometa infusion, along with Lanreotide injection well.  No adverse reaction noted. No complaints at this time. PIV flushed with NS and D/C per protocol.  Patient left clinic in no acute distress.

## 2024-02-01 NOTE — PROGRESS NOTES
PROGRESS NOTE    Subjective:       Patient ID: Hailey Sawant is a 78 y.o. female.    Chief Complaint: metastatic pheochromocytoma    Diagnosis:  Metastatic pheochromocytoma of the right adrenal gland, with mets to bones, liver    Oncologic History:  1. Ms. Sawant is a 74 yo woman with DM, HTN, CAD s/p MI, A fib and sick sinus syndrome s/p pacemaker in June 2022 (on eliquis since), carotid paraganglioma removed in 11/2018 who initially saw me on 11/28/2022 for further management of metastatic pheochromocytoma. Last seen Dr West in 2018 and Dr Cohen in early 2021. Does not follow oncologist locally. It is my first time seeing her today. Her history dates to 2009 when she was being worked up for abdominal pain and was found to have cholelithiasis. However, during this workup she was found to have bilateral adrenal masses.  She was also noted to be hypertensive.  She had a 6.5 cm mass on the right with some evidence of necrosis and also a mass just under 3 cm on the left.  Biochemical workup was consistent with pheochromocytoma.  In 11/2009 she underwent a bilateral adrenalectomy.  Pathology had revealed a right adrenal gland pheochromocytoma and a left adrenal gland adrenal cortical adenoma.  In 02/2016 she was noted to have recurrent disease to the bone and underwent I 131 MIBG therapy in 03/2016, 07/2016 and 11/2016.  In 10/2016 she underwent a right partial nephrectomy due to recurrent disease.  Pathology did confirm findings consistent with recurrent pheochromocytoma.  In 11/2018 she underwent removal of the right carotid body tumor with pathology showing paraganglioma. She presents today for follow up. She knows she has cancer but does not know it is stage IV. She is not sure if Dr Cohen called her after tumor board or not. She feels well. Had MI in June 2022, treated at [facility], had pacemaker placed. Medically treated. Feeling well  "today. We discussed restaging dotatate scan.   2. Dotatate scan declined by insurance several times. Finally had gallium PET on 23. It showed "New foci of abnormal radiotracer uptake at the cervical spine, sacral, right ilium and right acetabular joint. Interval enlargement of hepatic section 7 hypodense mass. Reviewed at tumor board. Recc PRRT. However unable to get insurance approval for PRRT and admission afterwards to monitor for catecholamine crisis.   3. Lanreotide started on 23. Zometa started 23. Last dose on 23  4. PRRT started on 3/1/23. #2 on 23, # 3 on 23, #4 on 23    Interval History:   Ms Sawant returns for follow up. Feeling okay. Diarrhea is better. Does not have it every day.     ECO    ROS:   A ten-point system review is obtained and negative except for what was stated in the Interval History.     Physical Examination:   Vital signs reviewed.   General: well hydrated, well developed, in no acute distress, using a walker  HEENT: normocephalic, PERRLA, EOMI, anicteric sclerae  Neck: supple, no JVD, +thyromegaly, soft, no cervical or supraclavicular lymphadenopathy  Lungs: clear breath sounds bilaterally, no wheezing, rales, or rhonchi  Heart: RRR, no M/R/G  Abdomen: soft, no tenderness, non-distended, no hepatosplenomegaly, mass, or hernia. BS present  Extremities: no clubbing, cyanosis, or edema  Skin: no rash, ulcer, or open wounds  Neuro: alert and oriented x 4, no focal neuro deficit  Psych: pleasant and appropriate mood and affect    Objective:     Laboratory Data:  Labs reviewed. CMP unremarkable    Imaging Data:  Gallium PET 23:  New foci of abnormal radiotracer uptake at the cervical spine, sacral, right ilium and right acetabular joint.     Interval enlargement of hepatic section 7 hypodense mass.     Based upon progression of disease and tracer avidity of the patient's tumor burden, the patient would be an appropriate candidate for PRRT if " clinically indicated.     CT neck 4/6/23:  Impression:     1. Status post resection right carotid body tumor.  No recurrent mass identified.  2. 1.5 cm peripherally enhancing nodules lower pole both lobes of the thyroid.    Thyroid US 5/16/23:  Impression:     Multiple thyroid nodules.  1.7 cm TI-RADS 5 left lobe nodule meets ACR criteria for FNA.     Right lobe nodule meets criteria for surveillance with repeat thyroid ultrasound recommended 1 year.      Copper PET 5/25/23:  Impression:     1. Right hepatic lobe lesions and multifocal primarily sclerotic osseous lesions stable in size and number with decreased tracer-avidity.  No new lesions identified.  2. Postsurgical change prior bilateral adrenalectomy.  No tracer uptake within the surgical bed to suggest localized recurrent disease.  3. Other stable incidental findings above.    Copper PET 9/13/23:  FINDINGS:  Quality of the study: Diaphragmatic/respiratory motion and associated misregistration artifact limits assessment of the liver.     In the head and neck, there is physiologic distribution in the pituitary, salivary, and thyroid glands, and there are no tracer avid lesions suspicious for malignancy.     In the chest, there are no tracer avid lesions suspicious for malignancy.     In the abdomen and pelvis, there is similar appearance of radiotracer avid lesions within the right hepatic lobe noting that evaluation is limited due to motion and misregistration artifact.  Dominant right hepatic lobe lesion demonstrates maximum SUV of 46 (previously 44).  Difficult to measure lesions on CT due to artifact.  No new lesion.  There is physiologic uptake in the pancreatic uncinate process, liver, spleen, adrenal glands and  tract.     In the bones, there is stable number and distribution of radiotracer avid lesions throughout the osseous structures including the right scapula, spine, sacrum, right ilium and ischium, and right femur.  Right scapula maximum SUV of  76 (previously 91).  Right ilium maximum SUV of 100 (previously 118).  Left sacrum maximum SUV of 25 (previously 40).  No new lesion.     Additional findings: Left chest wall cardiac pacer and leads noted.  Cardiomegaly.  Diffuse calcific atherosclerosis.  Postsurgical changes of bilateral adrenalectomy and cholecystectomy.  Calcified uterine fibroid.  Colonic diverticulosis.  Left hip arthroplasty hardware results in beam hardening artifact limiting assessment of the pelvis.     Impression:     Stable distribution of radiotracer avid lesions within the liver and bones.  No new lesion.     Motion artifact limits assessment of the liver.     Assessment and Plan:     1. Malignant pheochromocytoma of right adrenal gland    2. Secondary neuroendocrine tumor of distant lymph nodes    3. Secondary neuroendocrine tumor of bone    4. Secondary malignant neoplasm of bone    5. Immunodeficiency due to drugs    6. Immunodeficiency secondary to neoplasm    7. Adrenal insufficiency, primary post-surgical    8. Essential hypertension    9. Controlled type 2 diabetes mellitus without complication, without long-term current use of insulin    10. Sick sinus syndrome    11. PAF (paroxysmal atrial fibrillation)        1-6  - Ms Jese is a 79 yo woman with R adrenal pheochromocytoma s/p bilateral adrenectomy in 11/2009, recurrent disease to the bone in 2016 s/p MIBG in 2016. At our prior visits, we discussed interim progression of cancer in the liver and new lesions in the bones. Discussed lanreotide every 4 weeks, zometa every 3 months and PRRT. Lanreotide started 1/23/2023. Zometa started 2/20/23.   - PRRT #1 on 3/1/23. #2 on 4/26/23, #3 on 6/21/23, #4 on 8/16/23  - last PET scan in Sep 2023 showed stable disease  - c/w lanreotide every 4 weeks. Will give today.   - last zometa on 11/9/23, next due in March 2024  - RTC in 4 weeks with restaging PET scan    7.  - c/w cortef  - f/u with endocrinology    8.  - BP good at home. Did not  take BP meds this morning  - asked patient to take BP meds    9.  - BS controlled  - c/w current medication    10.11  - f/u with Dr Loya at [facility]    Follow-up:     Return in one month  Knows to call in the interval if any problems arise.    Electronically signed by Ema Chowdhury

## 2024-02-02 ENCOUNTER — TELEPHONE (OUTPATIENT)
Dept: HEMATOLOGY/ONCOLOGY | Facility: CLINIC | Age: 79
End: 2024-02-02
Payer: MEDICARE

## 2024-02-02 NOTE — TELEPHONE ENCOUNTER
----- Message from Valdo Butler sent at 2/2/2024 11:34 AM CST -----  Regarding: Consult/Advisory  Contact: Rose @ [facility]  Consult/Advisory    Name Of Caller: Rose @ [facility]       Contact Preference: [phone]       Fax: [phone]    Nature of call: Rsoe @ [facility] calling to get information corrected on the orders for this patient. The procedure is incorrect. Requesting a call back to further discuss correct information needed.

## 2024-02-02 NOTE — TELEPHONE ENCOUNTER
Returned call.  Copper PET scan for [facility] is not ordered correctly.  Correct order is IMG [id number] with [facility] at the end of the scan title.  Will investigate and return call.

## 2024-02-05 ENCOUNTER — TELEPHONE (OUTPATIENT)
Dept: HEMATOLOGY/ONCOLOGY | Facility: CLINIC | Age: 79
End: 2024-02-05
Payer: MEDICARE

## 2024-02-06 ENCOUNTER — TELEPHONE (OUTPATIENT)
Dept: HEMATOLOGY/ONCOLOGY | Facility: CLINIC | Age: 79
End: 2024-02-06
Payer: MEDICARE

## 2024-02-06 NOTE — TELEPHONE ENCOUNTER
----- Message from Vanessa Hand RN sent at 2/5/2024  5:08 PM CST -----  Regarding: pt returning call  Not sure if you already spoke with her, Francisca    ----- Message -----  From: Rebecca Webster  Sent: 2/5/2024   4:11 PM CST  To: [facility] Staff    Type:  Patient Returning Call    Who Called:pt  Who Left Message for Patient:francisca  Does the patient know what this is regarding?:returning call  Would the patient rather a call back or a response via [facility]? call  Best Call Back Number: [phone]  Additional Information:

## 2024-02-15 ENCOUNTER — HOSPITAL ENCOUNTER (OUTPATIENT)
Dept: RADIOLOGY | Facility: HOSPITAL | Age: 79
Discharge: HOME | End: 2024-02-15
Attending: INTERNAL MEDICINE
Payer: MEDICARE

## 2024-02-15 DIAGNOSIS — C74.91 MALIGNANT PHEOCHROMOCYTOMA OF RIGHT ADRENAL GLAND: ICD-10-CM

## 2024-02-15 PROCEDURE — 78815 PET IMAGE W/CT SKULL-THIGH: CPT | Mod: 26,PS,, | Performed by: STUDENT IN AN ORGANIZED HEALTH CARE EDUCATION/TRAINING PROGRAM

## 2024-02-15 PROCEDURE — 78815 PET IMAGE W/CT SKULL-THIGH: CPT | Mod: TC,PS

## 2024-02-19 ENCOUNTER — TUMOR BOARD CONFERENCE (OUTPATIENT)
Dept: HEMATOLOGY/ONCOLOGY | Facility: CLINIC | Age: 79
End: 2024-02-19
Payer: MEDICARE

## 2024-02-19 NOTE — PROGRESS NOTES
[facility]      NEUROENDOCRINE MULTIDISCIPLINARY TUMOR BOARD  _____________________________________________________________________    PRESENTER:   Ema Chowdhury MD    REASON FOR PRESENTATION:  Scan Review    ATTENDEES:   Gastroenterology - Not Present  Interventional Radiology - Edwin Chandler MD and ONDINA Garrison  Nuclear Medicine - Keenan Clifford MD  Nursing - [facility] Nursing: Kristi Wyatt, RN, Lani Vee, RN, and Christiane Moran RN  Oncology - Christina Sousa MD, Ema Chowdhury MD, and Jared Hernandez MD  Palliative Medicine - Not Present  Pathology -  Clau Gamez MD  Research - Not Present  Surgery - MD Shaan Jasmine MD Nathan Bolton, MD Russell Brown, MD    PATIENT STATUS:  Established Patient      PATIENT SUMMARY:  Past Medical History:   Diagnosis Date    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    Diabetes mellitus     Hypertension     Lumbar spondylosis 6/8/2018    Malignant pheochromocytoma     Pheochromocytoma     Pheochromocytoma, malignant 01/10/2016    Pheochromocytoma, malignant     Sacroiliac joint pain 7/11/2018    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/17/2018    Thyroid disease        Past Surgical History:   Procedure Laterality Date    ADRENALECTOMY      ANGIOGRAPHY N/A 11/15/2018    Procedure: ANGIOGRAM;  Surgeon: [facility] Diagnostic Provider;  Location: [facility] OR [address] [place] [address];  Service: Radiology;  Laterality: N/A;    APPENDECTOMY      DISSECTION OF NECK Right 11/16/2018    Procedure: DISSECTION, NECK to remove right carotid body tumor;  Surgeon: Noah Ca MD;  Location: [facility] OR [address] [place] [address];  Service: ENT;  Laterality: Right;    INJECTION OF ANESTHETIC AGENT INTO SACROILIAC JOINT Right 6/26/2018    Procedure: BLOCK, SACROILIAC JOINT;  Surgeon: Sydney Reynoso MD;  Location: [facility] PAIN MGT;  Service: Pain Management;  Laterality: Right;  Right SI Joint Injection    [address]    NEEDS CONSENT    LEFT  HEART CATHETERIZATION Left 7/18/2018    Procedure: CATHETERIZATION, HEART, LEFT;  Surgeon: Moustapha Vasquez MD;  Location: [facility] CATH LAB;  Service: Cardiovascular;  Laterality: Left;    MIBG Therapy  3/2016, 7/2016, 12/2016    [facility] [place]       TUMOR MARKERS:  5-HIAA, Plasma Ref Range: up to 22 ng/mL      Chromogranin A Ref Range: <93 ng/mL  Recent Labs   Lab 11/11/22  0959   Chromogranin A 229 H     Gastrin Ref Range: <100 pg/mL      Neurokinin A Ref Range: 0 - 40 pg/mL      Pancreastatin Ref Range: 10 - 135 pg/mL  Recent Labs   Lab 04/26/21  1017   Pancreastatin 66     Pancreatic Polypeptide Ref Range: >314 pg/mL      Serotonin Ref Range: <=230 ng/mL            Latest Ref Rng & Units 2/1/2024     9:38 AM 2/1/2024     9:02 AM 1/31/2024     2:46 PM   Neuroendocrine Labs   WBC 3.90 - 12.70 K/uL   5.00    RBC 4.00 - 5.40 M/uL   3.69    HGB 12.0 - 16.0 g/dL   10.8    HCT 37.0 - 48.5 %   33.5    MCV 82 - 98 fL   91    MCH 27.0 - 31.0 pg   29.3    MCHC 32.0 - 36.0 g/dL   32.3    RDW 11.5 - 14.5 %   15.4    PLATLETS 150 - 450 K/uL   172    MPV 7.4 - 10.4 fL   8.0    GRAN # 1.8 - 7.7 K/uL   3.6    LYMPH # 1.0 - 4.8 K/uL   1.0    MONO # 0.3 - 1.0 K/uL   0.3    EOS # 0.0 - 0.5 K/uL   0.1    BASO # 0.00 - 0.20 K/uL   0.10    GRAN % 38.0 - 73.0 %   71.2    LYMPH % 18.0 - 48.0 %   19.2    MONO % 4.0 - 15.0 %   5.8    EOS % 0.0 - 8.0 %   2.7    BASO % 0.0 - 1.9 %   1.1    DIFF METHOD    Automated    Weight  178 lbs 13 oz 178 lbs 13 oz      ____________________________________________________________________    DISCUSSION: 77 yo woman with R adrenal pheochromocytoma s/p bilateral adrenectomy in 11/2009, recurrent disease to the bone in 2016 s/p MIBG in 2016. At our prior visits, we discussed interim progression of cancer in the liver and new lesions in the bones. Discussed lanreotide every 4 weeks, zometa every 3 months and PRRT. Lanreotide started 1/23/2023. Zometa started 2/20/23. PRRT 3/1/23-8/16/23. Review  PET    INT RAD: Defer to Dr Clifford.    NUC MED:  Stable right hepatic lobe lesions  Stable distribution of osseous lesions with decreased uptake    BOARD RECOMMENDATIONS: Stable, continue surveillance.

## 2024-02-28 ENCOUNTER — TELEPHONE (OUTPATIENT)
Dept: ENDOCRINOLOGY | Facility: CLINIC | Age: 79
End: 2024-02-28
Payer: MEDICARE

## 2024-02-28 NOTE — TELEPHONE ENCOUNTER
----- Message from Beth Greenfield sent at 2/27/2024  4:47 PM CST -----  Regarding: Patient advice  Contact: Pt  [phone]            Name of Pharmacy:    [id number] - [place] - [address] AT SEC OF [facility]  [address] [place] [address]  Phone: [phone] Fax: [phone]    Name Of caller:     Hailey     Name of Medication: metFORMIN (GLUCOPHAGE-XR) 500 MG ER 24hr tablet    Comments/advisory:  Requesting a refill and a call to discuss issues with getting it       
Spoke to pharmacy, authorize refills.   
Full range of motion of upper and lower extremities, no joint tenderness/swelling.

## 2024-02-29 ENCOUNTER — INFUSION (OUTPATIENT)
Dept: INFUSION THERAPY | Facility: HOSPITAL | Age: 79
End: 2024-02-29
Attending: INTERNAL MEDICINE
Payer: MEDICARE

## 2024-02-29 ENCOUNTER — OFFICE VISIT (OUTPATIENT)
Dept: HEMATOLOGY/ONCOLOGY | Facility: CLINIC | Age: 79
End: 2024-02-29
Payer: MEDICARE

## 2024-02-29 VITALS
DIASTOLIC BLOOD PRESSURE: 83 MMHG | HEIGHT: 63 IN | SYSTOLIC BLOOD PRESSURE: 153 MMHG | HEART RATE: 69 BPM | TEMPERATURE: 98 F | BODY MASS INDEX: 30.66 KG/M2 | RESPIRATION RATE: 16 BRPM | OXYGEN SATURATION: 98 % | WEIGHT: 173.06 LBS

## 2024-02-29 VITALS
SYSTOLIC BLOOD PRESSURE: 153 MMHG | WEIGHT: 173.06 LBS | BODY MASS INDEX: 30.66 KG/M2 | HEART RATE: 69 BPM | DIASTOLIC BLOOD PRESSURE: 83 MMHG | OXYGEN SATURATION: 98 % | RESPIRATION RATE: 16 BRPM

## 2024-02-29 DIAGNOSIS — I49.5 SICK SINUS SYNDROME: ICD-10-CM

## 2024-02-29 DIAGNOSIS — I10 ESSENTIAL HYPERTENSION: ICD-10-CM

## 2024-02-29 DIAGNOSIS — E87.6 HYPOKALEMIA: ICD-10-CM

## 2024-02-29 DIAGNOSIS — E27.1 ADRENAL INSUFFICIENCY, PRIMARY: ICD-10-CM

## 2024-02-29 DIAGNOSIS — E11.9 CONTROLLED TYPE 2 DIABETES MELLITUS WITHOUT COMPLICATION, WITHOUT LONG-TERM CURRENT USE OF INSULIN: ICD-10-CM

## 2024-02-29 DIAGNOSIS — C7B.8 SECONDARY NEUROENDOCRINE TUMOR OF BONE: ICD-10-CM

## 2024-02-29 DIAGNOSIS — D84.81 IMMUNODEFICIENCY SECONDARY TO NEOPLASM: ICD-10-CM

## 2024-02-29 DIAGNOSIS — C74.90 MALIGNANT PHEOCHROMOCYTOMA, UNSPECIFIED LATERALITY: Primary | ICD-10-CM

## 2024-02-29 DIAGNOSIS — D84.821 IMMUNODEFICIENCY DUE TO DRUGS: ICD-10-CM

## 2024-02-29 DIAGNOSIS — C7B.8 SECONDARY NEUROENDOCRINE TUMOR OF DISTANT LYMPH NODES: ICD-10-CM

## 2024-02-29 DIAGNOSIS — D49.9 IMMUNODEFICIENCY SECONDARY TO NEOPLASM: ICD-10-CM

## 2024-02-29 DIAGNOSIS — Z79.899 IMMUNODEFICIENCY DUE TO DRUGS: ICD-10-CM

## 2024-02-29 DIAGNOSIS — I50.42 CHRONIC COMBINED SYSTOLIC AND DIASTOLIC CONGESTIVE HEART FAILURE: ICD-10-CM

## 2024-02-29 DIAGNOSIS — C74.91 MALIGNANT PHEOCHROMOCYTOMA OF RIGHT ADRENAL GLAND: Primary | ICD-10-CM

## 2024-02-29 DIAGNOSIS — I48.0 PAF (PAROXYSMAL ATRIAL FIBRILLATION): ICD-10-CM

## 2024-02-29 PROCEDURE — 99999 PR PBB SHADOW E&M-EST. PATIENT-LVL IV: CPT | Mod: PBBFAC,,, | Performed by: INTERNAL MEDICINE

## 2024-02-29 PROCEDURE — 3288F FALL RISK ASSESSMENT DOCD: CPT | Mod: CPTII,S$GLB,, | Performed by: INTERNAL MEDICINE

## 2024-02-29 PROCEDURE — 63600175 PHARM REV CODE 636 W HCPCS: Mod: JZ,JG | Performed by: INTERNAL MEDICINE

## 2024-02-29 PROCEDURE — 99215 OFFICE O/P EST HI 40 MIN: CPT | Mod: S$GLB,,, | Performed by: INTERNAL MEDICINE

## 2024-02-29 PROCEDURE — 96372 THER/PROPH/DIAG INJ SC/IM: CPT

## 2024-02-29 PROCEDURE — 1101F PT FALLS ASSESS-DOCD LE1/YR: CPT | Mod: CPTII,S$GLB,, | Performed by: INTERNAL MEDICINE

## 2024-02-29 PROCEDURE — 3079F DIAST BP 80-89 MM HG: CPT | Mod: CPTII,S$GLB,, | Performed by: INTERNAL MEDICINE

## 2024-02-29 PROCEDURE — 1160F RVW MEDS BY RX/DR IN RCRD: CPT | Mod: CPTII,S$GLB,, | Performed by: INTERNAL MEDICINE

## 2024-02-29 PROCEDURE — 1159F MED LIST DOCD IN RCRD: CPT | Mod: CPTII,S$GLB,, | Performed by: INTERNAL MEDICINE

## 2024-02-29 PROCEDURE — 3077F SYST BP >= 140 MM HG: CPT | Mod: CPTII,S$GLB,, | Performed by: INTERNAL MEDICINE

## 2024-02-29 PROCEDURE — G2211 COMPLEX E/M VISIT ADD ON: HCPCS | Mod: S$GLB,,, | Performed by: INTERNAL MEDICINE

## 2024-02-29 PROCEDURE — 1126F AMNT PAIN NOTED NONE PRSNT: CPT | Mod: CPTII,S$GLB,, | Performed by: INTERNAL MEDICINE

## 2024-02-29 RX ORDER — LANREOTIDE ACETATE 120 MG/.5ML
120 INJECTION SUBCUTANEOUS ONCE
Status: COMPLETED | OUTPATIENT
Start: 2024-02-29 | End: 2024-02-29

## 2024-02-29 RX ORDER — LANREOTIDE ACETATE 120 MG/.5ML
120 INJECTION SUBCUTANEOUS ONCE
Status: CANCELLED | OUTPATIENT
Start: 2024-02-29 | End: 2024-02-29

## 2024-02-29 RX ADMIN — LANREOTIDE ACETATE 120 MG: 120 INJECTION SUBCUTANEOUS at 10:02

## 2024-02-29 NOTE — PROGRESS NOTES
PROGRESS NOTE    Subjective:       Patient ID: Hailey Sawant is a 78 y.o. female.    Chief Complaint: metastatic pheochromocytoma    Diagnosis:  Metastatic pheochromocytoma of the right adrenal gland, with mets to bones, liver    Oncologic History:  1. Ms. Sawant is a 72 yo woman with DM, HTN, CAD s/p MI, A fib and sick sinus syndrome s/p pacemaker in June 2022 (on eliquis since), carotid paraganglioma removed in 11/2018 who initially saw me on 11/28/2022 for further management of metastatic pheochromocytoma. Last seen Dr West in 2018 and Dr Cohen in early 2021. Does not follow oncologist locally. It is my first time seeing her today. Her history dates to 2009 when she was being worked up for abdominal pain and was found to have cholelithiasis. However, during this workup she was found to have bilateral adrenal masses.  She was also noted to be hypertensive.  She had a 6.5 cm mass on the right with some evidence of necrosis and also a mass just under 3 cm on the left.  Biochemical workup was consistent with pheochromocytoma.  In 11/2009 she underwent a bilateral adrenalectomy.  Pathology had revealed a right adrenal gland pheochromocytoma and a left adrenal gland adrenal cortical adenoma.  In 02/2016 she was noted to have recurrent disease to the bone and underwent I 131 MIBG therapy in 03/2016, 07/2016 and 11/2016.  In 10/2016 she underwent a right partial nephrectomy due to recurrent disease.  Pathology did confirm findings consistent with recurrent pheochromocytoma.  In 11/2018 she underwent removal of the right carotid body tumor with pathology showing paraganglioma. She presents today for follow up. She knows she has cancer but does not know it is stage IV. She is not sure if Dr Cohen called her after tumor board or not. She feels well. Had MI in June 2022, treated at [facility], had pacemaker placed. Medically treated. Feeling well  "today. We discussed restaging dotatate scan.   2. Dotatate scan declined by insurance several times. Finally had gallium PET on 23. It showed "New foci of abnormal radiotracer uptake at the cervical spine, sacral, right ilium and right acetabular joint. Interval enlargement of hepatic section 7 hypodense mass. [facility] However unable to get insurance approval for PRRT and admission afterwards to monitor for catecholamine crisis.   3. Lanreotide started on 23. Zometa started 23. Last dose on 23  4. PRRT started on 3/1/23. #2 on 23, # 3 on 23, #4 on 23    Interval History:   Ms Sawant returns for follow up. Feeling okay. Just recovered from a cold last week.     ECO    ROS:   A ten-point system review is obtained and negative except for what was stated in the Interval History.     Physical Examination:   Vital signs reviewed.   General: well hydrated, well developed, in no acute distress, using a walker  HEENT: normocephalic, PERRLA, EOMI, anicteric sclerae  Neck: supple, no JVD, +thyromegaly, soft, no cervical or supraclavicular lymphadenopathy  Lungs: clear breath sounds bilaterally, no wheezing, rales, or rhonchi  Heart: RRR, no M/R/G  Abdomen: soft, no tenderness, non-distended, no hepatosplenomegaly, mass, or hernia. BS present  Extremities: no clubbing, cyanosis, or edema  Skin: no rash, ulcer, or open wounds  Neuro: alert and oriented x 4, no focal neuro deficit  Psych: pleasant and appropriate mood and affect    Objective:     Laboratory Data:  Labs reviewed. CMP unremarkable    Imaging Data:  Gallium PET 23:  New foci of abnormal radiotracer uptake at the cervical spine, sacral, right ilium and right acetabular joint.     Interval enlargement of hepatic section 7 hypodense mass.     Based upon progression of disease and tracer avidity of the patient's tumor burden, the patient would be an appropriate candidate for PRRT if clinically " indicated.     CT neck 4/6/23:  Impression:     1. Status post resection right carotid body tumor.  No recurrent mass identified.  2. 1.5 cm peripherally enhancing nodules lower pole both lobes of the thyroid.    Thyroid US 5/16/23:  Impression:     Multiple thyroid nodules.  1.7 cm TI-RADS 5 left lobe nodule meets ACR criteria for FNA.     Right lobe nodule meets criteria for surveillance with repeat thyroid ultrasound recommended 1 year.      Copper PET 5/25/23:  Impression:     1. Right hepatic lobe lesions and multifocal primarily sclerotic osseous lesions stable in size and number with decreased tracer-avidity.  No new lesions identified.  2. Postsurgical change prior bilateral adrenalectomy.  No tracer uptake within the surgical bed to suggest localized recurrent disease.  3. Other stable incidental findings above.    Copper PET 9/13/23:  FINDINGS:  Quality of the study: Diaphragmatic/respiratory motion and associated misregistration artifact limits assessment of the liver.     In the head and neck, there is physiologic distribution in the pituitary, salivary, and thyroid glands, and there are no tracer avid lesions suspicious for malignancy.     In the chest, there are no tracer avid lesions suspicious for malignancy.     In the abdomen and pelvis, there is similar appearance of radiotracer avid lesions within the right hepatic lobe noting that evaluation is limited due to motion and misregistration artifact.  Dominant right hepatic lobe lesion demonstrates maximum SUV of 46 (previously 44).  Difficult to measure lesions on CT due to artifact.  No new lesion.  There is physiologic uptake in the pancreatic uncinate process, liver, spleen, adrenal glands and  tract.     In the bones, there is stable number and distribution of radiotracer avid lesions throughout the osseous structures including the right scapula, spine, sacrum, right ilium and ischium, and right femur.  Right scapula maximum SUV of 76  (previously 91).  Right ilium maximum SUV of 100 (previously 118).  Left sacrum maximum SUV of 25 (previously 40).  No new lesion.     Additional findings: Left chest wall cardiac pacer and leads noted.  Cardiomegaly.  Diffuse calcific atherosclerosis.  Postsurgical changes of bilateral adrenalectomy and cholecystectomy.  Calcified uterine fibroid.  Colonic diverticulosis.  Left hip arthroplasty hardware results in beam hardening artifact limiting assessment of the pelvis.     Impression:     Stable distribution of radiotracer avid lesions within the liver and bones.  No new lesion.     Motion artifact limits assessment of the liver.    Copper PET 2/15/2024:  Impression:     Stable distribution of multiple tracer avid osseous lesions which show decreased tracer uptake compared to prior study.     Grossly stable appearance of tracer avid right hepatic lobe lesions.     No new tracer avid lesions.        Assessment and Plan:     1. Malignant pheochromocytoma of right adrenal gland    2. Secondary neuroendocrine tumor of distant lymph nodes    3. Secondary neuroendocrine tumor of bone    4. Immunodeficiency due to drugs    5. Immunodeficiency secondary to neoplasm    6. Adrenal insufficiency, primary post-surgical    7. Essential hypertension    8. Controlled type 2 diabetes mellitus without complication, without long-term current use of insulin    9. Sick sinus syndrome    10. PAF (paroxysmal atrial fibrillation)    11. Chronic combined systolic and diastolic congestive heart failure    12. Hypokalemia        1-5  - Ms Jese is a 79 yo woman with R adrenal pheochromocytoma s/p bilateral adrenectomy in 11/2009, recurrent disease to the bone in 2016 s/p MIBG in 2016. At our prior visits, we discussed interim progression of cancer in the liver and new lesions in the bones. Discussed lanreotide every 4 weeks, zometa every 3 months and PRRT. Lanreotide started 1/23/2023. Zometa started 2/20/23.   - PRRT #1 on 3/1/23. #2  on 4/26/23, #3 on 6/21/23, #4 on 8/16/23  - copper PET reviewed at tumor board. Stable disease  - reviewed with patient. Stable disease  - c/w lanreotide every 4 weeks. Will give today.   - last zometa on 2/1/2024, next due in May 2024  - RTC in 4 weeks     6.  - c/w cortef  - f/u with endocrinology    7.  - BP controlled  - c/w current medication    8.  - BS controlled  - c/w current medication    9-11  - f/u with Dr Loya at [facility]    12.  - takes lasix every other day  - asked patient to take two K-Dur 20 mEq today. And to take one K-Dur 20 mEq tab every time she takes lasix. Patient understands and agrees with the plan.     Follow-up:     Return in one month  Knows to call in the interval if any problems arise.    Electronically signed by Ema Chowdhury

## 2024-03-28 ENCOUNTER — OFFICE VISIT (OUTPATIENT)
Dept: HEMATOLOGY/ONCOLOGY | Facility: CLINIC | Age: 79
End: 2024-03-28
Payer: MEDICARE

## 2024-03-28 ENCOUNTER — INFUSION (OUTPATIENT)
Dept: INFUSION THERAPY | Facility: HOSPITAL | Age: 79
End: 2024-03-28
Attending: INTERNAL MEDICINE
Payer: MEDICARE

## 2024-03-28 VITALS
TEMPERATURE: 98 F | BODY MASS INDEX: 31.01 KG/M2 | HEART RATE: 65 BPM | WEIGHT: 175.06 LBS | DIASTOLIC BLOOD PRESSURE: 78 MMHG | OXYGEN SATURATION: 99 % | SYSTOLIC BLOOD PRESSURE: 161 MMHG

## 2024-03-28 DIAGNOSIS — E87.6 HYPOKALEMIA: ICD-10-CM

## 2024-03-28 DIAGNOSIS — C7B.8 SECONDARY NEUROENDOCRINE TUMOR OF BONE: ICD-10-CM

## 2024-03-28 DIAGNOSIS — E27.1 ADRENAL INSUFFICIENCY, PRIMARY: ICD-10-CM

## 2024-03-28 DIAGNOSIS — C7B.8 SECONDARY NEUROENDOCRINE TUMOR OF DISTANT LYMPH NODES: ICD-10-CM

## 2024-03-28 DIAGNOSIS — I50.42 CHRONIC COMBINED SYSTOLIC AND DIASTOLIC CONGESTIVE HEART FAILURE: ICD-10-CM

## 2024-03-28 DIAGNOSIS — C74.90 MALIGNANT PHEOCHROMOCYTOMA, UNSPECIFIED LATERALITY: Primary | ICD-10-CM

## 2024-03-28 DIAGNOSIS — C74.91 MALIGNANT PHEOCHROMOCYTOMA OF RIGHT ADRENAL GLAND: Primary | ICD-10-CM

## 2024-03-28 DIAGNOSIS — Z79.899 IMMUNODEFICIENCY DUE TO DRUGS: ICD-10-CM

## 2024-03-28 DIAGNOSIS — I48.0 PAF (PAROXYSMAL ATRIAL FIBRILLATION): ICD-10-CM

## 2024-03-28 DIAGNOSIS — D49.9 IMMUNODEFICIENCY SECONDARY TO NEOPLASM: ICD-10-CM

## 2024-03-28 DIAGNOSIS — E11.9 CONTROLLED TYPE 2 DIABETES MELLITUS WITHOUT COMPLICATION, WITHOUT LONG-TERM CURRENT USE OF INSULIN: ICD-10-CM

## 2024-03-28 DIAGNOSIS — I49.5 SICK SINUS SYNDROME: ICD-10-CM

## 2024-03-28 DIAGNOSIS — D84.821 IMMUNODEFICIENCY DUE TO DRUGS: ICD-10-CM

## 2024-03-28 DIAGNOSIS — D84.81 IMMUNODEFICIENCY SECONDARY TO NEOPLASM: ICD-10-CM

## 2024-03-28 DIAGNOSIS — I10 ESSENTIAL HYPERTENSION: ICD-10-CM

## 2024-03-28 PROCEDURE — 1159F MED LIST DOCD IN RCRD: CPT | Mod: CPTII,S$GLB,, | Performed by: INTERNAL MEDICINE

## 2024-03-28 PROCEDURE — 99999 PR PBB SHADOW E&M-EST. PATIENT-LVL IV: CPT | Mod: PBBFAC,,, | Performed by: INTERNAL MEDICINE

## 2024-03-28 PROCEDURE — 3078F DIAST BP <80 MM HG: CPT | Mod: CPTII,S$GLB,, | Performed by: INTERNAL MEDICINE

## 2024-03-28 PROCEDURE — 96372 THER/PROPH/DIAG INJ SC/IM: CPT

## 2024-03-28 PROCEDURE — 3288F FALL RISK ASSESSMENT DOCD: CPT | Mod: CPTII,S$GLB,, | Performed by: INTERNAL MEDICINE

## 2024-03-28 PROCEDURE — 63600175 PHARM REV CODE 636 W HCPCS: Mod: JZ,JG | Performed by: INTERNAL MEDICINE

## 2024-03-28 PROCEDURE — G2211 COMPLEX E/M VISIT ADD ON: HCPCS | Mod: S$GLB,,, | Performed by: INTERNAL MEDICINE

## 2024-03-28 PROCEDURE — 1101F PT FALLS ASSESS-DOCD LE1/YR: CPT | Mod: CPTII,S$GLB,, | Performed by: INTERNAL MEDICINE

## 2024-03-28 PROCEDURE — 1160F RVW MEDS BY RX/DR IN RCRD: CPT | Mod: CPTII,S$GLB,, | Performed by: INTERNAL MEDICINE

## 2024-03-28 PROCEDURE — 1126F AMNT PAIN NOTED NONE PRSNT: CPT | Mod: CPTII,S$GLB,, | Performed by: INTERNAL MEDICINE

## 2024-03-28 PROCEDURE — 99215 OFFICE O/P EST HI 40 MIN: CPT | Mod: S$GLB,,, | Performed by: INTERNAL MEDICINE

## 2024-03-28 PROCEDURE — 3077F SYST BP >= 140 MM HG: CPT | Mod: CPTII,S$GLB,, | Performed by: INTERNAL MEDICINE

## 2024-03-28 RX ORDER — LANREOTIDE ACETATE 120 MG/.5ML
120 INJECTION SUBCUTANEOUS ONCE
Status: CANCELLED | OUTPATIENT
Start: 2024-03-28 | End: 2024-03-28

## 2024-03-28 RX ORDER — LANREOTIDE ACETATE 120 MG/.5ML
120 INJECTION SUBCUTANEOUS ONCE
Status: COMPLETED | OUTPATIENT
Start: 2024-03-28 | End: 2024-03-28

## 2024-03-28 RX ADMIN — LANREOTIDE ACETATE 120 MG: 120 INJECTION SUBCUTANEOUS at 09:03

## 2024-03-28 NOTE — PROGRESS NOTES
PROGRESS NOTE    Subjective:       Patient ID: Hailey Sawant is a 78 y.o. female.    Chief Complaint: metastatic pheochromocytoma    Diagnosis:  Metastatic pheochromocytoma of the right adrenal gland, with mets to bones, liver    Oncologic History:  1. Ms. Sawant is a 74 yo woman with DM, HTN, CAD s/p MI, A fib and sick sinus syndrome s/p pacemaker in June 2022 (on eliquis since), carotid paraganglioma removed in 11/2018 who initially saw me on 11/28/2022 for further management of metastatic pheochromocytoma. Last seen Dr West in 2018 and Dr Cohen in early 2021. Does not follow oncologist locally. It is my first time seeing her today. Her history dates to 2009 when she was being worked up for abdominal pain and was found to have cholelithiasis. However, during this workup she was found to have bilateral adrenal masses.  She was also noted to be hypertensive.  She had a 6.5 cm mass on the right with some evidence of necrosis and also a mass just under 3 cm on the left.  Biochemical workup was consistent with pheochromocytoma.  In 11/2009 she underwent a bilateral adrenalectomy.  Pathology had revealed a right adrenal gland pheochromocytoma and a left adrenal gland adrenal cortical adenoma.  In 02/2016 she was noted to have recurrent disease to the bone and underwent I 131 MIBG therapy in 03/2016, 07/2016 and 11/2016.  In 10/2016 she underwent a right partial nephrectomy due to recurrent disease.  Pathology did confirm findings consistent with recurrent pheochromocytoma.  In 11/2018 she underwent removal of the right carotid body tumor with pathology showing paraganglioma. She presents today for follow up. She knows she has cancer but does not know it is stage IV. She is not sure if Dr Cohen called her after tumor board or not. She feels well. Had MI in June 2022, treated at [facility], had pacemaker placed. Medically treated. Feeling well  ----- Message from Johnny Bridges MD sent at 1/20/2020  7:06 PM CST -----  Benign appearing findings on mammogram, needs left diagnostic mammogram and left breast US in 6 months,please order and call pt "today. We discussed restaging dotatate scan.   2. Dotatate scan declined by insurance several times. Finally had gallium PET on 23. It showed "New foci of abnormal radiotracer uptake at the cervical spine, sacral, right ilium and right acetabular joint. Interval enlargement of hepatic section 7 hypodense mass. Reviewed at tumor board. [facility] PRRT. However unable to get insurance approval for PRRT and admission afterwards to monitor for catecholamine crisis.   3. Lanreotide started on 23. Zometa started 23. Last dose on 23  4. PRRT started on 3/1/23. #2 on 23, # 3 on 23, #4 on 23    Interval History:   Ms Sawant returns for follow up. Feeling okay. BP has been elevated. Taking cardura. Cardiologist stopped losartan and started her on a medication that started with an "e". She didn't bring medication list today.     ECO    ROS:   A ten-point system review is obtained and negative except for what was stated in the Interval History.     Physical Examination:   Vital signs reviewed.   General: well hydrated, well developed, in no acute distress, using a walker  HEENT: normocephalic, PERRLA, EOMI, anicteric sclerae  Neck: supple, no JVD, +thyromegaly, soft, no cervical or supraclavicular lymphadenopathy  Lungs: clear breath sounds bilaterally, no wheezing, rales, or rhonchi  Heart: RRR, no M/R/G  Abdomen: soft, no tenderness, non-distended, no hepatosplenomegaly, mass, or hernia. BS present  Extremities: no clubbing, cyanosis, or edema  Skin: no rash, ulcer, or open wounds  Neuro: alert and oriented x 4, no focal neuro deficit  Psych: pleasant and appropriate mood and affect    Objective:     Laboratory Data:  Labs reviewed. K 3.2    Imaging Data:  Gallium PET 23:  New foci of abnormal radiotracer uptake at the cervical spine, sacral, right ilium and right acetabular joint.     Interval enlargement of hepatic section 7 hypodense mass.     Based upon progression of disease and tracer " avidity of the patient's tumor burden, the patient would be an appropriate candidate for PRRT if clinically indicated.     CT neck 4/6/23:  Impression:     1. Status post resection right carotid body tumor.  No recurrent mass identified.  2. 1.5 cm peripherally enhancing nodules lower pole both lobes of the thyroid.    Thyroid US 5/16/23:  Impression:     Multiple thyroid nodules.  1.7 cm TI-RADS 5 left lobe nodule meets ACR criteria for FNA.     Right lobe nodule meets criteria for surveillance with repeat thyroid ultrasound recommended 1 year.      Copper PET 5/25/23:  Impression:     1. Right hepatic lobe lesions and multifocal primarily sclerotic osseous lesions stable in size and number with decreased tracer-avidity.  No new lesions identified.  2. Postsurgical change prior bilateral adrenalectomy.  No tracer uptake within the surgical bed to suggest localized recurrent disease.  3. Other stable incidental findings above.    Copper PET 9/13/23:  FINDINGS:  Quality of the study: Diaphragmatic/respiratory motion and associated misregistration artifact limits assessment of the liver.     In the head and neck, there is physiologic distribution in the pituitary, salivary, and thyroid glands, and there are no tracer avid lesions suspicious for malignancy.     In the chest, there are no tracer avid lesions suspicious for malignancy.     In the abdomen and pelvis, there is similar appearance of radiotracer avid lesions within the right hepatic lobe noting that evaluation is limited due to motion and misregistration artifact.  Dominant right hepatic lobe lesion demonstrates maximum SUV of 46 (previously 44).  Difficult to measure lesions on CT due to artifact.  No new lesion.  There is physiologic uptake in the pancreatic uncinate process, liver, spleen, adrenal glands and  tract.     In the bones, there is stable number and distribution of radiotracer avid lesions throughout the osseous structures including the right  scapula, spine, sacrum, right ilium and ischium, and right femur.  Right scapula maximum SUV of 76 (previously 91).  Right ilium maximum SUV of 100 (previously 118).  Left sacrum maximum SUV of 25 (previously 40).  No new lesion.     Additional findings: Left chest wall cardiac pacer and leads noted.  Cardiomegaly.  Diffuse calcific atherosclerosis.  Postsurgical changes of bilateral adrenalectomy and cholecystectomy.  Calcified uterine fibroid.  Colonic diverticulosis.  Left hip arthroplasty hardware results in beam hardening artifact limiting assessment of the pelvis.     Impression:     Stable distribution of radiotracer avid lesions within the liver and bones.  No new lesion.     Motion artifact limits assessment of the liver.    Copper PET 2/15/2024:  Impression:     Stable distribution of multiple tracer avid osseous lesions which show decreased tracer uptake compared to prior study.     Grossly stable appearance of tracer avid right hepatic lobe lesions.     No new tracer avid lesions.        Assessment and Plan:     1. Malignant pheochromocytoma of right adrenal gland    2. Secondary neuroendocrine tumor of distant lymph nodes    3. Secondary neuroendocrine tumor of bone    4. Immunodeficiency due to drugs    5. Immunodeficiency secondary to neoplasm    6. Adrenal insufficiency, primary post-surgical    7. Essential hypertension    8. Controlled type 2 diabetes mellitus without complication, without long-term current use of insulin    9. Sick sinus syndrome    10. PAF (paroxysmal atrial fibrillation)    11. Chronic combined systolic and diastolic congestive heart failure    12. Hypokalemia        1-5  - Ms Jese is a 77 yo woman with R adrenal pheochromocytoma s/p bilateral adrenectomy in 11/2009, recurrent disease to the bone in 2016 s/p MIBG in 2016. At our prior visits, we discussed interim progression of cancer in the liver and new lesions in the bones. Discussed lanreotide every 4 weeks, zometa every 3  months and PRRT. Lanreotide started 1/23/2023. Zometa started 2/20/23.   - PRRT #1 on 3/1/23. #2 on 4/26/23, #3 on 6/21/23, #4 on 8/16/23  - copper PET in Feb 2024 reviewed at tumor board. Stable disease  - c/w lanreotide every 4 weeks. Will give today.   - last zometa on 2/1/2024, next due in May 2024  - RTC in 4 weeks     6.  - c/w cortef  - f/u with endocrinology    7.  - f/u with cardiology  - asked patient to bring medication list on return    8.  - BS controlled  - c/w current medication    9-11  - f/u with Dr Loya at [facility]    12.  - takes lasix every other day  - asked patient to take two K-Dur 20 mEq today.     Follow-up:     Return in one month  Knows to call in the interval if any problems arise.    Electronically signed by Ema Chowdhury

## 2024-03-28 NOTE — NURSING
Patient tolerated Lanreotide injection well today. NAD noted upon discharge. AVS given, reviewed upcoming appts.

## 2024-04-12 ENCOUNTER — PATIENT OUTREACH (OUTPATIENT)
Dept: ADMINISTRATIVE | Facility: OTHER | Age: 79
End: 2024-04-12
Payer: MEDICARE

## 2024-04-15 VITALS — DIASTOLIC BLOOD PRESSURE: 82 MMHG | SYSTOLIC BLOOD PRESSURE: 138 MMHG | OXYGEN SATURATION: 98 % | HEART RATE: 74 BPM

## 2024-04-25 ENCOUNTER — OFFICE VISIT (OUTPATIENT)
Dept: HEMATOLOGY/ONCOLOGY | Facility: CLINIC | Age: 79
End: 2024-04-25
Payer: MEDICARE

## 2024-04-25 ENCOUNTER — INFUSION (OUTPATIENT)
Dept: INFUSION THERAPY | Facility: HOSPITAL | Age: 79
End: 2024-04-25
Attending: INTERNAL MEDICINE
Payer: MEDICARE

## 2024-04-25 VITALS
SYSTOLIC BLOOD PRESSURE: 122 MMHG | WEIGHT: 170 LBS | BODY MASS INDEX: 30.11 KG/M2 | HEART RATE: 63 BPM | OXYGEN SATURATION: 93 % | DIASTOLIC BLOOD PRESSURE: 65 MMHG

## 2024-04-25 VITALS
BODY MASS INDEX: 30.12 KG/M2 | HEART RATE: 63 BPM | DIASTOLIC BLOOD PRESSURE: 65 MMHG | SYSTOLIC BLOOD PRESSURE: 122 MMHG | OXYGEN SATURATION: 93 % | WEIGHT: 170 LBS | HEIGHT: 63 IN | RESPIRATION RATE: 16 BRPM

## 2024-04-25 DIAGNOSIS — D49.9 IMMUNODEFICIENCY SECONDARY TO NEOPLASM: ICD-10-CM

## 2024-04-25 DIAGNOSIS — E87.6 HYPOKALEMIA: ICD-10-CM

## 2024-04-25 DIAGNOSIS — I50.42 CHRONIC COMBINED SYSTOLIC AND DIASTOLIC CONGESTIVE HEART FAILURE: ICD-10-CM

## 2024-04-25 DIAGNOSIS — E11.9 CONTROLLED TYPE 2 DIABETES MELLITUS WITHOUT COMPLICATION, WITHOUT LONG-TERM CURRENT USE OF INSULIN: ICD-10-CM

## 2024-04-25 DIAGNOSIS — C7B.8 SECONDARY NEUROENDOCRINE TUMOR OF DISTANT LYMPH NODES: ICD-10-CM

## 2024-04-25 DIAGNOSIS — D84.81 IMMUNODEFICIENCY SECONDARY TO NEOPLASM: ICD-10-CM

## 2024-04-25 DIAGNOSIS — D84.821 IMMUNODEFICIENCY DUE TO DRUGS: ICD-10-CM

## 2024-04-25 DIAGNOSIS — E27.1 ADRENAL INSUFFICIENCY, PRIMARY: ICD-10-CM

## 2024-04-25 DIAGNOSIS — Z79.899 IMMUNODEFICIENCY DUE TO DRUGS: ICD-10-CM

## 2024-04-25 DIAGNOSIS — C7B.8 SECONDARY NEUROENDOCRINE TUMOR OF BONE: ICD-10-CM

## 2024-04-25 DIAGNOSIS — I10 ESSENTIAL HYPERTENSION: ICD-10-CM

## 2024-04-25 DIAGNOSIS — I49.5 SICK SINUS SYNDROME: ICD-10-CM

## 2024-04-25 DIAGNOSIS — C74.90 MALIGNANT PHEOCHROMOCYTOMA, UNSPECIFIED LATERALITY: Primary | ICD-10-CM

## 2024-04-25 DIAGNOSIS — I48.0 PAF (PAROXYSMAL ATRIAL FIBRILLATION): ICD-10-CM

## 2024-04-25 DIAGNOSIS — C74.91 MALIGNANT PHEOCHROMOCYTOMA OF RIGHT ADRENAL GLAND: Primary | ICD-10-CM

## 2024-04-25 PROCEDURE — 3074F SYST BP LT 130 MM HG: CPT | Mod: CPTII,S$GLB,, | Performed by: INTERNAL MEDICINE

## 2024-04-25 PROCEDURE — G2211 COMPLEX E/M VISIT ADD ON: HCPCS | Mod: S$GLB,,, | Performed by: INTERNAL MEDICINE

## 2024-04-25 PROCEDURE — 99999 PR PBB SHADOW E&M-EST. PATIENT-LVL IV: CPT | Mod: PBBFAC,,, | Performed by: INTERNAL MEDICINE

## 2024-04-25 PROCEDURE — 3078F DIAST BP <80 MM HG: CPT | Mod: CPTII,S$GLB,, | Performed by: INTERNAL MEDICINE

## 2024-04-25 PROCEDURE — 96372 THER/PROPH/DIAG INJ SC/IM: CPT

## 2024-04-25 PROCEDURE — 99215 OFFICE O/P EST HI 40 MIN: CPT | Mod: S$GLB,,, | Performed by: INTERNAL MEDICINE

## 2024-04-25 PROCEDURE — 3288F FALL RISK ASSESSMENT DOCD: CPT | Mod: CPTII,S$GLB,, | Performed by: INTERNAL MEDICINE

## 2024-04-25 PROCEDURE — 1101F PT FALLS ASSESS-DOCD LE1/YR: CPT | Mod: CPTII,S$GLB,, | Performed by: INTERNAL MEDICINE

## 2024-04-25 PROCEDURE — 1159F MED LIST DOCD IN RCRD: CPT | Mod: CPTII,S$GLB,, | Performed by: INTERNAL MEDICINE

## 2024-04-25 PROCEDURE — 63600175 PHARM REV CODE 636 W HCPCS: Mod: JZ,JG | Performed by: INTERNAL MEDICINE

## 2024-04-25 PROCEDURE — 1160F RVW MEDS BY RX/DR IN RCRD: CPT | Mod: CPTII,S$GLB,, | Performed by: INTERNAL MEDICINE

## 2024-04-25 RX ORDER — LANREOTIDE ACETATE 120 MG/.5ML
120 INJECTION SUBCUTANEOUS ONCE
Status: CANCELLED | OUTPATIENT
Start: 2024-04-25 | End: 2024-04-25

## 2024-04-25 RX ORDER — LANREOTIDE ACETATE 120 MG/.5ML
120 INJECTION SUBCUTANEOUS ONCE
Status: COMPLETED | OUTPATIENT
Start: 2024-04-25 | End: 2024-04-25

## 2024-04-25 RX ORDER — ROSUVASTATIN CALCIUM 5 MG/1
5 TABLET, COATED ORAL DAILY
COMMUNITY

## 2024-04-25 RX ORDER — SPIRONOLACTONE 25 MG/1
25 TABLET ORAL
COMMUNITY

## 2024-04-25 RX ADMIN — LANREOTIDE ACETATE 120 MG: 120 INJECTION SUBCUTANEOUS at 11:04

## 2024-04-25 NOTE — PROGRESS NOTES
PROGRESS NOTE    Subjective:       Patient ID: Hailey Sawant is a 78 y.o. female.    Chief Complaint: metastatic pheochromocytoma    Diagnosis:  Metastatic pheochromocytoma of the right adrenal gland, with mets to bones, liver    Oncologic History:  1. Ms. Sawant is a 74 yo woman with DM, HTN, CAD s/p MI, A fib and sick sinus syndrome s/p pacemaker in June 2022 (on eliquis since), carotid paraganglioma removed in 11/2018 who initially saw me on 11/28/2022 for further management of metastatic pheochromocytoma. Last seen Dr West in 2018 and Dr Cohen in early 2021. Does not follow oncologist locally. It is my first time seeing her today. Her history dates to 2009 when she was being worked up for abdominal pain and was found to have cholelithiasis. However, during this workup she was found to have bilateral adrenal masses.  She was also noted to be hypertensive.  She had a 6.5 cm mass on the right with some evidence of necrosis and also a mass just under 3 cm on the left.  Biochemical workup was consistent with pheochromocytoma.  In 11/2009 she underwent a bilateral adrenalectomy.  Pathology had revealed a right adrenal gland pheochromocytoma and a left adrenal gland adrenal cortical adenoma.  In 02/2016 she was noted to have recurrent disease to the bone and underwent I 131 MIBG therapy in 03/2016, 07/2016 and 11/2016.  In 10/2016 she underwent a right partial nephrectomy due to recurrent disease.  Pathology did confirm findings consistent with recurrent pheochromocytoma.  In 11/2018 she underwent removal of the right carotid body tumor with pathology showing paraganglioma. She presents today for follow up. She knows she has cancer but does not know it is stage IV. She is not sure if Dr Cohen called her after tumor board or not. She feels well. Had MI in June 2022, treated at [facility], had pacemaker placed. Medically treated. Feeling well  "today. We discussed restaging dotatate scan.   2. Dotatate scan declined by insurance several times. Finally had gallium PET on 23. It showed "New foci of abnormal radiotracer uptake at the cervical spine, sacral, right ilium and right acetabular joint. Interval enlargement of hepatic section 7 hypodense mass. Reviewed at tumor board. [facility] PRRT. However unable to get insurance approval for PRRT and admission afterwards to monitor for catecholamine crisis.   3. Lanreotide started on 23. Zometa started 23.   4. PRRT started on 3/1/23. #2 on 23, # 3 on 23, #4 on 23    Interval History:   Ms Sawant returns for follow up. Feeling okay. Taking metoprolol, losartan, cardura. Scheduled for heart procedure (Ablation?) in May.     ECO    ROS:   A ten-point system review is obtained and negative except for what was stated in the Interval History.     Physical Examination:   Vital signs reviewed.   General: well hydrated, well developed, in no acute distress, using a walker  HEENT: normocephalic, PERRLA, EOMI, anicteric sclerae  Neck: supple, no JVD, +thyromegaly, soft, no cervical or supraclavicular lymphadenopathy  Lungs: clear breath sounds bilaterally, no wheezing, rales, or rhonchi  Heart: RRR, no M/R/G  Abdomen: soft, no tenderness, non-distended, no hepatosplenomegaly, mass, or hernia. BS present  Extremities: no clubbing, cyanosis, or edema  Skin: no rash, ulcer, or open wounds  Neuro: alert and oriented x 4, no focal neuro deficit  Psych: pleasant and appropriate mood and affect    Objective:     Laboratory Data:  Labs reviewed. K 3.4    Imaging Data:  Gallium PET 23:  New foci of abnormal radiotracer uptake at the cervical spine, sacral, right ilium and right acetabular joint.     Interval enlargement of hepatic section 7 hypodense mass.     Based upon progression of disease and tracer avidity of the patient's tumor burden, the patient would be an appropriate candidate for PRRT if " clinically indicated.     CT neck 4/6/23:  Impression:     1. Status post resection right carotid body tumor.  No recurrent mass identified.  2. 1.5 cm peripherally enhancing nodules lower pole both lobes of the thyroid.    Thyroid US 5/16/23:  Impression:     Multiple thyroid nodules.  1.7 cm TI-RADS 5 left lobe nodule meets ACR criteria for FNA.     Right lobe nodule meets criteria for surveillance with repeat thyroid ultrasound recommended 1 year.      Copper PET 5/25/23:  Impression:     1. Right hepatic lobe lesions and multifocal primarily sclerotic osseous lesions stable in size and number with decreased tracer-avidity.  No new lesions identified.  2. Postsurgical change prior bilateral adrenalectomy.  No tracer uptake within the surgical bed to suggest localized recurrent disease.  3. Other stable incidental findings above.    Copper PET 9/13/23:  FINDINGS:  Quality of the study: Diaphragmatic/respiratory motion and associated misregistration artifact limits assessment of the liver.     In the head and neck, there is physiologic distribution in the pituitary, salivary, and thyroid glands, and there are no tracer avid lesions suspicious for malignancy.     In the chest, there are no tracer avid lesions suspicious for malignancy.     In the abdomen and pelvis, there is similar appearance of radiotracer avid lesions within the right hepatic lobe noting that evaluation is limited due to motion and misregistration artifact.  Dominant right hepatic lobe lesion demonstrates maximum SUV of 46 (previously 44).  Difficult to measure lesions on CT due to artifact.  No new lesion.  There is physiologic uptake in the pancreatic uncinate process, liver, spleen, adrenal glands and  tract.     In the bones, there is stable number and distribution of radiotracer avid lesions throughout the osseous structures including the right scapula, spine, sacrum, right ilium and ischium, and right femur.  Right scapula maximum SUV of  76 (previously 91).  Right ilium maximum SUV of 100 (previously 118).  Left sacrum maximum SUV of 25 (previously 40).  No new lesion.     Additional findings: Left chest wall cardiac pacer and leads noted.  Cardiomegaly.  Diffuse calcific atherosclerosis.  Postsurgical changes of bilateral adrenalectomy and cholecystectomy.  Calcified uterine fibroid.  Colonic diverticulosis.  Left hip arthroplasty hardware results in beam hardening artifact limiting assessment of the pelvis.     Impression:     Stable distribution of radiotracer avid lesions within the liver and bones.  No new lesion.     Motion artifact limits assessment of the liver.    Copper PET 2/15/2024:  Impression:     Stable distribution of multiple tracer avid osseous lesions which show decreased tracer uptake compared to prior study.     Grossly stable appearance of tracer avid right hepatic lobe lesions.     No new tracer avid lesions.        Assessment and Plan:     1. Malignant pheochromocytoma of right adrenal gland    2. Secondary neuroendocrine tumor of distant lymph nodes    3. Secondary neuroendocrine tumor of bone    4. Immunodeficiency due to drugs    5. Immunodeficiency secondary to neoplasm    6. Adrenal insufficiency, primary post-surgical    7. Essential hypertension    8. Controlled type 2 diabetes mellitus without complication, without long-term current use of insulin    9. Sick sinus syndrome    10. PAF (paroxysmal atrial fibrillation)    11. Chronic combined systolic and diastolic congestive heart failure    12. Hypokalemia        1-5  - Ms Jese is a 79 yo woman with R adrenal pheochromocytoma s/p bilateral adrenectomy in 11/2009, recurrent disease to the bone in 2016 s/p MIBG in 2016. At our prior visits, we discussed interim progression of cancer in the liver and new lesions in the bones. Discussed lanreotide every 4 weeks, zometa every 3 months and PRRT. Lanreotide started 1/23/2023. Zometa started 2/20/23.   - PRRT #1 on 3/1/23.  #2 on 4/26/23, #3 on 6/21/23, #4 on 8/16/23  - copper PET in Feb 2024 reviewed at tumor board. Stable disease  - c/w lanreotide every 4 weeks. Will give today.   - last zometa on 2/1/2024, next due in May 2024. scheduled  - RTC in 4 weeks     6.  - c/w cortef  - f/u with endocrinology    7.  - BP controlled  - c/w current medication  - f/u with cardiology    8.  - BS controlled  - c/w current medication    9-11  - f/u with Dr Loya at [facility]  - for her cardiac procedure, asked patient to continue with cardura daily and monitor BP    12.  - not consistent with K  - asked patient to take K 20mEq daily as she takes 20 mg of lasix daily    Follow-up:     Return in one month  Knows to call in the interval if any problems arise.    Electronically signed by Ema Chowdhury

## 2024-05-23 ENCOUNTER — INFUSION (OUTPATIENT)
Dept: INFUSION THERAPY | Facility: HOSPITAL | Age: 79
End: 2024-05-23
Attending: INTERNAL MEDICINE
Payer: MEDICARE

## 2024-05-23 VITALS
TEMPERATURE: 98 F | HEART RATE: 62 BPM | OXYGEN SATURATION: 97 % | DIASTOLIC BLOOD PRESSURE: 78 MMHG | SYSTOLIC BLOOD PRESSURE: 161 MMHG | RESPIRATION RATE: 16 BRPM

## 2024-05-23 DIAGNOSIS — C74.90 MALIGNANT PHEOCHROMOCYTOMA, UNSPECIFIED LATERALITY: Primary | ICD-10-CM

## 2024-05-23 DIAGNOSIS — C79.51 SECONDARY MALIGNANT NEOPLASM OF BONE: ICD-10-CM

## 2024-05-23 DIAGNOSIS — C74.91 MALIGNANT PHEOCHROMOCYTOMA OF RIGHT ADRENAL GLAND: ICD-10-CM

## 2024-05-23 PROCEDURE — 96372 THER/PROPH/DIAG INJ SC/IM: CPT | Mod: 59

## 2024-05-23 PROCEDURE — 25000003 PHARM REV CODE 250: Performed by: INTERNAL MEDICINE

## 2024-05-23 PROCEDURE — 63600175 PHARM REV CODE 636 W HCPCS: Performed by: INTERNAL MEDICINE

## 2024-05-23 PROCEDURE — A4216 STERILE WATER/SALINE, 10 ML: HCPCS | Performed by: INTERNAL MEDICINE

## 2024-05-23 PROCEDURE — 96365 THER/PROPH/DIAG IV INF INIT: CPT

## 2024-05-23 RX ORDER — SODIUM CHLORIDE 0.9 % (FLUSH) 0.9 %
10 SYRINGE (ML) INJECTION
Status: DISCONTINUED | OUTPATIENT
Start: 2024-05-23 | End: 2024-05-23 | Stop reason: HOSPADM

## 2024-05-23 RX ORDER — HEPARIN 100 UNIT/ML
500 SYRINGE INTRAVENOUS
OUTPATIENT
Start: 2024-06-13

## 2024-05-23 RX ORDER — LANREOTIDE ACETATE 120 MG/.5ML
120 INJECTION SUBCUTANEOUS ONCE
OUTPATIENT
Start: 2024-05-23 | End: 2024-05-23

## 2024-05-23 RX ORDER — SODIUM CHLORIDE 0.9 % (FLUSH) 0.9 %
10 SYRINGE (ML) INJECTION
OUTPATIENT
Start: 2024-06-13

## 2024-05-23 RX ORDER — LANREOTIDE ACETATE 120 MG/.5ML
120 INJECTION SUBCUTANEOUS ONCE
Status: COMPLETED | OUTPATIENT
Start: 2024-05-23 | End: 2024-05-23

## 2024-05-23 RX ADMIN — Medication 10 ML: at 12:05

## 2024-05-23 RX ADMIN — LANREOTIDE ACETATE 120 MG: 120 INJECTION SUBCUTANEOUS at 12:05

## 2024-05-23 RX ADMIN — ZOLEDRONIC ACID 3.5 MG: 4 INJECTION, SOLUTION, CONCENTRATE INTRAVENOUS at 11:05

## 2024-05-23 NOTE — NURSING
Pt tolerated Zometa infusion well.  No adverse reaction noted.   IV flushed with NS and D/C per protocol.  Patient tolerated Lanreotide injection well, d/c in no acute distress.

## 2024-06-20 ENCOUNTER — OFFICE VISIT (OUTPATIENT)
Dept: HEMATOLOGY/ONCOLOGY | Facility: CLINIC | Age: 79
End: 2024-06-20
Payer: MEDICARE

## 2024-06-20 ENCOUNTER — INFUSION (OUTPATIENT)
Dept: INFUSION THERAPY | Facility: HOSPITAL | Age: 79
End: 2024-06-20
Attending: INTERNAL MEDICINE
Payer: MEDICARE

## 2024-06-20 VITALS
SYSTOLIC BLOOD PRESSURE: 141 MMHG | DIASTOLIC BLOOD PRESSURE: 86 MMHG | OXYGEN SATURATION: 97 % | RESPIRATION RATE: 17 BRPM | TEMPERATURE: 98 F | HEART RATE: 69 BPM

## 2024-06-20 VITALS
SYSTOLIC BLOOD PRESSURE: 160 MMHG | HEART RATE: 64 BPM | WEIGHT: 169.56 LBS | OXYGEN SATURATION: 100 % | BODY MASS INDEX: 30.03 KG/M2 | DIASTOLIC BLOOD PRESSURE: 77 MMHG

## 2024-06-20 DIAGNOSIS — C74.90 MALIGNANT PHEOCHROMOCYTOMA, UNSPECIFIED LATERALITY: Primary | ICD-10-CM

## 2024-06-20 DIAGNOSIS — C7B.8 SECONDARY NEUROENDOCRINE TUMOR OF DISTANT LYMPH NODES: ICD-10-CM

## 2024-06-20 DIAGNOSIS — I10 ESSENTIAL HYPERTENSION: ICD-10-CM

## 2024-06-20 DIAGNOSIS — E11.9 CONTROLLED TYPE 2 DIABETES MELLITUS WITHOUT COMPLICATION, WITHOUT LONG-TERM CURRENT USE OF INSULIN: ICD-10-CM

## 2024-06-20 DIAGNOSIS — C7B.8 SECONDARY NEUROENDOCRINE TUMOR OF BONE: ICD-10-CM

## 2024-06-20 DIAGNOSIS — I48.0 PAF (PAROXYSMAL ATRIAL FIBRILLATION): ICD-10-CM

## 2024-06-20 DIAGNOSIS — E27.1 ADRENAL INSUFFICIENCY, PRIMARY: ICD-10-CM

## 2024-06-20 DIAGNOSIS — C74.91 MALIGNANT PHEOCHROMOCYTOMA OF RIGHT ADRENAL GLAND: Primary | ICD-10-CM

## 2024-06-20 DIAGNOSIS — D49.9 IMMUNODEFICIENCY SECONDARY TO NEOPLASM: ICD-10-CM

## 2024-06-20 DIAGNOSIS — I49.5 SICK SINUS SYNDROME: ICD-10-CM

## 2024-06-20 DIAGNOSIS — D84.821 IMMUNODEFICIENCY DUE TO DRUGS: ICD-10-CM

## 2024-06-20 DIAGNOSIS — I50.42 CHRONIC COMBINED SYSTOLIC AND DIASTOLIC CONGESTIVE HEART FAILURE: ICD-10-CM

## 2024-06-20 DIAGNOSIS — Z79.899 IMMUNODEFICIENCY DUE TO DRUGS: ICD-10-CM

## 2024-06-20 DIAGNOSIS — D84.81 IMMUNODEFICIENCY SECONDARY TO NEOPLASM: ICD-10-CM

## 2024-06-20 PROCEDURE — 1160F RVW MEDS BY RX/DR IN RCRD: CPT | Mod: CPTII,S$GLB,, | Performed by: INTERNAL MEDICINE

## 2024-06-20 PROCEDURE — 1126F AMNT PAIN NOTED NONE PRSNT: CPT | Mod: CPTII,S$GLB,, | Performed by: INTERNAL MEDICINE

## 2024-06-20 PROCEDURE — G2211 COMPLEX E/M VISIT ADD ON: HCPCS | Mod: S$GLB,,, | Performed by: INTERNAL MEDICINE

## 2024-06-20 PROCEDURE — 63600175 PHARM REV CODE 636 W HCPCS: Mod: JZ,JG | Performed by: INTERNAL MEDICINE

## 2024-06-20 PROCEDURE — 99999 PR PBB SHADOW E&M-EST. PATIENT-LVL IV: CPT | Mod: PBBFAC,,, | Performed by: INTERNAL MEDICINE

## 2024-06-20 PROCEDURE — 1101F PT FALLS ASSESS-DOCD LE1/YR: CPT | Mod: CPTII,S$GLB,, | Performed by: INTERNAL MEDICINE

## 2024-06-20 PROCEDURE — 96372 THER/PROPH/DIAG INJ SC/IM: CPT

## 2024-06-20 PROCEDURE — 3077F SYST BP >= 140 MM HG: CPT | Mod: CPTII,S$GLB,, | Performed by: INTERNAL MEDICINE

## 2024-06-20 PROCEDURE — 1159F MED LIST DOCD IN RCRD: CPT | Mod: CPTII,S$GLB,, | Performed by: INTERNAL MEDICINE

## 2024-06-20 PROCEDURE — 99215 OFFICE O/P EST HI 40 MIN: CPT | Mod: S$GLB,,, | Performed by: INTERNAL MEDICINE

## 2024-06-20 PROCEDURE — 3078F DIAST BP <80 MM HG: CPT | Mod: CPTII,S$GLB,, | Performed by: INTERNAL MEDICINE

## 2024-06-20 PROCEDURE — 3288F FALL RISK ASSESSMENT DOCD: CPT | Mod: CPTII,S$GLB,, | Performed by: INTERNAL MEDICINE

## 2024-06-20 RX ORDER — LANREOTIDE ACETATE 120 MG/.5ML
120 INJECTION SUBCUTANEOUS ONCE
OUTPATIENT
Start: 2024-06-20 | End: 2024-06-20

## 2024-06-20 RX ORDER — LANREOTIDE ACETATE 120 MG/.5ML
120 INJECTION SUBCUTANEOUS ONCE
Status: COMPLETED | OUTPATIENT
Start: 2024-06-20 | End: 2024-06-20

## 2024-06-20 RX ADMIN — LANREOTIDE ACETATE 120 MG: 120 INJECTION SUBCUTANEOUS at 09:06

## 2024-06-20 NOTE — PROGRESS NOTES
PROGRESS NOTE    Subjective:       Patient ID: Hailey Sawant is a 78 y.o. female.    Chief Complaint: metastatic pheochromocytoma    Diagnosis:  Metastatic pheochromocytoma of the right adrenal gland, with mets to bones, liver    Oncologic History:  1. Ms. Sawant is a 74 yo woman with DM, HTN, CAD s/p MI, A fib and sick sinus syndrome s/p pacemaker in June 2022 (on eliquis since), carotid paraganglioma removed in 11/2018 who initially saw me on 11/28/2022 for further management of metastatic pheochromocytoma. Last seen Dr West in 2018 and Dr Cohen in early 2021. Does not follow oncologist locally. It is my first time seeing her today. Her history dates to 2009 when she was being worked up for abdominal pain and was found to have cholelithiasis. However, during this workup she was found to have bilateral adrenal masses.  She was also noted to be hypertensive.  She had a 6.5 cm mass on the right with some evidence of necrosis and also a mass just under 3 cm on the left.  Biochemical workup was consistent with pheochromocytoma.  In 11/2009 she underwent a bilateral adrenalectomy.  Pathology had revealed a right adrenal gland pheochromocytoma and a left adrenal gland adrenal cortical adenoma.  In 02/2016 she was noted to have recurrent disease to the bone and underwent I 131 MIBG therapy in 03/2016, 07/2016 and 11/2016.  In 10/2016 she underwent a right partial nephrectomy due to recurrent disease.  Pathology did confirm findings consistent with recurrent pheochromocytoma.  In 11/2018 she underwent removal of the right carotid body tumor with pathology showing paraganglioma. She presents today for follow up. She knows she has cancer but does not know it is stage IV. She is not sure if Dr Cohen called her after tumor board or not. She feels well. Had MI in June 2022, treated at [facility], had pacemaker placed. Medically treated. Feeling well  "today. We discussed restaging dotatate scan.   2. Dotatate scan declined by insurance several times. Finally had gallium PET on 23. It showed "New foci of abnormal radiotracer uptake at the cervical spine, sacral, right ilium and right acetabular joint. Interval enlargement of hepatic section 7 hypodense mass. Reviewed at tumor board. Recc PRRT. However unable to get insurance approval for PRRT and admission afterwards to monitor for catecholamine crisis.   3. Lanreotide started on 23. Zometa started 23.   4. PRRT started on 3/1/23. #2 on 23, # 3 on 23, #4 on 23    Interval History:   Ms Sawant returns for follow up. Feeling okay. Does feel tired.    ECO    ROS:   A ten-point system review is obtained and negative except for what was stated in the Interval History.     Physical Examination:   Vital signs reviewed.   General: well hydrated, well developed, in no acute distress, using a walker  HEENT: normocephalic, EOMI, anicteric sclerae  Neck: supple, no JVD, +thyromegaly, soft, no cervical or supraclavicular lymphadenopathy  Lungs: clear breath sounds bilaterally, no wheezing, rales, or rhonchi  Heart: RRR, no M/R/G  Abdomen: soft, no tenderness, non-distended, no hepatosplenomegaly, mass, or hernia. BS present  Extremities: no clubbing, cyanosis, or edema  Skin: no rash, ulcer, or open wounds  Neuro: alert and oriented x 4, no focal neuro deficit  Psych: pleasant and appropriate mood and affect    Objective:     Laboratory Data:  Labs reviewed. K normal    Imaging Data:  Gallium PET 23:  New foci of abnormal radiotracer uptake at the cervical spine, sacral, right ilium and right acetabular joint.     Interval enlargement of hepatic section 7 hypodense mass.     Based upon progression of disease and tracer avidity of the patient's tumor burden, the patient would be an appropriate candidate for PRRT if clinically indicated.     CT neck 23:  Impression:     1. Status post " resection right carotid body tumor.  No recurrent mass identified.  2. 1.5 cm peripherally enhancing nodules lower pole both lobes of the thyroid.    Thyroid US 5/16/23:  Impression:     Multiple thyroid nodules.  1.7 cm TI-RADS 5 left lobe nodule meets ACR criteria for FNA.     Right lobe nodule meets criteria for surveillance with repeat thyroid ultrasound recommended 1 year.      Copper PET 5/25/23:  Impression:     1. Right hepatic lobe lesions and multifocal primarily sclerotic osseous lesions stable in size and number with decreased tracer-avidity.  No new lesions identified.  2. Postsurgical change prior bilateral adrenalectomy.  No tracer uptake within the surgical bed to suggest localized recurrent disease.  3. Other stable incidental findings above.    Copper PET 9/13/23:  FINDINGS:  Quality of the study: Diaphragmatic/respiratory motion and associated misregistration artifact limits assessment of the liver.     In the head and neck, there is physiologic distribution in the pituitary, salivary, and thyroid glands, and there are no tracer avid lesions suspicious for malignancy.     In the chest, there are no tracer avid lesions suspicious for malignancy.     In the abdomen and pelvis, there is similar appearance of radiotracer avid lesions within the right hepatic lobe noting that evaluation is limited due to motion and misregistration artifact.  Dominant right hepatic lobe lesion demonstrates maximum SUV of 46 (previously 44).  Difficult to measure lesions on CT due to artifact.  No new lesion.  There is physiologic uptake in the pancreatic uncinate process, liver, spleen, adrenal glands and  tract.     In the bones, there is stable number and distribution of radiotracer avid lesions throughout the osseous structures including the right scapula, spine, sacrum, right ilium and ischium, and right femur.  Right scapula maximum SUV of 76 (previously 91).  Right ilium maximum SUV of 100 (previously 118).   Left sacrum maximum SUV of 25 (previously 40).  No new lesion.     Additional findings: Left chest wall cardiac pacer and leads noted.  Cardiomegaly.  Diffuse calcific atherosclerosis.  Postsurgical changes of bilateral adrenalectomy and cholecystectomy.  Calcified uterine fibroid.  Colonic diverticulosis.  Left hip arthroplasty hardware results in beam hardening artifact limiting assessment of the pelvis.     Impression:     Stable distribution of radiotracer avid lesions within the liver and bones.  No new lesion.     Motion artifact limits assessment of the liver.    Copper PET 2/15/2024:  Impression:     Stable distribution of multiple tracer avid osseous lesions which show decreased tracer uptake compared to prior study.     Grossly stable appearance of tracer avid right hepatic lobe lesions.     No new tracer avid lesions.        Assessment and Plan:     1. Malignant pheochromocytoma of right adrenal gland    2. Secondary neuroendocrine tumor of distant lymph nodes    3. Secondary neuroendocrine tumor of bone    4. Immunodeficiency due to drugs    5. Immunodeficiency secondary to neoplasm    6. Adrenal insufficiency, primary post-surgical    7. Essential hypertension    8. Controlled type 2 diabetes mellitus without complication, without long-term current use of insulin    9. Sick sinus syndrome    10. PAF (paroxysmal atrial fibrillation)    11. Chronic combined systolic and diastolic congestive heart failure        1-5  - Ms Jese is a 79 yo woman with R adrenal pheochromocytoma s/p bilateral adrenectomy in 11/2009, recurrent disease to the bone in 2016 s/p MIBG in 2016. At our prior visits, we discussed interim progression of cancer in the liver and new lesions in the bones. Discussed lanreotide every 4 weeks, zometa every 3 months and PRRT. Lanreotide started 1/23/2023. Zometa started 2/20/23.   - PRRT #1 on 3/1/23. #2 on 4/26/23, #3 on 6/21/23, #4 on 8/16/23  - copper PET in Feb 2024 reviewed at tumor  board. Stable disease  - c/w lanreotide every 4 weeks. Will give today.   - last zometa on 5/23/24. Next due in August 2024  - RTC in 4 weeks     6.  - c/w cortef  - f/u with endocrinology    7.  - BP controlled  - c/w current medication  - f/u with cardiology    8.  - BS controlled  - c/w current medication    9-11  - f/u with Dr Loya at [facility]    Follow-up:     Return in one month  Knows to call in the interval if any problems arise.    Electronically signed by Ema Chowdhury

## 2024-07-18 ENCOUNTER — INFUSION (OUTPATIENT)
Dept: INFUSION THERAPY | Facility: HOSPITAL | Age: 79
End: 2024-07-18
Attending: INTERNAL MEDICINE
Payer: MEDICARE

## 2024-07-18 ENCOUNTER — OFFICE VISIT (OUTPATIENT)
Dept: HEMATOLOGY/ONCOLOGY | Facility: CLINIC | Age: 79
End: 2024-07-18
Payer: MEDICARE

## 2024-07-18 VITALS
SYSTOLIC BLOOD PRESSURE: 137 MMHG | TEMPERATURE: 98 F | OXYGEN SATURATION: 98 % | RESPIRATION RATE: 18 BRPM | DIASTOLIC BLOOD PRESSURE: 67 MMHG | HEART RATE: 66 BPM | WEIGHT: 171.5 LBS | BODY MASS INDEX: 30.38 KG/M2

## 2024-07-18 VITALS
BODY MASS INDEX: 30.38 KG/M2 | HEART RATE: 66 BPM | DIASTOLIC BLOOD PRESSURE: 67 MMHG | OXYGEN SATURATION: 98 % | WEIGHT: 171.5 LBS | SYSTOLIC BLOOD PRESSURE: 137 MMHG

## 2024-07-18 DIAGNOSIS — I50.42 CHRONIC COMBINED SYSTOLIC AND DIASTOLIC CONGESTIVE HEART FAILURE: ICD-10-CM

## 2024-07-18 DIAGNOSIS — C7B.8 SECONDARY NEUROENDOCRINE TUMOR OF DISTANT LYMPH NODES: ICD-10-CM

## 2024-07-18 DIAGNOSIS — C7B.8 SECONDARY NEUROENDOCRINE TUMOR OF BONE: ICD-10-CM

## 2024-07-18 DIAGNOSIS — Z79.899 IMMUNODEFICIENCY DUE TO DRUGS: ICD-10-CM

## 2024-07-18 DIAGNOSIS — M25.552 LEFT HIP PAIN: ICD-10-CM

## 2024-07-18 DIAGNOSIS — I48.0 PAF (PAROXYSMAL ATRIAL FIBRILLATION): ICD-10-CM

## 2024-07-18 DIAGNOSIS — E11.9 CONTROLLED TYPE 2 DIABETES MELLITUS WITHOUT COMPLICATION, WITHOUT LONG-TERM CURRENT USE OF INSULIN: ICD-10-CM

## 2024-07-18 DIAGNOSIS — D49.9 IMMUNODEFICIENCY SECONDARY TO NEOPLASM: ICD-10-CM

## 2024-07-18 DIAGNOSIS — E27.1 ADRENAL INSUFFICIENCY, PRIMARY: ICD-10-CM

## 2024-07-18 DIAGNOSIS — C74.91 MALIGNANT PHEOCHROMOCYTOMA OF RIGHT ADRENAL GLAND: Primary | ICD-10-CM

## 2024-07-18 DIAGNOSIS — D84.81 IMMUNODEFICIENCY SECONDARY TO NEOPLASM: ICD-10-CM

## 2024-07-18 DIAGNOSIS — C74.90 MALIGNANT PHEOCHROMOCYTOMA, UNSPECIFIED LATERALITY: Primary | ICD-10-CM

## 2024-07-18 DIAGNOSIS — I49.5 SICK SINUS SYNDROME: ICD-10-CM

## 2024-07-18 DIAGNOSIS — D84.821 IMMUNODEFICIENCY DUE TO DRUGS: ICD-10-CM

## 2024-07-18 DIAGNOSIS — I10 ESSENTIAL HYPERTENSION: ICD-10-CM

## 2024-07-18 PROCEDURE — 1160F RVW MEDS BY RX/DR IN RCRD: CPT | Mod: CPTII,S$GLB,, | Performed by: INTERNAL MEDICINE

## 2024-07-18 PROCEDURE — 96372 THER/PROPH/DIAG INJ SC/IM: CPT

## 2024-07-18 PROCEDURE — 1159F MED LIST DOCD IN RCRD: CPT | Mod: CPTII,S$GLB,, | Performed by: INTERNAL MEDICINE

## 2024-07-18 PROCEDURE — 1101F PT FALLS ASSESS-DOCD LE1/YR: CPT | Mod: CPTII,S$GLB,, | Performed by: INTERNAL MEDICINE

## 2024-07-18 PROCEDURE — 3078F DIAST BP <80 MM HG: CPT | Mod: CPTII,S$GLB,, | Performed by: INTERNAL MEDICINE

## 2024-07-18 PROCEDURE — 1126F AMNT PAIN NOTED NONE PRSNT: CPT | Mod: CPTII,S$GLB,, | Performed by: INTERNAL MEDICINE

## 2024-07-18 PROCEDURE — 99999 PR PBB SHADOW E&M-EST. PATIENT-LVL IV: CPT | Mod: PBBFAC,,, | Performed by: INTERNAL MEDICINE

## 2024-07-18 PROCEDURE — 3075F SYST BP GE 130 - 139MM HG: CPT | Mod: CPTII,S$GLB,, | Performed by: INTERNAL MEDICINE

## 2024-07-18 PROCEDURE — 99215 OFFICE O/P EST HI 40 MIN: CPT | Mod: S$GLB,,, | Performed by: INTERNAL MEDICINE

## 2024-07-18 PROCEDURE — 63600175 PHARM REV CODE 636 W HCPCS: Mod: JZ,JG | Performed by: INTERNAL MEDICINE

## 2024-07-18 PROCEDURE — 3288F FALL RISK ASSESSMENT DOCD: CPT | Mod: CPTII,S$GLB,, | Performed by: INTERNAL MEDICINE

## 2024-07-18 PROCEDURE — G2211 COMPLEX E/M VISIT ADD ON: HCPCS | Mod: S$GLB,,, | Performed by: INTERNAL MEDICINE

## 2024-07-18 RX ORDER — LANREOTIDE ACETATE 120 MG/.5ML
120 INJECTION SUBCUTANEOUS ONCE
Status: COMPLETED | OUTPATIENT
Start: 2024-07-18 | End: 2024-07-18

## 2024-07-18 RX ORDER — LANREOTIDE ACETATE 120 MG/.5ML
120 INJECTION SUBCUTANEOUS ONCE
OUTPATIENT
Start: 2024-07-18 | End: 2024-07-18

## 2024-07-18 RX ADMIN — LANREOTIDE ACETATE 120 MG: 120 INJECTION SUBCUTANEOUS at 10:07

## 2024-07-18 NOTE — PROGRESS NOTES
PROGRESS NOTE    Subjective:       Patient ID: Hailey Sawant is a 79 y.o. female.    Chief Complaint: metastatic pheochromocytoma    Diagnosis:  Metastatic pheochromocytoma of the right adrenal gland, with mets to bones, liver    Oncologic History:  1. Ms. Sawant is a 74 yo woman with DM, HTN, CAD s/p MI, A fib and sick sinus syndrome s/p pacemaker in June 2022 (on eliquis since), carotid paraganglioma removed in 11/2018 who initially saw me on 11/28/2022 for further management of metastatic pheochromocytoma. Last seen Dr West in 2018 and Dr Cohen in early 2021. Does not follow oncologist locally. It is my first time seeing her today. Her history dates to 2009 when she was being worked up for abdominal pain and was found to have cholelithiasis. However, during this workup she was found to have bilateral adrenal masses.  She was also noted to be hypertensive.  She had a 6.5 cm mass on the right with some evidence of necrosis and also a mass just under 3 cm on the left.  Biochemical workup was consistent with pheochromocytoma.  In 11/2009 she underwent a bilateral adrenalectomy.  Pathology had revealed a right adrenal gland pheochromocytoma and a left adrenal gland adrenal cortical adenoma.  In 02/2016 she was noted to have recurrent disease to the bone and underwent I 131 MIBG therapy in 03/2016, 07/2016 and 11/2016.  In 10/2016 she underwent a right partial nephrectomy due to recurrent disease.  Pathology did confirm findings consistent with recurrent pheochromocytoma.  In 11/2018 she underwent removal of the right carotid body tumor with pathology showing paraganglioma. She presents today for follow up. She knows she has cancer but does not know it is stage IV. She is not sure if Dr Cohen called her after tumor board or not. She feels well. Had MI in June 2022, treated at [facility], had pacemaker placed. Medically treated. Feeling well  "today. We discussed restaging dotatate scan.   2. Dotatate scan declined by insurance several times. Finally had gallium PET on 23. It showed "New foci of abnormal radiotracer uptake at the cervical spine, sacral, right ilium and right acetabular joint. Interval enlargement of hepatic section 7 hypodense mass. Reviewed at tumor board. [facility] PRRT. However unable to get insurance approval for PRRT and admission afterwards to monitor for catecholamine crisis.   3. Lanreotide started on 23. Zometa started 23.   4. PRRT started on 3/1/23. #2 on 23, # 3 on 23, #4 on 23    Interval History:   Ms Sawant returns for follow up. Feeling okay. Has chronic left hip pain. Some days are worse than the other. Takes tylenol every now and then for pain.     ECO    ROS:   A ten-point system review is obtained and negative except for what was stated in the Interval History.     Physical Examination:   Vital signs reviewed.   General: well hydrated, well developed, in no acute distress, using a walker  HEENT: normocephalic, EOMI, anicteric sclerae  Neck: supple, no JVD, +thyromegaly, soft, no cervical or supraclavicular lymphadenopathy  Lungs: clear breath sounds bilaterally, no wheezing, rales, or rhonchi  Heart: RRR, no M/R/G  Abdomen: soft, no tenderness, non-distended, no hepatosplenomegaly, mass, or hernia. BS present  Extremities: no clubbing, cyanosis, or edema  Skin: no rash, ulcer, or open wounds  Neuro: alert and oriented x 4, no focal neuro deficit  Psych: pleasant and appropriate mood and affect    Objective:     Laboratory Data:  Labs reviewed. AST 92    Imaging Data:  Gallium PET 23:  New foci of abnormal radiotracer uptake at the cervical spine, sacral, right ilium and right acetabular joint.     Interval enlargement of hepatic section 7 hypodense mass.     Based upon progression of disease and tracer avidity of the patient's tumor burden, the patient would be an appropriate candidate " for PRRT if clinically indicated.     CT neck 4/6/23:  Impression:     1. Status post resection right carotid body tumor.  No recurrent mass identified.  2. 1.5 cm peripherally enhancing nodules lower pole both lobes of the thyroid.    Thyroid US 5/16/23:  Impression:     Multiple thyroid nodules.  1.7 cm TI-RADS 5 left lobe nodule meets ACR criteria for FNA.     Right lobe nodule meets criteria for surveillance with repeat thyroid ultrasound recommended 1 year.      Copper PET 5/25/23:  Impression:     1. Right hepatic lobe lesions and multifocal primarily sclerotic osseous lesions stable in size and number with decreased tracer-avidity.  No new lesions identified.  2. Postsurgical change prior bilateral adrenalectomy.  No tracer uptake within the surgical bed to suggest localized recurrent disease.  3. Other stable incidental findings above.    Copper PET 9/13/23:  FINDINGS:  Quality of the study: Diaphragmatic/respiratory motion and associated misregistration artifact limits assessment of the liver.     In the head and neck, there is physiologic distribution in the pituitary, salivary, and thyroid glands, and there are no tracer avid lesions suspicious for malignancy.     In the chest, there are no tracer avid lesions suspicious for malignancy.     In the abdomen and pelvis, there is similar appearance of radiotracer avid lesions within the right hepatic lobe noting that evaluation is limited due to motion and misregistration artifact.  Dominant right hepatic lobe lesion demonstrates maximum SUV of 46 (previously 44).  Difficult to measure lesions on CT due to artifact.  No new lesion.  There is physiologic uptake in the pancreatic uncinate process, liver, spleen, adrenal glands and  tract.     In the bones, there is stable number and distribution of radiotracer avid lesions throughout the osseous structures including the right scapula, spine, sacrum, right ilium and ischium, and right femur.  Right scapula  maximum SUV of 76 (previously 91).  Right ilium maximum SUV of 100 (previously 118).  Left sacrum maximum SUV of 25 (previously 40).  No new lesion.     Additional findings: Left chest wall cardiac pacer and leads noted.  Cardiomegaly.  Diffuse calcific atherosclerosis.  Postsurgical changes of bilateral adrenalectomy and cholecystectomy.  Calcified uterine fibroid.  Colonic diverticulosis.  Left hip arthroplasty hardware results in beam hardening artifact limiting assessment of the pelvis.     Impression:     Stable distribution of radiotracer avid lesions within the liver and bones.  No new lesion.     Motion artifact limits assessment of the liver.    Copper PET 2/15/2024:  Impression:     Stable distribution of multiple tracer avid osseous lesions which show decreased tracer uptake compared to prior study.     Grossly stable appearance of tracer avid right hepatic lobe lesions.     No new tracer avid lesions.        Assessment and Plan:     1. Malignant pheochromocytoma of right adrenal gland    2. Secondary neuroendocrine tumor of distant lymph nodes    3. Secondary neuroendocrine tumor of bone    4. Immunodeficiency due to drugs    5. Immunodeficiency secondary to neoplasm    6. Adrenal insufficiency, primary post-surgical    7. Essential hypertension    8. Controlled type 2 diabetes mellitus without complication, without long-term current use of insulin    9. Sick sinus syndrome    10. PAF (paroxysmal atrial fibrillation)    11. Chronic combined systolic and diastolic congestive heart failure    12. Left hip pain        1-5  - Ms Jese is a 77 yo woman with R adrenal pheochromocytoma s/p bilateral adrenectomy in 11/2009, recurrent disease to the bone in 2016 s/p MIBG in 2016. At our prior visits, we discussed interim progression of cancer in the liver and new lesions in the bones. Discussed lanreotide every 4 weeks, zometa every 3 months and PRRT. Lanreotide started 1/23/2023. Zometa started 2/20/23.   -  PRRT #1 on 3/1/23. #2 on 4/26/23, #3 on 6/21/23, #4 on 8/16/23  - copper PET in Feb 2024 reviewed at tumor board. Stable disease  - c/w lanreotide every 4 weeks. Will give today.   - last zometa on 5/23/24. Next due in August 2024  - RTC in 4 weeks. Scheduled for PET at that time    6.  - c/w cortef  - f/u with endocrinology    7.  - BP controlled  - c/w current medication  - f/u with cardiology    8.  - BS controlled  - c/w current medication    9-11  - f/u with Dr Loya at [facility]    12.  - I have personally reviewed her last PET scan. No bone met in left hip. Pain could be from arthritis. Will follow up on PET scan in one month    Follow-up:     Return in one month  Knows to call in the interval if any problems arise.    Electronically signed by Ema Chowdhury

## 2024-08-08 ENCOUNTER — HOSPITAL ENCOUNTER (OUTPATIENT)
Dept: RADIOLOGY | Facility: HOSPITAL | Age: 79
Discharge: HOME | End: 2024-08-08
Attending: INTERNAL MEDICINE
Payer: MEDICARE

## 2024-08-08 DIAGNOSIS — C7B.8 SECONDARY NEUROENDOCRINE TUMOR OF DISTANT LYMPH NODES: ICD-10-CM

## 2024-08-08 DIAGNOSIS — C7B.8 SECONDARY NEUROENDOCRINE TUMOR OF BONE: ICD-10-CM

## 2024-08-08 DIAGNOSIS — C74.91 MALIGNANT PHEOCHROMOCYTOMA OF RIGHT ADRENAL GLAND: ICD-10-CM

## 2024-08-08 PROCEDURE — A9592 HC COPPER CU-64, DOTATE, DX, PER 1 MCI: HCPCS | Performed by: INTERNAL MEDICINE

## 2024-08-08 PROCEDURE — 78815 PET IMAGE W/CT SKULL-THIGH: CPT | Mod: TC

## 2024-08-08 PROCEDURE — 78815 PET IMAGE W/CT SKULL-THIGH: CPT | Mod: 26,PS,, | Performed by: NUCLEAR MEDICINE

## 2024-08-08 RX ADMIN — COPPER CU 64 DOTATATE 5.31 MILLICURIE: 1 INJECTION, SOLUTION INTRAVENOUS at 11:08

## 2024-08-15 ENCOUNTER — INFUSION (OUTPATIENT)
Dept: INFUSION THERAPY | Facility: HOSPITAL | Age: 79
End: 2024-08-15
Attending: INTERNAL MEDICINE
Payer: MEDICARE

## 2024-08-15 ENCOUNTER — OFFICE VISIT (OUTPATIENT)
Dept: HEMATOLOGY/ONCOLOGY | Facility: CLINIC | Age: 79
End: 2024-08-15
Payer: MEDICARE

## 2024-08-15 VITALS
DIASTOLIC BLOOD PRESSURE: 72 MMHG | BODY MASS INDEX: 27.88 KG/M2 | SYSTOLIC BLOOD PRESSURE: 132 MMHG | WEIGHT: 157.44 LBS | OXYGEN SATURATION: 98 % | HEART RATE: 63 BPM | TEMPERATURE: 99 F

## 2024-08-15 DIAGNOSIS — C74.91 MALIGNANT PHEOCHROMOCYTOMA OF RIGHT ADRENAL GLAND: Primary | ICD-10-CM

## 2024-08-15 DIAGNOSIS — I48.0 PAF (PAROXYSMAL ATRIAL FIBRILLATION): ICD-10-CM

## 2024-08-15 DIAGNOSIS — D84.821 IMMUNODEFICIENCY DUE TO DRUGS: ICD-10-CM

## 2024-08-15 DIAGNOSIS — E11.9 CONTROLLED TYPE 2 DIABETES MELLITUS WITHOUT COMPLICATION, WITHOUT LONG-TERM CURRENT USE OF INSULIN: ICD-10-CM

## 2024-08-15 DIAGNOSIS — C74.90 MALIGNANT PHEOCHROMOCYTOMA, UNSPECIFIED LATERALITY: ICD-10-CM

## 2024-08-15 DIAGNOSIS — C7B.8 SECONDARY NEUROENDOCRINE TUMOR OF DISTANT LYMPH NODES: ICD-10-CM

## 2024-08-15 DIAGNOSIS — I49.5 SICK SINUS SYNDROME: ICD-10-CM

## 2024-08-15 DIAGNOSIS — E87.6 HYPOKALEMIA: ICD-10-CM

## 2024-08-15 DIAGNOSIS — E27.1 ADRENAL INSUFFICIENCY, PRIMARY: ICD-10-CM

## 2024-08-15 DIAGNOSIS — D84.81 IMMUNODEFICIENCY SECONDARY TO NEOPLASM: ICD-10-CM

## 2024-08-15 DIAGNOSIS — D49.9 IMMUNODEFICIENCY SECONDARY TO NEOPLASM: ICD-10-CM

## 2024-08-15 DIAGNOSIS — C79.51 SECONDARY MALIGNANT NEOPLASM OF BONE: ICD-10-CM

## 2024-08-15 DIAGNOSIS — Z79.899 IMMUNODEFICIENCY DUE TO DRUGS: ICD-10-CM

## 2024-08-15 DIAGNOSIS — I10 ESSENTIAL HYPERTENSION: ICD-10-CM

## 2024-08-15 DIAGNOSIS — I50.42 CHRONIC COMBINED SYSTOLIC AND DIASTOLIC CONGESTIVE HEART FAILURE: ICD-10-CM

## 2024-08-15 DIAGNOSIS — C7B.8 SECONDARY NEUROENDOCRINE TUMOR OF BONE: ICD-10-CM

## 2024-08-15 PROCEDURE — 25000003 PHARM REV CODE 250: Performed by: INTERNAL MEDICINE

## 2024-08-15 PROCEDURE — 3288F FALL RISK ASSESSMENT DOCD: CPT | Mod: CPTII,S$GLB,, | Performed by: INTERNAL MEDICINE

## 2024-08-15 PROCEDURE — 63600175 PHARM REV CODE 636 W HCPCS: Mod: JZ,JG | Performed by: INTERNAL MEDICINE

## 2024-08-15 PROCEDURE — G2211 COMPLEX E/M VISIT ADD ON: HCPCS | Mod: S$GLB,,, | Performed by: INTERNAL MEDICINE

## 2024-08-15 PROCEDURE — A4216 STERILE WATER/SALINE, 10 ML: HCPCS | Performed by: INTERNAL MEDICINE

## 2024-08-15 PROCEDURE — 99215 OFFICE O/P EST HI 40 MIN: CPT | Mod: S$GLB,,, | Performed by: INTERNAL MEDICINE

## 2024-08-15 PROCEDURE — 1101F PT FALLS ASSESS-DOCD LE1/YR: CPT | Mod: CPTII,S$GLB,, | Performed by: INTERNAL MEDICINE

## 2024-08-15 PROCEDURE — 99999 PR PBB SHADOW E&M-EST. PATIENT-LVL IV: CPT | Mod: PBBFAC,,, | Performed by: INTERNAL MEDICINE

## 2024-08-15 PROCEDURE — 96365 THER/PROPH/DIAG IV INF INIT: CPT

## 2024-08-15 PROCEDURE — 3078F DIAST BP <80 MM HG: CPT | Mod: CPTII,S$GLB,, | Performed by: INTERNAL MEDICINE

## 2024-08-15 PROCEDURE — 3075F SYST BP GE 130 - 139MM HG: CPT | Mod: CPTII,S$GLB,, | Performed by: INTERNAL MEDICINE

## 2024-08-15 PROCEDURE — 1159F MED LIST DOCD IN RCRD: CPT | Mod: CPTII,S$GLB,, | Performed by: INTERNAL MEDICINE

## 2024-08-15 PROCEDURE — 96372 THER/PROPH/DIAG INJ SC/IM: CPT | Mod: 59

## 2024-08-15 PROCEDURE — 1125F AMNT PAIN NOTED PAIN PRSNT: CPT | Mod: CPTII,S$GLB,, | Performed by: INTERNAL MEDICINE

## 2024-08-15 PROCEDURE — 1160F RVW MEDS BY RX/DR IN RCRD: CPT | Mod: CPTII,S$GLB,, | Performed by: INTERNAL MEDICINE

## 2024-08-15 RX ORDER — LANREOTIDE ACETATE 120 MG/.5ML
120 INJECTION SUBCUTANEOUS ONCE
Status: COMPLETED | OUTPATIENT
Start: 2024-08-15 | End: 2024-08-15

## 2024-08-15 RX ORDER — HEPARIN 100 UNIT/ML
500 SYRINGE INTRAVENOUS
OUTPATIENT
Start: 2024-09-05

## 2024-08-15 RX ORDER — SODIUM CHLORIDE 0.9 % (FLUSH) 0.9 %
10 SYRINGE (ML) INJECTION
OUTPATIENT
Start: 2024-09-05

## 2024-08-15 RX ORDER — LANREOTIDE ACETATE 120 MG/.5ML
120 INJECTION SUBCUTANEOUS ONCE
OUTPATIENT
Start: 2024-08-15 | End: 2024-08-15

## 2024-08-15 RX ORDER — SODIUM CHLORIDE 0.9 % (FLUSH) 0.9 %
10 SYRINGE (ML) INJECTION
Status: DISCONTINUED | OUTPATIENT
Start: 2024-08-15 | End: 2024-08-15 | Stop reason: HOSPADM

## 2024-08-15 RX ADMIN — Medication 10 ML: at 11:08

## 2024-08-15 RX ADMIN — ZOLEDRONIC ACID 3.5 MG: 4 INJECTION, SOLUTION, CONCENTRATE INTRAVENOUS at 11:08

## 2024-08-15 RX ADMIN — SODIUM CHLORIDE: 9 INJECTION, SOLUTION INTRAVENOUS at 10:08

## 2024-08-15 RX ADMIN — LANREOTIDE ACETATE 120 MG: 120 INJECTION SUBCUTANEOUS at 12:08

## 2024-08-15 NOTE — PROGRESS NOTES
PROGRESS NOTE    Subjective:       Patient ID: Hailey Sawant is a 79 y.o. female.    Chief Complaint: metastatic pheochromocytoma    Diagnosis:  Metastatic pheochromocytoma of the right adrenal gland, with mets to bones, liver    Oncologic History:  1. Ms. Sawant is a 72 yo woman with DM, HTN, CAD s/p MI, A fib and sick sinus syndrome s/p pacemaker in June 2022 (on eliquis since), carotid paraganglioma removed in 11/2018 who initially saw me on 11/28/2022 for further management of metastatic pheochromocytoma. Last seen Dr West in 2018 and Dr Cohen in early 2021. Does not follow oncologist locally. It is my first time seeing her today. Her history dates to 2009 when she was being worked up for abdominal pain and was found to have cholelithiasis. However, during this workup she was found to have bilateral adrenal masses.  She was also noted to be hypertensive.  She had a 6.5 cm mass on the right with some evidence of necrosis and also a mass just under 3 cm on the left.  Biochemical workup was consistent with pheochromocytoma.  In 11/2009 she underwent a bilateral adrenalectomy.  Pathology had revealed a right adrenal gland pheochromocytoma and a left adrenal gland adrenal cortical adenoma.  In 02/2016 she was noted to have recurrent disease to the bone and underwent I 131 MIBG therapy in 03/2016, 07/2016 and 11/2016.  In 10/2016 she underwent a right partial nephrectomy due to recurrent disease.  Pathology did confirm findings consistent with recurrent pheochromocytoma.  In 11/2018 she underwent removal of the right carotid body tumor with pathology showing paraganglioma. She presents today for follow up. She knows she has cancer but does not know it is stage IV. She is not sure if Dr Cohen called her after tumor board or not. She feels well. Had MI in June 2022, treated at [facility], had pacemaker placed. Medically treated. Feeling well  Patient position supine. Patient prepped and draped per unit standard.    Safety straps applied:No: left side rail up   "today. We discussed restaging dotatate scan.   2. Dotatate scan declined by insurance several times. Finally had gallium PET on 23. It showed "New foci of abnormal radiotracer uptake at the cervical spine, sacral, right ilium and right acetabular joint. Interval enlargement of hepatic section 7 hypodense mass. [facility] tumor board[facility]. However unable to get insurance approval for PRRT and admission afterwards to monitor for catecholamine crisis.   3. Lanreotide started on 23. Zometa started 23.   4. PRRT started on 3/1/23. #2 on 23, # 3 on 23, #4 on 23    Interval History:   Ms Sawant returns for follow up. Feeling okay. Has rare sciatica pain on the left. Feels dizzy sometimes.    ECO    ROS:   A ten-point system review is obtained and negative except for what was stated in the Interval History.     Physical Examination:   Vital signs reviewed.   General: well hydrated, well developed, in no acute distress, using a walker  HEENT: normocephalic, EOMI, anicteric sclerae  Neck: supple, no JVD, +thyromegaly, soft, no cervical or supraclavicular lymphadenopathy  Lungs: clear breath sounds bilaterally, no wheezing, rales, or rhonchi  Heart: RRR, no M/R/G  Abdomen: soft, no tenderness, non-distended, no hepatosplenomegaly, mass, or hernia. BS present  Extremities: no clubbing, cyanosis, or edema  Skin: no rash, ulcer, or open wounds  Neuro: alert and oriented x 4, no focal neuro deficit  Psych: pleasant and appropriate mood and affect    Objective:     Laboratory Data:  Labs reviewed. K 3.4    Imaging Data:  Gallium PET 23:  New foci of abnormal radiotracer uptake at the cervical spine, sacral, right ilium and right acetabular joint.     Interval enlargement of hepatic section 7 hypodense mass.     Based upon progression of disease and tracer avidity of the patient's tumor burden, the patient would be an appropriate candidate for PRRT if clinically indicated.     CT neck " 4/6/23:  Impression:     1. Status post resection right carotid body tumor.  No recurrent mass identified.  2. 1.5 cm peripherally enhancing nodules lower pole both lobes of the thyroid.    Thyroid US 5/16/23:  Impression:     Multiple thyroid nodules.  1.7 cm TI-RADS 5 left lobe nodule meets ACR criteria for FNA.     Right lobe nodule meets criteria for surveillance with repeat thyroid ultrasound recommended 1 year.      Copper PET 5/25/23:  Impression:     1. Right hepatic lobe lesions and multifocal primarily sclerotic osseous lesions stable in size and number with decreased tracer-avidity.  No new lesions identified.  2. Postsurgical change prior bilateral adrenalectomy.  No tracer uptake within the surgical bed to suggest localized recurrent disease.  3. Other stable incidental findings above.    Copper PET 9/13/23:  FINDINGS:  Quality of the study: Diaphragmatic/respiratory motion and associated misregistration artifact limits assessment of the liver.     In the head and neck, there is physiologic distribution in the pituitary, salivary, and thyroid glands, and there are no tracer avid lesions suspicious for malignancy.     In the chest, there are no tracer avid lesions suspicious for malignancy.     In the abdomen and pelvis, there is similar appearance of radiotracer avid lesions within the right hepatic lobe noting that evaluation is limited due to motion and misregistration artifact.  Dominant right hepatic lobe lesion demonstrates maximum SUV of 46 (previously 44).  Difficult to measure lesions on CT due to artifact.  No new lesion.  There is physiologic uptake in the pancreatic uncinate process, liver, spleen, adrenal glands and  tract.     In the bones, there is stable number and distribution of radiotracer avid lesions throughout the osseous structures including the right scapula, spine, sacrum, right ilium and ischium, and right femur.  Right scapula maximum SUV of 76 (previously 91).  Right ilium  maximum SUV of 100 (previously 118).  Left sacrum maximum SUV of 25 (previously 40).  No new lesion.     Additional findings: Left chest wall cardiac pacer and leads noted.  Cardiomegaly.  Diffuse calcific atherosclerosis.  Postsurgical changes of bilateral adrenalectomy and cholecystectomy.  Calcified uterine fibroid.  Colonic diverticulosis.  Left hip arthroplasty hardware results in beam hardening artifact limiting assessment of the pelvis.     Impression:     Stable distribution of radiotracer avid lesions within the liver and bones.  No new lesion.     Motion artifact limits assessment of the liver.    Copper PET 2/15/2024:  Impression:     Stable distribution of multiple tracer avid osseous lesions which show decreased tracer uptake compared to prior study.     Grossly stable appearance of tracer avid right hepatic lobe lesions.     No new tracer avid lesions.      copper PET 8/8/24:  Impression:     Stable distribution of tracer avid osseous lesions with interval mild decreased tracer avidity of several index lesions, as above with mixed favorable treatment response     Interval mild decreased tracer avidity of posterior right hepatic lobe lesions.     No new tracer avid lesion.     Assessment and Plan:     1. Malignant pheochromocytoma of right adrenal gland    2. Secondary neuroendocrine tumor of distant lymph nodes    3. Secondary neuroendocrine tumor of bone    4. Immunodeficiency due to drugs    5. Immunodeficiency secondary to neoplasm    6. Adrenal insufficiency, primary post-surgical    7. Essential hypertension    8. Controlled type 2 diabetes mellitus without complication, without long-term current use of insulin    9. Sick sinus syndrome    10. PAF (paroxysmal atrial fibrillation)    11. Chronic combined systolic and diastolic congestive heart failure    12. Hypokalemia      1-5  - Ms Jese is a 79 yo woman with R adrenal pheochromocytoma s/p bilateral adrenectomy in 11/2009, recurrent disease to  the bone in 2016 s/p MIBG in 2016. At our prior visits, we discussed interim progression of cancer in the liver and new lesions in the bones. Discussed lanreotide every 4 weeks, zometa every 3 months and PRRT. Lanreotide started 1/23/2023. Zometa started 2/20/23.   - PRRT #1 on 3/1/23. #2 on 4/26/23, #3 on 6/21/23, #4 on 8/16/23  - reviewed copper PET results with patient. Stable disease  - c/w lanreotide every 4 weeks.   - zometa every 3 months. Will give today. Next due in Nov 2024    6.  - c/w cortef  - f/u with endocrinology    7.  - BP controlled  - c/w current medication  - f/u with cardiology    8.  - BS controlled  - c/w current medication    9-11  - f/u with Dr Loya at [facility]    12.  - mild. Asked patient to take two K-Dur 20 mEq every time she takes lasix. She takes lasix 20 mg twice a week  Follow-up:     Return in one month  Knows to call in the interval if any problems arise.    Electronically signed by Ema Chowdhury

## 2024-08-15 NOTE — NURSING
Patient tolerated Zometa and Lanreotide injection well today. NAD noted upon discharge. AVS/Calendar given to patient. PIV removed, catheter tip intact. Discharged home, ambulated independently using walker with family by side.

## 2024-08-19 ENCOUNTER — TUMOR BOARD CONFERENCE (OUTPATIENT)
Dept: HEMATOLOGY/ONCOLOGY | Facility: CLINIC | Age: 79
End: 2024-08-19
Payer: MEDICARE

## 2024-08-19 NOTE — PROGRESS NOTES
[facility]      NEUROENDOCRINE MULTIDISCIPLINARY TUMOR BOARD  _____________________________________________________________________    PRESENTER:   Ema Chowdhury MD    REASON FOR PRESENTATION:  Scan Review    ATTENDEES:   Gastroenterology - Not Present  Interventional Radiology - Edwin Chandler MD  Nuclear Medicine - Keenan Clifford MD and Mia Michael MD  Nursing - NET TB Nursing: Kristi Wyatt, RN, Siena Chamorro, RN, and Cedric Ricardo RN  Oncology - Ema Chowdhury MD and Jared Hernandez MD  Palliative Medicine - Not Present  Pathology -  Not Present  Research - Not Present  Surgery - MD Christiano Jasmine MD    PATIENT STATUS:  Established Patient    PATIENT SUMMARY:  Past Medical History:   Diagnosis Date    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    Diabetes mellitus     Hypertension     Lumbar spondylosis 6/8/2018    Malignant pheochromocytoma     Pheochromocytoma     Pheochromocytoma, malignant 01/10/2016    Pheochromocytoma, malignant     Sacroiliac joint pain 7/11/2018    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/17/2018    Thyroid disease        Past Surgical History:   Procedure Laterality Date    ADRENALECTOMY      ANGIOGRAPHY N/A 11/15/2018    Procedure: ANGIOGRAM;  Surgeon: [facility] Provider;  Location: [facility] OR [address] [place] [address];  Service: Radiology;  Laterality: N/A;    APPENDECTOMY      DISSECTION OF NECK Right 11/16/2018    Procedure: DISSECTION, NECK to remove right carotid body tumor;  Surgeon: Noah Ca MD;  Location: [facility] OR [address] [place] [address];  Service: ENT;  Laterality: Right;    INJECTION OF ANESTHETIC AGENT INTO SACROILIAC JOINT Right 6/26/2018    Procedure: BLOCK, SACROILIAC JOINT;  Surgeon: Sydney Reynoso MD;  Location: [facility] PAIN MGT;  Service: Pain Management;  Laterality: Right;  Right SI Joint Injection    [id number]    NEEDS CONSENT    LEFT HEART CATHETERIZATION Left 7/18/2018    Procedure: CATHETERIZATION,  HEART, LEFT;  Surgeon: Moustapha Vasquez MD;  Location: [facility] CATH LAB;  Service: Cardiovascular;  Laterality: Left;    MIBG Therapy  3/2016, 7/2016, 12/2016    [facility] - Dr. Martines       TUMOR MARKERS:  5-HIAA, Plasma Ref Range: up to 22 ng/mL      Chromogranin A Ref Range: <93 ng/mL  Recent Labs   Lab 11/11/22  0959   Chromogranin A 229 H     Gastrin Ref Range: <100 pg/mL      Neurokinin A Ref Range: 0 - 40 pg/mL      Pancreastatin Ref Range: 10 - 135 pg/mL      Pancreatic Polypeptide Ref Range: >314 pg/mL      Serotonin Ref Range: <=230 ng/mL            Latest Ref Rng & Units 8/15/2024    10:16 AM 8/8/2024    10:19 AM 7/18/2024    10:17 AM   Neuroendocrine Labs   WBC 3.90 - 12.70 K/uL  4.51     RBC 4.00 - 5.40 M/uL  3.59     HGB 12.0 - 16.0 g/dL  10.4     HCT 37.0 - 48.5 %  32.6     MCV 82 - 98 fL  91     MCH 27.0 - 31.0 pg  29.0     MCHC 32.0 - 36.0 g/dL  31.9     RDW 11.5 - 14.5 %  16.7     PLATLETS 150 - 450 K/uL  156     MPV 9.2 - 12.9 fL  9.6     GRAN # 1.8 - 7.7 K/uL  2.6     LYMPH # 1.0 - 4.8 K/uL  1.1     MONO # 0.3 - 1.0 K/uL  0.5     EOS # 0.0 - 0.5 K/uL  0.3     BASO # 0.00 - 0.20 K/uL  0.03     GRAN % 38.0 - 73.0 %  57.4     LYMPH % 18.0 - 48.0 %  24.6     MONO % 4.0 - 15.0 %  10.2     EOS % 0.0 - 8.0 %  6.9     BASO % 0.0 - 1.9 %  0.7     DIFF METHOD   Automated     GLUCOSE 70 - 110 mg/dL  90     BUN 8 - 23 mg/dL  13     CREATININE 0.5 - 1.4 mg/dL  1.1      - 145 mmol/L  140     K 3.5 - 5.1 mmol/L  3.4     CHLORIDE 95 - 110 mmol/L  110     CO2 23 - 29 mmol/L  20     CALCIUM 8.7 - 10.5 mg/dL  9.0     PROTEIN, TOTAL 6.0 - 8.4 g/dL  6.3     ALBUMIN 3.5 - 5.2 g/dL  3.5     TOTAL BILIRUBIN 0.1 - 1.0 mg/dL  0.6        For infants and newborns, interpretation of results should be based  on gestational age, weight and in agreement with clinical  observations.    Premature Infant recommended reference ranges:  Up to 24 hours.............<8.0 mg/dL  Up to 48 hours............<12.0 mg/dL  3-5  days..................<15.0 mg/dL  6-29 days.................<15.0 mg/dL      ALK PHOSPHATASE 55 - 135 U/L  59     SGOT (AST) 10 - 40 U/L  15     SGPT (ALT) 10 - 44 U/L  7     Weight  157 lbs 7 oz  171 lbs 8 oz     ____________________________________________________________________    DISCUSSION:79 yo woman with R adrenal pheochromocytoma s/p bilateral adrenectomy in 11/2009, recurrent disease to the bone in 2016 s/p MIBG in 2016. At our prior visits, we discussed interim progression of cancer in the liver and new lesions in the bones. Discussed lanreotide every 4 weeks, zometa every 3 months and PRRT. Lanreotide started 1/23/2023. Zometa started 2/20/23. PRRT #1 on 3/1/23. #2 on 4/26/23, #3 on 6/21/23, #4 on 8/16/23. Review PET        INT RAD: Defer to [facility] team.    NUC MED: Similar disease in liver and bone.    BOARD RECOMMENDATIONS: Stable, continue with Lanreotide & Zometa. Images 6 months.

## 2024-09-19 ENCOUNTER — LAB VISIT (OUTPATIENT)
Dept: LAB | Facility: HOSPITAL | Age: 79
End: 2024-09-19
Attending: FAMILY MEDICINE
Payer: MEDICARE

## 2024-09-19 ENCOUNTER — INFUSION (OUTPATIENT)
Dept: INFUSION THERAPY | Facility: HOSPITAL | Age: 79
End: 2024-09-19
Attending: INTERNAL MEDICINE
Payer: MEDICARE

## 2024-09-19 ENCOUNTER — OFFICE VISIT (OUTPATIENT)
Dept: HEMATOLOGY/ONCOLOGY | Facility: CLINIC | Age: 79
End: 2024-09-19
Payer: MEDICARE

## 2024-09-19 VITALS
BODY MASS INDEX: 29.88 KG/M2 | RESPIRATION RATE: 18 BRPM | OXYGEN SATURATION: 98 % | HEART RATE: 68 BPM | SYSTOLIC BLOOD PRESSURE: 159 MMHG | TEMPERATURE: 98 F | WEIGHT: 168.63 LBS | DIASTOLIC BLOOD PRESSURE: 76 MMHG

## 2024-09-19 VITALS
BODY MASS INDEX: 29.88 KG/M2 | DIASTOLIC BLOOD PRESSURE: 76 MMHG | TEMPERATURE: 98 F | OXYGEN SATURATION: 98 % | HEART RATE: 68 BPM | WEIGHT: 168.63 LBS | RESPIRATION RATE: 18 BRPM | SYSTOLIC BLOOD PRESSURE: 159 MMHG

## 2024-09-19 DIAGNOSIS — C7B.8 SECONDARY NEUROENDOCRINE TUMOR OF DISTANT LYMPH NODES: ICD-10-CM

## 2024-09-19 DIAGNOSIS — C74.90 MALIGNANT PHEOCHROMOCYTOMA, UNSPECIFIED LATERALITY: Primary | ICD-10-CM

## 2024-09-19 DIAGNOSIS — D84.81 IMMUNODEFICIENCY SECONDARY TO NEOPLASM: ICD-10-CM

## 2024-09-19 DIAGNOSIS — I50.42 CHRONIC COMBINED SYSTOLIC AND DIASTOLIC CONGESTIVE HEART FAILURE: ICD-10-CM

## 2024-09-19 DIAGNOSIS — I10 ESSENTIAL HYPERTENSION: ICD-10-CM

## 2024-09-19 DIAGNOSIS — Z79.899 IMMUNODEFICIENCY DUE TO DRUGS: ICD-10-CM

## 2024-09-19 DIAGNOSIS — C74.91 MALIGNANT PHEOCHROMOCYTOMA OF RIGHT ADRENAL GLAND: ICD-10-CM

## 2024-09-19 DIAGNOSIS — D49.9 IMMUNODEFICIENCY SECONDARY TO NEOPLASM: ICD-10-CM

## 2024-09-19 DIAGNOSIS — D84.821 IMMUNODEFICIENCY DUE TO DRUGS: ICD-10-CM

## 2024-09-19 DIAGNOSIS — C7B.8 SECONDARY NEUROENDOCRINE TUMOR OF BONE: ICD-10-CM

## 2024-09-19 DIAGNOSIS — E11.9 CONTROLLED TYPE 2 DIABETES MELLITUS WITHOUT COMPLICATION, WITHOUT LONG-TERM CURRENT USE OF INSULIN: ICD-10-CM

## 2024-09-19 DIAGNOSIS — I49.5 SICK SINUS SYNDROME: ICD-10-CM

## 2024-09-19 DIAGNOSIS — E27.1 ADRENAL INSUFFICIENCY, PRIMARY: ICD-10-CM

## 2024-09-19 DIAGNOSIS — C74.91 MALIGNANT PHEOCHROMOCYTOMA OF RIGHT ADRENAL GLAND: Primary | ICD-10-CM

## 2024-09-19 DIAGNOSIS — I48.0 PAF (PAROXYSMAL ATRIAL FIBRILLATION): ICD-10-CM

## 2024-09-19 LAB
ALBUMIN SERPL BCP-MCNC: 3.6 G/DL (ref 3.5–5.2)
ALP SERPL-CCNC: 69 U/L (ref 55–135)
ALT SERPL W/O P-5'-P-CCNC: 17 U/L (ref 10–44)
ANION GAP SERPL CALC-SCNC: 9 MMOL/L (ref 8–16)
AST SERPL-CCNC: 22 U/L (ref 10–40)
BASOPHILS # BLD AUTO: 0.04 K/UL (ref 0–0.2)
BASOPHILS NFR BLD: 0.8 % (ref 0–1.9)
BILIRUB SERPL-MCNC: 1.1 MG/DL (ref 0.1–1)
BUN SERPL-MCNC: 12 MG/DL (ref 8–23)
CALCIUM SERPL-MCNC: 9.1 MG/DL (ref 8.7–10.5)
CHLORIDE SERPL-SCNC: 109 MMOL/L (ref 95–110)
CO2 SERPL-SCNC: 24 MMOL/L (ref 23–29)
CREAT SERPL-MCNC: 1.1 MG/DL (ref 0.5–1.4)
DIFFERENTIAL METHOD BLD: ABNORMAL
EOSINOPHIL # BLD AUTO: 0.3 K/UL (ref 0–0.5)
EOSINOPHIL NFR BLD: 5.5 % (ref 0–8)
ERYTHROCYTE [DISTWIDTH] IN BLOOD BY AUTOMATED COUNT: 16.7 % (ref 11.5–14.5)
EST. GFR  (NO RACE VARIABLE): 51 ML/MIN/1.73 M^2
GLUCOSE SERPL-MCNC: 113 MG/DL (ref 70–110)
HCT VFR BLD AUTO: 35 % (ref 37–48.5)
HGB BLD-MCNC: 10.9 G/DL (ref 12–16)
IMM GRANULOCYTES # BLD AUTO: 0.01 K/UL (ref 0–0.04)
IMM GRANULOCYTES NFR BLD AUTO: 0.2 % (ref 0–0.5)
LYMPHOCYTES # BLD AUTO: 1.5 K/UL (ref 1–4.8)
LYMPHOCYTES NFR BLD: 29.2 % (ref 18–48)
MCH RBC QN AUTO: 28.4 PG (ref 27–31)
MCHC RBC AUTO-ENTMCNC: 31.1 G/DL (ref 32–36)
MCV RBC AUTO: 91 FL (ref 82–98)
MONOCYTES # BLD AUTO: 0.5 K/UL (ref 0.3–1)
MONOCYTES NFR BLD: 8.9 % (ref 4–15)
NEUTROPHILS # BLD AUTO: 2.8 K/UL (ref 1.8–7.7)
NEUTROPHILS NFR BLD: 55.4 % (ref 38–73)
NRBC BLD-RTO: 0 /100 WBC
PLATELET # BLD AUTO: 189 K/UL (ref 150–450)
PMV BLD AUTO: 9.5 FL (ref 9.2–12.9)
POTASSIUM SERPL-SCNC: 3.5 MMOL/L (ref 3.5–5.1)
PROT SERPL-MCNC: 6.7 G/DL (ref 6–8.4)
RBC # BLD AUTO: 3.84 M/UL (ref 4–5.4)
SODIUM SERPL-SCNC: 142 MMOL/L (ref 136–145)
WBC # BLD AUTO: 5.07 K/UL (ref 3.9–12.7)

## 2024-09-19 PROCEDURE — 1101F PT FALLS ASSESS-DOCD LE1/YR: CPT | Mod: CPTII,S$GLB,, | Performed by: INTERNAL MEDICINE

## 2024-09-19 PROCEDURE — 1159F MED LIST DOCD IN RCRD: CPT | Mod: CPTII,S$GLB,, | Performed by: INTERNAL MEDICINE

## 2024-09-19 PROCEDURE — 82384 ASSAY THREE CATECHOLAMINES: CPT | Performed by: INTERNAL MEDICINE

## 2024-09-19 PROCEDURE — 3078F DIAST BP <80 MM HG: CPT | Mod: CPTII,S$GLB,, | Performed by: INTERNAL MEDICINE

## 2024-09-19 PROCEDURE — 1125F AMNT PAIN NOTED PAIN PRSNT: CPT | Mod: CPTII,S$GLB,, | Performed by: INTERNAL MEDICINE

## 2024-09-19 PROCEDURE — 36415 COLL VENOUS BLD VENIPUNCTURE: CPT | Performed by: INTERNAL MEDICINE

## 2024-09-19 PROCEDURE — G2211 COMPLEX E/M VISIT ADD ON: HCPCS | Mod: S$GLB,,, | Performed by: INTERNAL MEDICINE

## 2024-09-19 PROCEDURE — 99999 PR PBB SHADOW E&M-EST. PATIENT-LVL V: CPT | Mod: PBBFAC,,, | Performed by: INTERNAL MEDICINE

## 2024-09-19 PROCEDURE — 3288F FALL RISK ASSESSMENT DOCD: CPT | Mod: CPTII,S$GLB,, | Performed by: INTERNAL MEDICINE

## 2024-09-19 PROCEDURE — 96372 THER/PROPH/DIAG INJ SC/IM: CPT

## 2024-09-19 PROCEDURE — 83835 ASSAY OF METANEPHRINES: CPT | Performed by: INTERNAL MEDICINE

## 2024-09-19 PROCEDURE — 80053 COMPREHEN METABOLIC PANEL: CPT | Performed by: INTERNAL MEDICINE

## 2024-09-19 PROCEDURE — 3077F SYST BP >= 140 MM HG: CPT | Mod: CPTII,S$GLB,, | Performed by: INTERNAL MEDICINE

## 2024-09-19 PROCEDURE — 85025 COMPLETE CBC W/AUTO DIFF WBC: CPT | Performed by: INTERNAL MEDICINE

## 2024-09-19 PROCEDURE — 99215 OFFICE O/P EST HI 40 MIN: CPT | Mod: S$GLB,,, | Performed by: INTERNAL MEDICINE

## 2024-09-19 PROCEDURE — 1160F RVW MEDS BY RX/DR IN RCRD: CPT | Mod: CPTII,S$GLB,, | Performed by: INTERNAL MEDICINE

## 2024-09-19 PROCEDURE — 63600175 PHARM REV CODE 636 W HCPCS: Mod: JZ,JG | Performed by: INTERNAL MEDICINE

## 2024-09-19 RX ORDER — LOSARTAN POTASSIUM 25 MG/1
25 TABLET ORAL 2 TIMES DAILY
COMMUNITY

## 2024-09-19 RX ORDER — LANREOTIDE ACETATE 120 MG/.5ML
120 INJECTION SUBCUTANEOUS ONCE
Status: COMPLETED | OUTPATIENT
Start: 2024-09-19 | End: 2024-09-19

## 2024-09-19 RX ORDER — MULTIVIT WITH MINERALS/HERBS
1 TABLET ORAL DAILY
COMMUNITY

## 2024-09-19 RX ORDER — B-COMPLEX WITH VITAMIN C
1 TABLET ORAL DAILY
COMMUNITY

## 2024-09-19 RX ORDER — LANREOTIDE ACETATE 120 MG/.5ML
120 INJECTION SUBCUTANEOUS ONCE
OUTPATIENT
Start: 2024-09-19 | End: 2024-09-19

## 2024-09-19 RX ADMIN — LANREOTIDE ACETATE 120 MG: 120 INJECTION SUBCUTANEOUS at 12:09

## 2024-09-19 NOTE — PROGRESS NOTES
PROGRESS NOTE    Subjective:       Patient ID: Hailey Sawant is a 79 y.o. female.    Chief Complaint: metastatic pheochromocytoma    Diagnosis:  Metastatic pheochromocytoma of the right adrenal gland, with mets to bones, liver    Oncologic History:  1. Ms. Sawant is a 72 yo woman with DM, HTN, CAD s/p MI, A fib and sick sinus syndrome s/p pacemaker in June 2022 (on eliquis since), carotid paraganglioma removed in 11/2018 who initially saw me on 11/28/2022 for further management of metastatic pheochromocytoma. Last seen Dr West in 2018 and Dr Cohen in early 2021. Does not follow oncologist locally. It is my first time seeing her today. Her history dates to 2009 when she was being worked up for abdominal pain and was found to have cholelithiasis. However, during this workup she was found to have bilateral adrenal masses.  She was also noted to be hypertensive.  She had a 6.5 cm mass on the right with some evidence of necrosis and also a mass just under 3 cm on the left.  Biochemical workup was consistent with pheochromocytoma.  In 11/2009 she underwent a bilateral adrenalectomy.  Pathology had revealed a right adrenal gland pheochromocytoma and a left adrenal gland adrenal cortical adenoma.  In 02/2016 she was noted to have recurrent disease to the bone and underwent I 131 MIBG therapy in 03/2016, 07/2016 and 11/2016.  In 10/2016 she underwent a right partial nephrectomy due to recurrent disease.  Pathology did confirm findings consistent with recurrent pheochromocytoma.  In 11/2018 she underwent removal of the right carotid body tumor with pathology showing paraganglioma. She presents today for follow up. She knows she has cancer but does not know it is stage IV. She is not sure if Dr Cohen called her after tumor board or not. She feels well. Had MI in June 2022, treated at [facility], had pacemaker placed. Medically treated. Feeling well  "today. We discussed restaging dotatate scan.   2. Dotatate scan declined by insurance several times. Finally had gallium PET on 23. It showed "New foci of abnormal radiotracer uptake at the cervical spine, sacral, right ilium and right acetabular joint. Interval enlargement of hepatic section 7 hypodense mass. [facility] tumor board. [facility] However unable to get insurance approval for PRRT and admission afterwards to monitor for catecholamine crisis.   3. Lanreotide started on 23. Zometa started 23.   4. PRRT started on 3/1/23. #2 on 23, # 3 on 23, #4 on 23    Interval History:   Ms Sawant returns for follow up. Hip pain is flaring up today. Otherwise feels okay.     ECO    ROS:   A ten-point system review is obtained and negative except for what was stated in the Interval History.     Physical Examination:   Vital signs reviewed.   General: well hydrated, well developed, in no acute distress, using a walker  HEENT: normocephalic, EOMI, anicteric sclerae  Neck: supple, no JVD, +thyromegaly, soft, no cervical or supraclavicular lymphadenopathy  Lungs: clear breath sounds bilaterally, no wheezing, rales, or rhonchi  Heart: RRR, no M/R/G  Abdomen: soft, no tenderness, non-distended, no hepatosplenomegaly, mass, or hernia. BS present  Extremities: no clubbing, cyanosis, or edema  Skin: no rash, ulcer, or open wounds  Neuro: alert and oriented x 4, no focal neuro deficit  Psych: pleasant and appropriate mood and affect    Objective:     Laboratory Data:  Labs from today pending    Imaging Data:  Gallium PET 23:  New foci of abnormal radiotracer uptake at the cervical spine, sacral, right ilium and right acetabular joint.     Interval enlargement of hepatic section 7 hypodense mass.     Based upon progression of disease and tracer avidity of the patient's tumor burden, the patient would be an appropriate candidate for PRRT if clinically indicated.     CT neck " 4/6/23:  Impression:     1. Status post resection right carotid body tumor.  No recurrent mass identified.  2. 1.5 cm peripherally enhancing nodules lower pole both lobes of the thyroid.    Thyroid US 5/16/23:  Impression:     Multiple thyroid nodules.  1.7 cm TI-RADS 5 left lobe nodule meets ACR criteria for FNA.     Right lobe nodule meets criteria for surveillance with repeat thyroid ultrasound recommended 1 year.      Copper PET 5/25/23:  Impression:     1. Right hepatic lobe lesions and multifocal primarily sclerotic osseous lesions stable in size and number with decreased tracer-avidity.  No new lesions identified.  2. Postsurgical change prior bilateral adrenalectomy.  No tracer uptake within the surgical bed to suggest localized recurrent disease.  3. Other stable incidental findings above.    Copper PET 9/13/23:  FINDINGS:  Quality of the study: Diaphragmatic/respiratory motion and associated misregistration artifact limits assessment of the liver.     In the head and neck, there is physiologic distribution in the pituitary, salivary, and thyroid glands, and there are no tracer avid lesions suspicious for malignancy.     In the chest, there are no tracer avid lesions suspicious for malignancy.     In the abdomen and pelvis, there is similar appearance of radiotracer avid lesions within the right hepatic lobe noting that evaluation is limited due to motion and misregistration artifact.  Dominant right hepatic lobe lesion demonstrates maximum SUV of 46 (previously 44).  Difficult to measure lesions on CT due to artifact.  No new lesion.  There is physiologic uptake in the pancreatic uncinate process, liver, spleen, adrenal glands and  tract.     In the bones, there is stable number and distribution of radiotracer avid lesions throughout the osseous structures including the right scapula, spine, sacrum, right ilium and ischium, and right femur.  Right scapula maximum SUV of 76 (previously 91).  Right ilium  maximum SUV of 100 (previously 118).  Left sacrum maximum SUV of 25 (previously 40).  No new lesion.     Additional findings: Left chest wall cardiac pacer and leads noted.  Cardiomegaly.  Diffuse calcific atherosclerosis.  Postsurgical changes of bilateral adrenalectomy and cholecystectomy.  Calcified uterine fibroid.  Colonic diverticulosis.  Left hip arthroplasty hardware results in beam hardening artifact limiting assessment of the pelvis.     Impression:     Stable distribution of radiotracer avid lesions within the liver and bones.  No new lesion.     Motion artifact limits assessment of the liver.    Copper PET 2/15/2024:  Impression:     Stable distribution of multiple tracer avid osseous lesions which show decreased tracer uptake compared to prior study.     Grossly stable appearance of tracer avid right hepatic lobe lesions.     No new tracer avid lesions.      copper PET 8/8/24:  Impression:     Stable distribution of tracer avid osseous lesions with interval mild decreased tracer avidity of several index lesions, as above with mixed favorable treatment response     Interval mild decreased tracer avidity of posterior right hepatic lobe lesions.     No new tracer avid lesion.     Assessment and Plan:     1. Malignant pheochromocytoma of right adrenal gland    2. Secondary neuroendocrine tumor of distant lymph nodes    3. Secondary neuroendocrine tumor of bone    4. Immunodeficiency due to drugs    5. Immunodeficiency secondary to neoplasm    6. Adrenal insufficiency, primary post-surgical    7. Essential hypertension    8. Controlled type 2 diabetes mellitus without complication, without long-term current use of insulin    9. Sick sinus syndrome    10. PAF (paroxysmal atrial fibrillation)    11. Chronic combined systolic and diastolic congestive heart failure        1-5  - Ms Jese is an 78 yo woman with R adrenal pheochromocytoma s/p bilateral adrenectomy in 11/2009, recurrent disease to the bone in 2016  s/p MIBG in 2016. At our prior visits, we discussed interim progression of cancer in the liver and new lesions in the bones. Discussed lanreotide every 4 weeks, zometa every 3 months and PRRT. Lanreotide started 1/23/2023. Zometa started 2/20/23.   - PRRT #1 on 3/1/23. #2 on 4/26/23, #3 on 6/21/23, #4 on 8/16/23  - doing well. Will get labs today  - c/w lanreotide every 4 weeks.   - zometa every 3 months. Last gave in August 2024. Next due in Nov 2024    6.  - c/w cortef  - f/u with endocrinology    7.  - BP controlled  - c/w current medication  - f/u with cardiology    8.  - BS controlled  - c/w current medication    9-11  - f/u with Dr Loya at [facility]    Follow-up:     Return in one month  Knows to call in the interval if any problems arise.    Electronically signed by Ema Chowdhury

## 2024-10-17 ENCOUNTER — INFUSION (OUTPATIENT)
Dept: INFUSION THERAPY | Facility: HOSPITAL | Age: 79
End: 2024-10-17
Attending: INTERNAL MEDICINE
Payer: MEDICARE

## 2024-10-17 ENCOUNTER — OFFICE VISIT (OUTPATIENT)
Dept: HEMATOLOGY/ONCOLOGY | Facility: CLINIC | Age: 79
End: 2024-10-17
Payer: MEDICARE

## 2024-10-17 VITALS
WEIGHT: 168 LBS | OXYGEN SATURATION: 99 % | DIASTOLIC BLOOD PRESSURE: 61 MMHG | BODY MASS INDEX: 29.76 KG/M2 | SYSTOLIC BLOOD PRESSURE: 113 MMHG | HEART RATE: 65 BPM

## 2024-10-17 VITALS
BODY MASS INDEX: 29.76 KG/M2 | SYSTOLIC BLOOD PRESSURE: 113 MMHG | WEIGHT: 168 LBS | DIASTOLIC BLOOD PRESSURE: 61 MMHG | OXYGEN SATURATION: 99 % | HEART RATE: 65 BPM

## 2024-10-17 DIAGNOSIS — D84.821 IMMUNODEFICIENCY DUE TO DRUGS: ICD-10-CM

## 2024-10-17 DIAGNOSIS — M25.551 RIGHT HIP PAIN: ICD-10-CM

## 2024-10-17 DIAGNOSIS — C7B.8 SECONDARY NEUROENDOCRINE TUMOR OF BONE: ICD-10-CM

## 2024-10-17 DIAGNOSIS — D84.81 IMMUNODEFICIENCY SECONDARY TO NEOPLASM: ICD-10-CM

## 2024-10-17 DIAGNOSIS — D49.9 IMMUNODEFICIENCY SECONDARY TO NEOPLASM: ICD-10-CM

## 2024-10-17 DIAGNOSIS — C74.90 MALIGNANT PHEOCHROMOCYTOMA, UNSPECIFIED LATERALITY: Primary | ICD-10-CM

## 2024-10-17 DIAGNOSIS — C7B.8 SECONDARY NEUROENDOCRINE TUMOR OF DISTANT LYMPH NODES: ICD-10-CM

## 2024-10-17 DIAGNOSIS — E11.9 CONTROLLED TYPE 2 DIABETES MELLITUS WITHOUT COMPLICATION, WITHOUT LONG-TERM CURRENT USE OF INSULIN: ICD-10-CM

## 2024-10-17 DIAGNOSIS — Z79.899 IMMUNODEFICIENCY DUE TO DRUGS: ICD-10-CM

## 2024-10-17 DIAGNOSIS — E27.1 ADRENAL INSUFFICIENCY, PRIMARY: ICD-10-CM

## 2024-10-17 DIAGNOSIS — I49.5 SICK SINUS SYNDROME: ICD-10-CM

## 2024-10-17 DIAGNOSIS — C74.91 MALIGNANT PHEOCHROMOCYTOMA OF RIGHT ADRENAL GLAND: Primary | ICD-10-CM

## 2024-10-17 DIAGNOSIS — I10 ESSENTIAL HYPERTENSION: ICD-10-CM

## 2024-10-17 DIAGNOSIS — I48.0 PAF (PAROXYSMAL ATRIAL FIBRILLATION): ICD-10-CM

## 2024-10-17 DIAGNOSIS — I50.42 CHRONIC COMBINED SYSTOLIC AND DIASTOLIC CONGESTIVE HEART FAILURE: ICD-10-CM

## 2024-10-17 PROCEDURE — 1159F MED LIST DOCD IN RCRD: CPT | Mod: CPTII,S$GLB,, | Performed by: INTERNAL MEDICINE

## 2024-10-17 PROCEDURE — 1160F RVW MEDS BY RX/DR IN RCRD: CPT | Mod: CPTII,S$GLB,, | Performed by: INTERNAL MEDICINE

## 2024-10-17 PROCEDURE — 1125F AMNT PAIN NOTED PAIN PRSNT: CPT | Mod: CPTII,S$GLB,, | Performed by: INTERNAL MEDICINE

## 2024-10-17 PROCEDURE — 1101F PT FALLS ASSESS-DOCD LE1/YR: CPT | Mod: CPTII,S$GLB,, | Performed by: INTERNAL MEDICINE

## 2024-10-17 PROCEDURE — 3074F SYST BP LT 130 MM HG: CPT | Mod: CPTII,S$GLB,, | Performed by: INTERNAL MEDICINE

## 2024-10-17 PROCEDURE — 3078F DIAST BP <80 MM HG: CPT | Mod: CPTII,S$GLB,, | Performed by: INTERNAL MEDICINE

## 2024-10-17 PROCEDURE — 99215 OFFICE O/P EST HI 40 MIN: CPT | Mod: S$GLB,,, | Performed by: INTERNAL MEDICINE

## 2024-10-17 PROCEDURE — 99999 PR PBB SHADOW E&M-EST. PATIENT-LVL IV: CPT | Mod: PBBFAC,,, | Performed by: INTERNAL MEDICINE

## 2024-10-17 PROCEDURE — 3288F FALL RISK ASSESSMENT DOCD: CPT | Mod: CPTII,S$GLB,, | Performed by: INTERNAL MEDICINE

## 2024-10-17 PROCEDURE — 63600175 PHARM REV CODE 636 W HCPCS: Mod: JZ,JG | Performed by: INTERNAL MEDICINE

## 2024-10-17 PROCEDURE — 96372 THER/PROPH/DIAG INJ SC/IM: CPT

## 2024-10-17 PROCEDURE — G2211 COMPLEX E/M VISIT ADD ON: HCPCS | Mod: S$GLB,,, | Performed by: INTERNAL MEDICINE

## 2024-10-17 RX ORDER — LANREOTIDE ACETATE 120 MG/.5ML
120 INJECTION SUBCUTANEOUS ONCE
OUTPATIENT
Start: 2024-10-17 | End: 2024-10-17

## 2024-10-17 RX ORDER — LANREOTIDE ACETATE 120 MG/.5ML
120 INJECTION SUBCUTANEOUS ONCE
Status: COMPLETED | OUTPATIENT
Start: 2024-10-17 | End: 2024-10-17

## 2024-10-17 RX ADMIN — LANREOTIDE ACETATE 120 MG: 120 INJECTION SUBCUTANEOUS at 12:10

## 2024-10-17 NOTE — PROGRESS NOTES
PROGRESS NOTE    Subjective:       Patient ID: Hailey Sawant is a 79 y.o. female.    Chief Complaint: metastatic pheochromocytoma    Diagnosis:  Metastatic pheochromocytoma of the right adrenal gland, with mets to bones, liver    Oncologic History:  1. Ms. Sawant is a 74 yo woman with DM, HTN, CAD s/p MI, A fib and sick sinus syndrome s/p pacemaker in June 2022 (on eliquis since), carotid paraganglioma removed in 11/2018 who initially saw me on 11/28/2022 for further management of metastatic pheochromocytoma. Last seen Dr West in 2018 and Dr Cohen in early 2021. Does not follow oncologist locally. It is my first time seeing her today. Her history dates to 2009 when she was being worked up for abdominal pain and was found to have cholelithiasis. However, during this workup she was found to have bilateral adrenal masses.  She was also noted to be hypertensive.  She had a 6.5 cm mass on the right with some evidence of necrosis and also a mass just under 3 cm on the left.  Biochemical workup was consistent with pheochromocytoma.  In 11/2009 she underwent a bilateral adrenalectomy.  Pathology had revealed a right adrenal gland pheochromocytoma and a left adrenal gland adrenal cortical adenoma.  In 02/2016 she was noted to have recurrent disease to the bone and underwent I 131 MIBG therapy in 03/2016, 07/2016 and 11/2016.  In 10/2016 she underwent a right partial nephrectomy due to recurrent disease.  Pathology did confirm findings consistent with recurrent pheochromocytoma.  In 11/2018 she underwent removal of the right carotid body tumor with pathology showing paraganglioma. She presents today for follow up. She knows she has cancer but does not know it is stage IV. She is not sure if Dr Cohen called her after tumor board or not. She feels well. Had MI in June 2022, treated at [facility], had pacemaker placed. Medically treated. Feeling well  "today. We discussed restaging dotatate scan.   2. Dotatate scan declined by insurance several times. Finally had gallium PET on 23. It showed "New foci of abnormal radiotracer uptake at the cervical spine, sacral, right ilium and right acetabular joint. Interval enlargement of hepatic section 7 hypodense mass. Reviewed at tumor board. [facility]. However unable to get insurance approval for PRRT and admission afterwards to monitor for catecholamine crisis.   3. Lanreotide started on 23. Zometa started 23.   4. PRRT started on 3/1/23. #2 on 23, # 3 on 23, #4 on 23    Interval History:   Ms Sawant returns for follow up. Has chronic R hip pain that comes and goes. Has R hip pain for the past 2 weeks.     ECO    ROS:   A ten-point system review is obtained and negative except for what was stated in the Interval History.     Physical Examination:   Vital signs reviewed.   General: well hydrated, well developed, in no acute distress  HEENT: normocephalic, EOMI, anicteric sclerae  Neck: supple, no JVD, +thyromegaly, soft, no cervical or supraclavicular lymphadenopathy  Lungs: clear breath sounds bilaterally, no wheezing, rales, or rhonchi  Heart: RRR, no M/R/G  Abdomen: soft, no tenderness, non-distended, no hepatosplenomegaly, mass, or hernia. BS present  Extremities: no clubbing, cyanosis, or edema  Skin: no rash, ulcer, or open wounds  Neuro: alert and oriented x 4, no focal neuro deficit  Psych: pleasant and appropriate mood and affect    Objective:     Laboratory Data:  Labs reviewed. CBC, CMP adequate for treatment    Imaging Data:  Gallium PET 23:  New foci of abnormal radiotracer uptake at the cervical spine, sacral, right ilium and right acetabular joint.     Interval enlargement of hepatic section 7 hypodense mass.     Based upon progression of disease and tracer avidity of the patient's tumor burden, the patient would be an appropriate candidate for PRRT if clinically " indicated.     CT neck 4/6/23:  Impression:     1. Status post resection right carotid body tumor.  No recurrent mass identified.  2. 1.5 cm peripherally enhancing nodules lower pole both lobes of the thyroid.    Thyroid US 5/16/23:  Impression:     Multiple thyroid nodules.  1.7 cm TI-RADS 5 left lobe nodule meets ACR criteria for FNA.     Right lobe nodule meets criteria for surveillance with repeat thyroid ultrasound recommended 1 year.      Copper PET 5/25/23:  Impression:     1. Right hepatic lobe lesions and multifocal primarily sclerotic osseous lesions stable in size and number with decreased tracer-avidity.  No new lesions identified.  2. Postsurgical change prior bilateral adrenalectomy.  No tracer uptake within the surgical bed to suggest localized recurrent disease.  3. Other stable incidental findings above.    Copper PET 9/13/23:  FINDINGS:  Quality of the study: Diaphragmatic/respiratory motion and associated misregistration artifact limits assessment of the liver.     In the head and neck, there is physiologic distribution in the pituitary, salivary, and thyroid glands, and there are no tracer avid lesions suspicious for malignancy.     In the chest, there are no tracer avid lesions suspicious for malignancy.     In the abdomen and pelvis, there is similar appearance of radiotracer avid lesions within the right hepatic lobe noting that evaluation is limited due to motion and misregistration artifact.  Dominant right hepatic lobe lesion demonstrates maximum SUV of 46 (previously 44).  Difficult to measure lesions on CT due to artifact.  No new lesion.  There is physiologic uptake in the pancreatic uncinate process, liver, spleen, adrenal glands and  tract.     In the bones, there is stable number and distribution of radiotracer avid lesions throughout the osseous structures including the right scapula, spine, sacrum, right ilium and ischium, and right femur.  Right scapula maximum SUV of 76  (previously 91).  Right ilium maximum SUV of 100 (previously 118).  Left sacrum maximum SUV of 25 (previously 40).  No new lesion.     Additional findings: Left chest wall cardiac pacer and leads noted.  Cardiomegaly.  Diffuse calcific atherosclerosis.  Postsurgical changes of bilateral adrenalectomy and cholecystectomy.  Calcified uterine fibroid.  Colonic diverticulosis.  Left hip arthroplasty hardware results in beam hardening artifact limiting assessment of the pelvis.     Impression:     Stable distribution of radiotracer avid lesions within the liver and bones.  No new lesion.     Motion artifact limits assessment of the liver.    Copper PET 2/15/2024:  Impression:     Stable distribution of multiple tracer avid osseous lesions which show decreased tracer uptake compared to prior study.     Grossly stable appearance of tracer avid right hepatic lobe lesions.     No new tracer avid lesions.      copper PET 8/8/24:  Impression:     Stable distribution of tracer avid osseous lesions with interval mild decreased tracer avidity of several index lesions, as above with mixed favorable treatment response     Interval mild decreased tracer avidity of posterior right hepatic lobe lesions.     No new tracer avid lesion.     Assessment and Plan:     1. Malignant pheochromocytoma of right adrenal gland    2. Secondary neuroendocrine tumor of distant lymph nodes    3. Secondary neuroendocrine tumor of bone    4. Immunodeficiency due to drugs    5. Immunodeficiency secondary to neoplasm    6. Adrenal insufficiency, primary post-surgical    7. Essential hypertension    8. Controlled type 2 diabetes mellitus without complication, without long-term current use of insulin    9. Sick sinus syndrome    10. PAF (paroxysmal atrial fibrillation)    11. Chronic combined systolic and diastolic congestive heart failure        1-5  - Ms Jese is an 78 yo woman with R adrenal pheochromocytoma s/p bilateral adrenectomy in 11/2009,  recurrent disease to the bone in 2016 s/p MIBG in 2016. At our prior visits, we discussed interim progression of cancer in the liver and new lesions in the bones. Discussed lanreotide every 4 weeks, zometa every 3 months and PRRT. Lanreotide started 1/23/2023. Zometa started 2/20/23.   - PRRT #1 on 3/1/23. #2 on 4/26/23, #3 on 6/21/23, #4 on 8/16/23  - doing well. Reviewed test results with patient. Adequate for treatment. Potassium normal  - R hip pain could be from chronic arthritis. Will get CT hip on return for further evaluation. Patient does not feel there is dramatic change that warrants immediate CT  - c/w lanreotide every 4 weeks.   - zometa every 3 months. Last gave in August 2024. Next due in Nov 2024    6.  - c/w cortef  - f/u with endocrinology    7.  - BP controlled  - c/w current medication  - f/u with cardiology    8.  - BS controlled  - c/w current medication    9-11  - f/u with Dr Loya at [facility]    Follow-up:     Return in one month  Knows to call in the interval if any problems arise.    I spent 40 minutes reviewing the chart, interpreting laboratory result and imaging data, coordinating patient's care, with at least 50% of the time on face-to-face counseling.       Electronically signed by Ema Chowdhury

## 2024-11-14 ENCOUNTER — INFUSION (OUTPATIENT)
Dept: INFUSION THERAPY | Facility: HOSPITAL | Age: 79
End: 2024-11-14
Attending: INTERNAL MEDICINE
Payer: MEDICARE

## 2024-11-14 ENCOUNTER — OFFICE VISIT (OUTPATIENT)
Dept: HEMATOLOGY/ONCOLOGY | Facility: CLINIC | Age: 79
End: 2024-11-14
Payer: MEDICARE

## 2024-11-14 VITALS
DIASTOLIC BLOOD PRESSURE: 71 MMHG | WEIGHT: 170.88 LBS | TEMPERATURE: 98 F | BODY MASS INDEX: 30.27 KG/M2 | HEART RATE: 66 BPM | SYSTOLIC BLOOD PRESSURE: 131 MMHG | OXYGEN SATURATION: 96 % | RESPIRATION RATE: 18 BRPM

## 2024-11-14 VITALS
WEIGHT: 170.88 LBS | HEART RATE: 66 BPM | TEMPERATURE: 98 F | DIASTOLIC BLOOD PRESSURE: 71 MMHG | BODY MASS INDEX: 30.27 KG/M2 | RESPIRATION RATE: 18 BRPM | OXYGEN SATURATION: 96 % | SYSTOLIC BLOOD PRESSURE: 131 MMHG

## 2024-11-14 DIAGNOSIS — C74.90 MALIGNANT PHEOCHROMOCYTOMA, UNSPECIFIED LATERALITY: Primary | ICD-10-CM

## 2024-11-14 DIAGNOSIS — E11.9 CONTROLLED TYPE 2 DIABETES MELLITUS WITHOUT COMPLICATION, WITHOUT LONG-TERM CURRENT USE OF INSULIN: ICD-10-CM

## 2024-11-14 DIAGNOSIS — C7B.8 SECONDARY NEUROENDOCRINE TUMOR OF BONE: ICD-10-CM

## 2024-11-14 DIAGNOSIS — C79.51 SECONDARY MALIGNANT NEOPLASM OF BONE: ICD-10-CM

## 2024-11-14 DIAGNOSIS — G89.3 CANCER RELATED PAIN: ICD-10-CM

## 2024-11-14 DIAGNOSIS — E27.1 ADRENAL INSUFFICIENCY, PRIMARY: ICD-10-CM

## 2024-11-14 DIAGNOSIS — D49.9 IMMUNODEFICIENCY SECONDARY TO NEOPLASM: ICD-10-CM

## 2024-11-14 DIAGNOSIS — R53.83 FATIGUE, UNSPECIFIED TYPE: ICD-10-CM

## 2024-11-14 DIAGNOSIS — I48.0 PAF (PAROXYSMAL ATRIAL FIBRILLATION): ICD-10-CM

## 2024-11-14 DIAGNOSIS — C74.91 MALIGNANT PHEOCHROMOCYTOMA OF RIGHT ADRENAL GLAND: ICD-10-CM

## 2024-11-14 DIAGNOSIS — D84.821 IMMUNODEFICIENCY DUE TO DRUGS: ICD-10-CM

## 2024-11-14 DIAGNOSIS — I50.42 CHRONIC COMBINED SYSTOLIC AND DIASTOLIC CONGESTIVE HEART FAILURE: ICD-10-CM

## 2024-11-14 DIAGNOSIS — D84.81 IMMUNODEFICIENCY SECONDARY TO NEOPLASM: ICD-10-CM

## 2024-11-14 DIAGNOSIS — I10 ESSENTIAL HYPERTENSION: ICD-10-CM

## 2024-11-14 DIAGNOSIS — Z79.899 IMMUNODEFICIENCY DUE TO DRUGS: ICD-10-CM

## 2024-11-14 DIAGNOSIS — C7B.8 SECONDARY NEUROENDOCRINE TUMOR OF DISTANT LYMPH NODES: ICD-10-CM

## 2024-11-14 DIAGNOSIS — I49.5 SICK SINUS SYNDROME: ICD-10-CM

## 2024-11-14 PROCEDURE — 1160F RVW MEDS BY RX/DR IN RCRD: CPT | Mod: CPTII,S$GLB,, | Performed by: INTERNAL MEDICINE

## 2024-11-14 PROCEDURE — 3075F SYST BP GE 130 - 139MM HG: CPT | Mod: CPTII,S$GLB,, | Performed by: INTERNAL MEDICINE

## 2024-11-14 PROCEDURE — 1126F AMNT PAIN NOTED NONE PRSNT: CPT | Mod: CPTII,S$GLB,, | Performed by: INTERNAL MEDICINE

## 2024-11-14 PROCEDURE — 63600175 PHARM REV CODE 636 W HCPCS: Performed by: INTERNAL MEDICINE

## 2024-11-14 PROCEDURE — 99999 PR PBB SHADOW E&M-EST. PATIENT-LVL IV: CPT | Mod: PBBFAC,,, | Performed by: INTERNAL MEDICINE

## 2024-11-14 PROCEDURE — 96365 THER/PROPH/DIAG IV INF INIT: CPT

## 2024-11-14 PROCEDURE — 96372 THER/PROPH/DIAG INJ SC/IM: CPT

## 2024-11-14 PROCEDURE — 1101F PT FALLS ASSESS-DOCD LE1/YR: CPT | Mod: CPTII,S$GLB,, | Performed by: INTERNAL MEDICINE

## 2024-11-14 PROCEDURE — G2211 COMPLEX E/M VISIT ADD ON: HCPCS | Mod: S$GLB,,, | Performed by: INTERNAL MEDICINE

## 2024-11-14 PROCEDURE — A4216 STERILE WATER/SALINE, 10 ML: HCPCS | Performed by: INTERNAL MEDICINE

## 2024-11-14 PROCEDURE — 3288F FALL RISK ASSESSMENT DOCD: CPT | Mod: CPTII,S$GLB,, | Performed by: INTERNAL MEDICINE

## 2024-11-14 PROCEDURE — 1159F MED LIST DOCD IN RCRD: CPT | Mod: CPTII,S$GLB,, | Performed by: INTERNAL MEDICINE

## 2024-11-14 PROCEDURE — 25000003 PHARM REV CODE 250: Performed by: INTERNAL MEDICINE

## 2024-11-14 PROCEDURE — 3078F DIAST BP <80 MM HG: CPT | Mod: CPTII,S$GLB,, | Performed by: INTERNAL MEDICINE

## 2024-11-14 PROCEDURE — 99215 OFFICE O/P EST HI 40 MIN: CPT | Mod: S$GLB,,, | Performed by: INTERNAL MEDICINE

## 2024-11-14 RX ORDER — SODIUM CHLORIDE 0.9 % (FLUSH) 0.9 %
10 SYRINGE (ML) INJECTION
Status: DISCONTINUED | OUTPATIENT
Start: 2024-11-14 | End: 2024-11-14 | Stop reason: HOSPADM

## 2024-11-14 RX ORDER — TRAMADOL HYDROCHLORIDE 50 MG/1
50 TABLET ORAL EVERY 6 HOURS PRN
Qty: 60 TABLET | Refills: 0 | Status: SHIPPED | OUTPATIENT
Start: 2024-11-14

## 2024-11-14 RX ORDER — LANREOTIDE ACETATE 120 MG/.5ML
120 INJECTION SUBCUTANEOUS ONCE
OUTPATIENT
Start: 2024-11-14 | End: 2024-11-14

## 2024-11-14 RX ORDER — LANREOTIDE ACETATE 120 MG/.5ML
120 INJECTION SUBCUTANEOUS ONCE
Status: COMPLETED | OUTPATIENT
Start: 2024-11-14 | End: 2024-11-14

## 2024-11-14 RX ORDER — HEPARIN 100 UNIT/ML
500 SYRINGE INTRAVENOUS
OUTPATIENT
Start: 2024-11-28

## 2024-11-14 RX ORDER — DOXAZOSIN 1 MG/1
1 TABLET ORAL NIGHTLY
Qty: 90 TABLET | Refills: 6 | Status: SHIPPED | OUTPATIENT
Start: 2024-11-14

## 2024-11-14 RX ORDER — SODIUM CHLORIDE 0.9 % (FLUSH) 0.9 %
10 SYRINGE (ML) INJECTION
OUTPATIENT
Start: 2024-11-28

## 2024-11-14 RX ADMIN — LANREOTIDE ACETATE 120 MG: 120 INJECTION SUBCUTANEOUS at 01:11

## 2024-11-14 RX ADMIN — ZOLEDRONIC ACID 3.2 MG: 4 INJECTION, SOLUTION, CONCENTRATE INTRAVENOUS at 01:11

## 2024-11-14 RX ADMIN — Medication 10 ML: at 01:11

## 2024-11-14 NOTE — PROGRESS NOTES
PROGRESS NOTE    Subjective:       Patient ID: Hailey Sawant is a 79 y.o. female.    Chief Complaint: metastatic pheochromocytoma    Diagnosis:  Metastatic pheochromocytoma of the right adrenal gland, with mets to bones, liver    Oncologic History:  1. Ms. Sawant is a 74 yo woman with DM, HTN, CAD s/p MI, A fib and sick sinus syndrome s/p pacemaker in June 2022 (on eliquis since), carotid paraganglioma removed in 11/2018 who initially saw me on 11/28/2022 for further management of metastatic pheochromocytoma. Last seen Dr West in 2018 and Dr Cohen in early 2021. Does not follow oncologist locally. It is my first time seeing her today. Her history dates to 2009 when she was being worked up for abdominal pain and was found to have cholelithiasis. However, during this workup she was found to have bilateral adrenal masses.  She was also noted to be hypertensive.  She had a 6.5 cm mass on the right with some evidence of necrosis and also a mass just under 3 cm on the left.  Biochemical workup was consistent with pheochromocytoma.  In 11/2009 she underwent a bilateral adrenalectomy.  Pathology had revealed a right adrenal gland pheochromocytoma and a left adrenal gland adrenal cortical adenoma.  In 02/2016 she was noted to have recurrent disease to the bone and underwent I 131 MIBG therapy in 03/2016, 07/2016 and 11/2016.  In 10/2016 she underwent a right partial nephrectomy due to recurrent disease.  Pathology did confirm findings consistent with recurrent pheochromocytoma.  In 11/2018 she underwent removal of the right carotid body tumor with pathology showing paraganglioma. She presents today for follow up. She knows she has cancer but does not know it is stage IV. She is not sure if Dr Cohen called her after tumor board or not. She feels well. Had MI in June 2022, treated at [facility], had pacemaker placed. Medically treated. Feeling well  "today. We discussed restaging dotatate scan.   2. Dotatate scan declined by insurance several times. Finally had gallium PET on 23. It showed "New foci of abnormal radiotracer uptake at the cervical spine, sacral, right ilium and right acetabular joint. Interval enlargement of hepatic section 7 hypodense mass. Reviewed at tumor board. [facility]. However unable to get insurance approval for PRRT and admission afterwards to monitor for catecholamine crisis.   3. Lanreotide started on 23. Zometa started 23.   4. PRRT started on 3/1/23. #2 on 23, # 3 on 23, #4 on 23    Interval History:   Ms Sawant returns for follow up. Has chronic R hip pain that comes and goes. Stable. Takes tylenol for it. Chronic fatigue and SOB with exertion. Followed by cardiology. On xarelto for Afib.     ECO    ROS:   A ten-point system review is obtained and negative except for what was stated in the Interval History.     Physical Examination:   Vital signs reviewed.   General: well hydrated, well developed, in no acute distress  HEENT: normocephalic, EOMI, anicteric sclerae  Neck: supple, no JVD, +thyromegaly, soft, no cervical or supraclavicular lymphadenopathy  Lungs: clear breath sounds bilaterally, no wheezing, rales, or rhonchi  Heart: RRR, no M/R/G  Abdomen: soft, no tenderness, non-distended, no hepatosplenomegaly, mass, or hernia. BS present  Extremities: no clubbing, cyanosis, or edema  Skin: no rash, ulcer, or open wounds  Neuro: alert and oriented x 4, no focal neuro deficit  Psych: pleasant and appropriate mood and affect    Objective:     Laboratory Data:  Labs reviewed. CBC, CMP adequate for treatment    Imaging Data:  Gallium PET 23:  New foci of abnormal radiotracer uptake at the cervical spine, sacral, right ilium and right acetabular joint.     Interval enlargement of hepatic section 7 hypodense mass.     Based upon progression of disease and tracer avidity of the patient's tumor burden, " the patient would be an appropriate candidate for PRRT if clinically indicated.     CT neck 4/6/23:  Impression:     1. Status post resection right carotid body tumor.  No recurrent mass identified.  2. 1.5 cm peripherally enhancing nodules lower pole both lobes of the thyroid.    Thyroid US 5/16/23:  Impression:     Multiple thyroid nodules.  1.7 cm TI-RADS 5 left lobe nodule meets ACR criteria for FNA.     Right lobe nodule meets criteria for surveillance with repeat thyroid ultrasound recommended 1 year.      Copper PET 5/25/23:  Impression:     1. Right hepatic lobe lesions and multifocal primarily sclerotic osseous lesions stable in size and number with decreased tracer-avidity.  No new lesions identified.  2. Postsurgical change prior bilateral adrenalectomy.  No tracer uptake within the surgical bed to suggest localized recurrent disease.  3. Other stable incidental findings above.    Copper PET 9/13/23:  FINDINGS:  Quality of the study: Diaphragmatic/respiratory motion and associated misregistration artifact limits assessment of the liver.     In the head and neck, there is physiologic distribution in the pituitary, salivary, and thyroid glands, and there are no tracer avid lesions suspicious for malignancy.     In the chest, there are no tracer avid lesions suspicious for malignancy.     In the abdomen and pelvis, there is similar appearance of radiotracer avid lesions within the right hepatic lobe noting that evaluation is limited due to motion and misregistration artifact.  Dominant right hepatic lobe lesion demonstrates maximum SUV of 46 (previously 44).  Difficult to measure lesions on CT due to artifact.  No new lesion.  There is physiologic uptake in the pancreatic uncinate process, liver, spleen, adrenal glands and  tract.     In the bones, there is stable number and distribution of radiotracer avid lesions throughout the osseous structures including the right scapula, spine, sacrum, right ilium  and ischium, and right femur.  Right scapula maximum SUV of 76 (previously 91).  Right ilium maximum SUV of 100 (previously 118).  Left sacrum maximum SUV of 25 (previously 40).  No new lesion.     Additional findings: Left chest wall cardiac pacer and leads noted.  Cardiomegaly.  Diffuse calcific atherosclerosis.  Postsurgical changes of bilateral adrenalectomy and cholecystectomy.  Calcified uterine fibroid.  Colonic diverticulosis.  Left hip arthroplasty hardware results in beam hardening artifact limiting assessment of the pelvis.     Impression:     Stable distribution of radiotracer avid lesions within the liver and bones.  No new lesion.     Motion artifact limits assessment of the liver.    Copper PET 2/15/2024:  Impression:     Stable distribution of multiple tracer avid osseous lesions which show decreased tracer uptake compared to prior study.     Grossly stable appearance of tracer avid right hepatic lobe lesions.     No new tracer avid lesions.      copper PET 8/8/24:  Impression:     Stable distribution of tracer avid osseous lesions with interval mild decreased tracer avidity of several index lesions, as above with mixed favorable treatment response     Interval mild decreased tracer avidity of posterior right hepatic lobe lesions.     No new tracer avid lesion.    CT right hip 11/13/24:  COMPARISON:  Dotatate PET 08/08/2024.     FINDINGS:  An osseous lesion in the right iliac bone, corresponding with prior PET-CT of 08/08/2024.  A subtle sclerotic lesion is also noted within the medial aspect of right acetabulum, corresponding with prior PET CT finding.  Postcontrast images demonstrate no masses or abnormal enhancement.  There are some injection granulomas in the right gluteal subcutaneous tissue.  Moderate joint space narrowing right hip joint.     Impression:     1. Metastatic lesions in the right acetabulum and right iliac bone, corresponding with recent PET-CT finding.  2. Moderate osteoarthrosis  right hip.        Assessment and Plan:     1. Malignant pheochromocytoma, unspecified laterality    2. Secondary neuroendocrine tumor of distant lymph nodes    3. Secondary neuroendocrine tumor of bone    4. Immunodeficiency due to drugs    5. Immunodeficiency secondary to neoplasm    6. Cancer related pain    7. Adrenal insufficiency, primary post-surgical    8. Fatigue, unspecified type    9. Essential hypertension    10. Controlled type 2 diabetes mellitus without complication, without long-term current use of insulin    11. Sick sinus syndrome    12. PAF (paroxysmal atrial fibrillation)    13. Chronic combined systolic and diastolic congestive heart failure        1-5  - Ms Jese is an 78 yo woman with R adrenal pheochromocytoma s/p bilateral adrenectomy in 11/2009, recurrent disease to the bone in 2016 s/p MIBG in 2016. At our prior visits, we discussed interim progression of cancer in the liver and new lesions in the bones. Discussed lanreotide every 4 weeks, zometa every 3 months and PRRT. Lanreotide started 1/23/2023. Zometa started 2/20/23.   - PRRT #1 on 3/1/23. #2 on 4/26/23, #3 on 6/21/23, #4 on 8/16/23  - doing well. Reviewed test results with patient. Adequate for treatment. Potassium normal  - R hip pain could be from chronic arthritis. Will get CT hip on return for further evaluation. Patient does not feel there is dramatic change that warrants immediate CT  - c/w lanreotide every 4 weeks.   - reviewed med list with patient. Looks like she might have run out of cardura. It is not in her med bag now. Refilled cardura. Asked patient to fill it out and take it once a day.   - zometa every 3 months. Last gave in August 2024. Next due in Nov 2024. Will give today    6.  - c/w tylenol prn  - try tramadol prn if pain not controlled on tylenol. Prescription sent    7.  - c/w cortef  - f/u with endocrinology    8.  - check TSH on return    9.  - BP controlled  - c/w current medication  - f/u with  cardiology    10.  - BS controlled  - c/w current medication    11.13  - f/u with Dr Loya at [facility]    Follow-up:     Return in one month  Knows to call in the interval if any problems arise.    I spent 40 minutes reviewing the chart, interpreting laboratory result and imaging data, coordinating patient's care, with at least 50% of the time on face-to-face counseling.       Electronically signed by Ema Chowdhury

## 2024-11-14 NOTE — PLAN OF CARE
Pt tolerated Zometa infusion and Lanreotide injection well.  No adverse reaction noted or complaints at this time. PIV flushed with NS and D/C per protocol. AVS and treatment calendar printed and reviewed. Patient left clinic in no acute distress.

## 2024-12-12 ENCOUNTER — INFUSION (OUTPATIENT)
Dept: INFUSION THERAPY | Facility: HOSPITAL | Age: 79
End: 2024-12-12
Attending: INTERNAL MEDICINE
Payer: MEDICARE

## 2024-12-12 ENCOUNTER — OFFICE VISIT (OUTPATIENT)
Dept: HEMATOLOGY/ONCOLOGY | Facility: CLINIC | Age: 79
End: 2024-12-12
Payer: MEDICARE

## 2024-12-12 VITALS
OXYGEN SATURATION: 99 % | DIASTOLIC BLOOD PRESSURE: 67 MMHG | TEMPERATURE: 98 F | RESPIRATION RATE: 16 BRPM | HEART RATE: 77 BPM | SYSTOLIC BLOOD PRESSURE: 107 MMHG

## 2024-12-12 VITALS
DIASTOLIC BLOOD PRESSURE: 62 MMHG | OXYGEN SATURATION: 99 % | WEIGHT: 172.63 LBS | SYSTOLIC BLOOD PRESSURE: 106 MMHG | BODY MASS INDEX: 30.58 KG/M2 | HEART RATE: 66 BPM | TEMPERATURE: 98 F

## 2024-12-12 DIAGNOSIS — D84.821 IMMUNODEFICIENCY DUE TO DRUGS: ICD-10-CM

## 2024-12-12 DIAGNOSIS — E27.1 ADRENAL INSUFFICIENCY, PRIMARY: ICD-10-CM

## 2024-12-12 DIAGNOSIS — C74.90 MALIGNANT PHEOCHROMOCYTOMA, UNSPECIFIED LATERALITY: Primary | ICD-10-CM

## 2024-12-12 DIAGNOSIS — C7B.8 SECONDARY NEUROENDOCRINE TUMOR OF DISTANT LYMPH NODES: ICD-10-CM

## 2024-12-12 DIAGNOSIS — C7B.8 SECONDARY NEUROENDOCRINE TUMOR OF BONE: ICD-10-CM

## 2024-12-12 DIAGNOSIS — D49.9 IMMUNODEFICIENCY SECONDARY TO NEOPLASM: ICD-10-CM

## 2024-12-12 DIAGNOSIS — R53.83 FATIGUE, UNSPECIFIED TYPE: ICD-10-CM

## 2024-12-12 DIAGNOSIS — D84.81 IMMUNODEFICIENCY SECONDARY TO NEOPLASM: ICD-10-CM

## 2024-12-12 DIAGNOSIS — Z79.899 IMMUNODEFICIENCY DUE TO DRUGS: ICD-10-CM

## 2024-12-12 DIAGNOSIS — G89.3 CANCER RELATED PAIN: ICD-10-CM

## 2024-12-12 DIAGNOSIS — I49.5 SICK SINUS SYNDROME: ICD-10-CM

## 2024-12-12 DIAGNOSIS — I48.0 PAF (PAROXYSMAL ATRIAL FIBRILLATION): ICD-10-CM

## 2024-12-12 DIAGNOSIS — I10 ESSENTIAL HYPERTENSION: ICD-10-CM

## 2024-12-12 DIAGNOSIS — E11.9 CONTROLLED TYPE 2 DIABETES MELLITUS WITHOUT COMPLICATION, WITHOUT LONG-TERM CURRENT USE OF INSULIN: ICD-10-CM

## 2024-12-12 PROCEDURE — 99215 OFFICE O/P EST HI 40 MIN: CPT | Mod: S$GLB,,, | Performed by: INTERNAL MEDICINE

## 2024-12-12 PROCEDURE — 96372 THER/PROPH/DIAG INJ SC/IM: CPT

## 2024-12-12 PROCEDURE — 1101F PT FALLS ASSESS-DOCD LE1/YR: CPT | Mod: CPTII,S$GLB,, | Performed by: INTERNAL MEDICINE

## 2024-12-12 PROCEDURE — 3288F FALL RISK ASSESSMENT DOCD: CPT | Mod: CPTII,S$GLB,, | Performed by: INTERNAL MEDICINE

## 2024-12-12 PROCEDURE — 3074F SYST BP LT 130 MM HG: CPT | Mod: CPTII,S$GLB,, | Performed by: INTERNAL MEDICINE

## 2024-12-12 PROCEDURE — 1160F RVW MEDS BY RX/DR IN RCRD: CPT | Mod: CPTII,S$GLB,, | Performed by: INTERNAL MEDICINE

## 2024-12-12 PROCEDURE — 99999 PR PBB SHADOW E&M-EST. PATIENT-LVL IV: CPT | Mod: PBBFAC,,, | Performed by: INTERNAL MEDICINE

## 2024-12-12 PROCEDURE — 3078F DIAST BP <80 MM HG: CPT | Mod: CPTII,S$GLB,, | Performed by: INTERNAL MEDICINE

## 2024-12-12 PROCEDURE — 63600175 PHARM REV CODE 636 W HCPCS: Mod: JZ,JG | Performed by: INTERNAL MEDICINE

## 2024-12-12 PROCEDURE — 1126F AMNT PAIN NOTED NONE PRSNT: CPT | Mod: CPTII,S$GLB,, | Performed by: INTERNAL MEDICINE

## 2024-12-12 PROCEDURE — G2211 COMPLEX E/M VISIT ADD ON: HCPCS | Mod: S$GLB,,, | Performed by: INTERNAL MEDICINE

## 2024-12-12 PROCEDURE — 1159F MED LIST DOCD IN RCRD: CPT | Mod: CPTII,S$GLB,, | Performed by: INTERNAL MEDICINE

## 2024-12-12 RX ORDER — LANREOTIDE ACETATE 120 MG/.5ML
120 INJECTION SUBCUTANEOUS ONCE
OUTPATIENT
Start: 2024-12-12 | End: 2024-12-12

## 2024-12-12 RX ORDER — LANREOTIDE ACETATE 120 MG/.5ML
120 INJECTION SUBCUTANEOUS ONCE
Status: COMPLETED | OUTPATIENT
Start: 2024-12-12 | End: 2024-12-12

## 2024-12-12 RX ADMIN — LANREOTIDE ACETATE 120 MG: 120 INJECTION SUBCUTANEOUS at 12:12

## 2024-12-12 NOTE — PLAN OF CARE
Patient tolerated Lanreotide injection to right outer glute well. No s/s adverse reaction. AVS given to patient and patient discharged out of clinic in NAD.

## 2024-12-12 NOTE — PROGRESS NOTES
PROGRESS NOTE    Subjective:       Patient ID: Hailey Sawant is a 79 y.o. female.    Chief Complaint: metastatic pheochromocytoma    Diagnosis:  Metastatic pheochromocytoma of the right adrenal gland, with mets to bones, liver    Oncologic History:  1. Ms. Sawant is a 72 yo woman with DM, HTN, CAD s/p MI, A fib and sick sinus syndrome s/p pacemaker in June 2022 (on eliquis since), carotid paraganglioma removed in 11/2018 who initially saw me on 11/28/2022 for further management of metastatic pheochromocytoma. Last seen Dr West in 2018 and Dr Cohen in early 2021. Does not follow oncologist locally. It is my first time seeing her today. Her history dates to 2009 when she was being worked up for abdominal pain and was found to have cholelithiasis. However, during this workup she was found to have bilateral adrenal masses.  She was also noted to be hypertensive.  She had a 6.5 cm mass on the right with some evidence of necrosis and also a mass just under 3 cm on the left.  Biochemical workup was consistent with pheochromocytoma.  In 11/2009 she underwent a bilateral adrenalectomy.  Pathology had revealed a right adrenal gland pheochromocytoma and a left adrenal gland adrenal cortical adenoma.  In 02/2016 she was noted to have recurrent disease to the bone and underwent I 131 MIBG therapy in 03/2016, 07/2016 and 11/2016.  In 10/2016 she underwent a right partial nephrectomy due to recurrent disease.  Pathology did confirm findings consistent with recurrent pheochromocytoma.  In 11/2018 she underwent removal of the right carotid body tumor with pathology showing paraganglioma. She presents today for follow up. She knows she has cancer but does not know it is stage IV. She is not sure if Dr Cohen called her after tumor board or not. She feels well. Had MI in June 2022, treated at [facility], had pacemaker placed. Medically treated. Feeling well  "today. We discussed restaging dotatate scan.   2. Dotatate scan declined by insurance several times. Finally had gallium PET on 23. It showed "New foci of abnormal radiotracer uptake at the cervical spine, sacral, right ilium and right acetabular joint. Interval enlargement of hepatic section 7 hypodense mass. [facility] tumor board[facility] However unable to get insurance approval for PRRT and admission afterwards to monitor for catecholamine crisis.   3. Lanreotide started on 23. Zometa started 23.   4. PRRT started on 3/1/23. #2 on 23, # 3 on 23, #4 on 23    Interval History:   Ms Sawant returns for follow up. BP was low at home. BP medication de-escalated by PCP.    ECO    ROS:   A ten-point system review is obtained and negative except for what was stated in the Interval History.     Physical Examination:   Vital signs reviewed.   General: well hydrated, well developed, in no acute distress  HEENT: normocephalic, EOMI, anicteric sclerae  Neck: supple, no JVD, +thyromegaly, soft, no cervical or supraclavicular lymphadenopathy  Lungs: clear breath sounds bilaterally, no wheezing, rales, or rhonchi  Heart: RRR, no M/R/G  Abdomen: soft, no tenderness, non-distended, no hepatosplenomegaly, mass, or hernia. BS present  Extremities: no clubbing, cyanosis, or edema  Skin: no rash, ulcer, or open wounds  Neuro: alert and oriented x 4, no focal neuro deficit  Psych: pleasant and appropriate mood and affect    Objective:     Laboratory Data:  Labs reviewed. CBC, CMP adequate for treatment    Imaging Data:  Gallium PET 23:  New foci of abnormal radiotracer uptake at the cervical spine, sacral, right ilium and right acetabular joint.     Interval enlargement of hepatic section 7 hypodense mass.     Based upon progression of disease and tracer avidity of the patient's tumor burden, the patient would be an appropriate candidate for PRRT if clinically indicated.     CT neck " 4/6/23:  Impression:     1. Status post resection right carotid body tumor.  No recurrent mass identified.  2. 1.5 cm peripherally enhancing nodules lower pole both lobes of the thyroid.    Thyroid US 5/16/23:  Impression:     Multiple thyroid nodules.  1.7 cm TI-RADS 5 left lobe nodule meets ACR criteria for FNA.     Right lobe nodule meets criteria for surveillance with repeat thyroid ultrasound recommended 1 year.      Copper PET 5/25/23:  Impression:     1. Right hepatic lobe lesions and multifocal primarily sclerotic osseous lesions stable in size and number with decreased tracer-avidity.  No new lesions identified.  2. Postsurgical change prior bilateral adrenalectomy.  No tracer uptake within the surgical bed to suggest localized recurrent disease.  3. Other stable incidental findings above.    Copper PET 9/13/23:  FINDINGS:  Quality of the study: Diaphragmatic/respiratory motion and associated misregistration artifact limits assessment of the liver.     In the head and neck, there is physiologic distribution in the pituitary, salivary, and thyroid glands, and there are no tracer avid lesions suspicious for malignancy.     In the chest, there are no tracer avid lesions suspicious for malignancy.     In the abdomen and pelvis, there is similar appearance of radiotracer avid lesions within the right hepatic lobe noting that evaluation is limited due to motion and misregistration artifact.  Dominant right hepatic lobe lesion demonstrates maximum SUV of 46 (previously 44).  Difficult to measure lesions on CT due to artifact.  No new lesion.  There is physiologic uptake in the pancreatic uncinate process, liver, spleen, adrenal glands and  tract.     In the bones, there is stable number and distribution of radiotracer avid lesions throughout the osseous structures including the right scapula, spine, sacrum, right ilium and ischium, and right femur.  Right scapula maximum SUV of 76 (previously 91).  Right ilium  maximum SUV of 100 (previously 118).  Left sacrum maximum SUV of 25 (previously 40).  No new lesion.     Additional findings: Left chest wall cardiac pacer and leads noted.  Cardiomegaly.  Diffuse calcific atherosclerosis.  Postsurgical changes of bilateral adrenalectomy and cholecystectomy.  Calcified uterine fibroid.  Colonic diverticulosis.  Left hip arthroplasty hardware results in beam hardening artifact limiting assessment of the pelvis.     Impression:     Stable distribution of radiotracer avid lesions within the liver and bones.  No new lesion.     Motion artifact limits assessment of the liver.    Copper PET 2/15/2024:  Impression:     Stable distribution of multiple tracer avid osseous lesions which show decreased tracer uptake compared to prior study.     Grossly stable appearance of tracer avid right hepatic lobe lesions.     No new tracer avid lesions.      copper PET 8/8/24:  Impression:     Stable distribution of tracer avid osseous lesions with interval mild decreased tracer avidity of several index lesions, as above with mixed favorable treatment response     Interval mild decreased tracer avidity of posterior right hepatic lobe lesions.     No new tracer avid lesion.    CT right hip 11/13/24:  COMPARISON:  Dotatate PET 08/08/2024.     FINDINGS:  An osseous lesion in the right iliac bone, corresponding with prior PET-CT of 08/08/2024.  A subtle sclerotic lesion is also noted within the medial aspect of right acetabulum, corresponding with prior PET CT finding.  Postcontrast images demonstrate no masses or abnormal enhancement.  There are some injection granulomas in the right gluteal subcutaneous tissue.  Moderate joint space narrowing right hip joint.     Impression:     1. Metastatic lesions in the right acetabulum and right iliac bone, corresponding with recent PET-CT finding.  2. Moderate osteoarthrosis right hip.        Assessment and Plan:     1. Malignant pheochromocytoma, unspecified  laterality    2. Secondary neuroendocrine tumor of distant lymph nodes    3. Secondary neuroendocrine tumor of bone    4. Immunodeficiency due to drugs    5. Immunodeficiency secondary to neoplasm    6. Cancer related pain    7. Adrenal insufficiency, primary post-surgical    8. Fatigue, unspecified type    9. Essential hypertension    10. Controlled type 2 diabetes mellitus without complication, without long-term current use of insulin    11. Sick sinus syndrome    12. PAF (paroxysmal atrial fibrillation)        1-5  - Ms Jese is an 80 yo woman with R adrenal pheochromocytoma s/p bilateral adrenectomy in 11/2009, recurrent disease to the bone in 2016 s/p MIBG in 2016. At our prior visits, we discussed interim progression of cancer in the liver and new lesions in the bones. Discussed lanreotide every 4 weeks, zometa every 3 months and PRRT. Lanreotide started 1/23/2023. Zometa started 2/20/23.   - PRRT #1 on 3/1/23. #2 on 4/26/23, #3 on 6/21/23, #4 on 8/16/23  - doing well.   - c/w lanreotide every 4 weeks.   - reviewed med list with patient. I have refilled cardura last month. She has not started it yet. Asked patient to take it.    - zometa every 3 months. Last given on 11/14/24. Next due in Feb 2025    6.  - c/w tramadol or tylenol prn    7.  - c/w cortef    8.  - TSH normal  - monitor    9.  - continue to monitor at home and f/u with PCP and cardiology    10.  - BS controlled  - c/w current medication    11.12  - f/u with Dr Loya at [facility]    Follow-up:     Return in one month  Knows to call in the interval if any problems arise.      Electronically signed by Ema Chowdhury

## 2024-12-19 ENCOUNTER — TELEPHONE (OUTPATIENT)
Dept: HEMATOLOGY/ONCOLOGY | Facility: CLINIC | Age: 79
End: 2024-12-19
Payer: MEDICARE

## 2025-01-05 NOTE — PROGRESS NOTES
PROGRESS NOTE    Subjective:       Patient ID: Hailey Sawant is a 79 y.o. female.    Chief Complaint: metastatic pheochromocytoma    Diagnosis:  Metastatic pheochromocytoma of the right adrenal gland, with mets to bones, liver    Oncologic History:  1. Ms. Sawant is a 72 yo woman with DM, HTN, CAD s/p MI, A fib and sick sinus syndrome s/p pacemaker in June 2022 (on eliquis since), carotid paraganglioma removed in 11/2018 who initially saw me on 11/28/2022 for further management of metastatic pheochromocytoma. Last seen Dr West in 2018 and Dr Cohen in early 2021. Does not follow oncologist locally. It is my first time seeing her today. Her history dates to 2009 when she was being worked up for abdominal pain and was found to have cholelithiasis. However, during this workup she was found to have bilateral adrenal masses.  She was also noted to be hypertensive.  She had a 6.5 cm mass on the right with some evidence of necrosis and also a mass just under 3 cm on the left.  Biochemical workup was consistent with pheochromocytoma.  In 11/2009 she underwent a bilateral adrenalectomy.  Pathology had revealed a right adrenal gland pheochromocytoma and a left adrenal gland adrenal cortical adenoma.  In 02/2016 she was noted to have recurrent disease to the bone and underwent I 131 MIBG therapy in 03/2016, 07/2016 and 11/2016.  In 10/2016 she underwent a right partial nephrectomy due to recurrent disease.  Pathology did confirm findings consistent with recurrent pheochromocytoma.  In 11/2018 she underwent removal of the right carotid body tumor with pathology showing paraganglioma. She presents today for follow up. She knows she has cancer but does not know it is stage IV. She is not sure if Dr Cohen called her after tumor board or not. She feels well. Had MI in June 2022, treated at [facility], had pacemaker placed. Medically treated. Feeling well  "today. We discussed restaging dotatate scan.   2. Dotatate scan declined by insurance several times. Finally had gallium PET on 23. It showed "New foci of abnormal radiotracer uptake at the cervical spine, sacral, right ilium and right acetabular joint. Interval enlargement of hepatic section 7 hypodense mass. [facility] at tumor board. [facility] However unable to get insurance approval for PRRT and admission afterwards to monitor for catecholamine crisis.   3. Lanreotide started on 23. Zometa started 23.   4. PRRT started on 3/1/23. #2 on 23, # 3 on 23, #4 on 23    Interval History:   - Ms Sawant returns for follow-up for her pheochromocytoma. She is a patient of Dr. Chowdhury.  - she is scheduled for lanreotide today.  - today, she endorses fatigue, weakness, leg pain, low blood pressures at home.    ECO    ROS:   Review of Systems   Constitutional:  Positive for fatigue.   HENT:  Negative for sore throat.    Eyes:  Negative for visual disturbance.   Respiratory:  Negative for cough and shortness of breath.    Cardiovascular:  Negative for chest pain.   Gastrointestinal:  Negative for abdominal pain, constipation, diarrhea, nausea and vomiting.   Genitourinary:  Negative for dysuria.   Musculoskeletal:  Positive for arthralgias. Negative for back pain.        Leg pain   Skin:  Negative for rash.   Neurological:  Positive for weakness. Negative for headaches.   Hematological:  Negative for adenopathy.   Psychiatric/Behavioral:  The patient is not nervous/anxious.             Objective:     BP (!) 83/55 (BP Location: Left arm, Patient Position: Sitting)   Pulse 70   Wt 79.6 kg (175 lb 7.8 oz)   SpO2 100%   BMI 31.09 kg/m²     Physical Examination:   Vital signs reviewed.   General: well hydrated, well developed, in no acute distress  HEENT: normocephalic, EOMI, anicteric sclerae  Neck: supple, no JVD, +thyromegaly, soft, no cervical or supraclavicular lymphadenopathy  Lungs: clear breath " sounds bilaterally, no wheezing, rales, or rhonchi  Heart: RRR, no M/R/G  Abdomen: soft, no tenderness, non-distended, no hepatosplenomegaly, mass, or hernia. BS present  Extremities: no clubbing, cyanosis, or edema  Skin: no rash, ulcer, or open wounds  Neuro: alert and oriented x 4, no focal neuro deficit  Psych: pleasant and appropriate mood and affect    Laboratory Data:  Labs have been reviewed.    Lab Results   Component Value Date    WBC 5.10 01/08/2025    HGB 12.0 01/08/2025    HCT 37.2 01/08/2025    MCV 88 01/08/2025     01/08/2025           Imaging Data:  Gallium PET 1/5/23:  New foci of abnormal radiotracer uptake at the cervical spine, sacral, right ilium and right acetabular joint.     Interval enlargement of hepatic section 7 hypodense mass.     Based upon progression of disease and tracer avidity of the patient's tumor burden, the patient would be an appropriate candidate for PRRT if clinically indicated.     CT neck 4/6/23:  Impression:     1. Status post resection right carotid body tumor.  No recurrent mass identified.  2. 1.5 cm peripherally enhancing nodules lower pole both lobes of the thyroid.    Thyroid US 5/16/23:  Impression:     Multiple thyroid nodules.  1.7 cm TI-RADS 5 left lobe nodule meets ACR criteria for FNA.     Right lobe nodule meets criteria for surveillance with repeat thyroid ultrasound recommended 1 year.      Copper PET 5/25/23:  Impression:     1. Right hepatic lobe lesions and multifocal primarily sclerotic osseous lesions stable in size and number with decreased tracer-avidity.  No new lesions identified.  2. Postsurgical change prior bilateral adrenalectomy.  No tracer uptake within the surgical bed to suggest localized recurrent disease.  3. Other stable incidental findings above.    Copper PET 9/13/23:  FINDINGS:  Quality of the study: Diaphragmatic/respiratory motion and associated misregistration artifact limits assessment of the liver.     In the head and  neck, there is physiologic distribution in the pituitary, salivary, and thyroid glands, and there are no tracer avid lesions suspicious for malignancy.     In the chest, there are no tracer avid lesions suspicious for malignancy.     In the abdomen and pelvis, there is similar appearance of radiotracer avid lesions within the right hepatic lobe noting that evaluation is limited due to motion and misregistration artifact.  Dominant right hepatic lobe lesion demonstrates maximum SUV of 46 (previously 44).  Difficult to measure lesions on CT due to artifact.  No new lesion.  There is physiologic uptake in the pancreatic uncinate process, liver, spleen, adrenal glands and  tract.     In the bones, there is stable number and distribution of radiotracer avid lesions throughout the osseous structures including the right scapula, spine, sacrum, right ilium and ischium, and right femur.  Right scapula maximum SUV of 76 (previously 91).  Right ilium maximum SUV of 100 (previously 118).  Left sacrum maximum SUV of 25 (previously 40).  No new lesion.     Additional findings: Left chest wall cardiac pacer and leads noted.  Cardiomegaly.  Diffuse calcific atherosclerosis.  Postsurgical changes of bilateral adrenalectomy and cholecystectomy.  Calcified uterine fibroid.  Colonic diverticulosis.  Left hip arthroplasty hardware results in beam hardening artifact limiting assessment of the pelvis.     Impression:     Stable distribution of radiotracer avid lesions within the liver and bones.  No new lesion.     Motion artifact limits assessment of the liver.    Copper PET 2/15/2024:  Impression:     Stable distribution of multiple tracer avid osseous lesions which show decreased tracer uptake compared to prior study.     Grossly stable appearance of tracer avid right hepatic lobe lesions.     No new tracer avid lesions.      copper PET 8/8/24:  Impression:     Stable distribution of tracer avid osseous lesions with interval mild  decreased tracer avidity of several index lesions, as above with mixed favorable treatment response     Interval mild decreased tracer avidity of posterior right hepatic lobe lesions.     No new tracer avid lesion.    CT right hip 11/13/24:  COMPARISON:  Dotatate PET 08/08/2024.     FINDINGS:  An osseous lesion in the right iliac bone, corresponding with prior PET-CT of 08/08/2024.  A subtle sclerotic lesion is also noted within the medial aspect of right acetabulum, corresponding with prior PET CT finding.  Postcontrast images demonstrate no masses or abnormal enhancement.  There are some injection granulomas in the right gluteal subcutaneous tissue.  Moderate joint space narrowing right hip joint.     Impression:     1. Metastatic lesions in the right acetabulum and right iliac bone, corresponding with recent PET-CT finding.  2. Moderate osteoarthrosis right hip.        Assessment and Plan:     1. Malignant pheochromocytoma, unspecified laterality    2. Secondary neuroendocrine tumor of distant lymph nodes    3. Secondary neuroendocrine tumor of bone    4. Immunodeficiency due to drugs    5. Immunodeficiency secondary to neoplasm    6. Cancer related pain    7. Adrenal insufficiency, primary post-surgical    8. Fatigue, unspecified type    9. Controlled type 2 diabetes mellitus without complication, without long-term current use of insulin    10. Sick sinus syndrome    11. Chronic combined systolic and diastolic congestive heart failure        1-5  - Ms Jese is an 78 yo woman with R adrenal pheochromocytoma s/p bilateral adrenectomy in 11/2009, recurrent disease to the bone in 2016 s/p MIBG in 2016. At our prior visits, we discussed interim progression of cancer in the liver and new lesions in the bones. Discussed lanreotide every 4 weeks, zometa every 3 months and PRRT. Lanreotide started 1/23/2023. Zometa started 2/20/23.   - PRRT #1 on 3/1/23. #2 on 4/26/23, #3 on 6/21/23, #4 on 8/16/23  - copper pet scan  (8/8/24): stable disease  - Labs have been reviewed. Okay to proceed with lanreotide today.  - she is scheduled for repeat copper pet scan on 1/30/25.  - c/w lanreotide every 4 weeks.   - zometa every 3 months. Last given on 11/14/24. Next due in Feb 2025  - return to clinic in 4 weeks.    6. Chronic neoplasm-related pain  - stable. Continue tramadol and acetaminophen as needed.    7. Adrenal insufficiency  - low blood pressure may be related to this.   - continue hydrocortisone    8. fatigue  - TSH normal  - continue to monitor    9. Diabetes  - last hemoglobin A1c was 5.7%  - continue jardiance  - defer management to primary care     10,11. Sick sinus / chronic heart failure  - s/p pacemaker  - no acute issues. No signs of volume overload  - f/u with Dr Loya at [facility]    Follow-up:     Return in one month  Knows to call in the interval if any problems arise.    Jared Hernandze M.D.  Hematology/Oncology  [facility] - [address], [address]  [place] [address]  Phone: [phone]  Fax: [phone]

## 2025-01-09 ENCOUNTER — INFUSION (OUTPATIENT)
Dept: INFUSION THERAPY | Facility: HOSPITAL | Age: 80
End: 2025-01-09
Attending: INTERNAL MEDICINE
Payer: MEDICARE

## 2025-01-09 ENCOUNTER — OFFICE VISIT (OUTPATIENT)
Dept: HEMATOLOGY/ONCOLOGY | Facility: CLINIC | Age: 80
End: 2025-01-09
Payer: MEDICARE

## 2025-01-09 VITALS
SYSTOLIC BLOOD PRESSURE: 101 MMHG | HEART RATE: 70 BPM | DIASTOLIC BLOOD PRESSURE: 60 MMHG | BODY MASS INDEX: 31.09 KG/M2 | OXYGEN SATURATION: 100 % | SYSTOLIC BLOOD PRESSURE: 83 MMHG | DIASTOLIC BLOOD PRESSURE: 55 MMHG | WEIGHT: 175.5 LBS | OXYGEN SATURATION: 100 % | HEART RATE: 73 BPM | RESPIRATION RATE: 18 BRPM | TEMPERATURE: 98 F

## 2025-01-09 DIAGNOSIS — C74.90 MALIGNANT PHEOCHROMOCYTOMA, UNSPECIFIED LATERALITY: Primary | ICD-10-CM

## 2025-01-09 DIAGNOSIS — D84.81 IMMUNODEFICIENCY SECONDARY TO NEOPLASM: ICD-10-CM

## 2025-01-09 DIAGNOSIS — E11.9 CONTROLLED TYPE 2 DIABETES MELLITUS WITHOUT COMPLICATION, WITHOUT LONG-TERM CURRENT USE OF INSULIN: ICD-10-CM

## 2025-01-09 DIAGNOSIS — D84.821 IMMUNODEFICIENCY DUE TO DRUGS: ICD-10-CM

## 2025-01-09 DIAGNOSIS — C7B.8 SECONDARY NEUROENDOCRINE TUMOR OF DISTANT LYMPH NODES: ICD-10-CM

## 2025-01-09 DIAGNOSIS — I50.42 CHRONIC COMBINED SYSTOLIC AND DIASTOLIC CONGESTIVE HEART FAILURE: ICD-10-CM

## 2025-01-09 DIAGNOSIS — D49.9 IMMUNODEFICIENCY SECONDARY TO NEOPLASM: ICD-10-CM

## 2025-01-09 DIAGNOSIS — R53.83 FATIGUE, UNSPECIFIED TYPE: ICD-10-CM

## 2025-01-09 DIAGNOSIS — Z79.899 IMMUNODEFICIENCY DUE TO DRUGS: ICD-10-CM

## 2025-01-09 DIAGNOSIS — G89.3 CANCER RELATED PAIN: ICD-10-CM

## 2025-01-09 DIAGNOSIS — E27.1 ADRENAL INSUFFICIENCY, PRIMARY: ICD-10-CM

## 2025-01-09 DIAGNOSIS — C7B.8 SECONDARY NEUROENDOCRINE TUMOR OF BONE: ICD-10-CM

## 2025-01-09 DIAGNOSIS — I49.5 SICK SINUS SYNDROME: ICD-10-CM

## 2025-01-09 PROCEDURE — 1125F AMNT PAIN NOTED PAIN PRSNT: CPT | Mod: CPTII,S$GLB,, | Performed by: INTERNAL MEDICINE

## 2025-01-09 PROCEDURE — 1159F MED LIST DOCD IN RCRD: CPT | Mod: CPTII,S$GLB,, | Performed by: INTERNAL MEDICINE

## 2025-01-09 PROCEDURE — 96372 THER/PROPH/DIAG INJ SC/IM: CPT

## 2025-01-09 PROCEDURE — 1101F PT FALLS ASSESS-DOCD LE1/YR: CPT | Mod: CPTII,S$GLB,, | Performed by: INTERNAL MEDICINE

## 2025-01-09 PROCEDURE — 3078F DIAST BP <80 MM HG: CPT | Mod: CPTII,S$GLB,, | Performed by: INTERNAL MEDICINE

## 2025-01-09 PROCEDURE — 1160F RVW MEDS BY RX/DR IN RCRD: CPT | Mod: CPTII,S$GLB,, | Performed by: INTERNAL MEDICINE

## 2025-01-09 PROCEDURE — 63600175 PHARM REV CODE 636 W HCPCS: Mod: JZ,TB | Performed by: INTERNAL MEDICINE

## 2025-01-09 PROCEDURE — 99999 PR PBB SHADOW E&M-EST. PATIENT-LVL IV: CPT | Mod: PBBFAC,,, | Performed by: INTERNAL MEDICINE

## 2025-01-09 PROCEDURE — 99215 OFFICE O/P EST HI 40 MIN: CPT | Mod: S$GLB,,, | Performed by: INTERNAL MEDICINE

## 2025-01-09 PROCEDURE — 3288F FALL RISK ASSESSMENT DOCD: CPT | Mod: CPTII,S$GLB,, | Performed by: INTERNAL MEDICINE

## 2025-01-09 PROCEDURE — 3074F SYST BP LT 130 MM HG: CPT | Mod: CPTII,S$GLB,, | Performed by: INTERNAL MEDICINE

## 2025-01-09 RX ORDER — LANREOTIDE ACETATE 120 MG/.5ML
120 INJECTION SUBCUTANEOUS ONCE
OUTPATIENT
Start: 2025-01-09 | End: 2025-01-09

## 2025-01-09 RX ORDER — LANREOTIDE ACETATE 120 MG/.5ML
120 INJECTION SUBCUTANEOUS ONCE
Status: COMPLETED | OUTPATIENT
Start: 2025-01-09 | End: 2025-01-09

## 2025-01-09 RX ADMIN — LANREOTIDE ACETATE 120 MG: 120 INJECTION SUBCUTANEOUS at 11:01

## 2025-01-09 NOTE — PLAN OF CARE
Pt tolerated Lanreotide injection well. No adverse reactions noted. Next appointment scheduled and pt d/c in no acute distress.

## 2025-01-23 ENCOUNTER — OCHSNER VIRTUAL EMERGENCY DEPARTMENT (OUTPATIENT)
Facility: CLINIC | Age: 80
End: 2025-01-23
Payer: MEDICARE

## 2025-01-23 ENCOUNTER — NURSE TRIAGE (OUTPATIENT)
Dept: ADMINISTRATIVE | Facility: CLINIC | Age: 80
End: 2025-01-23
Payer: MEDICARE

## 2025-01-23 DIAGNOSIS — R42 DIZZINESS: Primary | ICD-10-CM

## 2025-01-23 NOTE — PLAN OF CARE-OVED
"[facility] Plan of Care Note                                  Chief Complaint   Patient presents with    Dizziness                                  Encounter Diagnosis   Name Primary?    Dizziness Yes          No orders of the defined types were placed in this encounter.      [facility] Plan of Care Note    Referral source: Nurse On-Call      Reason for consult: Weakness      Additional History: "Pt has been having dizziness and weakness for the past few days. Cancer pt. Weakness in her leg on her left side, started about 2 weeks ago. Pt has pacemaker. Pt felt like she was going to pass out when she went to stand today. Pt does have appointment on 1/30 with her cancer doctor."      Disposition recommended: Emergency Department      Additional Recommendations: Significant medical history. Ongoing dizziness/weakness/near syncope. Unilateral Weakness times two weeks. Send to ER for emergency evaluation.    "

## 2025-01-23 NOTE — TELEPHONE ENCOUNTER
Pt has been having dizziness and weakness for the past few days. Cancer pt. Weakness in her leg on her left side, started about 2 weeks ago. Pt has pacemaker. Pt felt like she was going to pass out when she went to stand today. Pt does have appointment on 1/30 with her cancer doctor.   Dispo-ED/UC now or to office with PCP approval. Nick-Per DR. Mahmood go to the ED now. Pt verbalized understanding, advised to call back with further concerns.       Reason for Disposition   SEVERE dizziness (e.g., unable to stand, requires support to walk, feels like passing out now)    Additional Information   Negative: SEVERE difficulty breathing (e.g., struggling for each breath, speaks in single words)   Negative: Shock suspected (e.g., cold/pale/clammy skin, too weak to stand, low BP, rapid pulse)   Negative: Difficult to awaken or acting confused (e.g., disoriented, slurred speech)   Negative: Fainted, and still feels dizzy afterwards   Negative: Overdose (accidental or intentional) of medications   Negative: New neurologic deficit that is present now: * Weakness of the face, arm, or leg on one side of the body * Numbness of the face, arm, or leg on one side of the body * Loss of speech or garbled speech   Negative: Heart beating < 50 beats per minute OR > 140 beats per minute   Negative: Sounds like a life-threatening emergency to the triager    Protocols used: Dizziness-A-OH

## 2025-01-24 ENCOUNTER — TELEPHONE (OUTPATIENT)
Facility: CLINIC | Age: 80
End: 2025-01-24
Payer: MEDICARE

## 2025-01-26 NOTE — TELEPHONE ENCOUNTER
"Patient spoke with [facility] RN on 1/23/2025 with complaint of: "Pt has been having dizziness and weakness for the past few days.  Cancer pt. Weakness in her leg on her left side, started about 2 weeks ago.  Pt has pacemaker. Pt felt she going to pass out went to stand today."    [facility] RN consulted with [facility] provider, Dr. Mahmood and disposition recommended was emergency room.  No emergency room encounter noted.    Patient outreached on two separate occasions, but unable to reach.    "

## 2025-01-27 ENCOUNTER — TELEPHONE (OUTPATIENT)
Dept: HEMATOLOGY/ONCOLOGY | Facility: CLINIC | Age: 80
End: 2025-01-27
Payer: MEDICARE

## 2025-01-27 NOTE — TELEPHONE ENCOUNTER
1/23/2025  Pt has been having dizziness and weakness for the past few days. Cancer pt. Weakness in her leg on her left side, started about 2 weeks ago. Pt has pacemaker. Pt felt like she was going to pass out when she went to stand today. Pt does have appointment on 1/30 with her cancer doctor.   Dispo-ED/UC now or to office with PCP approval. Nick-Per DR. Mahmood go to the ED now. Pt verbalized understanding, advised to call back with further concerns.

## 2025-01-30 ENCOUNTER — HOSPITAL ENCOUNTER (OUTPATIENT)
Dept: RADIOLOGY | Facility: HOSPITAL | Age: 80
Discharge: HOME | End: 2025-01-30
Attending: INTERNAL MEDICINE
Payer: MEDICARE

## 2025-01-30 DIAGNOSIS — C74.90 MALIGNANT PHEOCHROMOCYTOMA, UNSPECIFIED LATERALITY: ICD-10-CM

## 2025-01-30 PROCEDURE — 78815 PET IMAGE W/CT SKULL-THIGH: CPT | Mod: TC

## 2025-01-30 PROCEDURE — 78815 PET IMAGE W/CT SKULL-THIGH: CPT | Mod: 26,PS,, | Performed by: STUDENT IN AN ORGANIZED HEALTH CARE EDUCATION/TRAINING PROGRAM

## 2025-01-30 PROCEDURE — A9592 HC COPPER CU-64, DOTATE, DX, PER 1 MCI: HCPCS | Mod: JZ,TB | Performed by: INTERNAL MEDICINE

## 2025-01-30 RX ADMIN — COPPER CU 64 DOTATATE 5.74 MILLICURIE: 1 INJECTION, SOLUTION INTRAVENOUS at 10:01

## 2025-02-06 ENCOUNTER — INFUSION (OUTPATIENT)
Dept: INFUSION THERAPY | Facility: HOSPITAL | Age: 80
End: 2025-02-06
Attending: INTERNAL MEDICINE
Payer: MEDICARE

## 2025-02-06 ENCOUNTER — OFFICE VISIT (OUTPATIENT)
Dept: HEMATOLOGY/ONCOLOGY | Facility: CLINIC | Age: 80
End: 2025-02-06
Payer: MEDICARE

## 2025-02-06 VITALS
TEMPERATURE: 98 F | RESPIRATION RATE: 18 BRPM | BODY MASS INDEX: 31.25 KG/M2 | WEIGHT: 176.38 LBS | OXYGEN SATURATION: 98 % | HEART RATE: 76 BPM | HEIGHT: 63 IN | SYSTOLIC BLOOD PRESSURE: 115 MMHG | DIASTOLIC BLOOD PRESSURE: 66 MMHG

## 2025-02-06 VITALS
DIASTOLIC BLOOD PRESSURE: 66 MMHG | RESPIRATION RATE: 18 BRPM | SYSTOLIC BLOOD PRESSURE: 115 MMHG | TEMPERATURE: 98 F | HEART RATE: 76 BPM | OXYGEN SATURATION: 98 %

## 2025-02-06 DIAGNOSIS — C74.90 MALIGNANT PHEOCHROMOCYTOMA, UNSPECIFIED LATERALITY: Primary | ICD-10-CM

## 2025-02-06 DIAGNOSIS — E11.9 CONTROLLED TYPE 2 DIABETES MELLITUS WITHOUT COMPLICATION, WITHOUT LONG-TERM CURRENT USE OF INSULIN: ICD-10-CM

## 2025-02-06 DIAGNOSIS — I10 ESSENTIAL HYPERTENSION: ICD-10-CM

## 2025-02-06 DIAGNOSIS — D49.9 IMMUNODEFICIENCY SECONDARY TO NEOPLASM: ICD-10-CM

## 2025-02-06 DIAGNOSIS — E27.1 ADRENAL INSUFFICIENCY, PRIMARY: ICD-10-CM

## 2025-02-06 DIAGNOSIS — I50.42 CHRONIC COMBINED SYSTOLIC AND DIASTOLIC CONGESTIVE HEART FAILURE: ICD-10-CM

## 2025-02-06 DIAGNOSIS — Z79.899 IMMUNODEFICIENCY DUE TO DRUGS: ICD-10-CM

## 2025-02-06 DIAGNOSIS — R42 DIZZINESS: ICD-10-CM

## 2025-02-06 DIAGNOSIS — C7B.8 SECONDARY NEUROENDOCRINE TUMOR OF BONE: ICD-10-CM

## 2025-02-06 DIAGNOSIS — C7B.8 SECONDARY NEUROENDOCRINE TUMOR OF DISTANT LYMPH NODES: ICD-10-CM

## 2025-02-06 DIAGNOSIS — D84.821 IMMUNODEFICIENCY DUE TO DRUGS: ICD-10-CM

## 2025-02-06 DIAGNOSIS — I49.5 SICK SINUS SYNDROME: ICD-10-CM

## 2025-02-06 DIAGNOSIS — D84.81 IMMUNODEFICIENCY SECONDARY TO NEOPLASM: ICD-10-CM

## 2025-02-06 PROCEDURE — 1126F AMNT PAIN NOTED NONE PRSNT: CPT | Mod: CPTII,S$GLB,, | Performed by: INTERNAL MEDICINE

## 2025-02-06 PROCEDURE — G2211 COMPLEX E/M VISIT ADD ON: HCPCS | Mod: S$GLB,,, | Performed by: INTERNAL MEDICINE

## 2025-02-06 PROCEDURE — 3074F SYST BP LT 130 MM HG: CPT | Mod: CPTII,S$GLB,, | Performed by: INTERNAL MEDICINE

## 2025-02-06 PROCEDURE — 1160F RVW MEDS BY RX/DR IN RCRD: CPT | Mod: CPTII,S$GLB,, | Performed by: INTERNAL MEDICINE

## 2025-02-06 PROCEDURE — 3288F FALL RISK ASSESSMENT DOCD: CPT | Mod: CPTII,S$GLB,, | Performed by: INTERNAL MEDICINE

## 2025-02-06 PROCEDURE — 1101F PT FALLS ASSESS-DOCD LE1/YR: CPT | Mod: CPTII,S$GLB,, | Performed by: INTERNAL MEDICINE

## 2025-02-06 PROCEDURE — 99999 PR PBB SHADOW E&M-EST. PATIENT-LVL V: CPT | Mod: PBBFAC,,, | Performed by: INTERNAL MEDICINE

## 2025-02-06 PROCEDURE — 63600175 PHARM REV CODE 636 W HCPCS: Mod: JZ,TB | Performed by: INTERNAL MEDICINE

## 2025-02-06 PROCEDURE — 99215 OFFICE O/P EST HI 40 MIN: CPT | Mod: S$GLB,,, | Performed by: INTERNAL MEDICINE

## 2025-02-06 PROCEDURE — 3078F DIAST BP <80 MM HG: CPT | Mod: CPTII,S$GLB,, | Performed by: INTERNAL MEDICINE

## 2025-02-06 PROCEDURE — 1159F MED LIST DOCD IN RCRD: CPT | Mod: CPTII,S$GLB,, | Performed by: INTERNAL MEDICINE

## 2025-02-06 PROCEDURE — 96372 THER/PROPH/DIAG INJ SC/IM: CPT

## 2025-02-06 RX ORDER — LANREOTIDE ACETATE 120 MG/.5ML
120 INJECTION SUBCUTANEOUS ONCE
Status: COMPLETED | OUTPATIENT
Start: 2025-02-06 | End: 2025-02-06

## 2025-02-06 RX ORDER — LANREOTIDE ACETATE 120 MG/.5ML
120 INJECTION SUBCUTANEOUS ONCE
OUTPATIENT
Start: 2025-02-06 | End: 2025-02-06

## 2025-02-06 RX ADMIN — LANREOTIDE ACETATE 120 MG: 120 INJECTION SUBCUTANEOUS at 12:02

## 2025-02-10 ENCOUNTER — TUMOR BOARD CONFERENCE (OUTPATIENT)
Dept: HEMATOLOGY/ONCOLOGY | Facility: CLINIC | Age: 80
End: 2025-02-10
Payer: MEDICARE

## 2025-02-10 ENCOUNTER — DOCUMENTATION ONLY (OUTPATIENT)
Dept: HEMATOLOGY/ONCOLOGY | Facility: CLINIC | Age: 80
End: 2025-02-10
Payer: MEDICARE

## 2025-02-10 NOTE — PROGRESS NOTES
[facility]      NEUROENDOCRINE MULTIDISCIPLINARY TUMOR BOARD  _____________________________________________________________________    PRESENTER:   Ema Chowdhury MD    REASON FOR PRESENTATION:  Scan Review    ATTENDEES:   Gastroenterology - Not Present  Interventional Radiology - ONDINA Garrison and Ron Hairston MD  Nuclear Medicine - Keenan Clifford MD and Mia Michael MD  Nursing - NET TB Nursing: Kristi Wyatt, RN, Lani Vee, RN, and Christiane Moran RN  Oncology - Ema Chowdhury MD and Jared Hernandez MD  Palliative Medicine - Not Present  Pathology -Johana Palacios MD  Research - Not Present  Surgery - MD Christiano Jasmine MD  Genetics - [facility]    PATIENT STATUS:  Established Patient    PATIENT SUMMARY:  Past Medical History:   Diagnosis Date    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    Diabetes mellitus     Hypertension     Lumbar spondylosis 6/8/2018    Malignant pheochromocytoma     Pheochromocytoma     Pheochromocytoma, malignant 01/10/2016    Pheochromocytoma, malignant     Sacroiliac joint pain 7/11/2018    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/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    Thyroid disease        Past Surgical History:   Procedure Laterality Date    ADRENALECTOMY      ANGIOGRAPHY N/A 11/15/2018    Procedure: ANGIOGRAM;  Surgeon: [facility] Diagnostic Provider;  Location: [facility] OR [address] [place] [address];  Service: Radiology;  Laterality: N/A;    APPENDECTOMY      DISSECTION OF NECK Right 11/16/2018    Procedure: DISSECTION, NECK to remove right carotid body tumor;  Surgeon: Noah Ca MD;  Location: [facility] OR [address] [place] [address];  Service: ENT;  Laterality: Right;    INJECTION OF ANESTHETIC AGENT INTO SACROILIAC JOINT Right 6/26/2018    Procedure: BLOCK, SACROILIAC JOINT;  Surgeon: Sydney Reynoso MD;  Location: [facility] [place] PAIN T;  Service: Pain Management;  Laterality: Right;  Right SI Joint Injection    [id number]    NEEDS CONSENT    LEFT HEART  CATHETERIZATION Left 7/18/2018    Procedure: CATHETERIZATION, HEART, LEFT;  Surgeon: Moustapha Vasquez MD;  Location: [facility] CATH LAB;  Service: Cardiovascular;  Laterality: Left;    MIBG Therapy  3/2016, 7/2016, 12/2016    [facility] - Dr. Martines       TUMOR MARKERS:  5-HIAA, Plasma Ref Range: up to 22 ng/mL      Chromogranin A Ref Range: <93 ng/mL  Recent Labs   Lab 11/11/22  0959   Chromogranin A 229 H     Gastrin Ref Range: <100 pg/mL      Neurokinin A Ref Range: 0 - 40 pg/mL      Pancreastatin Ref Range: 10 - 135 pg/mL      Pancreatic Polypeptide Ref Range: >314 pg/mL      Serotonin Ref Range: <=230 ng/mL            Latest Ref Rng & Units 2/6/2025    11:08 AM 2/5/2025     3:22 PM 1/9/2025    11:07 AM   Neuroendocrine Labs   WBC 3.90 - 12.70 K/uL  4.30     RBC 4.00 - 5.40 M/uL  3.86     HGB 12.0 - 16.0 g/dL  11.0     HCT 37.0 - 48.5 %  33.9     MCV 82 - 98 fL  88     MCH 27.0 - 31.0 pg  28.4     MCHC 32.0 - 36.0 g/dL  32.3     RDW 11.5 - 14.5 %  16.5     PLATLETS 150 - 450 K/uL  168     MPV 7.4 - 10.4 fL  7.3     GRAN # 1.8 - 7.7 K/uL  2.2     LYMPH # 1.0 - 4.8 K/uL  1.3     MONO # 0.3 - 1.0 K/uL  0.5     EOS # 0.0 - 0.5 K/uL  0.2     BASO # 0.00 - 0.20 K/uL  0.00     GRAN % 38.0 - 73.0 %  51.2     LYMPH % 18.0 - 48.0 %  30.6     MONO % 4.0 - 15.0 %  12.0     EOS % 0.0 - 8.0 %  5.6     BASO % 0.0 - 1.9 %  0.6     DIFF METHOD   Automated     GLUCOSE 74 - 106 mg/dL  107     BUN 7 - 17 mg/dL  12     CREATININE 0.70 - 1.20 mg/dL  1.22      - 145 mmol/L  138     K 3.5 - 5.1 mmol/L  4.4     CHLORIDE 95 - 110 mmol/L  106     CO2 23 - 29 mmol/L  24     CALCIUM 8.4 - 10.2 mg/dL  8.4     PROTEIN, TOTAL 6.3 - 8.2 g/dL  6.2     ALBUMIN 3.5 - 5.2 g/dL  3.4     TOTAL BILIRUBIN 0.2 - 1.3 mg/dL  0.7     ALK PHOSPHATASE 38 - 145 U/L  57     SGOT (AST) 14 - 36 U/L  38     SGPT (ALT) 10 - 44 U/L  21     Weight  176 lbs 6 oz  175 lbs 8 oz     ____________________________________________________________________    DISCUSSION: 80 yo  woman with R adrenal pheochromocytoma s/p bilateral adrenectomy in 11/2009, recurrent disease to the bone in 2016 s/p MIBG in 2016. At our prior visits, we discussed interim progression of cancer in the liver and new lesions in the bones. Discussed lanreotide every 4 weeks, zometa every 3 months and PRRT. Lanreotide started 1/23/2023. Zometa started 2/20/23. PRRT #1 on 3/1/23. #2 on 4/26/23, #3 on 6/21/23, #4 on 8/16/23. Review PET    INT RAD:Defer to [facility].    NUC MED: 01/30/25 similar liver and osseous mets    BOARD RECOMMENDATIONS: Stable disease, continue lanreotide & Zometa.

## 2025-02-10 NOTE — PROGRESS NOTES
I reviewed MsMaria Guadalupe Hailey Sawant's chart as I serve as on the multidisciplinary team as a genetic counselor. Her case was discussed on 2/10/2025 at the Neuroendocrine Tumor Boards. She meets NCCN criteria for genetic testing given her personal history of adrenal pheochromocytoma.

## 2025-02-20 ENCOUNTER — TELEPHONE (OUTPATIENT)
Dept: HEMATOLOGY/ONCOLOGY | Facility: CLINIC | Age: 80
End: 2025-02-20
Payer: MEDICARE

## 2025-03-05 RX ORDER — SODIUM CHLORIDE 0.9 % (FLUSH) 0.9 %
10 SYRINGE (ML) INJECTION
Status: CANCELLED | OUTPATIENT
Start: 2025-03-05

## 2025-03-05 RX ORDER — HEPARIN 100 UNIT/ML
500 SYRINGE INTRAVENOUS
Status: CANCELLED | OUTPATIENT
Start: 2025-03-05

## 2025-03-06 ENCOUNTER — INFUSION (OUTPATIENT)
Dept: INFUSION THERAPY | Facility: HOSPITAL | Age: 80
End: 2025-03-06
Attending: INTERNAL MEDICINE
Payer: MEDICARE

## 2025-03-06 VITALS
RESPIRATION RATE: 16 BRPM | DIASTOLIC BLOOD PRESSURE: 55 MMHG | OXYGEN SATURATION: 96 % | SYSTOLIC BLOOD PRESSURE: 100 MMHG | TEMPERATURE: 98 F | HEART RATE: 73 BPM

## 2025-03-06 DIAGNOSIS — C74.91 MALIGNANT PHEOCHROMOCYTOMA OF RIGHT ADRENAL GLAND: ICD-10-CM

## 2025-03-06 DIAGNOSIS — C79.51 SECONDARY MALIGNANT NEOPLASM OF BONE: ICD-10-CM

## 2025-03-06 DIAGNOSIS — C74.90 MALIGNANT PHEOCHROMOCYTOMA, UNSPECIFIED LATERALITY: Primary | ICD-10-CM

## 2025-03-06 PROCEDURE — 63600175 PHARM REV CODE 636 W HCPCS: Performed by: INTERNAL MEDICINE

## 2025-03-06 PROCEDURE — 63600175 PHARM REV CODE 636 W HCPCS: Mod: JZ,TB | Performed by: INTERNAL MEDICINE

## 2025-03-06 PROCEDURE — 96374 THER/PROPH/DIAG INJ IV PUSH: CPT

## 2025-03-06 PROCEDURE — 96372 THER/PROPH/DIAG INJ SC/IM: CPT | Mod: 59

## 2025-03-06 PROCEDURE — 25000003 PHARM REV CODE 250: Performed by: INTERNAL MEDICINE

## 2025-03-06 RX ORDER — LANREOTIDE ACETATE 120 MG/.5ML
120 INJECTION SUBCUTANEOUS ONCE
OUTPATIENT
Start: 2025-03-06 | End: 2025-03-06

## 2025-03-06 RX ORDER — LANREOTIDE ACETATE 120 MG/.5ML
120 INJECTION SUBCUTANEOUS ONCE
Status: COMPLETED | OUTPATIENT
Start: 2025-03-06 | End: 2025-03-06

## 2025-03-06 RX ORDER — HEPARIN 100 UNIT/ML
500 SYRINGE INTRAVENOUS
OUTPATIENT
Start: 2025-03-20

## 2025-03-06 RX ORDER — SODIUM CHLORIDE 0.9 % (FLUSH) 0.9 %
10 SYRINGE (ML) INJECTION
OUTPATIENT
Start: 2025-03-20

## 2025-03-06 RX ORDER — SODIUM CHLORIDE 0.9 % (FLUSH) 0.9 %
10 SYRINGE (ML) INJECTION
Status: DISCONTINUED | OUTPATIENT
Start: 2025-03-06 | End: 2025-03-06 | Stop reason: HOSPADM

## 2025-03-06 RX ORDER — HEPARIN 100 UNIT/ML
500 SYRINGE INTRAVENOUS
Status: DISCONTINUED | OUTPATIENT
Start: 2025-03-06 | End: 2025-03-06 | Stop reason: HOSPADM

## 2025-03-06 RX ADMIN — SODIUM CHLORIDE: 9 INJECTION, SOLUTION INTRAVENOUS at 11:03

## 2025-03-06 RX ADMIN — LANREOTIDE ACETATE 120 MG: 120 INJECTION SUBCUTANEOUS at 12:03

## 2025-03-06 RX ADMIN — ZOLEDRONIC ACID 3 MG: 4 INJECTION, SOLUTION, CONCENTRATE INTRAVENOUS at 12:03

## 2025-03-06 NOTE — NURSING
1230- infusion completed, tolerated it well. IV removed. Lanreotide injection given . Tolerated it well. Pt off unit per motorized W/C in good condition.

## 2025-03-24 NOTE — TELEPHONE ENCOUNTER
Addended by: HASEEB OROURKE on: 3/24/2025 11:55 AM     Modules accepted: Orders     Please call the patient to let them know:    1) The thyroid biopsies came back unsatisfactory, meaning there were not enough thyroid cells on the slides to make a determination.  This happens about 10% of the time.  In this situation, we need to repeat the biopsy in about six weeks.    Please schedule repeat FNA in six weeks.      Please let me know if she has any questions.

## 2025-04-03 ENCOUNTER — OFFICE VISIT (OUTPATIENT)
Dept: HEMATOLOGY/ONCOLOGY | Facility: CLINIC | Age: 80
End: 2025-04-03
Payer: MEDICARE

## 2025-04-03 ENCOUNTER — INFUSION (OUTPATIENT)
Dept: INFUSION THERAPY | Facility: HOSPITAL | Age: 80
End: 2025-04-03
Attending: INTERNAL MEDICINE
Payer: MEDICARE

## 2025-04-03 VITALS
TEMPERATURE: 98 F | DIASTOLIC BLOOD PRESSURE: 57 MMHG | SYSTOLIC BLOOD PRESSURE: 107 MMHG | RESPIRATION RATE: 16 BRPM | HEART RATE: 67 BPM | OXYGEN SATURATION: 99 %

## 2025-04-03 VITALS
WEIGHT: 175.06 LBS | BODY MASS INDEX: 31.01 KG/M2 | HEART RATE: 67 BPM | OXYGEN SATURATION: 99 % | DIASTOLIC BLOOD PRESSURE: 57 MMHG | SYSTOLIC BLOOD PRESSURE: 107 MMHG | TEMPERATURE: 98 F

## 2025-04-03 DIAGNOSIS — E11.9 CONTROLLED TYPE 2 DIABETES MELLITUS WITHOUT COMPLICATION, WITHOUT LONG-TERM CURRENT USE OF INSULIN: ICD-10-CM

## 2025-04-03 DIAGNOSIS — I50.42 CHRONIC COMBINED SYSTOLIC AND DIASTOLIC CONGESTIVE HEART FAILURE: ICD-10-CM

## 2025-04-03 DIAGNOSIS — D49.9 IMMUNODEFICIENCY SECONDARY TO NEOPLASM: ICD-10-CM

## 2025-04-03 DIAGNOSIS — D84.81 IMMUNODEFICIENCY SECONDARY TO NEOPLASM: ICD-10-CM

## 2025-04-03 DIAGNOSIS — C7B.8 SECONDARY NEUROENDOCRINE TUMOR OF BONE: ICD-10-CM

## 2025-04-03 DIAGNOSIS — D84.821 IMMUNODEFICIENCY DUE TO DRUGS: ICD-10-CM

## 2025-04-03 DIAGNOSIS — Z79.899 IMMUNODEFICIENCY DUE TO DRUGS: ICD-10-CM

## 2025-04-03 DIAGNOSIS — C74.90 MALIGNANT PHEOCHROMOCYTOMA, UNSPECIFIED LATERALITY: Primary | ICD-10-CM

## 2025-04-03 DIAGNOSIS — E27.1 ADRENAL INSUFFICIENCY, PRIMARY: ICD-10-CM

## 2025-04-03 DIAGNOSIS — G89.29 CHRONIC NONINTRACTABLE HEADACHE, UNSPECIFIED HEADACHE TYPE: ICD-10-CM

## 2025-04-03 DIAGNOSIS — I49.5 SICK SINUS SYNDROME: ICD-10-CM

## 2025-04-03 DIAGNOSIS — R51.9 CHRONIC NONINTRACTABLE HEADACHE, UNSPECIFIED HEADACHE TYPE: ICD-10-CM

## 2025-04-03 DIAGNOSIS — C7B.8 SECONDARY NEUROENDOCRINE TUMOR OF DISTANT LYMPH NODES: ICD-10-CM

## 2025-04-03 DIAGNOSIS — I10 ESSENTIAL HYPERTENSION: ICD-10-CM

## 2025-04-03 PROCEDURE — 1101F PT FALLS ASSESS-DOCD LE1/YR: CPT | Mod: CPTII,S$GLB,, | Performed by: INTERNAL MEDICINE

## 2025-04-03 PROCEDURE — 3288F FALL RISK ASSESSMENT DOCD: CPT | Mod: CPTII,S$GLB,, | Performed by: INTERNAL MEDICINE

## 2025-04-03 PROCEDURE — 3074F SYST BP LT 130 MM HG: CPT | Mod: CPTII,S$GLB,, | Performed by: INTERNAL MEDICINE

## 2025-04-03 PROCEDURE — 96372 THER/PROPH/DIAG INJ SC/IM: CPT

## 2025-04-03 PROCEDURE — 1160F RVW MEDS BY RX/DR IN RCRD: CPT | Mod: CPTII,S$GLB,, | Performed by: INTERNAL MEDICINE

## 2025-04-03 PROCEDURE — 63600175 PHARM REV CODE 636 W HCPCS: Mod: JZ,TB | Performed by: INTERNAL MEDICINE

## 2025-04-03 PROCEDURE — 99999 PR PBB SHADOW E&M-EST. PATIENT-LVL IV: CPT | Mod: PBBFAC,,, | Performed by: INTERNAL MEDICINE

## 2025-04-03 PROCEDURE — 99215 OFFICE O/P EST HI 40 MIN: CPT | Mod: S$GLB,,, | Performed by: INTERNAL MEDICINE

## 2025-04-03 PROCEDURE — G2211 COMPLEX E/M VISIT ADD ON: HCPCS | Mod: S$GLB,,, | Performed by: INTERNAL MEDICINE

## 2025-04-03 PROCEDURE — 3078F DIAST BP <80 MM HG: CPT | Mod: CPTII,S$GLB,, | Performed by: INTERNAL MEDICINE

## 2025-04-03 PROCEDURE — 1159F MED LIST DOCD IN RCRD: CPT | Mod: CPTII,S$GLB,, | Performed by: INTERNAL MEDICINE

## 2025-04-03 RX ORDER — LANREOTIDE ACETATE 120 MG/.5ML
120 INJECTION SUBCUTANEOUS ONCE
OUTPATIENT
Start: 2025-04-03 | End: 2025-04-03

## 2025-04-03 RX ORDER — LANREOTIDE ACETATE 120 MG/.5ML
120 INJECTION SUBCUTANEOUS ONCE
Status: COMPLETED | OUTPATIENT
Start: 2025-04-03 | End: 2025-04-03

## 2025-04-03 RX ADMIN — LANREOTIDE ACETATE 120 MG: 120 INJECTION SUBCUTANEOUS at 11:04

## 2025-04-03 NOTE — NURSING
Patient tolerated Lanreotide injection to right buttocks well today. Calendar given and reviewed with patient. Plan to rtc 5/1 for next appt. Discharged home, ambulated independently using walker.

## 2025-04-03 NOTE — Clinical Note
Keep scheduled appts. Add CBC, CMP, metanephrine, cate[place]amine [place] [facility] [place]. See me on 5/29 then lanreotide. Labs on 6/25, see me on 6/26 then lanreotide and zometa

## 2025-04-03 NOTE — PROGRESS NOTES
PROGRESS NOTE    Subjective:       Patient ID: Hailey Sawant is a 79 y.o. female.    Chief Complaint: metastatic pheochromocytoma    Diagnosis:  Metastatic pheochromocytoma of the right adrenal gland, with mets to bones, liver    Oncologic History:  1. Ms. Sawant is a 72 yo woman with DM, HTN, CAD s/p MI, A fib and sick sinus syndrome s/p pacemaker in June 2022 (on eliquis since), carotid paraganglioma removed in 11/2018 who initially saw me on 11/28/2022 for further management of metastatic pheochromocytoma. Last seen Dr West in 2018 and Dr Cohen in early 2021. Does not follow oncologist locally. It is my first time seeing her today. Her history dates to 2009 when she was being worked up for abdominal pain and was found to have cholelithiasis. However, during this workup she was found to have bilateral adrenal masses.  She was also noted to be hypertensive.  She had a 6.5 cm mass on the right with some evidence of necrosis and also a mass just under 3 cm on the left.  Biochemical workup was consistent with pheochromocytoma.  In 11/2009 she underwent a bilateral adrenalectomy.  Pathology had revealed a right adrenal gland pheochromocytoma and a left adrenal gland adrenal cortical adenoma.  In 02/2016 she was noted to have recurrent disease to the bone and underwent I 131 MIBG therapy in 03/2016, 07/2016 and 11/2016.  In 10/2016 she underwent a right partial nephrectomy due to recurrent disease.  Pathology did confirm findings consistent with recurrent pheochromocytoma.  In 11/2018 she underwent removal of the right carotid body tumor with pathology showing paraganglioma. She presents today for follow up. She knows she has cancer but does not know it is stage IV. She is not sure if Dr Cohen called her after tumor board or not. She feels well. Had MI in June 2022, treated at [facility], had pacemaker placed. Medically treated. Feeling well  "today. We discussed restaging dotatate scan.   2. Dotatate scan declined by insurance several times. Finally had gallium PET on 23. It showed "New foci of abnormal radiotracer uptake at the cervical spine, sacral, right ilium and right acetabular joint. Interval enlargement of hepatic section 7 hypodense mass. Reviewed at tumor board. [facility]. However unable to get insurance approval for PRRT and admission afterwards to monitor for catecholamine crisis.   3. Lanreotide started on 23. Zometa started 23.   4. PRRT started on 3/1/23. #2 on 23, # 3 on 23, #4 on 23    Interval History:   Ms Sawant returns for follow up. She feels tired and noted some headache recently. Chronic knee pain    ECO    ROS:   A ten-point system review is obtained and negative except for what was stated in the Interval History.     Physical Examination:   Vital signs reviewed.   General: well hydrated, well developed, in no acute distress  HEENT: normocephalic, EOMI, anicteric sclerae.   Neck: supple, no JVD, +thyromegaly, soft, no cervical or supraclavicular lymphadenopathy  Lungs: clear breath sounds bilaterally, no wheezing, rales, or rhonchi  Heart: RRR, no M/R/G  Abdomen: soft, no tenderness, non-distended, no hepatosplenomegaly, mass, or hernia. BS present  Extremities: no clubbing, cyanosis, or edema  Skin: no rash, ulcer, or open wounds  Neuro: alert and oriented x 4, no focal neuro deficit  Psych: pleasant and appropriate mood and affect    Objective:     Laboratory Data:  Labs reviewed. CBC, CMP adequate for treatment    Imaging Data:  Gallium PET 23:  New foci of abnormal radiotracer uptake at the cervical spine, sacral, right ilium and right acetabular joint.     Interval enlargement of hepatic section 7 hypodense mass.     Based upon progression of disease and tracer avidity of the patient's tumor burden, the patient would be an appropriate candidate for PRRT if clinically indicated.     CT " neck 4/6/23:  Impression:     1. Status post resection right carotid body tumor.  No recurrent mass identified.  2. 1.5 cm peripherally enhancing nodules lower pole both lobes of the thyroid.    Thyroid US 5/16/23:  Impression:     Multiple thyroid nodules.  1.7 cm TI-RADS 5 left lobe nodule meets ACR criteria for FNA.     Right lobe nodule meets criteria for surveillance with repeat thyroid ultrasound recommended 1 year.      Copper PET 5/25/23:  Impression:     1. Right hepatic lobe lesions and multifocal primarily sclerotic osseous lesions stable in size and number with decreased tracer-avidity.  No new lesions identified.  2. Postsurgical change prior bilateral adrenalectomy.  No tracer uptake within the surgical bed to suggest localized recurrent disease.  3. Other stable incidental findings above.    Copper PET 9/13/23:  FINDINGS:  Quality of the study: Diaphragmatic/respiratory motion and associated misregistration artifact limits assessment of the liver.     In the head and neck, there is physiologic distribution in the pituitary, salivary, and thyroid glands, and there are no tracer avid lesions suspicious for malignancy.     In the chest, there are no tracer avid lesions suspicious for malignancy.     In the abdomen and pelvis, there is similar appearance of radiotracer avid lesions within the right hepatic lobe noting that evaluation is limited due to motion and misregistration artifact.  Dominant right hepatic lobe lesion demonstrates maximum SUV of 46 (previously 44).  Difficult to measure lesions on CT due to artifact.  No new lesion.  There is physiologic uptake in the pancreatic uncinate process, liver, spleen, adrenal glands and  tract.     In the bones, there is stable number and distribution of radiotracer avid lesions throughout the osseous structures including the right scapula, spine, sacrum, right ilium and ischium, and right femur.  Right scapula maximum SUV of 76 (previously 91).  Right  ilium maximum SUV of 100 (previously 118).  Left sacrum maximum SUV of 25 (previously 40).  No new lesion.     Additional findings: Left chest wall cardiac pacer and leads noted.  Cardiomegaly.  Diffuse calcific atherosclerosis.  Postsurgical changes of bilateral adrenalectomy and cholecystectomy.  Calcified uterine fibroid.  Colonic diverticulosis.  Left hip arthroplasty hardware results in beam hardening artifact limiting assessment of the pelvis.     Impression:     Stable distribution of radiotracer avid lesions within the liver and bones.  No new lesion.     Motion artifact limits assessment of the liver.    Copper PET 2/15/2024:  Impression:     Stable distribution of multiple tracer avid osseous lesions which show decreased tracer uptake compared to prior study.     Grossly stable appearance of tracer avid right hepatic lobe lesions.     No new tracer avid lesions.      copper PET 8/8/24:  Impression:     Stable distribution of tracer avid osseous lesions with interval mild decreased tracer avidity of several index lesions, as above with mixed favorable treatment response     Interval mild decreased tracer avidity of posterior right hepatic lobe lesions.     No new tracer avid lesion.    CT right hip 11/13/24:  COMPARISON:  Dotatate PET 08/08/2024.     FINDINGS:  An osseous lesion in the right iliac bone, corresponding with prior PET-CT of 08/08/2024.  A subtle sclerotic lesion is also noted within the medial aspect of right acetabulum, corresponding with prior PET CT finding.  Postcontrast images demonstrate no masses or abnormal enhancement.  There are some injection granulomas in the right gluteal subcutaneous tissue.  Moderate joint space narrowing right hip joint.     Impression:     1. Metastatic lesions in the right acetabulum and right iliac bone, corresponding with recent PET-CT finding.  2. Moderate osteoarthrosis right hip.     Copper PET 1/30/25:  Impression:     History of metastatic  pheochromocytoma status post bilateral adrenalectomy.     Similar appearing tracer avid liver and osseous metastasis.     No new tracer avid disease elsewhere.        Assessment and Plan:     1. Malignant pheochromocytoma, unspecified laterality    2. Secondary neuroendocrine tumor of distant lymph nodes    3. Secondary neuroendocrine tumor of bone    4. Immunodeficiency due to drugs    5. Immunodeficiency secondary to neoplasm    6. Adrenal insufficiency, primary post-surgical    7. Controlled type 2 diabetes mellitus without complication, without long-term current use of insulin    8. Sick sinus syndrome    9. Chronic combined systolic and diastolic congestive heart failure    10. Essential hypertension    11. Chronic nonintractable headache, unspecified headache type        1-5  - Ms Sawant is an 80 yo woman with R adrenal pheochromocytoma s/p bilateral adrenectomy in 11/2009, recurrent disease to the bone in 2016 s/p MIBG in 2016. At our prior visits, we discussed interim progression of cancer in the liver and new lesions in the bones. Discussed lanreotide every 4 weeks, zometa every 3 months and PRRT. Lanreotide started 1/23/2023. Zometa started 2/20/23.   - PRRT #1 on 3/1/23. #2 on 4/26/23, #3 on 6/21/23, #4 on 8/16/23  - doing well.   - c/w lanreotide every 4 weeks. Will give today  - zometa every 3 months. Last given on 3/6/25. Next due in June 2025    6.  - reviewed med list with patient. Patient said she has been taking cortef  - c/w cortef    7.  - BS controlled  - c/w current medication    8.9  - f/u with Dr Loya at [facility]    10.  - BP controlled  - c/w current medication    11.  - will get brain MRI    Follow-up:     Return in one month  Knows to call in the interval if any problems arise.    I spent 40 minutes reviewing the chart, interpreting laboratory result and imaging data, coordinating patient's care, with at least 50% of the time on face-to-face counseling.         Electronically signed by  Lingling Du                               cardiac precautions

## 2025-04-11 ENCOUNTER — PATIENT OUTREACH (OUTPATIENT)
Dept: ADMINISTRATIVE | Facility: OTHER | Age: 80
End: 2025-04-11
Payer: MEDICARE

## 2025-04-14 VITALS — SYSTOLIC BLOOD PRESSURE: 148 MMHG | DIASTOLIC BLOOD PRESSURE: 88 MMHG | HEART RATE: 83 BPM | OXYGEN SATURATION: 99 %

## 2025-05-01 ENCOUNTER — INFUSION (OUTPATIENT)
Dept: INFUSION THERAPY | Facility: HOSPITAL | Age: 80
End: 2025-05-01
Attending: INTERNAL MEDICINE
Payer: MEDICARE

## 2025-05-01 VITALS
TEMPERATURE: 98 F | OXYGEN SATURATION: 97 % | RESPIRATION RATE: 18 BRPM | SYSTOLIC BLOOD PRESSURE: 120 MMHG | HEART RATE: 66 BPM | DIASTOLIC BLOOD PRESSURE: 70 MMHG

## 2025-05-01 DIAGNOSIS — C74.90 MALIGNANT PHEOCHROMOCYTOMA, UNSPECIFIED LATERALITY: Primary | ICD-10-CM

## 2025-05-01 PROCEDURE — 63600175 PHARM REV CODE 636 W HCPCS: Mod: JZ,TB | Performed by: INTERNAL MEDICINE

## 2025-05-01 PROCEDURE — 96372 THER/PROPH/DIAG INJ SC/IM: CPT

## 2025-05-01 RX ORDER — LANREOTIDE ACETATE 120 MG/.5ML
120 INJECTION SUBCUTANEOUS ONCE
Status: COMPLETED | OUTPATIENT
Start: 2025-05-01 | End: 2025-05-01

## 2025-05-01 RX ORDER — LANREOTIDE ACETATE 120 MG/.5ML
120 INJECTION SUBCUTANEOUS ONCE
OUTPATIENT
Start: 2025-05-01 | End: 2025-05-01

## 2025-05-01 RX ADMIN — LANREOTIDE ACETATE 120 MG: 120 INJECTION SUBCUTANEOUS at 11:05

## 2025-05-01 NOTE — NURSING
Patient tolerated lanreotide injection to left buttocks well today. Plan to rtc 5/29th for next appt to see Dr. Chowdhury and injection appt. Calendar given and reviewed with patient. NAD noted upon discharge. Discharged home, ambulated independently using walker.

## 2025-05-29 ENCOUNTER — OFFICE VISIT (OUTPATIENT)
Dept: HEMATOLOGY/ONCOLOGY | Facility: CLINIC | Age: 80
End: 2025-05-29
Payer: MEDICARE

## 2025-05-29 ENCOUNTER — INFUSION (OUTPATIENT)
Dept: INFUSION THERAPY | Facility: HOSPITAL | Age: 80
End: 2025-05-29
Attending: INTERNAL MEDICINE
Payer: MEDICARE

## 2025-05-29 VITALS
BODY MASS INDEX: 31.48 KG/M2 | SYSTOLIC BLOOD PRESSURE: 129 MMHG | WEIGHT: 177.69 LBS | OXYGEN SATURATION: 100 % | HEART RATE: 63 BPM | TEMPERATURE: 98 F | DIASTOLIC BLOOD PRESSURE: 67 MMHG | RESPIRATION RATE: 18 BRPM

## 2025-05-29 VITALS
SYSTOLIC BLOOD PRESSURE: 129 MMHG | DIASTOLIC BLOOD PRESSURE: 67 MMHG | HEART RATE: 63 BPM | BODY MASS INDEX: 31.48 KG/M2 | TEMPERATURE: 98 F | OXYGEN SATURATION: 100 % | WEIGHT: 177.69 LBS

## 2025-05-29 DIAGNOSIS — R51.9 CHRONIC NONINTRACTABLE HEADACHE, UNSPECIFIED HEADACHE TYPE: ICD-10-CM

## 2025-05-29 DIAGNOSIS — R13.10 DYSPHAGIA, UNSPECIFIED TYPE: ICD-10-CM

## 2025-05-29 DIAGNOSIS — G89.29 CHRONIC NONINTRACTABLE HEADACHE, UNSPECIFIED HEADACHE TYPE: ICD-10-CM

## 2025-05-29 DIAGNOSIS — E27.1 ADRENAL INSUFFICIENCY, PRIMARY: ICD-10-CM

## 2025-05-29 DIAGNOSIS — D84.821 IMMUNODEFICIENCY DUE TO DRUGS: ICD-10-CM

## 2025-05-29 DIAGNOSIS — C74.90 MALIGNANT PHEOCHROMOCYTOMA, UNSPECIFIED LATERALITY: Primary | ICD-10-CM

## 2025-05-29 DIAGNOSIS — I49.5 SICK SINUS SYNDROME: ICD-10-CM

## 2025-05-29 DIAGNOSIS — D84.81 IMMUNODEFICIENCY SECONDARY TO NEOPLASM: ICD-10-CM

## 2025-05-29 DIAGNOSIS — I50.42 CHRONIC COMBINED SYSTOLIC AND DIASTOLIC CONGESTIVE HEART FAILURE: ICD-10-CM

## 2025-05-29 DIAGNOSIS — C7A.098 MALIGNANT CARCINOID TUMORS OF OTHER SITES: ICD-10-CM

## 2025-05-29 DIAGNOSIS — D49.9 IMMUNODEFICIENCY SECONDARY TO NEOPLASM: ICD-10-CM

## 2025-05-29 DIAGNOSIS — I10 ESSENTIAL HYPERTENSION: ICD-10-CM

## 2025-05-29 DIAGNOSIS — C7B.8 SECONDARY NEUROENDOCRINE TUMOR OF DISTANT LYMPH NODES: ICD-10-CM

## 2025-05-29 DIAGNOSIS — Z79.899 IMMUNODEFICIENCY DUE TO DRUGS: ICD-10-CM

## 2025-05-29 DIAGNOSIS — C7B.8 SECONDARY NEUROENDOCRINE TUMOR OF BONE: ICD-10-CM

## 2025-05-29 DIAGNOSIS — E11.9 CONTROLLED TYPE 2 DIABETES MELLITUS WITHOUT COMPLICATION, WITHOUT LONG-TERM CURRENT USE OF INSULIN: ICD-10-CM

## 2025-05-29 PROCEDURE — G2211 COMPLEX E/M VISIT ADD ON: HCPCS | Mod: S$GLB,,, | Performed by: INTERNAL MEDICINE

## 2025-05-29 PROCEDURE — 3288F FALL RISK ASSESSMENT DOCD: CPT | Mod: CPTII,S$GLB,, | Performed by: INTERNAL MEDICINE

## 2025-05-29 PROCEDURE — 1101F PT FALLS ASSESS-DOCD LE1/YR: CPT | Mod: CPTII,S$GLB,, | Performed by: INTERNAL MEDICINE

## 2025-05-29 PROCEDURE — 1160F RVW MEDS BY RX/DR IN RCRD: CPT | Mod: CPTII,S$GLB,, | Performed by: INTERNAL MEDICINE

## 2025-05-29 PROCEDURE — 1159F MED LIST DOCD IN RCRD: CPT | Mod: CPTII,S$GLB,, | Performed by: INTERNAL MEDICINE

## 2025-05-29 PROCEDURE — 3078F DIAST BP <80 MM HG: CPT | Mod: CPTII,S$GLB,, | Performed by: INTERNAL MEDICINE

## 2025-05-29 PROCEDURE — 3074F SYST BP LT 130 MM HG: CPT | Mod: CPTII,S$GLB,, | Performed by: INTERNAL MEDICINE

## 2025-05-29 PROCEDURE — 99999 PR PBB SHADOW E&M-EST. PATIENT-LVL IV: CPT | Mod: PBBFAC,,, | Performed by: INTERNAL MEDICINE

## 2025-05-29 PROCEDURE — 96372 THER/PROPH/DIAG INJ SC/IM: CPT

## 2025-05-29 PROCEDURE — 99215 OFFICE O/P EST HI 40 MIN: CPT | Mod: S$GLB,,, | Performed by: INTERNAL MEDICINE

## 2025-05-29 PROCEDURE — 63600175 PHARM REV CODE 636 W HCPCS: Mod: JZ,TB | Performed by: INTERNAL MEDICINE

## 2025-05-29 RX ORDER — LANREOTIDE ACETATE 120 MG/.5ML
120 INJECTION SUBCUTANEOUS ONCE
Status: COMPLETED | OUTPATIENT
Start: 2025-05-29 | End: 2025-05-29

## 2025-05-29 RX ORDER — LANREOTIDE ACETATE 120 MG/.5ML
120 INJECTION SUBCUTANEOUS ONCE
OUTPATIENT
Start: 2025-05-29 | End: 2025-05-29

## 2025-05-29 RX ADMIN — LANREOTIDE ACETATE 120 MG: 120 INJECTION SUBCUTANEOUS at 02:05

## 2025-05-29 NOTE — PROGRESS NOTES
PROGRESS NOTE    Subjective:       Patient ID: Hailey Sawant is a 79 y.o. female.    Chief Complaint: metastatic pheochromocytoma    Diagnosis:  Metastatic pheochromocytoma of the right adrenal gland, with mets to bones, liver    Oncologic History:  1. Ms. Sawant is a 72 yo woman with DM, HTN, CAD s/p MI, A fib and sick sinus syndrome s/p pacemaker in June 2022 (on eliquis since), carotid paraganglioma removed in 11/2018 who initially saw me on 11/28/2022 for further management of metastatic pheochromocytoma. Last seen Dr West in 2018 and Dr Cohen in early 2021. Does not follow oncologist locally. It is my first time seeing her today. Her history dates to 2009 when she was being worked up for abdominal pain and was found to have cholelithiasis. However, during this workup she was found to have bilateral adrenal masses.  She was also noted to be hypertensive.  She had a 6.5 cm mass on the right with some evidence of necrosis and also a mass just under 3 cm on the left.  Biochemical workup was consistent with pheochromocytoma.  In 11/2009 she underwent a bilateral adrenalectomy.  Pathology had revealed a right adrenal gland pheochromocytoma and a left adrenal gland adrenal cortical adenoma.  In 02/2016 she was noted to have recurrent disease to the bone and underwent I 131 MIBG therapy in 03/2016, 07/2016 and 11/2016.  In 10/2016 she underwent a right partial nephrectomy due to recurrent disease.  Pathology did confirm findings consistent with recurrent pheochromocytoma.  In 11/2018 she underwent removal of the right carotid body tumor with pathology showing paraganglioma. She presents today for follow up. She knows she has cancer but does not know it is stage IV. She is not sure if Dr Cohen called her after tumor board or not. She feels well. Had MI in June 2022, treated at [facility], had pacemaker placed. Medically treated. Feeling well  "today. We discussed restaging dotatate scan.   2. Dotatate scan declined by insurance several times. Finally had gallium PET on 23. It showed "New foci of abnormal radiotracer uptake at the cervical spine, sacral, right ilium and right acetabular joint. Interval enlargement of hepatic section 7 hypodense mass. Reviewed at tumor board. Recc PRRT. However unable to get insurance approval for PRRT and admission afterwards to monitor for catecholamine crisis.   3. Lanreotide started on 23. Zometa started 23.   4. PRRT started on 3/1/23. #2 on 23, # 3 on 23, #4 on 23    Interval History:   Ms Sawant returns for follow up. Chronic fatigue and occasional headache. Sometimes she feels food is stuck but not all the time.     ECO    ROS:   A ten-point system review is obtained and negative except for what was stated in the Interval History.     Physical Examination:   Vital signs reviewed.   General: well hydrated, well developed, in no acute distress  HEENT: normocephalic, EOMI, anicteric sclerae.   Neck: supple, no JVD, +thyromegaly, soft, no cervical or supraclavicular lymphadenopathy  Lungs: clear breath sounds bilaterally, no wheezing, rales, or rhonchi  Heart: RRR, no M/R/G  Abdomen: soft, no tenderness, non-distended, no hepatosplenomegaly, mass, or hernia. BS present  Extremities: no clubbing, cyanosis, or edema  Skin: no rash, ulcer, or open wounds  Neuro: alert and oriented x 4, no focal neuro deficit  Psych: pleasant and appropriate mood and affect    Objective:     Laboratory Data:  Labs reviewed. CBC, CMP adequate for treatment    Imaging Data:  Gallium PET 23:  New foci of abnormal radiotracer uptake at the cervical spine, sacral, right ilium and right acetabular joint.     Interval enlargement of hepatic section 7 hypodense mass.     Based upon progression of disease and tracer avidity of the patient's tumor burden, the patient would be an appropriate candidate for PRRT if " clinically indicated.     CT neck 4/6/23:  Impression:     1. Status post resection right carotid body tumor.  No recurrent mass identified.  2. 1.5 cm peripherally enhancing nodules lower pole both lobes of the thyroid.    Thyroid US 5/16/23:  Impression:     Multiple thyroid nodules.  1.7 cm TI-RADS 5 left lobe nodule meets ACR criteria for FNA.     Right lobe nodule meets criteria for surveillance with repeat thyroid ultrasound recommended 1 year.      Copper PET 5/25/23:  Impression:     1. Right hepatic lobe lesions and multifocal primarily sclerotic osseous lesions stable in size and number with decreased tracer-avidity.  No new lesions identified.  2. Postsurgical change prior bilateral adrenalectomy.  No tracer uptake within the surgical bed to suggest localized recurrent disease.  3. Other stable incidental findings above.    Copper PET 9/13/23:  FINDINGS:  Quality of the study: Diaphragmatic/respiratory motion and associated misregistration artifact limits assessment of the liver.     In the head and neck, there is physiologic distribution in the pituitary, salivary, and thyroid glands, and there are no tracer avid lesions suspicious for malignancy.     In the chest, there are no tracer avid lesions suspicious for malignancy.     In the abdomen and pelvis, there is similar appearance of radiotracer avid lesions within the right hepatic lobe noting that evaluation is limited due to motion and misregistration artifact.  Dominant right hepatic lobe lesion demonstrates maximum SUV of 46 (previously 44).  Difficult to measure lesions on CT due to artifact.  No new lesion.  There is physiologic uptake in the pancreatic uncinate process, liver, spleen, adrenal glands and  tract.     In the bones, there is stable number and distribution of radiotracer avid lesions throughout the osseous structures including the right scapula, spine, sacrum, right ilium and ischium, and right femur.  Right scapula maximum SUV of  76 (previously 91).  Right ilium maximum SUV of 100 (previously 118).  Left sacrum maximum SUV of 25 (previously 40).  No new lesion.     Additional findings: Left chest wall cardiac pacer and leads noted.  Cardiomegaly.  Diffuse calcific atherosclerosis.  Postsurgical changes of bilateral adrenalectomy and cholecystectomy.  Calcified uterine fibroid.  Colonic diverticulosis.  Left hip arthroplasty hardware results in beam hardening artifact limiting assessment of the pelvis.     Impression:     Stable distribution of radiotracer avid lesions within the liver and bones.  No new lesion.     Motion artifact limits assessment of the liver.    Copper PET 2/15/2024:  Impression:     Stable distribution of multiple tracer avid osseous lesions which show decreased tracer uptake compared to prior study.     Grossly stable appearance of tracer avid right hepatic lobe lesions.     No new tracer avid lesions.      copper PET 8/8/24:  Impression:     Stable distribution of tracer avid osseous lesions with interval mild decreased tracer avidity of several index lesions, as above with mixed favorable treatment response     Interval mild decreased tracer avidity of posterior right hepatic lobe lesions.     No new tracer avid lesion.    CT right hip 11/13/24:  COMPARISON:  Dotatate PET 08/08/2024.     FINDINGS:  An osseous lesion in the right iliac bone, corresponding with prior PET-CT of 08/08/2024.  A subtle sclerotic lesion is also noted within the medial aspect of right acetabulum, corresponding with prior PET CT finding.  Postcontrast images demonstrate no masses or abnormal enhancement.  There are some injection granulomas in the right gluteal subcutaneous tissue.  Moderate joint space narrowing right hip joint.     Impression:     1. Metastatic lesions in the right acetabulum and right iliac bone, corresponding with recent PET-CT finding.  2. Moderate osteoarthrosis right hip.     Copper PET 1/30/25:  Impression:     History  of metastatic pheochromocytoma status post bilateral adrenalectomy.     Similar appearing tracer avid liver and osseous metastasis.     No new tracer avid disease elsewhere.      MRI brain 5/7/25:  Impression:     No evidence of an acute intracranial abnormality or intracranial metastatic disease.     Mild age-related cerebral atrophy and probable chronic microvascular ischemic change.     Assessment and Plan:     1. Malignant pheochromocytoma, unspecified laterality    2. Secondary neuroendocrine tumor of distant lymph nodes    3. Secondary neuroendocrine tumor of bone    4. Malignant carcinoid tumors of other sites    5. Immunodeficiency due to drugs    6. Immunodeficiency secondary to neoplasm    7. Adrenal insufficiency, primary post-surgical    8. Controlled type 2 diabetes mellitus without complication, without long-term current use of insulin    9. Sick sinus syndrome    10. Chronic combined systolic and diastolic congestive heart failure    11. Essential hypertension    12. Chronic nonintractable headache, unspecified headache type    13. Dysphagia, unspecified type        1-6  - Ms Sawant is an 78 yo woman with R adrenal pheochromocytoma s/p bilateral adrenectomy in 11/2009, recurrent disease to the bone in 2016 s/p MIBG in 2016. At our prior visits, we discussed interim progression of cancer in the liver and new lesions in the bones. Discussed lanreotide every 4 weeks, zometa every 3 months and PRRT. Lanreotide started 1/23/2023. Zometa started 2/20/23.   - PRRT #1 on 3/1/23. #2 on 4/26/23, #3 on 6/21/23, #4 on 8/16/23  - doing well.   - c/w lanreotide every 4 weeks. Will give today  - zometa every 3 months. Last given on 3/6/25. Next due in June 2025. Will give next time    7.  - c/w cortef    8.  - BS controlled  - c/w current medication    9.10  - f/u with Dr Loya at [facility]    11.  - BP controlled  - c/w current medication    12.  - reviewed MRI results with patient. No acute pathology. Chronic  age-related changes    13  - occasional  - asked patient to monitor. If it gets more consistent with get EGD.     Follow-up:     Return in one month  Knows to call in the interval if any problems arise.    I spent 40 minutes reviewing the chart, interpreting laboratory result and imaging data, coordinating patient's care, with at least 50% of the time on face-to-face counseling.         Electronically signed by Ema Chowdhury

## 2025-06-26 ENCOUNTER — INFUSION (OUTPATIENT)
Dept: INFUSION THERAPY | Facility: HOSPITAL | Age: 80
End: 2025-06-26
Attending: INTERNAL MEDICINE
Payer: MEDICARE

## 2025-06-26 ENCOUNTER — OFFICE VISIT (OUTPATIENT)
Dept: HEMATOLOGY/ONCOLOGY | Facility: CLINIC | Age: 80
End: 2025-06-26
Payer: MEDICARE

## 2025-06-26 VITALS
TEMPERATURE: 98 F | HEART RATE: 59 BPM | WEIGHT: 183.19 LBS | DIASTOLIC BLOOD PRESSURE: 54 MMHG | HEART RATE: 59 BPM | HEIGHT: 63 IN | SYSTOLIC BLOOD PRESSURE: 98 MMHG | OXYGEN SATURATION: 98 % | DIASTOLIC BLOOD PRESSURE: 54 MMHG | BODY MASS INDEX: 32.46 KG/M2 | TEMPERATURE: 98 F | WEIGHT: 183.19 LBS | BODY MASS INDEX: 32.45 KG/M2 | SYSTOLIC BLOOD PRESSURE: 98 MMHG | RESPIRATION RATE: 18 BRPM | OXYGEN SATURATION: 98 %

## 2025-06-26 DIAGNOSIS — I50.42 CHRONIC COMBINED SYSTOLIC AND DIASTOLIC CONGESTIVE HEART FAILURE: ICD-10-CM

## 2025-06-26 DIAGNOSIS — E27.1 ADRENAL INSUFFICIENCY, PRIMARY: ICD-10-CM

## 2025-06-26 DIAGNOSIS — C7B.8 SECONDARY NEUROENDOCRINE TUMOR OF BONE: ICD-10-CM

## 2025-06-26 DIAGNOSIS — C79.51 SECONDARY MALIGNANT NEOPLASM OF BONE: ICD-10-CM

## 2025-06-26 DIAGNOSIS — E11.9 CONTROLLED TYPE 2 DIABETES MELLITUS WITHOUT COMPLICATION, WITHOUT LONG-TERM CURRENT USE OF INSULIN: ICD-10-CM

## 2025-06-26 DIAGNOSIS — D84.821 IMMUNODEFICIENCY DUE TO DRUGS: ICD-10-CM

## 2025-06-26 DIAGNOSIS — D49.9 IMMUNODEFICIENCY SECONDARY TO NEOPLASM: ICD-10-CM

## 2025-06-26 DIAGNOSIS — Z79.899 IMMUNODEFICIENCY DUE TO DRUGS: ICD-10-CM

## 2025-06-26 DIAGNOSIS — C74.91 MALIGNANT PHEOCHROMOCYTOMA OF RIGHT ADRENAL GLAND: Primary | ICD-10-CM

## 2025-06-26 DIAGNOSIS — I49.5 SICK SINUS SYNDROME: ICD-10-CM

## 2025-06-26 DIAGNOSIS — C74.90 MALIGNANT PHEOCHROMOCYTOMA, UNSPECIFIED LATERALITY: Primary | ICD-10-CM

## 2025-06-26 DIAGNOSIS — I10 ESSENTIAL HYPERTENSION: ICD-10-CM

## 2025-06-26 DIAGNOSIS — D84.81 IMMUNODEFICIENCY SECONDARY TO NEOPLASM: ICD-10-CM

## 2025-06-26 DIAGNOSIS — C7A.098 MALIGNANT CARCINOID TUMORS OF OTHER SITES: ICD-10-CM

## 2025-06-26 DIAGNOSIS — C74.90 MALIGNANT PHEOCHROMOCYTOMA, UNSPECIFIED LATERALITY: ICD-10-CM

## 2025-06-26 DIAGNOSIS — C7B.8 SECONDARY NEUROENDOCRINE TUMOR OF DISTANT LYMPH NODES: ICD-10-CM

## 2025-06-26 PROCEDURE — 3078F DIAST BP <80 MM HG: CPT | Mod: CPTII,S$GLB,, | Performed by: INTERNAL MEDICINE

## 2025-06-26 PROCEDURE — G2211 COMPLEX E/M VISIT ADD ON: HCPCS | Mod: S$GLB,,, | Performed by: INTERNAL MEDICINE

## 2025-06-26 PROCEDURE — 63600175 PHARM REV CODE 636 W HCPCS: Performed by: INTERNAL MEDICINE

## 2025-06-26 PROCEDURE — 1126F AMNT PAIN NOTED NONE PRSNT: CPT | Mod: CPTII,S$GLB,, | Performed by: INTERNAL MEDICINE

## 2025-06-26 PROCEDURE — 63600175 PHARM REV CODE 636 W HCPCS: Mod: JZ,TB | Performed by: INTERNAL MEDICINE

## 2025-06-26 PROCEDURE — A4216 STERILE WATER/SALINE, 10 ML: HCPCS | Performed by: INTERNAL MEDICINE

## 2025-06-26 PROCEDURE — 1159F MED LIST DOCD IN RCRD: CPT | Mod: CPTII,S$GLB,, | Performed by: INTERNAL MEDICINE

## 2025-06-26 PROCEDURE — 96365 THER/PROPH/DIAG IV INF INIT: CPT

## 2025-06-26 PROCEDURE — 99999 PR PBB SHADOW E&M-EST. PATIENT-LVL IV: CPT | Mod: PBBFAC,,, | Performed by: INTERNAL MEDICINE

## 2025-06-26 PROCEDURE — 25000003 PHARM REV CODE 250: Performed by: INTERNAL MEDICINE

## 2025-06-26 PROCEDURE — 1101F PT FALLS ASSESS-DOCD LE1/YR: CPT | Mod: CPTII,S$GLB,, | Performed by: INTERNAL MEDICINE

## 2025-06-26 PROCEDURE — 3288F FALL RISK ASSESSMENT DOCD: CPT | Mod: CPTII,S$GLB,, | Performed by: INTERNAL MEDICINE

## 2025-06-26 PROCEDURE — 99215 OFFICE O/P EST HI 40 MIN: CPT | Mod: S$GLB,,, | Performed by: INTERNAL MEDICINE

## 2025-06-26 PROCEDURE — 1160F RVW MEDS BY RX/DR IN RCRD: CPT | Mod: CPTII,S$GLB,, | Performed by: INTERNAL MEDICINE

## 2025-06-26 PROCEDURE — 3074F SYST BP LT 130 MM HG: CPT | Mod: CPTII,S$GLB,, | Performed by: INTERNAL MEDICINE

## 2025-06-26 PROCEDURE — 96372 THER/PROPH/DIAG INJ SC/IM: CPT

## 2025-06-26 RX ORDER — LANREOTIDE ACETATE 120 MG/.5ML
120 INJECTION SUBCUTANEOUS ONCE
Status: COMPLETED | OUTPATIENT
Start: 2025-06-26 | End: 2025-06-26

## 2025-06-26 RX ORDER — SODIUM CHLORIDE 0.9 % (FLUSH) 0.9 %
10 SYRINGE (ML) INJECTION
Status: DISCONTINUED | OUTPATIENT
Start: 2025-06-26 | End: 2025-06-26 | Stop reason: HOSPADM

## 2025-06-26 RX ORDER — LANREOTIDE ACETATE 120 MG/.5ML
120 INJECTION SUBCUTANEOUS ONCE
OUTPATIENT
Start: 2025-06-26 | End: 2025-06-26

## 2025-06-26 RX ORDER — HEPARIN 100 UNIT/ML
500 SYRINGE INTRAVENOUS
OUTPATIENT
Start: 2025-07-24

## 2025-06-26 RX ORDER — SODIUM CHLORIDE 0.9 % (FLUSH) 0.9 %
10 SYRINGE (ML) INJECTION
OUTPATIENT
Start: 2025-07-24

## 2025-06-26 RX ADMIN — ZOLEDRONIC ACID 3.5 MG: 4 INJECTION, SOLUTION, CONCENTRATE INTRAVENOUS at 02:06

## 2025-06-26 RX ADMIN — LANREOTIDE ACETATE 120 MG: 120 INJECTION SUBCUTANEOUS at 02:06

## 2025-06-26 RX ADMIN — Medication 10 ML: at 02:06

## 2025-06-26 NOTE — NURSING
Pt received IV Zomets infusion. Pt tolerated it well. Pt tolerated LAnretotide injection well, no complaints at this time. No adverse reactions noted. Next appointment scheduled and pt d/c in no acute distress.

## 2025-06-26 NOTE — Clinical Note
Keep scheduled appts. See me in 8 and 12 weeks with CBC, CMP, metanephrine, catecholamines checked at [facility] 1 day prior

## 2025-06-26 NOTE — PROGRESS NOTES
PROGRESS NOTE    Subjective:       Patient ID: Hailey Sawant is a 79 y.o. female.    Chief Complaint: metastatic pheochromocytoma    Diagnosis:  Metastatic pheochromocytoma of the right adrenal gland, with mets to bones, liver    Oncologic History:  1. Ms. Sawant is a 74 yo woman with DM, HTN, CAD s/p MI, A fib and sick sinus syndrome s/p pacemaker in June 2022 (on eliquis since), carotid paraganglioma removed in 11/2018 who initially saw me on 11/28/2022 for further management of metastatic pheochromocytoma. Last seen Dr West in 2018 and Dr Cohen in early 2021. Does not follow oncologist locally. It is my first time seeing her today. Her history dates to 2009 when she was being worked up for abdominal pain and was found to have cholelithiasis. However, during this workup she was found to have bilateral adrenal masses.  She was also noted to be hypertensive.  She had a 6.5 cm mass on the right with some evidence of necrosis and also a mass just under 3 cm on the left.  Biochemical workup was consistent with pheochromocytoma.  In 11/2009 she underwent a bilateral adrenalectomy.  Pathology had revealed a right adrenal gland pheochromocytoma and a left adrenal gland adrenal cortical adenoma.  In 02/2016 she was noted to have recurrent disease to the bone and underwent I 131 MIBG therapy in 03/2016, 07/2016 and 11/2016.  In 10/2016 she underwent a right partial nephrectomy due to recurrent disease.  Pathology did confirm findings consistent with recurrent pheochromocytoma.  In 11/2018 she underwent removal of the right carotid body tumor with pathology showing paraganglioma. She presents today for follow up. She knows she has cancer but does not know it is stage IV. She is not sure if Dr Cohen called her after tumor board or not. She feels well. Had MI in June 2022, treated at [facility], had pacemaker placed. Medically treated. Feeling well  "today. We discussed restaging dotatate scan.   2. Dotatate scan declined by insurance several times. Finally had gallium PET on 23. It showed "New foci of abnormal radiotracer uptake at the cervical spine, sacral, right ilium and right acetabular joint. Interval enlargement of hepatic section 7 hypodense mass. [facility] tumor board[facility] However unable to get insurance approval for PRRT and admission afterwards to monitor for catecholamine crisis.   3. Lanreotide started on 23. Zometa started 23.   4. PRRT started on 3/1/23. #2 on 23, # 3 on 23, #4 on 23    Interval History:   Ms Sawant returns for follow up. Chronic fatigue and SOB. Saw cardiologist this week. Was told everything is good.     ECO    ROS:   A ten-point system review is obtained and negative except for what was stated in the Interval History.     Physical Examination:   Vital signs reviewed.   General: well hydrated, well developed, in no acute distress  HEENT: normocephalic, EOMI, anicteric sclerae.   Neck: supple, no JVD, +thyromegaly, soft, no cervical or supraclavicular lymphadenopathy  Lungs: clear breath sounds bilaterally, no wheezing, rales, or rhonchi  Heart: RRR, no M/R/G  Abdomen: soft, no tenderness, non-distended, no hepatosplenomegaly, mass, or hernia. BS present  Extremities: no clubbing, cyanosis, or edema  Skin: no rash, ulcer, or open wounds  Neuro: alert and oriented x 4, no focal neuro deficit  Psych: pleasant and appropriate mood and affect    Objective:     Laboratory Data:  Labs reviewed. CBC, CMP adequate for treatment    Imaging Data:  Gallium PET 23:  New foci of abnormal radiotracer uptake at the cervical spine, sacral, right ilium and right acetabular joint.     Interval enlargement of hepatic section 7 hypodense mass.     Based upon progression of disease and tracer avidity of the patient's tumor burden, the patient would be an appropriate candidate for PRRT if clinically " indicated.     CT neck 4/6/23:  Impression:     1. Status post resection right carotid body tumor.  No recurrent mass identified.  2. 1.5 cm peripherally enhancing nodules lower pole both lobes of the thyroid.    Thyroid US 5/16/23:  Impression:     Multiple thyroid nodules.  1.7 cm TI-RADS 5 left lobe nodule meets ACR criteria for FNA.     Right lobe nodule meets criteria for surveillance with repeat thyroid ultrasound recommended 1 year.      Copper PET 5/25/23:  Impression:     1. Right hepatic lobe lesions and multifocal primarily sclerotic osseous lesions stable in size and number with decreased tracer-avidity.  No new lesions identified.  2. Postsurgical change prior bilateral adrenalectomy.  No tracer uptake within the surgical bed to suggest localized recurrent disease.  3. Other stable incidental findings above.    Copper PET 9/13/23:  FINDINGS:  Quality of the study: Diaphragmatic/respiratory motion and associated misregistration artifact limits assessment of the liver.     In the head and neck, there is physiologic distribution in the pituitary, salivary, and thyroid glands, and there are no tracer avid lesions suspicious for malignancy.     In the chest, there are no tracer avid lesions suspicious for malignancy.     In the abdomen and pelvis, there is similar appearance of radiotracer avid lesions within the right hepatic lobe noting that evaluation is limited due to motion and misregistration artifact.  Dominant right hepatic lobe lesion demonstrates maximum SUV of 46 (previously 44).  Difficult to measure lesions on CT due to artifact.  No new lesion.  There is physiologic uptake in the pancreatic uncinate process, liver, spleen, adrenal glands and  tract.     In the bones, there is stable number and distribution of radiotracer avid lesions throughout the osseous structures including the right scapula, spine, sacrum, right ilium and ischium, and right femur.  Right scapula maximum SUV of 76  (previously 91).  Right ilium maximum SUV of 100 (previously 118).  Left sacrum maximum SUV of 25 (previously 40).  No new lesion.     Additional findings: Left chest wall cardiac pacer and leads noted.  Cardiomegaly.  Diffuse calcific atherosclerosis.  Postsurgical changes of bilateral adrenalectomy and cholecystectomy.  Calcified uterine fibroid.  Colonic diverticulosis.  Left hip arthroplasty hardware results in beam hardening artifact limiting assessment of the pelvis.     Impression:     Stable distribution of radiotracer avid lesions within the liver and bones.  No new lesion.     Motion artifact limits assessment of the liver.    Copper PET 2/15/2024:  Impression:     Stable distribution of multiple tracer avid osseous lesions which show decreased tracer uptake compared to prior study.     Grossly stable appearance of tracer avid right hepatic lobe lesions.     No new tracer avid lesions.      copper PET 8/8/24:  Impression:     Stable distribution of tracer avid osseous lesions with interval mild decreased tracer avidity of several index lesions, as above with mixed favorable treatment response     Interval mild decreased tracer avidity of posterior right hepatic lobe lesions.     No new tracer avid lesion.    CT right hip 11/13/24:  COMPARISON:  Dotatate PET 08/08/2024.     FINDINGS:  An osseous lesion in the right iliac bone, corresponding with prior PET-CT of 08/08/2024.  A subtle sclerotic lesion is also noted within the medial aspect of right acetabulum, corresponding with prior PET CT finding.  Postcontrast images demonstrate no masses or abnormal enhancement.  There are some injection granulomas in the right gluteal subcutaneous tissue.  Moderate joint space narrowing right hip joint.     Impression:     1. Metastatic lesions in the right acetabulum and right iliac bone, corresponding with recent PET-CT finding.  2. Moderate osteoarthrosis right hip.     Copper PET 1/30/25:  Impression:     History of  metastatic pheochromocytoma status post bilateral adrenalectomy.     Similar appearing tracer avid liver and osseous metastasis.     No new tracer avid disease elsewhere.      MRI brain 5/7/25:  Impression:     No evidence of an acute intracranial abnormality or intracranial metastatic disease.     Mild age-related cerebral atrophy and probable chronic microvascular ischemic change.     Assessment and Plan:     1. Malignant pheochromocytoma, unspecified laterality    2. Secondary neuroendocrine tumor of distant lymph nodes    3. Secondary neuroendocrine tumor of bone    4. Malignant carcinoid tumors of other sites    5. Immunodeficiency due to drugs    6. Immunodeficiency secondary to neoplasm    7. Adrenal insufficiency, primary post-surgical    8. Controlled type 2 diabetes mellitus without complication, without long-term current use of insulin    9. Sick sinus syndrome    10. Chronic combined systolic and diastolic congestive heart failure    11. Essential hypertension        1-6  - Ms Jese is an 78 yo woman with R adrenal pheochromocytoma s/p bilateral adrenectomy in 11/2009, recurrent disease to the bone in 2016 s/p MIBG in 2016. At our prior visits, we discussed interim progression of cancer in the liver and new lesions in the bones. Discussed lanreotide every 4 weeks, zometa every 3 months and PRRT. Lanreotide started 1/23/2023. Zometa started 2/20/23.   - PRRT #1 on 3/1/23. #2 on 4/26/23, #3 on 6/21/23, #4 on 8/16/23  - doing well.   - c/w lanreotide every 4 weeks. Will give today  - zometa every 3 months. Last given on 3/6/25. Will give today  - RTC in 4 weeks with PET    7.  - c/w cortef    8.  - BS controlled  - c/w current medication    9.10  - f/u with Dr Loya at [facility]    11.  - BP controlled  - c/w current medication      Follow-up:     Return in one month  Knows to call in the interval if any problems arise.    I spent 40 minutes reviewing the chart, interpreting laboratory result and imaging data,  coordinating patient's care, with at least 50% of the time on face-to-face counseling.         Electronically signed by Ema Chowdhury

## 2025-07-03 ENCOUNTER — TELEPHONE (OUTPATIENT)
Dept: HEMATOLOGY/ONCOLOGY | Facility: CLINIC | Age: 80
End: 2025-07-03
Payer: MEDICARE

## 2025-07-17 ENCOUNTER — HOSPITAL ENCOUNTER (OUTPATIENT)
Dept: RADIOLOGY | Facility: HOSPITAL | Age: 80
Discharge: HOME | End: 2025-07-17
Attending: INTERNAL MEDICINE
Payer: MEDICARE

## 2025-07-17 DIAGNOSIS — C7A.098 MALIGNANT CARCINOID TUMORS OF OTHER SITES: ICD-10-CM

## 2025-07-17 DIAGNOSIS — C74.90 MALIGNANT PHEOCHROMOCYTOMA, UNSPECIFIED LATERALITY: ICD-10-CM

## 2025-07-17 PROCEDURE — 78815 PET IMAGE W/CT SKULL-THIGH: CPT | Mod: 26,PS,, | Performed by: NUCLEAR MEDICINE

## 2025-07-17 PROCEDURE — A9592 HC COPPER CU-64, DOTATE, DX, PER 1 MCI: HCPCS | Mod: JZ,TB | Performed by: INTERNAL MEDICINE

## 2025-07-17 PROCEDURE — 78815 PET IMAGE W/CT SKULL-THIGH: CPT | Mod: TC

## 2025-07-17 RX ADMIN — COPPER CU 64 DOTATATE 5.32 MILLICURIE: 1 INJECTION, SOLUTION INTRAVENOUS at 12:07

## 2025-07-24 ENCOUNTER — OFFICE VISIT (OUTPATIENT)
Dept: HEMATOLOGY/ONCOLOGY | Facility: CLINIC | Age: 80
End: 2025-07-24
Payer: MEDICARE

## 2025-07-24 ENCOUNTER — INFUSION (OUTPATIENT)
Dept: INFUSION THERAPY | Facility: HOSPITAL | Age: 80
End: 2025-07-24
Attending: INTERNAL MEDICINE
Payer: MEDICARE

## 2025-07-24 VITALS
OXYGEN SATURATION: 98 % | SYSTOLIC BLOOD PRESSURE: 93 MMHG | DIASTOLIC BLOOD PRESSURE: 62 MMHG | SYSTOLIC BLOOD PRESSURE: 93 MMHG | BODY MASS INDEX: 32.8 KG/M2 | RESPIRATION RATE: 18 BRPM | HEIGHT: 63 IN | WEIGHT: 185.19 LBS | WEIGHT: 185.19 LBS | OXYGEN SATURATION: 98 % | DIASTOLIC BLOOD PRESSURE: 62 MMHG | HEART RATE: 70 BPM | BODY MASS INDEX: 32.81 KG/M2 | TEMPERATURE: 98 F | TEMPERATURE: 98 F | HEART RATE: 70 BPM

## 2025-07-24 DIAGNOSIS — Z79.899 IMMUNODEFICIENCY DUE TO DRUGS: ICD-10-CM

## 2025-07-24 DIAGNOSIS — C7B.8 SECONDARY NEUROENDOCRINE TUMOR OF DISTANT LYMPH NODES: ICD-10-CM

## 2025-07-24 DIAGNOSIS — E11.9 CONTROLLED TYPE 2 DIABETES MELLITUS WITHOUT COMPLICATION, WITHOUT LONG-TERM CURRENT USE OF INSULIN: ICD-10-CM

## 2025-07-24 DIAGNOSIS — C74.90 MALIGNANT PHEOCHROMOCYTOMA, UNSPECIFIED LATERALITY: Primary | ICD-10-CM

## 2025-07-24 DIAGNOSIS — D49.9 IMMUNODEFICIENCY SECONDARY TO NEOPLASM: ICD-10-CM

## 2025-07-24 DIAGNOSIS — I49.5 SICK SINUS SYNDROME: ICD-10-CM

## 2025-07-24 DIAGNOSIS — D84.821 IMMUNODEFICIENCY DUE TO DRUGS: ICD-10-CM

## 2025-07-24 DIAGNOSIS — I10 ESSENTIAL HYPERTENSION: ICD-10-CM

## 2025-07-24 DIAGNOSIS — I50.42 CHRONIC COMBINED SYSTOLIC AND DIASTOLIC CONGESTIVE HEART FAILURE: ICD-10-CM

## 2025-07-24 DIAGNOSIS — D84.81 IMMUNODEFICIENCY SECONDARY TO NEOPLASM: ICD-10-CM

## 2025-07-24 DIAGNOSIS — E27.1 ADRENAL INSUFFICIENCY, PRIMARY: ICD-10-CM

## 2025-07-24 DIAGNOSIS — C7B.8 SECONDARY NEUROENDOCRINE TUMOR OF BONE: ICD-10-CM

## 2025-07-24 DIAGNOSIS — C7A.098 MALIGNANT CARCINOID TUMORS OF OTHER SITES: ICD-10-CM

## 2025-07-24 PROCEDURE — 99999 PR PBB SHADOW E&M-EST. PATIENT-LVL IV: CPT | Mod: PBBFAC,,, | Performed by: INTERNAL MEDICINE

## 2025-07-24 PROCEDURE — 3288F FALL RISK ASSESSMENT DOCD: CPT | Mod: CPTII,S$GLB,, | Performed by: INTERNAL MEDICINE

## 2025-07-24 PROCEDURE — 99215 OFFICE O/P EST HI 40 MIN: CPT | Mod: S$GLB,,, | Performed by: INTERNAL MEDICINE

## 2025-07-24 PROCEDURE — 1159F MED LIST DOCD IN RCRD: CPT | Mod: CPTII,S$GLB,, | Performed by: INTERNAL MEDICINE

## 2025-07-24 PROCEDURE — 96372 THER/PROPH/DIAG INJ SC/IM: CPT

## 2025-07-24 PROCEDURE — 3078F DIAST BP <80 MM HG: CPT | Mod: CPTII,S$GLB,, | Performed by: INTERNAL MEDICINE

## 2025-07-24 PROCEDURE — 3074F SYST BP LT 130 MM HG: CPT | Mod: CPTII,S$GLB,, | Performed by: INTERNAL MEDICINE

## 2025-07-24 PROCEDURE — G2211 COMPLEX E/M VISIT ADD ON: HCPCS | Mod: S$GLB,,, | Performed by: INTERNAL MEDICINE

## 2025-07-24 PROCEDURE — 63600175 PHARM REV CODE 636 W HCPCS: Mod: JZ,TB | Performed by: INTERNAL MEDICINE

## 2025-07-24 PROCEDURE — 1101F PT FALLS ASSESS-DOCD LE1/YR: CPT | Mod: CPTII,S$GLB,, | Performed by: INTERNAL MEDICINE

## 2025-07-24 PROCEDURE — 1160F RVW MEDS BY RX/DR IN RCRD: CPT | Mod: CPTII,S$GLB,, | Performed by: INTERNAL MEDICINE

## 2025-07-24 RX ORDER — LANREOTIDE ACETATE 120 MG/.5ML
120 INJECTION SUBCUTANEOUS ONCE
Status: COMPLETED | OUTPATIENT
Start: 2025-07-24 | End: 2025-07-24

## 2025-07-24 RX ORDER — LANREOTIDE ACETATE 120 MG/.5ML
120 INJECTION SUBCUTANEOUS ONCE
OUTPATIENT
Start: 2025-07-24 | End: 2025-07-24

## 2025-07-24 RX ADMIN — LANREOTIDE ACETATE 120 MG: 120 INJECTION SUBCUTANEOUS at 01:07

## 2025-07-24 NOTE — PROGRESS NOTES
PROGRESS NOTE    Subjective:       Patient ID: Hailey Sawant is a 80 y.o. female.    Chief Complaint: metastatic pheochromocytoma    Diagnosis:  Metastatic pheochromocytoma of the right adrenal gland, with mets to bones, liver    Oncologic History:  1. Ms. Sawant is a 72 yo woman with DM, HTN, CAD s/p MI, A fib and sick sinus syndrome s/p pacemaker in June 2022 (on eliquis since), carotid paraganglioma removed in 11/2018 who initially saw me on 11/28/2022 for further management of metastatic pheochromocytoma. Last seen Dr West in 2018 and Dr Cohen in early 2021. Does not follow oncologist locally. It is my first time seeing her today. Her history dates to 2009 when she was being worked up for abdominal pain and was found to have cholelithiasis. However, during this workup she was found to have bilateral adrenal masses.  She was also noted to be hypertensive.  She had a 6.5 cm mass on the right with some evidence of necrosis and also a mass just under 3 cm on the left.  Biochemical workup was consistent with pheochromocytoma.  In 11/2009 she underwent a bilateral adrenalectomy.  Pathology had revealed a right adrenal gland pheochromocytoma and a left adrenal gland adrenal cortical adenoma.  In 02/2016 she was noted to have recurrent disease to the bone and underwent I 131 MIBG therapy in 03/2016, 07/2016 and 11/2016.  In 10/2016 she underwent a right partial nephrectomy due to recurrent disease.  Pathology did confirm findings consistent with recurrent pheochromocytoma.  In 11/2018 she underwent removal of the right carotid body tumor with pathology showing paraganglioma. She presents today for follow up. She knows she has cancer but does not know it is stage IV. She is not sure if Dr Cohen called her after tumor board or not. She feels well. Had MI in June 2022, treated at [facility], had pacemaker placed. Medically treated. Feeling well  "today. We discussed restaging dotatate scan.   2. Dotatate scan declined by insurance several times. Finally had gallium PET on 23. It showed "New foci of abnormal radiotracer uptake at the cervical spine, sacral, right ilium and right acetabular joint. Interval enlargement of hepatic section 7 hypodense mass. Reviewed at tumor board. [facility] PRRT. However unable to get insurance approval for PRRT and admission afterwards to monitor for catecholamine crisis.   3. Lanreotide started on 23. Zometa started 23.   4. PRRT started on 3/1/23. #2 on 23, # 3 on 23, #4 on 23    Interval History:   Ms Sawant returns for follow up. Feeling okay. Trying to get over a cold. Has some sore throat.     ECO    ROS:   A ten-point system review is obtained and negative except for what was stated in the Interval History.     Physical Examination:   Vital signs reviewed.   General: well hydrated, well developed, in no acute distress  HEENT: normocephalic, EOMI, anicteric sclerae.   Neck: supple, no JVD, +thyromegaly, soft, no cervical or supraclavicular lymphadenopathy  Lungs: clear breath sounds bilaterally, no wheezing, rales, or rhonchi  Heart: RRR, no M/R/G  Abdomen: soft, no tenderness, non-distended, no hepatosplenomegaly, mass, or hernia. BS present  Extremities: no clubbing, cyanosis, or edema  Skin: no rash, ulcer, or open wounds  Neuro: alert and oriented x 4, no focal neuro deficit  Psych: pleasant and appropriate mood and affect    Objective:     Laboratory Data:  Labs reviewed. CBC, CMP adequate for treatment    Imaging Data:  Gallium PET 23:  New foci of abnormal radiotracer uptake at the cervical spine, sacral, right ilium and right acetabular joint.     Interval enlargement of hepatic section 7 hypodense mass.     Based upon progression of disease and tracer avidity of the patient's tumor burden, the patient would be an appropriate candidate for PRRT if clinically indicated.     CT neck " 4/6/23:  Impression:     1. Status post resection right carotid body tumor.  No recurrent mass identified.  2. 1.5 cm peripherally enhancing nodules lower pole both lobes of the thyroid.    Thyroid US 5/16/23:  Impression:     Multiple thyroid nodules.  1.7 cm TI-RADS 5 left lobe nodule meets ACR criteria for FNA.     Right lobe nodule meets criteria for surveillance with repeat thyroid ultrasound recommended 1 year.      Copper PET 5/25/23:  Impression:     1. Right hepatic lobe lesions and multifocal primarily sclerotic osseous lesions stable in size and number with decreased tracer-avidity.  No new lesions identified.  2. Postsurgical change prior bilateral adrenalectomy.  No tracer uptake within the surgical bed to suggest localized recurrent disease.  3. Other stable incidental findings above.    Copper PET 9/13/23:  FINDINGS:  Quality of the study: Diaphragmatic/respiratory motion and associated misregistration artifact limits assessment of the liver.     In the head and neck, there is physiologic distribution in the pituitary, salivary, and thyroid glands, and there are no tracer avid lesions suspicious for malignancy.     In the chest, there are no tracer avid lesions suspicious for malignancy.     In the abdomen and pelvis, there is similar appearance of radiotracer avid lesions within the right hepatic lobe noting that evaluation is limited due to motion and misregistration artifact.  Dominant right hepatic lobe lesion demonstrates maximum SUV of 46 (previously 44).  Difficult to measure lesions on CT due to artifact.  No new lesion.  There is physiologic uptake in the pancreatic uncinate process, liver, spleen, adrenal glands and  tract.     In the bones, there is stable number and distribution of radiotracer avid lesions throughout the osseous structures including the right scapula, spine, sacrum, right ilium and ischium, and right femur.  Right scapula maximum SUV of 76 (previously 91).  Right ilium  maximum SUV of 100 (previously 118).  Left sacrum maximum SUV of 25 (previously 40).  No new lesion.     Additional findings: Left chest wall cardiac pacer and leads noted.  Cardiomegaly.  Diffuse calcific atherosclerosis.  Postsurgical changes of bilateral adrenalectomy and cholecystectomy.  Calcified uterine fibroid.  Colonic diverticulosis.  Left hip arthroplasty hardware results in beam hardening artifact limiting assessment of the pelvis.     Impression:     Stable distribution of radiotracer avid lesions within the liver and bones.  No new lesion.     Motion artifact limits assessment of the liver.    Copper PET 2/15/2024:  Impression:     Stable distribution of multiple tracer avid osseous lesions which show decreased tracer uptake compared to prior study.     Grossly stable appearance of tracer avid right hepatic lobe lesions.     No new tracer avid lesions.      copper PET 8/8/24:  Impression:     Stable distribution of tracer avid osseous lesions with interval mild decreased tracer avidity of several index lesions, as above with mixed favorable treatment response     Interval mild decreased tracer avidity of posterior right hepatic lobe lesions.     No new tracer avid lesion.    CT right hip 11/13/24:  COMPARISON:  Dotatate PET 08/08/2024.     FINDINGS:  An osseous lesion in the right iliac bone, corresponding with prior PET-CT of 08/08/2024.  A subtle sclerotic lesion is also noted within the medial aspect of right acetabulum, corresponding with prior PET CT finding.  Postcontrast images demonstrate no masses or abnormal enhancement.  There are some injection granulomas in the right gluteal subcutaneous tissue.  Moderate joint space narrowing right hip joint.     Impression:     1. Metastatic lesions in the right acetabulum and right iliac bone, corresponding with recent PET-CT finding.  2. Moderate osteoarthrosis right hip.     Copper PET 1/30/25:  Impression:     History of metastatic pheochromocytoma  status post bilateral adrenalectomy.     Similar appearing tracer avid liver and osseous metastasis.     No new tracer avid disease elsewhere.      MRI brain 5/7/25:  Impression:     No evidence of an acute intracranial abnormality or intracranial metastatic disease.     Mild age-related cerebral atrophy and probable chronic microvascular ischemic change.    Copper PET 7/17/25:  COMPARISON:  CU64 PET-CT 01/30/2025     FINDINGS:  Quality of the study: Adequate.     SUV measurements may not be directly comparable with those made on Ga-68 DOTATATE PET/CT.     In the head and neck, there is physiologic distribution in the pituitary, salivary, and thyroid glands, and there are no tracer avid lesions suspicious for malignancy.     In the chest, there are no tracer avid lesions suspicious for malignancy.     In the abdomen and pelvis, there is physiologic uptake in the pancreatic uncinate process and body, liver, spleen, and  tract.  Postoperative changes from previous bilateral adrenalectomy and partial right nephrectomy.  Redemonstration of tracer avid hypoattenuating lesions in the right hepatic lobe, with index lesion measuring 3.4 x 2.2 cm (series 2, image 144) with SUV max 44, previously measuring up to 2.9 cm with SUV max 62.     In the bones, there are multiple tracer avid lesions, with index lesions below.  No new tracer avid osseous lesions identified.     * sclerotic right scapular lesion with SUV max 25 (fused axial image 82), previous SUV max 30.     * sclerotic right iliac lesion measuring 1.7 x 1.2 cm (series 2, image 198) with SUV max 32, previously measuring 1.8 x 1.1 cm with SUV max 38.     * left acetabular sclerotic lesion with SUV max 16 (fused axial image 213), previous SUV max 2.8.     Additional CT findings: Layering secretions in the right maxillary sinus, lobular mucosal thickening of the left maxillary sinus.  Cardiac device implanted in the left chest wall with leads terminating in the heart.   Small hiatal hernia.  Multivessel calcific atherosclerosis of the coronary arteries.  Dilatation of the pulmonary trunk up to 5.2 cm, similar to prior.  Left atrial enlargement measuring up to 5.3 cm.  Intrapelvic evaluation degraded by streak artifact from left total hip arthroplasty prosthesis.  Calcified uterine fibroid.  Injection granulomas in the soft tissues of the bilateral gluteal regions.     Impression:     In this patient with history of metastatic pheochromocytoma, there is redemonstration of tracer avid foci in the bones and liver.  Left acetabular lesion with significant interval increase in tracer uptake compared to 01/30/2025.  Otherwise, remaining lesions stable in size and tracer avidity.        Assessment and Plan:     1. Malignant pheochromocytoma, unspecified laterality    2. Secondary neuroendocrine tumor of distant lymph nodes    3. Secondary neuroendocrine tumor of bone    4. Malignant carcinoid tumors of other sites    5. Immunodeficiency due to drugs    6. Immunodeficiency secondary to neoplasm    7. Adrenal insufficiency, primary post-surgical    8. Controlled type 2 diabetes mellitus without complication, without long-term current use of insulin    9. Sick sinus syndrome    10. Chronic combined systolic and diastolic congestive heart failure    11. Essential hypertension        1-6  - Ms Jese is an 79 yo woman with R adrenal pheochromocytoma s/p bilateral adrenectomy in 11/2009, recurrent disease to the bone in 2016 s/p MIBG in 2016. At our prior visits, we discussed interim progression of cancer in the liver and new lesions in the bones. Discussed lanreotide every 4 weeks, zometa every 3 months and PRRT. Lanreotide started 1/23/2023. Zometa started 2/20/23.   - PRRT #1 on 3/1/23. #2 on 4/26/23, #3 on 6/21/23, #4 on 8/16/23  - doing well.   - I have personally reviewed her PET scan images and compared to prior. Stable disease. Discussed one bone lesion got brighter but that does not  mean progression of disease.  - c/w lanreotide every 4 weeks. Will give today  - zometa every 3 months. Last given on 6/26/25. Next due in Sep  - RTC in 4 weeks   - will review at tumor board    7.  - c/w cortef    8.  - BS controlled  - c/w current medication    9.10  - f/u with Dr Loya at [facility]    11.  - BP controlled  - c/w current medication      Follow-up:     Return in one month  Knows to call in the interval if any problems arise.    I spent 40 minutes reviewing the chart, interpreting laboratory result and imaging data, coordinating patient's care, with at least 50% of the time on face-to-face counseling.      code applied: patient requires a continuous, longitudinal, and active collaborative plan of care related to this patient's health condition, pheocrhomocytoma -- the management of which requires the direction of a practitioner with specialized clinical knowledge, skill, and expertise.      Electronically signed by Ema Chowdhury

## 2025-07-24 NOTE — Clinical Note
Please schedule CBC, CMP, catecholamine, metanephrine at [facility] on 10/15. Keep other appts.

## 2025-07-24 NOTE — NURSING
Pt tolerated Lanrerotide injection well, no complaints at this time. No adverse reactions noted. Next appointment scheduled and pt d/c in no acute distress.

## 2025-07-28 ENCOUNTER — TUMOR BOARD CONFERENCE (OUTPATIENT)
Dept: HEMATOLOGY/ONCOLOGY | Facility: CLINIC | Age: 80
End: 2025-07-28
Payer: MEDICARE

## 2025-07-28 ENCOUNTER — DOCUMENTATION ONLY (OUTPATIENT)
Dept: HEMATOLOGY/ONCOLOGY | Facility: CLINIC | Age: 80
End: 2025-07-28
Payer: MEDICARE

## 2025-07-28 NOTE — PROGRESS NOTES
[facility]      NEUROENDOCRINE MULTIDISCIPLINARY TUMOR BOARD  _____________________________________________________________________    PRESENTER:   Ema Chowdhury MD    REASON FOR PRESENTATION:  Scan Review    ATTENDEES:   Gastroenterology - Not Present  Interventional Radiology - Daniel Allen MD  Nuclear Medicine - Keenan Clifford MD and Mia Michael MD  Nursing - NET TB Nursing: Kristi Wyatt, RN, Christiane Moran, RN, and Liz Crespo, RN  Oncology - Ema Chowdhury MD  Palliative Medicine - Not Present  Pathology -Ajay Michael MD and Johana Palacios MD  Research - Not Present  Surgery - Chet Samayoa MD  Genetics - Moose Fuchs    PATIENT STATUS:  Established Patient    PATIENT SUMMARY:  Past Medical History:   Diagnosis Date    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    Diabetes mellitus     Hypertension     Lumbar spondylosis 6/8/2018    Malignant pheochromocytoma     Pheochromocytoma     Pheochromocytoma, malignant 01/10/2016    Pheochromocytoma, malignant     Sacroiliac joint pain 7/11/2018    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/17/2018    Thyroid disease        Past Surgical History:   Procedure Laterality Date    ADRENALECTOMY      ANGIOGRAPHY N/A 11/15/2018    Procedure: ANGIOGRAM;  Surgeon: [facility];  Location: [facility] OR [address] [place] [address];  Service: Radiology;  Laterality: N/A;    APPENDECTOMY      DISSECTION OF NECK Right 11/16/2018    Procedure: DISSECTION, NECK to remove right carotid body tumor;  Surgeon: Noah Ca MD;  Location: [facility] OR [address] [place] [address];  Service: ENT;  Laterality: Right;    INJECTION OF ANESTHETIC AGENT INTO SACROILIAC JOINT Right 6/26/2018    Procedure: BLOCK, SACROILIAC JOINT;  Surgeon: Sydney Reynoso MD;  Location: [facility] PAIN [facility];  Service: Pain Management;  Laterality: Right;  Right SI Joint Injection    [id number]    NEEDS CONSENT    LEFT HEART CATHETERIZATION Left 7/18/2018    Procedure:  CATHETERIZATION, HEART, LEFT;  Surgeon: Moustapha Vasquez MD;  Location: [facility] C[facility] LAB;  Service: Cardiovascular;  Laterality: Left;    MIBG Therapy  3/2016, 7/2016, 12/2016    [facility] [place] [facility] - Dr. Martines       TUMOR MARKERS:  5-HIAA, Plasma Ref Range: up to 22 ng/mL      Chromogranin A Ref Range: <93 ng/mL  Recent Labs   Lab 11/11/22  0959   Chromogranin A 229 H     Gastrin Ref Range: <100 pg/mL      Neurokinin A Ref Range: 0 - 40 pg/mL      Pancreastatin Ref Range: 10 - 135 pg/mL      Pancreatic Polypeptide Ref Range: >314 pg/mL      Serotonin Ref Range: <=230 ng/mL            Latest Ref Rng & Units 7/24/2025     1:28 PM 7/24/2025     1:03 PM 7/23/2025    10:04 AM   Neuroendocrine Labs   WBC 3.90 - 12.70 K/uL   7.30    RBC 4.00 - 5.40 M/uL   3.67    HGB 12.0 - 16.0 g/dL   10.7    HCT 37.0 - 48.5 %   32.4    MCV 82 - 98 fL   88    MCH 27.0 - 31.0 pg   29.1    MCHC 32.0 - 36.0 g/dL   32.9    RDW 11.5 - 14.5 %   16.1    PLATLETS 150 - 450 K/uL   210    MPV 7.4 - 10.4 fL   7.7    GRAN # 1.8 - 7.7 K/uL   4.9    LYMPH # 1.0 - 4.8 K/uL   1.3    MONO # 0.3 - 1.0 K/uL   0.8    EOS # 0.0 - 0.5 K/uL   0.2    BASO # 0.00 - 0.20 K/uL   0.00    GRAN % 38.0 - 73.0 %   67.6    LYMPH % 18.0 - 48.0 %   18.0    MONO % 4.0 - 15.0 %   11.3    EOS % 0.0 - 8.0 %   2.7    BASO % 0.0 - 1.9 %   0.4    DIFF METHOD    Automated    GLUCOSE 74 - 106 mg/dL   133    BUN 7 - 17 mg/dL   12    CREATININE 0.70 - 1.20 mg/dL   1.00     - 145 mmol/L   139    K 3.5 - 5.1 mmol/L   3.6    CHLORIDE 95 - 110 mmol/L   104    CO2 23 - 29 mmol/L   31    CALCIUM 8.4 - 10.2 mg/dL   9.0    PROTEIN, TOTAL 6.3 - 8.2 g/dL   6.5    ALBUMIN 3.5 - 5.2 g/dL   3.5    TOTAL BILIRUBIN 0.2 - 1.3 mg/dL   1.0    ALK PHOSPHATASE 38 - 145 U/L   70    SGOT (AST) 14 - 36 U/L   30    SGPT (ALT) 10 - 44 U/L   19    Weight  185 lbs 3 oz 185 lbs 3 oz      ____________________________________________________________________    DISCUSSION: 79 yo woman with R adrenal pheochromocytoma s/p  bilateral adrenectomy in 11/2009, recurrent disease to the bone in 2016 s/p MIBG in 2016. At our prior visits, we discussed interim progression of cancer in the liver and new lesions in the bones. Discussed lanreotide every 4 weeks, zometa every 3 months and PRRT. Lanreotide started 1/23/2023. Zometa started 2/20/23.  PRRT from 3/1/23-8/16/23. Review PET scan from July 2025 and compare to prior.    INT RAD: Defer to [facility].    NUC MED: bone and liver mets stable with left acetabular lesion increase in tracer uptake.    BOARD RECOMMENDATIONS: Continue lanreotide & Zometa, restage in couple of months.

## 2025-07-28 NOTE — PROGRESS NOTES
"Neuroendocrine Tumor Board  Cancer Genetics Summary    Cancer Genetics Provider Present:  Moose Fuchs    Patient ID:  Name Hailey Sawant ("Hailey")    1945    MRN [id number]      Date of Neuroendocrine Tumor Board:  2025  Presenting Provider(s):  Dr. Chowdhury    Cancer Genetics Impression:  Hailey is a 80 y.o. patient assigned female at birth who has been diagnosed with right adrenal pheochromocytoma in  with recurrent metastatic disease in 2016.      Cancer Genetics Recommendation: Cancer Genetics consult for further cancer genetic risk assessment, genetic counseling, and potentially genetic testing.    Intervention:  Forwarded this recommendation to the presenting provider abovementioned.  "

## 2025-08-21 ENCOUNTER — INFUSION (OUTPATIENT)
Dept: INFUSION THERAPY | Facility: HOSPITAL | Age: 80
End: 2025-08-21
Attending: INTERNAL MEDICINE
Payer: MEDICARE

## 2025-08-21 ENCOUNTER — OFFICE VISIT (OUTPATIENT)
Dept: HEMATOLOGY/ONCOLOGY | Facility: CLINIC | Age: 80
End: 2025-08-21
Payer: MEDICARE

## 2025-08-21 VITALS
OXYGEN SATURATION: 97 % | RESPIRATION RATE: 18 BRPM | HEART RATE: 62 BPM | DIASTOLIC BLOOD PRESSURE: 68 MMHG | OXYGEN SATURATION: 98 % | TEMPERATURE: 98 F | DIASTOLIC BLOOD PRESSURE: 78 MMHG | HEART RATE: 71 BPM | TEMPERATURE: 98 F | BODY MASS INDEX: 32.18 KG/M2 | SYSTOLIC BLOOD PRESSURE: 129 MMHG | WEIGHT: 181.69 LBS | SYSTOLIC BLOOD PRESSURE: 119 MMHG

## 2025-08-21 DIAGNOSIS — C7B.8 SECONDARY NEUROENDOCRINE TUMOR OF BONE: ICD-10-CM

## 2025-08-21 DIAGNOSIS — C7A.098 MALIGNANT CARCINOID TUMORS OF OTHER SITES: ICD-10-CM

## 2025-08-21 DIAGNOSIS — E27.1 ADRENAL INSUFFICIENCY, PRIMARY: ICD-10-CM

## 2025-08-21 DIAGNOSIS — D84.821 IMMUNODEFICIENCY DUE TO DRUGS: ICD-10-CM

## 2025-08-21 DIAGNOSIS — I50.42 CHRONIC COMBINED SYSTOLIC AND DIASTOLIC CONGESTIVE HEART FAILURE: ICD-10-CM

## 2025-08-21 DIAGNOSIS — E11.9 CONTROLLED TYPE 2 DIABETES MELLITUS WITHOUT COMPLICATION, WITHOUT LONG-TERM CURRENT USE OF INSULIN: ICD-10-CM

## 2025-08-21 DIAGNOSIS — C74.90 MALIGNANT PHEOCHROMOCYTOMA, UNSPECIFIED LATERALITY: Primary | ICD-10-CM

## 2025-08-21 DIAGNOSIS — Z79.899 IMMUNODEFICIENCY DUE TO DRUGS: ICD-10-CM

## 2025-08-21 DIAGNOSIS — I49.5 SICK SINUS SYNDROME: ICD-10-CM

## 2025-08-21 DIAGNOSIS — D84.81 IMMUNODEFICIENCY SECONDARY TO NEOPLASM: ICD-10-CM

## 2025-08-21 DIAGNOSIS — I10 ESSENTIAL HYPERTENSION: ICD-10-CM

## 2025-08-21 DIAGNOSIS — D49.9 IMMUNODEFICIENCY SECONDARY TO NEOPLASM: ICD-10-CM

## 2025-08-21 DIAGNOSIS — C7B.8 SECONDARY NEUROENDOCRINE TUMOR OF DISTANT LYMPH NODES: ICD-10-CM

## 2025-08-21 PROCEDURE — 1159F MED LIST DOCD IN RCRD: CPT | Mod: CPTII,S$GLB,, | Performed by: INTERNAL MEDICINE

## 2025-08-21 PROCEDURE — 99999 PR PBB SHADOW E&M-EST. PATIENT-LVL IV: CPT | Mod: PBBFAC,,, | Performed by: INTERNAL MEDICINE

## 2025-08-21 PROCEDURE — 99214 OFFICE O/P EST MOD 30 MIN: CPT | Mod: S$GLB,,, | Performed by: INTERNAL MEDICINE

## 2025-08-21 PROCEDURE — 1160F RVW MEDS BY RX/DR IN RCRD: CPT | Mod: CPTII,S$GLB,, | Performed by: INTERNAL MEDICINE

## 2025-08-21 PROCEDURE — 1125F AMNT PAIN NOTED PAIN PRSNT: CPT | Mod: CPTII,S$GLB,, | Performed by: INTERNAL MEDICINE

## 2025-08-21 PROCEDURE — 1100F PTFALLS ASSESS-DOCD GE2>/YR: CPT | Mod: CPTII,S$GLB,, | Performed by: INTERNAL MEDICINE

## 2025-08-21 PROCEDURE — 96372 THER/PROPH/DIAG INJ SC/IM: CPT

## 2025-08-21 PROCEDURE — 3074F SYST BP LT 130 MM HG: CPT | Mod: CPTII,S$GLB,, | Performed by: INTERNAL MEDICINE

## 2025-08-21 PROCEDURE — 3288F FALL RISK ASSESSMENT DOCD: CPT | Mod: CPTII,S$GLB,, | Performed by: INTERNAL MEDICINE

## 2025-08-21 PROCEDURE — G2211 COMPLEX E/M VISIT ADD ON: HCPCS | Mod: S$GLB,,, | Performed by: INTERNAL MEDICINE

## 2025-08-21 PROCEDURE — 63600175 PHARM REV CODE 636 W HCPCS: Mod: JZ,TB | Performed by: INTERNAL MEDICINE

## 2025-08-21 PROCEDURE — 3078F DIAST BP <80 MM HG: CPT | Mod: CPTII,S$GLB,, | Performed by: INTERNAL MEDICINE

## 2025-08-21 RX ORDER — LANREOTIDE ACETATE 120 MG/.5ML
120 INJECTION SUBCUTANEOUS ONCE
Status: COMPLETED | OUTPATIENT
Start: 2025-08-21 | End: 2025-08-21

## 2025-08-21 RX ORDER — LANREOTIDE ACETATE 120 MG/.5ML
120 INJECTION SUBCUTANEOUS ONCE
OUTPATIENT
Start: 2025-08-21 | End: 2025-08-21

## 2025-08-21 RX ADMIN — LANREOTIDE ACETATE 120 MG: 120 INJECTION SUBCUTANEOUS at 01:08

## 2025-08-22 ENCOUNTER — TELEPHONE (OUTPATIENT)
Dept: HEMATOLOGY/ONCOLOGY | Facility: CLINIC | Age: 80
End: 2025-08-22
Payer: MEDICARE

## (undated) DEVICE — SEE MEDLINE ITEM 156902

## (undated) DEVICE — BLADE SURG #15 CARBON STEEL

## (undated) DEVICE — SUT 1 36IN COATED VICRYL VI

## (undated) DEVICE — SHEET EENT SPLIT

## (undated) DEVICE — SUT COATED VICRYL 4/0 27IN

## (undated) DEVICE — TRAY FOLEY 16FR INFECTION CONT

## (undated) DEVICE — GOWN SURGICAL X-LARGE

## (undated) DEVICE — HOOK LONE STAR BLUNT 12MM

## (undated) DEVICE — SUT SILK 3-0 STRANDS 30IN

## (undated) DEVICE — HEMOSTAT SURGICEL NU-KNIT 6X9

## (undated) DEVICE — SEE MEDLINE ITEM 146417

## (undated) DEVICE — EVACUATOR WOUND BULB 100CC

## (undated) DEVICE — SUT SILK 2-0 STRANDS 30IN

## (undated) DEVICE — NDL BOX COUNTER

## (undated) DEVICE — CONTAINER SPECIMEN STRL 4OZ

## (undated) DEVICE — SEE MEDLINE ITEM 154981

## (undated) DEVICE — SUT VICRYL+ 1 CT1 18IN

## (undated) DEVICE — SEE MEDLINE ITEM 157128

## (undated) DEVICE — SUT LIGACLIP SMALL XTRA

## (undated) DEVICE — PLEDGET TFE POLYMER 3/16

## (undated) DEVICE — HEMOSTAT SURGICEL 4X8IN

## (undated) DEVICE — SEE MEDLINE ITEM 152622

## (undated) DEVICE — STAPLER SKIN PROXIMATE WIDE

## (undated) DEVICE — SUT SILK 3-0 SH DETACH 30IN

## (undated) DEVICE — COVER LIGHT HANDLE 80/CA

## (undated) DEVICE — LOOP VESSEL BLUE MAXI

## (undated) DEVICE — GAUZE SPONGE PEANUT STRL

## (undated) DEVICE — ELECTRODE EXTENDED BLADE

## (undated) DEVICE — SUT 2-0 12-18IN SILK

## (undated) DEVICE — HEMOSTAT SURGICEL 2X3IN

## (undated) DEVICE — APPLIER LIGACLIP LG 13IN

## (undated) DEVICE — DRAPE STERI INSTRUMENT 1018

## (undated) DEVICE — ELECTRODE REM PLYHSV RETURN 9

## (undated) DEVICE — SYS CLSR DERMABOND PRINEO 22CM

## (undated) DEVICE — SEALER LIGASURE MARYLAND 37CM

## (undated) DEVICE — SPONGE LAP 18X18 PREWASHED

## (undated) DEVICE — WARMER DRAPE STERILE LF

## (undated) DEVICE — SUT 3-0 12-18IN SILK

## (undated) DEVICE — TRAY MINOR GEN SURG

## (undated) DEVICE — APPLICATOR CHLORAPREP ORN 26ML

## (undated) DEVICE — SKINMARKER & RULER REGULAR X-F

## (undated) DEVICE — SEE MEDLINE ITEM 157194

## (undated) DEVICE — SEE MEDLINE ITEM 157206

## (undated) DEVICE — CLIP MED TICALL

## (undated) DEVICE — CORD BIPOLAR 12 FOOT

## (undated) DEVICE — SUT SILK 2-0 PS 18IN BLACK

## (undated) DEVICE — ELECTRODE BLADE INSULATED 1 IN

## (undated) DEVICE — SUT 1 48IN PDS II VIO MONO

## (undated) DEVICE — SUT 2/0 36IN COATED VICRYL

## (undated) DEVICE — DRAPE ABDOMINAL TIBURON 14X11

## (undated) DEVICE — BLADE SURG SZ20 CARBON STEEL

## (undated) DEVICE — DRESSING ADH ISLAND 3.6 X 14

## (undated) DEVICE — Device

## (undated) DEVICE — PROBE CATH TEMP 16 FRFOLEY 400

## (undated) DEVICE — CATH URETHRAL RED RUBBER 18FR

## (undated) DEVICE — DRAIN CHANNEL ROUND 15FR

## (undated) DEVICE — SEE MEDLINE ITEM 146292

## (undated) DEVICE — GAUZE SPONGE 4X4 12PLY

## (undated) DEVICE — NDL HYPO REG 25G X 1 1/2

## (undated) DEVICE — BANDAGE ADHESIVE

## (undated) DEVICE — BLADE SURG CARBON STEEL #10

## (undated) DEVICE — SEE MEDLINE ITEM 157117

## (undated) DEVICE — SUT SILK 3-0 SH 18IN BLACK

## (undated) DEVICE — SUT VICRYL PLUS 3-0 SH 18IN

## (undated) DEVICE — ADHESIVE DERMABOND ADVANCED

## (undated) DEVICE — SUT 0 VICRYL / CT-1